# Patient Record
Sex: MALE | Race: WHITE | Employment: FULL TIME | RURAL
[De-identification: names, ages, dates, MRNs, and addresses within clinical notes are randomized per-mention and may not be internally consistent; named-entity substitution may affect disease eponyms.]

---

## 2018-08-07 ENCOUNTER — APPOINTMENT (OUTPATIENT)
Dept: CT IMAGING | Facility: HOSPITAL | Age: 53
End: 2018-08-07
Payer: COMMERCIAL

## 2018-08-07 ENCOUNTER — HOSPITAL ENCOUNTER (EMERGENCY)
Facility: HOSPITAL | Age: 53
Discharge: HOME | End: 2018-08-08
Attending: EMERGENCY MEDICINE
Payer: COMMERCIAL

## 2018-08-07 ENCOUNTER — APPOINTMENT (OUTPATIENT)
Dept: GENERAL RADIOLOGY | Facility: HOSPITAL | Age: 53
End: 2018-08-07
Payer: COMMERCIAL

## 2018-08-07 DIAGNOSIS — I10 ESSENTIAL HYPERTENSION: Primary | ICD-10-CM

## 2018-08-07 LAB
A/G RATIO: 1.6 (ref 0.8–2)
ALBUMIN SERPL-MCNC: 4.5 G/DL (ref 3.4–4.8)
ALP BLD-CCNC: 92 U/L (ref 25–100)
ALT SERPL-CCNC: 17 U/L (ref 4–36)
AMYLASE: 28 U/L (ref 20–104)
ANION GAP SERPL CALCULATED.3IONS-SCNC: 13 MMOL/L (ref 3–16)
AST SERPL-CCNC: 21 U/L (ref 8–33)
BASOPHILS ABSOLUTE: 0.1 K/UL (ref 0–0.1)
BASOPHILS RELATIVE PERCENT: 0.8 %
BILIRUB SERPL-MCNC: 0.7 MG/DL (ref 0.3–1.2)
BUN BLDV-MCNC: 20 MG/DL (ref 6–20)
CALCIUM SERPL-MCNC: 10 MG/DL (ref 8.5–10.5)
CHLORIDE BLD-SCNC: 98 MMOL/L (ref 98–107)
CO2: 26 MMOL/L (ref 20–30)
CREAT SERPL-MCNC: 1.3 MG/DL (ref 0.4–1.2)
EOSINOPHILS ABSOLUTE: 0.4 K/UL (ref 0–0.4)
EOSINOPHILS RELATIVE PERCENT: 4.9 %
GFR AFRICAN AMERICAN: >59
GFR NON-AFRICAN AMERICAN: 57
GLOBULIN: 2.8 G/DL
GLUCOSE BLD-MCNC: 246 MG/DL (ref 74–106)
HCT VFR BLD CALC: 44.3 % (ref 40–54)
HEMOGLOBIN: 15.5 G/DL (ref 13–18)
IMMATURE GRANULOCYTES #: 0 K/UL
IMMATURE GRANULOCYTES %: 0.3 % (ref 0–5)
LACTIC ACID: 1.8 MMOL/L (ref 0.4–2)
LIPASE: 23 U/L (ref 5.6–51.3)
LYMPHOCYTES ABSOLUTE: 1.7 K/UL (ref 1.5–4)
LYMPHOCYTES RELATIVE PERCENT: 22.3 %
MCH RBC QN AUTO: 30.3 PG (ref 27–32)
MCHC RBC AUTO-ENTMCNC: 35 G/DL (ref 31–35)
MCV RBC AUTO: 86.5 FL (ref 80–100)
MONOCYTES ABSOLUTE: 0.6 K/UL (ref 0.2–0.8)
MONOCYTES RELATIVE PERCENT: 8 %
NEUTROPHILS ABSOLUTE: 4.8 K/UL (ref 2–7.5)
NEUTROPHILS RELATIVE PERCENT: 63.7 %
PDW BLD-RTO: 12.5 % (ref 11–16)
PLATELET # BLD: 184 K/UL (ref 150–400)
PMV BLD AUTO: 10.7 FL (ref 6–10)
POTASSIUM SERPL-SCNC: 4.1 MMOL/L (ref 3.4–5.1)
RBC # BLD: 5.12 M/UL (ref 4.5–6)
SODIUM BLD-SCNC: 137 MMOL/L (ref 136–145)
TOTAL PROTEIN: 7.3 G/DL (ref 6.4–8.3)
TROPONIN: <0.3 NG/ML
WBC # BLD: 7.5 K/UL (ref 4–11)

## 2018-08-07 PROCEDURE — 36415 COLL VENOUS BLD VENIPUNCTURE: CPT

## 2018-08-07 PROCEDURE — 70450 CT HEAD/BRAIN W/O DYE: CPT

## 2018-08-07 PROCEDURE — 6370000000 HC RX 637 (ALT 250 FOR IP): Performed by: EMERGENCY MEDICINE

## 2018-08-07 PROCEDURE — 82150 ASSAY OF AMYLASE: CPT

## 2018-08-07 PROCEDURE — 83605 ASSAY OF LACTIC ACID: CPT

## 2018-08-07 PROCEDURE — 71045 X-RAY EXAM CHEST 1 VIEW: CPT

## 2018-08-07 PROCEDURE — 93005 ELECTROCARDIOGRAM TRACING: CPT

## 2018-08-07 PROCEDURE — 85025 COMPLETE CBC W/AUTO DIFF WBC: CPT

## 2018-08-07 PROCEDURE — 83690 ASSAY OF LIPASE: CPT

## 2018-08-07 PROCEDURE — 80053 COMPREHEN METABOLIC PANEL: CPT

## 2018-08-07 PROCEDURE — 84484 ASSAY OF TROPONIN QUANT: CPT

## 2018-08-07 PROCEDURE — 99284 EMERGENCY DEPT VISIT MOD MDM: CPT

## 2018-08-07 RX ORDER — AMLODIPINE BESYLATE 5 MG/1
10 TABLET ORAL ONCE
Status: COMPLETED | OUTPATIENT
Start: 2018-08-07 | End: 2018-08-07

## 2018-08-07 RX ORDER — ONDANSETRON 2 MG/ML
4 INJECTION INTRAMUSCULAR; INTRAVENOUS ONCE
Status: DISCONTINUED | OUTPATIENT
Start: 2018-08-07 | End: 2018-08-08 | Stop reason: HOSPADM

## 2018-08-07 RX ORDER — 0.9 % SODIUM CHLORIDE 0.9 %
1000 INTRAVENOUS SOLUTION INTRAVENOUS ONCE
Status: DISCONTINUED | OUTPATIENT
Start: 2018-08-07 | End: 2018-08-07

## 2018-08-07 RX ORDER — CLONIDINE HYDROCHLORIDE 0.1 MG/1
0.2 TABLET ORAL ONCE
Status: COMPLETED | OUTPATIENT
Start: 2018-08-07 | End: 2018-08-07

## 2018-08-07 RX ORDER — ONDANSETRON 2 MG/ML
4 INJECTION INTRAMUSCULAR; INTRAVENOUS ONCE
Status: DISCONTINUED | OUTPATIENT
Start: 2018-08-07 | End: 2018-08-07

## 2018-08-07 RX ORDER — ONDANSETRON 2 MG/ML
4 INJECTION INTRAMUSCULAR; INTRAVENOUS EVERY 30 MIN PRN
Status: DISCONTINUED | OUTPATIENT
Start: 2018-08-07 | End: 2018-08-07

## 2018-08-07 RX ORDER — AMLODIPINE BESYLATE 10 MG/1
10 TABLET ORAL DAILY
Qty: 30 TABLET | Refills: 0 | Status: SHIPPED | OUTPATIENT
Start: 2018-08-07

## 2018-08-07 RX ORDER — AMLODIPINE BESYLATE 10 MG/1
10 TABLET ORAL DAILY
COMMUNITY
End: 2018-08-07 | Stop reason: SDUPTHER

## 2018-08-07 RX ADMIN — AMLODIPINE BESYLATE 10 MG: 5 TABLET ORAL at 23:44

## 2018-08-07 RX ADMIN — CLONIDINE HYDROCHLORIDE 0.2 MG: 0.1 TABLET ORAL at 22:43

## 2018-08-08 ENCOUNTER — HOSPITAL ENCOUNTER (EMERGENCY)
Facility: HOSPITAL | Age: 53
Discharge: ANOTHER HOSPITAL | End: 2018-08-08
Attending: EMERGENCY MEDICINE
Payer: COMMERCIAL

## 2018-08-08 ENCOUNTER — HOSPITAL ENCOUNTER (INPATIENT)
Facility: HOSPITAL | Age: 53
LOS: 7 days | Discharge: NURSING, REHAB OR LONG-TERM CARE | End: 2018-08-15
Attending: HOSPITALIST | Admitting: INTERNAL MEDICINE

## 2018-08-08 VITALS
DIASTOLIC BLOOD PRESSURE: 102 MMHG | HEART RATE: 60 BPM | HEIGHT: 72 IN | TEMPERATURE: 98.4 F | WEIGHT: 200 LBS | SYSTOLIC BLOOD PRESSURE: 186 MMHG | RESPIRATION RATE: 16 BRPM | OXYGEN SATURATION: 97 % | BODY MASS INDEX: 27.09 KG/M2

## 2018-08-08 VITALS
RESPIRATION RATE: 8 BRPM | DIASTOLIC BLOOD PRESSURE: 107 MMHG | HEIGHT: 72 IN | HEART RATE: 75 BPM | TEMPERATURE: 98.8 F | WEIGHT: 210 LBS | BODY MASS INDEX: 28.44 KG/M2 | SYSTOLIC BLOOD PRESSURE: 184 MMHG | OXYGEN SATURATION: 99 %

## 2018-08-08 DIAGNOSIS — R47.1 DYSARTHRIA: ICD-10-CM

## 2018-08-08 DIAGNOSIS — Z74.09 IMPAIRED MOBILITY AND ADLS: ICD-10-CM

## 2018-08-08 DIAGNOSIS — I63.9 CEREBROVASCULAR ACCIDENT (CVA), UNSPECIFIED MECHANISM (HCC): Primary | ICD-10-CM

## 2018-08-08 DIAGNOSIS — R47.1 DYSARTHRIA: Primary | ICD-10-CM

## 2018-08-08 DIAGNOSIS — Z74.09 IMPAIRED FUNCTIONAL MOBILITY, BALANCE, GAIT, AND ENDURANCE: ICD-10-CM

## 2018-08-08 DIAGNOSIS — R53.1 RIGHT SIDED WEAKNESS: ICD-10-CM

## 2018-08-08 DIAGNOSIS — Z78.9 IMPAIRED MOBILITY AND ADLS: ICD-10-CM

## 2018-08-08 LAB
A/G RATIO: 1.5 (ref 0.8–2)
ALBUMIN SERPL-MCNC: 4.3 G/DL (ref 3.4–4.8)
ALP BLD-CCNC: 88 U/L (ref 25–100)
ALT SERPL-CCNC: 14 U/L (ref 4–36)
ANION GAP SERPL CALCULATED.3IONS-SCNC: 14 MMOL/L (ref 3–16)
AST SERPL-CCNC: 18 U/L (ref 8–33)
BASOPHILS ABSOLUTE: 0.1 K/UL (ref 0–0.1)
BASOPHILS RELATIVE PERCENT: 0.6 %
BILIRUB SERPL-MCNC: 1 MG/DL (ref 0.3–1.2)
BUN BLDV-MCNC: 19 MG/DL (ref 6–20)
CALCIUM SERPL-MCNC: 9.7 MG/DL (ref 8.5–10.5)
CHLORIDE BLD-SCNC: 97 MMOL/L (ref 98–107)
CO2: 24 MMOL/L (ref 20–30)
CREAT SERPL-MCNC: 1.2 MG/DL (ref 0.4–1.2)
EOSINOPHILS ABSOLUTE: 0.2 K/UL (ref 0–0.4)
EOSINOPHILS RELATIVE PERCENT: 2.5 %
GFR AFRICAN AMERICAN: >59
GFR NON-AFRICAN AMERICAN: >59
GLOBULIN: 2.9 G/DL
GLUCOSE BLD-MCNC: 292 MG/DL (ref 74–106)
GLUCOSE BLD-MCNC: 324 MG/DL (ref 74–106)
GLUCOSE BLDC GLUCOMTR-MCNC: 237 MG/DL (ref 70–130)
HCT VFR BLD CALC: 46.8 % (ref 40–54)
HEMOGLOBIN: 16.3 G/DL (ref 13–18)
IMMATURE GRANULOCYTES #: 0 K/UL
IMMATURE GRANULOCYTES %: 0.2 % (ref 0–5)
LYMPHOCYTES ABSOLUTE: 1.1 K/UL (ref 1.5–4)
LYMPHOCYTES RELATIVE PERCENT: 13.8 %
MCH RBC QN AUTO: 29.9 PG (ref 27–32)
MCHC RBC AUTO-ENTMCNC: 34.8 G/DL (ref 31–35)
MCV RBC AUTO: 85.7 FL (ref 80–100)
MONOCYTES ABSOLUTE: 0.6 K/UL (ref 0.2–0.8)
MONOCYTES RELATIVE PERCENT: 7.5 %
NEUTROPHILS ABSOLUTE: 6.2 K/UL (ref 2–7.5)
NEUTROPHILS RELATIVE PERCENT: 75.4 %
PDW BLD-RTO: 12.4 % (ref 11–16)
PERFORMED ON: ABNORMAL
PLATELET # BLD: 187 K/UL (ref 150–400)
PMV BLD AUTO: 10.7 FL (ref 6–10)
POTASSIUM REFLEX MAGNESIUM: 4.1 MMOL/L (ref 3.4–5.1)
RBC # BLD: 5.46 M/UL (ref 4.5–6)
SODIUM BLD-SCNC: 135 MMOL/L (ref 136–145)
TOTAL PROTEIN: 7.2 G/DL (ref 6.4–8.3)
WBC # BLD: 8.3 K/UL (ref 4–11)

## 2018-08-08 PROCEDURE — 80053 COMPREHEN METABOLIC PANEL: CPT

## 2018-08-08 PROCEDURE — G0378 HOSPITAL OBSERVATION PER HR: HCPCS

## 2018-08-08 PROCEDURE — 85025 COMPLETE CBC W/AUTO DIFF WBC: CPT

## 2018-08-08 PROCEDURE — 99285 EMERGENCY DEPT VISIT HI MDM: CPT

## 2018-08-08 PROCEDURE — 36415 COLL VENOUS BLD VENIPUNCTURE: CPT

## 2018-08-08 PROCEDURE — 82962 GLUCOSE BLOOD TEST: CPT

## 2018-08-08 PROCEDURE — 6370000000 HC RX 637 (ALT 250 FOR IP): Performed by: EMERGENCY MEDICINE

## 2018-08-08 RX ORDER — ASPIRIN 81 MG/1
324 TABLET, CHEWABLE ORAL ONCE
Status: COMPLETED | OUTPATIENT
Start: 2018-08-08 | End: 2018-08-08

## 2018-08-08 RX ADMIN — ASPIRIN 81 MG 324 MG: 81 TABLET ORAL at 18:24

## 2018-08-08 ASSESSMENT — ENCOUNTER SYMPTOMS
SHORTNESS OF BREATH: 0
WHEEZING: 0
ABDOMINAL PAIN: 0
SINUS PRESSURE: 0
TROUBLE SWALLOWING: 0
EYE PAIN: 0
BACK PAIN: 0
COUGH: 0
EYE REDNESS: 0
RHINORRHEA: 0
SORE THROAT: 0
NAUSEA: 0
CONSTIPATION: 0
CHEST TIGHTNESS: 0
DIARRHEA: 0
EYE DISCHARGE: 0
VOMITING: 0

## 2018-08-08 NOTE — ED PROVIDER NOTES
anxiety  Genitourinary:  No dysuria, no hematuria    Except as noted above the remainder of the review of systems was reviewed and negative. PAST MEDICAL HISTORY     Past Medical History:   Diagnosis Date    Diabetes mellitus ([address]     Hypertension          SURGICAL HISTORY     History reviewed. No pertinent surgical history. CURRENT MEDICATIONS       Previous Medications    AMLODIPINE (NORVASC) 10 MG TABLET    Take 1 tablet by mouth daily    METFORMIN (GLUCOPHAGE) 500 MG TABLET    Take 500 mg by mouth daily       ALLERGIES     Patient has no known allergies. FAMILY HISTORY     History reviewed. No pertinent family history. SOCIAL HISTORY       Social History     Social History    Marital status:      Spouse name: N/A    Number of children: N/A    Years of education: N/A     Social History Main Topics    Smoking status: Former Smoker     Types: Cigarettes     Quit date: 1980    Smokeless tobacco: Never Used    Alcohol use No    Drug use: No    Sexual activity: Not Asked     Other Topics Concern    None     Social History Narrative    None         PHYSICAL EXAM    (up to 7 for level 4, 8 or more for level 5)     ED Triage Vitals   BP Temp Temp src Pulse Resp SpO2 Height Weight   -- -- -- -- -- -- -- --       Physical Exam  General :Patient is awake, alert, oriented, in no acute distress, nontoxic appearing  HEENT: Pupils are equally round and reactive to light, EOMI, conjunctivae clear, sclerae white, there is no injection no icterus. Oral mucosa is moist, no exudate. Uvula is midline  Neck: Neck is supple, full range of motion, trachea midline  Cardiac: Heart regular rate, rhythm, no murmurs, rubs, or gallops  Lungs: Lungs are clear to auscultation, there is no wheezing, rhonchi, or rales. There is no use of accessory muscles. Chest wall: There is no tenderness to palpation over the chest wall or over ribs  Abdomen: Abdomen is soft, nontender, nondistended.  There is no firm including blood work which were unremarkable, that time did have some elevated blood pressures and was started on blood pressure medication. Given his continued symptoms I feel he needs to be evaluated for underlying CVA progressive [place] stroke. I did discuss the case with neurologist, Dr. Selma Kocher, who agrees the patient would benefit from further workup and evaluation at their facility regarding transfer. Patient was given full dose aspirin, we'll plan on transfer for neurological workup, MRI/MRA. Family updated on current plan of care and they are in agreement. CONSULTS:  None    PROCEDURES:  Procedures    CRITICAL CARE TIME    Total Critical Care time was 0 minutes, excluding separately reportable procedures. There was a high probability of clinically significant/life threatening deterioration in the patient's condition which required my urgent intervention. FINAL IMPRESSION      1. Cerebrovascular accident (CVA), unspecified mechanism ([address]    2. Right sided weakness    3. Dysarthria          DISPOSITION/PLAN   DISPOSITION Decision To Transfer 08/08/2018 06:02:14 PM      PATIENT REFERRED TO:  No follow-up provider specified. DISCHARGE MEDICATIONS:  New Prescriptions    No medications on file       Comment: Please note this report has been produced using speech recognition software and may contain errors related to that system including errors in grammar, punctuation, and spelling, as well as words and phrases that may be inappropriate. If there are any questions or concerns please feel free to contact the dictating provider for clarification.     Sherry Carmen DO  Attending Emergency Physician              Sherry Carmen DO  08/08/18 5793

## 2018-08-08 NOTE — ED NOTES
08/08/18 1740   Neurological   Level of Consciousness 0   Orientation Level Oriented X4   NIH/MNIHSS Stroke Scale Assessed Yes   [place]ann Coma Scale   Eye Opening 4   Best Verbal Response 5   Best Motor Response 6   [place] Coma Scale Score 15   Neurological   Neuro (WDL) X   Seizure activity No   NIH/MNHISS Stroke Scale   Interval 24 hrs post onset of symptoms +/- 20 mins   Level of Consciousness (1a. ) 0   LOC Questions (1b. ) 0   LOC Commands (1c. ) 0   Best Gaze (2. ) 0   Visual (3. ) 0   Facial Palsy (4. ) (!) 1   Motor Arm, Left (5a. ) 0   Motor Arm, Right (5b. ) 1   Motor Leg, Left (6a. ) 0   Motor Leg, Right (6b. ) 1   Limb Ataxia (7. ) (!) 1   Sensory (8. ) 0   Best Language (9. ) 0   Dysarthria (10. ) 1   Extinction and Inattention (11) (!) 1   Modified Total 6   Total 6     pt speech is mildly slurred. Family reports a change from baseline, onset of speech change was last night, reports that speech and gotten worse today. Pt reports that RUE and RLE weakness started yesterday, however reports that he was able to walk yesterday, and is having to drag extremity  today. Pt reports he started noticing balance \"issues' 2 days ago.       Shelia Washington RN  08/08/18 1800       Shelia Washington RN  08/08/18 [address], RN  08/08/18 1911

## 2018-08-08 NOTE — ED TRIAGE NOTES
Pt laying in bed, reports that he was seen yesterday for the same thing, reports that he started having Right sided weakness yesterday, and was worked up for a stroke. Pt reports that he was [facility] after midnight. And that every since then the symptoms have gotten worse. Pt reports that \"that leg just drags\", \"i cant do anything with this arm\". Pt has a noted, droop on right side of face. Pt speech noted to be mildly off, per the family. Pt has weakness in RUE , and RLE pulls/pushs. Pt is mildly tearful, reports fear of what is going on.

## 2018-08-08 NOTE — NURSING NOTE
"  ACC REVIEW REPORT: [facility]        PATIENT NAME: Mele Rossi    PATIENT ID: [id number]    BED: S 377    BED TYPE: telemetry    BED GIVEN TO: Madisyn Tompkins    TIME BED GIVEN: 1805    YOB: 1965    AGE: 53    GENDER: M    PREVIOUS ADMIT TO [facility]: no    PREVIOUS ADMISSION DATE:     PATIENT CLASS:     TODAY'S DATE: 8/8/2018    TRANSFER DATE: 8-8-18    ETA: after 1945    TRANSFERRING FACILITY: [facility]    TRANSFERRING FACILITY PHONE # : [phone]    TRANSFERRING MD: Mariajose    DATE/TIME REQUEST RECEIVED: 8-8-18@1804    [facility] RN: Karin Tabares    REPORT FROM: Madisyn Tompkins    TIME REPORT TAKEN: 1804    DIAGNOSIS: CVA    REASON FOR TRANSFER TO [facility]: higher level of care    TRANSPORTATION: EMS    CLINICAL REASON FOR TRANSFER TO [facility]: pt developed sx of being off balance on 8/6 - 24 hours on 8/7 @ 8a.m.  he was seen in the ED and CT scan was done that was negative; today he returned to the ED because his sx were worsening - balance & rt sided weakness UE & LE - CT scan was not repeated      CLINICAL INFORMATION    HEIGHT: 72\"    WEIGHT: 95.45 kg    ALLERGIES: NKA    EDMONDS: no    INFECTIOUS DISEASE:     ISOLATION:     LAST VITAL SIGNS:  TIME: 1800  TEMP:   PULSE: 88  B/P: 198/114  (SBP 8/7 ED visit was 200 - norvasc was ordered to start tonight - pt has not started this yet)  RESP:     LAB INFORMATION: CBC wnl, CMP results pending; fsbs @ 1750 was 292    CULTURE INFORMATION:     MEDS/IV FLUIDS: #18 rt ac - SL; only med given is ASA 324mg      CARDIAC SYSTEM:    CHEST PAIN: none reported    RHYTHM: NSR    Is patient taking or has patient been given any drugs that could increase bleeding? no  (Plavix, Brilinta, Effient, Eliquis, Xarelto, Warfarin, Integrilin, Angiomax)      CARDIAC NOTES: pt has a hx of HTN but was not taking any anti-hypertensive meds      RESPIRATORY SYSTEM:    OXYGEN: no    O2 SAT: 99% on RA    RESPIRATORY STATUS: no soa reported      CNS/MUSCULOSKELETAL    ALERT AND " ORIENTED:  yes    STROKE SCALE: 6 - see separate detailed NIHSS form    CAT SCAN RESULTS:negative on 8/7    CNS/MUSCULOSKELETAL NOTES: pt drags his RLE; has mild rt facial droop, mild slurred speech, RUE weakness and limb ataxia; up with assistance      GI//GY    GI//GY NOTES: using urinal    PAST MEDICAL HISTORY: htn, DM    OTHER SYMPTOM NOTES: swallow test not performed - has been NPO      Analia Tabares RN  8/8/2018  6:26 PM

## 2018-08-09 ENCOUNTER — APPOINTMENT (OUTPATIENT)
Dept: MRI IMAGING | Facility: HOSPITAL | Age: 53
End: 2018-08-09

## 2018-08-09 ENCOUNTER — APPOINTMENT (OUTPATIENT)
Dept: CARDIOLOGY | Facility: HOSPITAL | Age: 53
End: 2018-08-09
Attending: INTERNAL MEDICINE

## 2018-08-09 PROBLEM — R47.1 DYSARTHRIA: Status: ACTIVE | Noted: 2018-08-09

## 2018-08-09 PROBLEM — E11.9 DIABETES MELLITUS: Status: ACTIVE | Noted: 2018-08-09

## 2018-08-09 PROBLEM — I10 HTN (HYPERTENSION): Status: ACTIVE | Noted: 2018-08-09

## 2018-08-09 LAB
ALBUMIN SERPL-MCNC: 3.96 G/DL (ref 3.2–4.8)
ALBUMIN SERPL-MCNC: 3.98 G/DL (ref 3.2–4.8)
ALBUMIN/GLOB SERPL: 1.3 G/DL (ref 1.5–2.5)
ALBUMIN/GLOB SERPL: 1.5 G/DL (ref 1.5–2.5)
ALP SERPL-CCNC: 81 U/L (ref 25–100)
ALP SERPL-CCNC: 82 U/L (ref 25–100)
ALT SERPL W P-5'-P-CCNC: 16 U/L (ref 7–40)
ALT SERPL W P-5'-P-CCNC: 16 U/L (ref 7–40)
ANION GAP SERPL CALCULATED.3IONS-SCNC: 10 MMOL/L (ref 3–11)
ANION GAP SERPL CALCULATED.3IONS-SCNC: 16 MMOL/L (ref 3–11)
ARTICHOKE IGE QN: 69 MG/DL (ref 0–130)
AST SERPL-CCNC: 21 U/L (ref 0–33)
AST SERPL-CCNC: 22 U/L (ref 0–33)
BILIRUB SERPL-MCNC: 0.9 MG/DL (ref 0.3–1.2)
BILIRUB SERPL-MCNC: 1.1 MG/DL (ref 0.3–1.2)
BUN BLD-MCNC: 14 MG/DL (ref 9–23)
BUN BLD-MCNC: 16 MG/DL (ref 9–23)
BUN/CREAT SERPL: 11.7 (ref 7–25)
BUN/CREAT SERPL: 13.2 (ref 7–25)
CALCIUM SPEC-SCNC: 9.1 MG/DL (ref 8.7–10.4)
CALCIUM SPEC-SCNC: 9.2 MG/DL (ref 8.7–10.4)
CHLORIDE SERPL-SCNC: 102 MMOL/L (ref 99–109)
CHLORIDE SERPL-SCNC: 103 MMOL/L (ref 99–109)
CHOLEST SERPL-MCNC: 111 MG/DL (ref 0–200)
CO2 SERPL-SCNC: 28 MMOL/L (ref 20–31)
CO2 SERPL-SCNC: 29 MMOL/L (ref 20–31)
CREAT BLD-MCNC: 1.2 MG/DL (ref 0.6–1.3)
CREAT BLD-MCNC: 1.21 MG/DL (ref 0.6–1.3)
DEPRECATED RDW RBC AUTO: 39.6 FL (ref 37–54)
DEPRECATED RDW RBC AUTO: 40.4 FL (ref 37–54)
ERYTHROCYTE [DISTWIDTH] IN BLOOD BY AUTOMATED COUNT: 12.6 % (ref 11.3–14.5)
ERYTHROCYTE [DISTWIDTH] IN BLOOD BY AUTOMATED COUNT: 12.7 % (ref 11.3–14.5)
GFR SERPL CREATININE-BSD FRML MDRD: 62 ML/MIN/1.73
GFR SERPL CREATININE-BSD FRML MDRD: 63 ML/MIN/1.73
GLOBULIN UR ELPH-MCNC: 2.6 GM/DL
GLOBULIN UR ELPH-MCNC: 2.9 GM/DL
GLUCOSE BLD-MCNC: 170 MG/DL (ref 70–100)
GLUCOSE BLD-MCNC: 185 MG/DL (ref 70–100)
GLUCOSE BLDC GLUCOMTR-MCNC: 200 MG/DL (ref 70–130)
GLUCOSE BLDC GLUCOMTR-MCNC: 211 MG/DL (ref 70–130)
GLUCOSE BLDC GLUCOMTR-MCNC: 244 MG/DL (ref 70–130)
GLUCOSE BLDC GLUCOMTR-MCNC: 338 MG/DL (ref 70–130)
HBA1C MFR BLD: 10.6 % (ref 4.8–5.6)
HBA1C MFR BLD: 10.7 % (ref 4.8–5.6)
HCT VFR BLD AUTO: 44.5 % (ref 38.9–50.9)
HCT VFR BLD AUTO: 46.3 % (ref 38.9–50.9)
HDLC SERPL-MCNC: 32 MG/DL (ref 40–60)
HGB BLD-MCNC: 15.7 G/DL (ref 13.1–17.5)
HGB BLD-MCNC: 16 G/DL (ref 13.1–17.5)
INR PPP: 1.02 (ref 0.91–1.09)
MCH RBC QN AUTO: 30.2 PG (ref 27–31)
MCH RBC QN AUTO: 30.4 PG (ref 27–31)
MCHC RBC AUTO-ENTMCNC: 34.6 G/DL (ref 32–36)
MCHC RBC AUTO-ENTMCNC: 35.3 G/DL (ref 32–36)
MCV RBC AUTO: 86.1 FL (ref 80–99)
MCV RBC AUTO: 87.4 FL (ref 80–99)
PLATELET # BLD AUTO: 164 10*3/MM3 (ref 150–450)
PLATELET # BLD AUTO: 172 10*3/MM3 (ref 150–450)
PMV BLD AUTO: 10.9 FL (ref 6–12)
PMV BLD AUTO: 11.5 FL (ref 6–12)
POTASSIUM BLD-SCNC: 3.9 MMOL/L (ref 3.5–5.5)
POTASSIUM BLD-SCNC: 4.4 MMOL/L (ref 3.5–5.5)
PROT SERPL-MCNC: 6.6 G/DL (ref 5.7–8.2)
PROT SERPL-MCNC: 6.9 G/DL (ref 5.7–8.2)
PROTHROMBIN TIME: 10.7 SECONDS (ref 9.6–11.5)
RBC # BLD AUTO: 5.17 10*6/MM3 (ref 4.2–5.76)
RBC # BLD AUTO: 5.3 10*6/MM3 (ref 4.2–5.76)
SODIUM BLD-SCNC: 141 MMOL/L (ref 132–146)
SODIUM BLD-SCNC: 147 MMOL/L (ref 132–146)
TRIGL SERPL-MCNC: 117 MG/DL (ref 0–150)
TROPONIN I SERPL-MCNC: 0.01 NG/ML
TSH SERPL DL<=0.05 MIU/L-ACNC: 0.62 MIU/ML (ref 0.35–5.35)
WBC NRBC COR # BLD: 8.66 10*3/MM3 (ref 3.5–10.8)
WBC NRBC COR # BLD: 8.72 10*3/MM3 (ref 3.5–10.8)

## 2018-08-09 PROCEDURE — 63710000001 INSULIN LISPRO (HUMAN) PER 5 UNITS: Performed by: INTERNAL MEDICINE

## 2018-08-09 PROCEDURE — 99223 1ST HOSP IP/OBS HIGH 75: CPT | Performed by: INTERNAL MEDICINE

## 2018-08-09 PROCEDURE — 80061 LIPID PANEL: CPT | Performed by: INTERNAL MEDICINE

## 2018-08-09 PROCEDURE — 92523 SPEECH SOUND LANG COMPREHEN: CPT

## 2018-08-09 PROCEDURE — 99223 1ST HOSP IP/OBS HIGH 75: CPT | Performed by: PSYCHIATRY & NEUROLOGY

## 2018-08-09 PROCEDURE — 97165 OT EVAL LOW COMPLEX 30 MIN: CPT

## 2018-08-09 PROCEDURE — 85027 COMPLETE CBC AUTOMATED: CPT | Performed by: INTERNAL MEDICINE

## 2018-08-09 PROCEDURE — 83036 HEMOGLOBIN GLYCOSYLATED A1C: CPT | Performed by: INTERNAL MEDICINE

## 2018-08-09 PROCEDURE — G8979 MOBILITY GOAL STATUS: HCPCS

## 2018-08-09 PROCEDURE — 85610 PROTHROMBIN TIME: CPT | Performed by: INTERNAL MEDICINE

## 2018-08-09 PROCEDURE — 70551 MRI BRAIN STEM W/O DYE: CPT

## 2018-08-09 PROCEDURE — 80053 COMPREHEN METABOLIC PANEL: CPT | Performed by: INTERNAL MEDICINE

## 2018-08-09 PROCEDURE — 97162 PT EVAL MOD COMPLEX 30 MIN: CPT

## 2018-08-09 PROCEDURE — 84443 ASSAY THYROID STIM HORMONE: CPT | Performed by: INTERNAL MEDICINE

## 2018-08-09 PROCEDURE — 92610 EVALUATE SWALLOWING FUNCTION: CPT

## 2018-08-09 PROCEDURE — 84484 ASSAY OF TROPONIN QUANT: CPT | Performed by: INTERNAL MEDICINE

## 2018-08-09 PROCEDURE — 93306 TTE W/DOPPLER COMPLETE: CPT

## 2018-08-09 PROCEDURE — 82962 GLUCOSE BLOOD TEST: CPT

## 2018-08-09 PROCEDURE — G8978 MOBILITY CURRENT STATUS: HCPCS

## 2018-08-09 PROCEDURE — G0108 DIAB MANAGE TRN  PER INDIV: HCPCS | Performed by: REGISTERED NURSE

## 2018-08-09 PROCEDURE — 93880 EXTRACRANIAL BILAT STUDY: CPT | Performed by: INTERNAL MEDICINE

## 2018-08-09 PROCEDURE — 93306 TTE W/DOPPLER COMPLETE: CPT | Performed by: INTERNAL MEDICINE

## 2018-08-09 PROCEDURE — 93880 EXTRACRANIAL BILAT STUDY: CPT

## 2018-08-09 RX ORDER — ACETAMINOPHEN 650 MG
TABLET, EXTENDED RELEASE ORAL DAILY
Status: DISCONTINUED | OUTPATIENT
Start: 2018-08-09 | End: 2018-08-15 | Stop reason: HOSPADM

## 2018-08-09 RX ORDER — ASPIRIN 325 MG
325 TABLET ORAL DAILY
Status: DISCONTINUED | OUTPATIENT
Start: 2018-08-09 | End: 2018-08-15 | Stop reason: HOSPADM

## 2018-08-09 RX ORDER — DEXTROSE MONOHYDRATE 25 G/50ML
25 INJECTION, SOLUTION INTRAVENOUS
Status: DISCONTINUED | OUTPATIENT
Start: 2018-08-09 | End: 2018-08-15 | Stop reason: HOSPADM

## 2018-08-09 RX ORDER — AMLODIPINE BESYLATE 5 MG/1
5 TABLET ORAL
Status: DISCONTINUED | OUTPATIENT
Start: 2018-08-09 | End: 2018-08-10

## 2018-08-09 RX ORDER — AMLODIPINE BESYLATE 10 MG/1
10 TABLET ORAL DAILY
COMMUNITY
End: 2022-01-12 | Stop reason: SDUPTHER

## 2018-08-09 RX ORDER — ENALAPRILAT 2.5 MG/2ML
0.62 INJECTION INTRAVENOUS ONCE
Status: COMPLETED | OUTPATIENT
Start: 2018-08-09 | End: 2018-08-09

## 2018-08-09 RX ORDER — ASPIRIN 300 MG/1
300 SUPPOSITORY RECTAL DAILY
Status: DISCONTINUED | OUTPATIENT
Start: 2018-08-09 | End: 2018-08-15 | Stop reason: HOSPADM

## 2018-08-09 RX ORDER — SODIUM CHLORIDE 0.9 % (FLUSH) 0.9 %
1-10 SYRINGE (ML) INJECTION AS NEEDED
Status: DISCONTINUED | OUTPATIENT
Start: 2018-08-09 | End: 2018-08-15 | Stop reason: HOSPADM

## 2018-08-09 RX ORDER — ATORVASTATIN CALCIUM 40 MG/1
80 TABLET, FILM COATED ORAL NIGHTLY
Status: DISCONTINUED | OUTPATIENT
Start: 2018-08-09 | End: 2018-08-15 | Stop reason: HOSPADM

## 2018-08-09 RX ORDER — NICOTINE POLACRILEX 4 MG
15 LOZENGE BUCCAL
Status: DISCONTINUED | OUTPATIENT
Start: 2018-08-09 | End: 2018-08-15 | Stop reason: HOSPADM

## 2018-08-09 RX ADMIN — ASPIRIN 325 MG ORAL TABLET 325 MG: 325 PILL ORAL at 09:47

## 2018-08-09 RX ADMIN — INSULIN LISPRO 4 UNITS: 100 INJECTION, SOLUTION INTRAVENOUS; SUBCUTANEOUS at 20:32

## 2018-08-09 RX ADMIN — ENALAPRILAT 0.62 MG: 1.25 INJECTION INTRAVENOUS at 20:31

## 2018-08-09 RX ADMIN — INSULIN LISPRO 4 UNITS: 100 INJECTION, SOLUTION INTRAVENOUS; SUBCUTANEOUS at 17:10

## 2018-08-09 RX ADMIN — INSULIN LISPRO 7 UNITS: 100 INJECTION, SOLUTION INTRAVENOUS; SUBCUTANEOUS at 11:48

## 2018-08-09 RX ADMIN — DESMOPRESSIN ACETATE 80 MG: 0.2 TABLET ORAL at 20:32

## 2018-08-09 RX ADMIN — AMLODIPINE BESYLATE 5 MG: 5 TABLET ORAL at 12:32

## 2018-08-09 NOTE — PLAN OF CARE
Problem: Patient Care Overview  Goal: Plan of Care Review  Outcome: Ongoing (interventions implemented as appropriate)   08/09/18 0542   Coping/Psychosocial   Plan of Care Reviewed With patient   Plan of Care Review   Progress improving

## 2018-08-09 NOTE — THERAPY EVALUATION
Acute Care - Occupational Therapy Initial Evaluation  [facility]     Patient Name: Mele Rossi  : 1965  MRN: [id number]  Today's Date: 2018  Onset of Illness/Injury or Date of Surgery: 18  Date of Referral to OT: 18  Referring Physician: MD Jenn    Admit Date: 2018       ICD-10-CM ICD-9-CM   1. Dysarthria R47.1 784.51   2. Impaired mobility and ADLs Z74.09 799.89     Patient Active Problem List   Diagnosis   • Stroke (CMS/HCC)   • Diabetes mellitus (CMS/HCC)   • HTN (hypertension)   • Dysarthria     History reviewed. No pertinent past medical history.  No past surgical history on file.       OT ASSESSMENT FLOWSHEET (last 72 hours)      Occupational Therapy Evaluation     Row Name 18 1351                   OT Evaluation Time/Intention    Subjective Information no complaints  -AN        Document Type evaluation  -AN        Mode of Treatment occupational therapy  -AN        Patient Effort good  -AN        Symptoms Noted During/After Treatment none  -AN        Comment BP is high  -AN           General Information    Patient Profile Reviewed? yes  -AN        Onset of Illness/Injury or Date of Surgery 18  -AN        Referring Physician MD Jenn  -AN        Patient Observations alert;cooperative;agree to therapy  -AN        Patient/Family Observations Spouse and Sister present  -AN        General Observations of Patient Pt supine in bed watchiing TV, SCDS on  -AN        Prior Level of Function independent:;all household mobility;community mobility;gait;transfer;ADL's;bed mobility;home management;driving;work   works full time labor work  -AN        Equipment Currently Used at Home shower chair  -AN        Pertinent History of Current Functional Problem Pt admitted following onset of R side weakness, decreased balance, facial droop  -AN        Existing Precautions/Restrictions fall   R side weakness  -AN        Risks Reviewed patient:;family:;LOB;dizziness;increased  discomfort;change in vital signs  -AN        Benefits Reviewed patient:;family:;improve function;increase independence;increase strength;increase balance  -AN        Barriers to Rehab none identified  -AN           Relationship/Environment    Primary Source of Support/Comfort spouse  -AN        Lives With spouse  -AN        Primary Roles/Responsibilities wage earner, full time  -AN           Resource/Environmental Concerns    Current Living Arrangements home/apartment/condo  -AN           Home Main Entrance    Number of Stairs, Main Entrance four  -AN           Cognitive Assessment/Interventions    Additional Documentation Cognitive Assessment/Intervention (Group)  -AN           Cognitive Assessment/Intervention- PT/OT    Affect/Mental Status (Cognitive) flat/blunted affect  -AN        Orientation Status (Cognition) oriented x 4  -AN        Follows Commands (Cognition) WFL  -AN        Cognitive Function (Cognitive) attention deficit  -AN        Attention Deficit (Cognitive) mild deficit  -AN        Safety Deficit (Cognitive) insight into deficits/self awareness;mild deficit  -AN        Cognitive Interventions (Cognitive) occupation/activity based interventions  -AN        Personal Safety Interventions fall prevention program maintained  -AN           Bed Mobility Assessment/Treatment    Bed Mobility Assessment/Treatment supine-sit;sit-supine  -AN        Supine-Sit [place] (Bed Mobility) set up;supervision  -AN        Bed Mobility, Safety Issues decreased use of arms for pushing/pulling;decreased use of legs for bridging/pushing  -AN        Assistive Device (Bed Mobility) bed rails;head of bed elevated  -AN           Functional Mobility    Functional Mobility- Comment defer to PT  -AN           Transfer Assessment/Treatment    Transfer Assessment/Treatment sit-stand transfer;stand-sit transfer  -AN           Sit-Stand Transfer    Sit-Stand [place] (Transfers) contact guard  -AN           Stand-Sit Transfer     Stand-Sit [place] (Transfers) contact guard  -AN           ADL Assessment/Intervention    BADL Assessment/Intervention upper body dressing;lower body dressing;grooming;bathing  -AN           Bathing Assessment/Intervention    Bathing [place] Level lower body;moderate assist (50% patient effort)  -AN        Comment (Bathing) simulated with limited balance  -AN           Upper Body Dressing Assessment/Training    Upper Body Dressing [place] Level don;pajama/robe;minimum assist (75% patient effort)  -AN        Upper Body Dressing Position edge of bed sitting  -AN           Lower Body Dressing Assessment/Training    Lower Body Dressing [place] Level don;socks;moderate assist (50% patient effort)  -AN        Lower Body Dressing Position edge of bed sitting  -AN           Grooming Assessment/Training    Comment (Grooming) simulated set up, decreased R UE coordination  -AN           BADL Safety/Performance    Impairments, BADL Safety/Performance balance;coordination;strength  -AN        Skilled BADL Treatment/Intervention BADL process/adaptation training  -AN           General ROM    GENERAL ROM COMMENTS Tray UE WFL, slight R UE drift  -AN           General Assessment (Manual Muscle Testing)    Comment, General Manual Muscle Testing (MMT) Assessment R UE 4/5, L UE 4+/5  -AN           Motor Assessment/Interventions    Additional Documentation Balance (Group);Gross Motor Coordination (Group);Therapeutic Exercise (Group)  -AN           Gross Motor Coordination    Gross Motor Impairments balance;coordination;strength  -AN        Gross Motor Skill, Impairments Detail deficit R finger to nose, grasp  -AN           Balance    Balance static sitting balance;dynamic sitting balance;static standing balance;dynamic standing balance  -AN           Static Sitting Balance    Level of [place] (Unsupported Sitting, Static Balance) supervision  -AN        Sitting Position (Unsupported Sitting, Static Balance)  sitting on edge of bed  -AN        Time Able to Maintain Position (Unsupported Sitting, Static Balance) 3 to 4 minutes  -AN        Comment (Unsupported Sitting, Static Balance) Tray feet on floor  -AN           Dynamic Sitting Balance    Level of [place], Reaches Outside Midline (Sitting, Dynamic Balance) supervision  -AN        Sitting Position, Reaches Outside Midline (Sitting, Dynamic Balance) sitting on edge of bed  -AN        Comment, Reaches Outside Midline (Sitting, Dynamic Balance) L/R shift with Tray feet on floor  -AN           Static Standing Balance    Level of [place] (Supported Standing, Static Balance) contact guard assist  -AN        Time Able to Maintain Position (Supported Standing, Static Balance) 45 to 60 seconds  -AN           Dynamic Standing Balance    Level of [place], Reaches Outside Midline (Standing, Dynamic Balance) minimal assist, 75% patient effort  -AN        Time Able to Maintain Position, Reaches Outside Midline (Standing, Dynamic Balance) 45 to 60 seconds  -AN        Comment, Reaches Outside Midline (Standing, Dynamic Balance) L/R weight shift  -AN           Sensory Assessment/Intervention    Sensory General Assessment no sensation deficits identified  -AN        Additional Documentation Vision Assessment/Intervention (Group)  -AN           Vision Assessment/Intervention    Visual Impairment/Limitations WFL;corrective lenses full time  -AN           Positioning and Restraints    Pre-Treatment Position in bed  -AN        Post Treatment Position bed  -AN        In Bed supine;call light within reach;encouraged to call for assist;with family/caregiver;SCD pump applied  -AN           Pain Assessment    Additional Documentation Pain Scale: Numbers Pre/Post-Treatment (Group)  -AN           Pain Scale: Numbers Pre/Post-Treatment    Pain Scale: Numbers, Pretreatment 0/10 - no pain  -AN        Pain Scale: Numbers, Post-Treatment 0/10 - no pain  -AN           Wound 08/08/18 2010  Right lateral foot     Wound - Properties Group Date first assessed: 08/08/18  -TL Time first assessed: 2010  -TL Present On Admission : yes  -TL Side: Right  -TL Orientation: lateral  -TL Location: foot  -TL       Coping    Observed Emotional State calm  -AN           Plan of Care Review    Plan of Care Reviewed With patient;spouse  -AN           Clinical Impression (OT)    Date of Referral to OT 08/09/18  -AN        OT Diagnosis Impaired ADL and mobility performance  -AN        Patient/Family Goals Statement (OT Eval) Agreeable to OT  -AN        Criteria for Skilled Therapeutic Interventions Met (OT Eval) yes;treatment indicated  -AN        Rehab Potential (OT Eval) good, to achieve stated therapy goals  -AN        Therapy Frequency (OT Eval) daily  -AN        Care Plan Review (OT) evaluation/treatment results reviewed;care plan/treatment goals reviewed;risks/benefits reviewed  -AN        Anticipated Equipment Needs at Discharge (OT) front wheeled walker;raised toilet seat   grab bar  -AN        Anticipated Discharge Disposition (OT) inpatient rehabilitation facility  -AN        Patient/Family Concerns, Anticipated Discharge Disposition (OT) looking at  as option also  -AN           Vital Signs    Pre Systolic BP Rehab 178  -AN        Pre Treatment Diastolic   -AN        Post Systolic BP Rehab 197  -AN        Post Treatment Diastolic   -AN        Pretreatment Heart Rate (beats/min) 74  -AN        Posttreatment Heart Rate (beats/min) 69  -AN           Planned OT Interventions    Planned Therapy Interventions (OT Eval) BADL retraining;functional balance retraining;occupation/activity based interventions;strengthening exercise;transfer/mobility retraining  -AN        BADL Retraining    -AN           OT Goals    Transfer Goal Selection (OT) transfer, OT goal 1  -AN        Bathing Goal Selection (OT) bathing, OT goal 1  -AN        Dressing Goal Selection (OT) dressing, OT goal 1  -AN        Strength Goal  Selection (OT) strength, OT goal 1  -AN        Additional Documentation Strength Goal Selection (OT) (Row)  -AN           Transfer Goal 1 (OT)    Activity/Assistive Device (Transfer Goal 1, OT) transfers, all;walker, rolling  -AN        [place] Level/Cues Needed (Transfer Goal 1, OT) contact guard assist  -AN        Time Frame (Transfer Goal 1, OT) 10 days  -AN        Progress/Outcome (Transfer Goal 1, OT) goal ongoing  -AN           Bathing Goal 1 (OT)    Activity/Assistive Device (Bathing Goal 1, OT) bathing skills, all;long-handled sponge;shower chair  -AN        [place] Level/Cues Needed (Bathing Goal 1, OT) minimum assist (75% or more patient effort)  -AN        Time Frame (Bathing Goal 1, OT) 10 days  -AN        Progress/Outcomes (Bathing Goal 1, OT) goal ongoing  -AN           Dressing Goal 1 (OT)    Activity/Assistive Device (Dressing Goal 1, OT) dressing skills, all  -AN        [place]/Cues Needed (Dressing Goal 1, OT) minimum assist (75% or more patient effort)  -AN        Time Frame (Dressing Goal 1, OT) 10 days  -AN        Progress/Outcome (Dressing Goal 1, OT) goal ongoing  -AN           Strength Goal 1 (OT)    Strength Goal 1 (OT) Pt will be I withUE HEP to improve R UE strength  -AN        Time Frame (Strength Goal 1, OT) 10 days  -AN        Progress/Outcome (Strength Goal 1, OT) goal ongoing  -AN           Living Environment    Home Accessibility stairs to enter home  -AN          User Key  (r) = Recorded By, (t) = Taken By, (c) = Cosigned By    Initials Name Effective Dates    AN Elida Jeffrey, OT 06/22/15 -     Sherita Gomez LPN 03/14/16 -            Occupational Therapy Education     Title: PT OT SLP Therapies (Active)     Topic: Occupational Therapy (Active)     Point: ADL training (Done)     Description: Instruct learner(s) on proper safety adaptation and remediation techniques during self care or transfers.   Instruct in proper use of assistive devices.   Learning Progress  Summary     Learner Status Readiness Method Response Comment Documented by    Patient Done Acceptance E JACEK,NR Educated on OT role, pt current deficits and need for assist. Discussed POC and discharge. AN 08/09/18 1428    Family Done Acceptance E JACEK,NR Educated on OT role, pt current deficits and need for assist. Discussed POC and discharge. AN 08/09/18 1428                      User Key     Initials Effective Dates Name Provider Type Discipline    AN 06/22/15 -  Elida Jeffrey, OT Occupational Therapist OT                  OT Recommendation and Plan  Outcome Summary/Treatment Plan (OT)  Anticipated Equipment Needs at Discharge (OT): front wheeled walker, raised toilet seat (grab bar)  Anticipated Discharge Disposition (OT): inpatient rehabilitation facility  Patient/Family Concerns, Anticipated Discharge Disposition (OT): looking at HH as option also  Planned Therapy Interventions (OT Eval): BADL retraining, functional balance retraining, occupation/activity based interventions, strengthening exercise, transfer/mobility retraining  Therapy Frequency (OT Eval): daily  Plan of Care Review  Plan of Care Reviewed With: patient, spouse  Plan of Care Reviewed With: patient, spouse  Outcome Summary: Pt with current evolving symptoms that are hindering I and safety with ADL's and mobility. Pt is currently CGa for txfr, min assist dynamic balance and mod assist LB ADL's. Pt would benefit from IRF at discharge.           Outcome Measures     Row Name 08/09/18 2621             How much help from another is currently needed...    Putting on and taking off regular lower body clothing? 2  -AN      Bathing (including washing, rinsing, and drying) 2  -AN      Toileting (which includes using toilet bed pan or urinal) 2  -AN      Putting on and taking off regular upper body clothing 3  -AN      Taking care of personal grooming (such as brushing teeth) 3  -AN      Eating meals 3  -AN      Score 15  -AN         Modified Marie Scale     Modified [place] Scale 4 - Moderately severe disability.  Unable to walk without assistance, and unable to attend to own bodily needs without assistance.  -AN         Functional Assessment    Outcome Measure Options AM-PAC 6 Clicks Daily Activity (OT);Modified Marie  -AN        User Key  (r) = Recorded By, (t) = Taken By, (c) = Cosigned By    Initials Name Provider Type    Elida Trinidad OT Occupational Therapist          Time Calculation:   OT Start Time: 1351  Therapy Suggested Charges     Code   Minutes Charges    None           Therapy Charges for Today     Code Description Service Date Service Provider Modifiers Qty    [id number]  OT EVAL LOW COMPLEXITY 4 8/9/2018 Elida Jeffrey OT GO 1               Elida Jeffrey OT  8/9/2018

## 2018-08-09 NOTE — PLAN OF CARE
Problem: Patient Care Overview  Goal: Plan of Care Review  Outcome: Ongoing (interventions implemented as appropriate)   08/09/18 1430   Coping/Psychosocial   Plan of Care Reviewed With patient   Plan of Care Review   Progress no change   OTHER   Outcome Summary WOC nurse consulted to assess right great toe wound. Pt has chronic diabetic/neuropathic wound right great toe; dry and callused; cleaned with NS, Betadine applied; BRYCE. See LDA and order. Pt counseled to work with podiatrist for nail and callus care. WOC nurse will s/o. Please contact WOC nurse as needed for concerns.

## 2018-08-09 NOTE — H&P
"    [facility] Medicine Services  HISTORY AND PHYSICAL    Patient Name: Mele Rossi  : 1965  MRN: [id number]  Primary Care Physician: Kymberly Ibrahim APRN    Subjective   Subjective     Chief Complaint:  Right sided weakness     HPI:  Mele Rossi is a 53 y.o. male who has h/o HTN and DM2 but has been non compliant with his medications. Patient tells me he cannot remember the last time he took his medications. However, patient initially presented to [facility] ER with c/o right sided weakness and \"getting off balance\". Patient reports that his symptoms started at about 430PM yesterday. He does not have any pain or SOB. No report of fever, chills, nausea or vomiting. Upon presentation to the ER, patient was reportedly severely hypertenstive with SBP of 240. He had a negative work up for stroke and BP was treated. Patient was discharged home. Today, he reported again with worsening symptoms. Head CT scan was not repeated but he was noted to have a very elevated BP again. Patient was transferred to [facility] for further evaluation. I repeated the BP upon reaching patient's room and it was 170/109. Again, patient does not complain of any other symptoms except right sided weakness and occasional difficulty with swallowing.     Review of Systems   Constitutional: Negative for chills, diaphoresis, fatigue and fever.   HENT: Positive for trouble swallowing. Negative for drooling, sore throat and voice change.    Eyes: Negative for photophobia and visual disturbance.   Respiratory: Negative for cough, chest tightness, shortness of breath and wheezing.    Cardiovascular: Negative for chest pain, palpitations and leg swelling.   Gastrointestinal: Negative for abdominal distention, abdominal pain, diarrhea, nausea and vomiting.   Endocrine: Negative for polyuria.   Genitourinary: Negative for dysuria, flank pain, frequency and urgency.   Musculoskeletal: Negative for arthralgias, neck pain and " neck stiffness.   Skin: Negative for rash.   Neurological: Positive for weakness (right UE and LE). Negative for dizziness, speech difficulty, light-headedness, numbness and headaches.   Psychiatric/Behavioral: Negative for agitation, confusion and hallucinations.   All other systems reviewed and are negative.           Personal History     HTN, Diabetes, Arthritis    No past surgical history on file.    Family History: family history is not on file.     Social History:    Social History     Social History Narrative   • No narrative on file       Medications:  No prescriptions prior to admission.       No Known Allergies    Objective   Objective     Vital Signs:   Temp:  [97.3 °F (36.3 °C)-98.3 °F (36.8 °C)] 97.3 °F (36.3 °C)  Heart Rate:  [72-80] 72  Resp:  [16] 16  BP: (176-232)/(107-151) 176/107   Total (NIH Stroke Scale): 4    Physical Exam   Constitutional: No acute distress, awake, alert and oriented x 4.  Eyes: PERRLA, sclerae anicteric, no conjunctival injection  HENT: NCAT, mucous membranes moist  Neck: Supple, no thyromegaly, no lymphadenopathy, trachea midline  Respiratory: Clear to auscultation bilaterally, nonlabored respirations   Cardiovascular: RRR, no murmurs, rubs, or gallops, palpable pedal pulses bilaterally  Gastrointestinal: Positive bowel sounds, soft, nontender, nondistended  Musculoskeletal: No bilateral ankle edema, no clubbing or cyanosis to extremities  Psychiatric: Appropriate affect, cooperative  Neurologic: Oriented x 4, Power is about 3/5 in both RUE and RLE, Cranial Nerves grossly intact to confrontation, speech clear  Skin: No rashes    Results Reviewed:  I have personally reviewed current lab, radiology, and data and agree.      Results from last 7 days  Lab Units 08/09/18  0011   WBC 10*3/mm3 8.72   HEMOGLOBIN g/dL 15.7   HEMATOCRIT % 44.5   PLATELETS 10*3/mm3 172           Invalid input(s):  ALKPHOS, TROPONININT  CrCl cannot be calculated (No order found.).  Brief Urine Lab  Results     None        No results found for: BNP  Imaging Results (last 24 hours)     ** No results found for the last 24 hours. **             Assessment/Plan   Assessment / Plan     Hospital Problem List     Stroke (CMS/[facility])    Diabetes mellitus (CMS/[facility])    HTN (hypertension)            Assessment & Plan:  1. Right sided weakness  2. Uncontrolled HTN  3. Uncontrolled DM2  3. Medical non compliance.      Admit patient to observation and telemetry.  Keep npo until swallow evaluation in the morning.  Also needs PT and OT evaluation in the morning.  Will allow permissive HTN but treat SBP >180mmHg.  Will obtain brain MRI, 2D echo and bilateral carotid doppler.  Will check fasting lipids.  Will consult neurology in the morning.  Check A1c and start fingerstick glucose monitoring with insulin protocol.  Patient needs further counseling on the importance of compliance with his care.    DVT prophylaxis: bilateral SCDs.    CODE STATUS:    Code Status and Medical Interventions:   Ordered at: 08/09/18 0010     Level Of Support Discussed With:    Patient     Code Status:    CPR     Medical Interventions (Level of Support Prior to Arrest):    Full         Electronically signed by Fabio Roger MD, 08/09/18, 12:34 AM.

## 2018-08-09 NOTE — CONSULTS
" Patient was seen for management of type 2 diabetes. Discussed and taught patient about type 2 diabetes self-management, risk factors, and importance of blood glucose control to reduce complications. Target blood glucose readings and A1c goals per ADA were reviewed. Reviewed with patient current A1c of 10.7% and discussed its significance. Signs, symptoms and treatment of hyperglycemia and hypoglycemia were discussed. He shares that he is currently on metformin for his management, and \"only take it, when my glucose is elevated\" Discussed the action of metformin and that it must be taken as written by provider. He is unable to tell me how he is supposed to take it. Encouraged him to allow his wife to read the label or to call his pharmacy and have them read the label as written. He uses a [employer] meter and testing supplies and denies any difficulty testing. A copy of blood sugar goals per ADA guidelines were given to Mr Rossi.  Lifestyle changes such as physical activity with MD approval and healthy eating were encouraged. Patient was encouraged to keep record of blood glucose readings to take to follow up appointment with PCP.  OP education was also encouraged for additional education once discharged. Thank you for this referral.   "

## 2018-08-09 NOTE — PROGRESS NOTES
H and P reviewed overnight. Patient admitted with CVA. Feels a little better this am. Neurology has seen. Continue asa and statin. Echo and carotid imaging pending. Will restart amlodipine. Also needs better control of his dm2, but is very hesitant to start insulin. PT/OT eval pending.

## 2018-08-09 NOTE — CONSULTS
Attempted to see patient for diabetes education, however patient was out of room for a procedure. Noted on chart that patient was in noninvasive lab at attempted time of visit  Will attempt to see patient at a later time.   Thank you for this referral.

## 2018-08-09 NOTE — THERAPY EVALUATION
Acute Care - Speech Language Pathology Initial Evaluation  [facility]   Cognitive-Communication Evaluation  Clinical Swallow Evaluation     Patient Name: Mele Rossi  : 1965  MRN: [id number]  Today's Date: 2018               Admit Date: 2018     Visit Dx:    ICD-10-CM ICD-9-CM   1. Dysarthria R47.1 784.51     Patient Active Problem List   Diagnosis   • Stroke (CMS/HCC)   • Diabetes mellitus (CMS/HCC)   • HTN (hypertension)     History reviewed. No pertinent past medical history.  No past surgical history on file.       SLP EVALUATION (last 72 hours)      SLP SLC Evaluation     Row Name 18 0830                   Comprehension Assessment/Intervention    Comprehension Assessment/Intervention Auditory Comprehension;Reading Comprehension  -SM           Auditory Comprehension Assessment/Intervention    Auditory Comprehension (Communication) WFL  -SM           Reading Comprehension Assessment/Intervention    Reading Comprehension (Communication) WFL  -SM        Reading Comprehension, Comment slow to read at times, baseline per pt  -SM           Expression Assessment/Intervention    Expression Assessment/Intervention verbal expression   DNT writing 2' R hand weakness  -SM           Verbal Expression Assessment/Intervention    Verbal Expression WFL  -SM           Motor Speech Assessment/Intervention    Motor Speech Function mild impairment  -        Motor Speech, Comment Flaccid dysarthria c/b imprecise articulation 2' R OM weakness  -SM           Cognitive Assessment Intervention- SLP    Cognitive Function (Cognition) WFL  -SM        Orientation Status (Cognition) person;place;time;situation;WFL  -SM        Memory (Cognitive) short-term;long-term;mental manipulation;WFL  -SM        Attention (Cognitive) WFL  -SM        Thought Organization (Cognitive) WFL;mild impairment   baseline  -SM        Reasoning (Cognitive) WFL  -SM        Problem Solving (Cognitive) WFL  -        Functional Math  (Cognitive) L  -        Executive Function (Cognition) L  -        Pragmatics (Communication) [facility]  -           SLP Clinical Impressions    SLP Diagnosis Mild flaccid dysarthria  -        Rehab Potential/Prognosis good  -        Criteria for Skilled Therapy Interventions Met yes  -        Functional Impact difficulty in expressing complex messages  -           Recommendations    Therapy Frequency (SLP SLC) 5 days per week  -        Predicted Duration Therapy Intervention (Days) until discharge  -        Anticipated Dischage Disposition anticipate therapy at next level of care  -           Communication Treatment Objective and Progress Goals (SLP)    Motor Speech/Dysarthria Treatment Objectives Motor Speech/Dysarthria Treatment Objectives (Group)  -        Additional Goals Additional Goals (Group)  -           Motor Speech/Dysarthria Treatment Objectives    Articulation Selection articulation, SLP goal 1  -           Articulation Goal 1 (SLP)    Improve Articulation Goal 1 (SLP) by over-articulating at word level;by over-articulating at phrase level;by over-articulating in connected speech;100%;independently (over 90% accuracy)  -        Time Frame (Articulation Goal 1, SLP) short term goal (STG);by discharge  -           Additional Goals    Additional Goal Selection additional goal, SLP goal 1  -           Additional Goal 1 (SLP)    Additional Goal 1, SLP LTG: Improve speech in order to communicate self independently with 100% accuracy and no cues  -        Time Frame (Additional Goal 1, SLP) by discharge  -          User Key  (r) = Recorded By, (t) = Taken By, (c) = Cosigned By    Initials Name Effective Dates    Elizabeth Zelaya, MS CCC-SLP 06/22/15 -                EDUCATION  The patient has been educated in the following areas:     Cognitive Impairment Communication Impairment.    SLP Recommendation and Plan    SLP Diagnosis: Mild flaccid dysarthria    Rehab  Potential/Prognosis: good    Criteria for Skilled Therapeutic Interventions Met: no problems identified which require skilled intervention (will address oral weakness via dysarthria goals)  Criteria for Skilled Therapy Interventions Met: yes    Anticipated Dischage Disposition: anticipate therapy at next level of care              Predicted Duration Therapy Intervention (Days): until discharge          Plan of Care Review  Plan of Care Reviewed With: patient, family  Plan of Care Reviewed With: patient, family              SLP GOALS     Row Name 08/09/18 0830             Articulation Goal 1 (SLP)    Improve Articulation Goal 1 (SLP) by over-articulating at word level;by over-articulating at phrase level;by over-articulating in connected speech;100%;independently (over 90% accuracy)  -      Time Frame (Articulation Goal 1, SLP) short term goal (STG);by discharge  -         Additional Goal 1 (SLP)    Additional Goal 1, SLP LTG: Improve speech in order to communicate self independently with 100% accuracy and no cues  -      Time Frame (Additional Goal 1, SLP) by discharge  -        User Key  (r) = Recorded By, (t) = Taken By, (c) = Cosigned By    Initials Name Provider Type    Elizabeth Zelaya MS CCC-SLP Speech and Language Pathologist                      Time Calculation:           Time Calculation- SLP     Row Name 08/09/18 0943             Time Calculation- SLP    SLP Start Time 0830  -      SLP Received On 08/09/18  -        User Key  (r) = Recorded By, (t) = Taken By, (c) = Cosigned By    Initials Name Provider Type    Elizabeth Zelaya MS CCC-SLP Speech and Language Pathologist          Therapy Charges for Today     Code Description Service Date Service Provider Modifiers Qty    95404699649 HC ST EVAL ORAL PHARYNG SWALLOW 2 8/9/2018 Elizabeth Pereira MS CCC-SLP GN 1    70180412179 HC ST EVAL SPEECH AND PROD W LANG  2 8/9/2018 Elizabeth Pereira MS CCC-SLP GN 1                      Elizabeth Pereira MS CCC-SLP  2018 and Acute Care - Speech Language Pathology   Swallow Initial Evaluation  [place]     Patient Name: Mele Rossi  : 1965  MRN: [id number]  Today's Date: 2018               Admit Date: 2018    Visit Dx:     ICD-10-CM ICD-9-CM   1. Dysarthria R47.1 784.51     Patient Active Problem List   Diagnosis   • Stroke (CMS/HCC)   • Diabetes mellitus (CMS/HCC)   • HTN (hypertension)     History reviewed. No pertinent past medical history.  No past surgical history on file.       SWALLOW EVALUATION (last 72 hours)      SLP Adult Swallow Evaluation     Row Name 18 0830                   Rehab Evaluation    Document Type evaluation  -SM        Subjective Information no complaints  -        Patient Observations alert;cooperative  -           General Information    Patient Profile Reviewed yes  -SM        Pertinent History Of Current Problem L soo infarct  -        Current Method of Nutrition NPO  -SM        Precautions/Limitations, Vision corrective lenses needed for reading  -        Precautions/Limitations, Hearing WFL  -        Prior Level of Function-Communication WFL  -SM        Prior Level of Function-Swallowing safe, efficient swallowing in all situations  -        Plans/Goals Discussed with patient and family;agreed upon  -        Barriers to Rehab none identified  -        Patient's Goals for Discharge return to regular diet  -        Family Goals for Discharge family did not state  -           Pain Assessment    Additional Documentation Pain Scale: Numbers Pre/Post-Treatment (Group)  -SM           Pain Scale: Numbers Pre/Post-Treatment    Pain Scale: Numbers, Pretreatment 0/10 - no pain  -SM        Pain Scale: Numbers, Post-Treatment 0/10 - no pain  -SM           Oral Musculature and Cranial Nerve Assessment    Oral Labial or Buccal Impairment, Detail, Cranial Nerve VII (Facial): right labial droop  -SM        Lingual Impairment,  Detail. Cranial Nerves IX, XII (Glossopharyngeal and Hypoglossal) reduced strength right;other (see comments)   tongue deviation to R  -SM           Clinical Swallow Eval    Oral Prep Phase WFL  -SM        Oral Transit WFL  -SM        Oral Residue WFL  -SM        Pharyngeal Phase no overt signs/symptoms of pharyngeal impairment  -           Clinical Impression    SLP Swallowing Diagnosis other (see comments)   Swallow WFL  -SM        Criteria for Skilled Therapeutic Interventions Met no problems identified which require skilled intervention   will address oral weakness via dysarthria goals  -           Recommendations    SLP Diet Recommendation regular textures;thin liquids  -        SLP Rec. for Method of Medication Administration meds whole;with thin liquids  -          User Key  (r) = Recorded By, (t) = Taken By, (c) = Cosigned By    Initials Name Effective Dates    Elizabeth Zelaya MS CCC-SLP 06/22/15 -         EDUCATION  The patient has been educated in the following areas:   Dysphagia (Swallowing Impairment) Modified Diet Instruction.    SLP Recommendation and Plan  SLP Swallowing Diagnosis: other (see comments) (Swallow WFL)  SLP Diet Recommendation: regular textures, thin liquids              Criteria for Skilled Therapeutic Interventions Met: no problems identified which require skilled intervention (will address oral weakness via dysarthria goals)  Anticipated Dischage Disposition: anticipate therapy at next level of care        Predicted Duration Therapy Intervention (Days): until discharge       Plan of Care Reviewed With: patient, family  Plan of Care Review  Plan of Care Reviewed With: patient, family          SLP GOALS     Row Name 08/09/18 0830             Articulation Goal 1 (SLP)    Improve Articulation Goal 1 (SLP) by over-articulating at word level;by over-articulating at phrase level;by over-articulating in connected speech;100%;independently (over 90% accuracy)  -      Time  Frame (Articulation Goal 1, SLP) short term goal (STG);by discharge  -         Additional Goal 1 (SLP)    Additional Goal 1, SLP LTG: Improve speech in order to communicate self independently with 100% accuracy and no cues  -      Time Frame (Additional Goal 1, SLP) by discharge  -        User Key  (r) = Recorded By, (t) = Taken By, (c) = Cosigned By    Initials Name Provider Type    Elizabeth Zelaya MS CCC-SLP Speech and Language Pathologist               Time Calculation:         Time Calculation- SLP     Row Name 08/09/18 0943             Time Calculation- SLP    SLP Start Time 0830  -      SLP Received On 08/09/18  -        User Key  (r) = Recorded By, (t) = Taken By, (c) = Cosigned By    Initials Name Provider Type    Elizabeth Zelaya MS CCC-SLP Speech and Language Pathologist          Therapy Charges for Today     Code Description Service Date Service Provider Modifiers Qty    64627782153 HC ST EVAL ORAL PHARYNG SWALLOW 2 8/9/2018 Elizabeth Pereira MS CCC-SLP GN 1    82951487529 HC ST EVAL SPEECH AND PROD W LANG  2 8/9/2018 Elizabeth Pereira MS CCC-SLP GN 1               Elizabeth Pereira MS CCC-JOELLEN  8/9/2018

## 2018-08-09 NOTE — PLAN OF CARE
Problem: Patient Care Overview  Goal: Plan of Care Review  Outcome: Ongoing (interventions implemented as appropriate)   08/09/18 2046   Coping/Psychosocial   Plan of Care Reviewed With patient   Plan of Care Review   Progress improving   OTHER   Outcome Summary PT eval completed. Pt. presents w/ evolving symptoms including dec strength, balance deficits, dec coordination and motor control, and gait instability, impacting patient's safety and ind. w/ functional mobility. Pt. required CGA for transfers and min A for gait, ambulating 60 ft w/ RW (dec motor control R LE). Pt. will benefit from skilled IPPT to address impairments and promote return to PLOF. Recommend IP rehab at D/C.

## 2018-08-09 NOTE — PLAN OF CARE
Problem: Patient Care Overview  Goal: Plan of Care Review  Outcome: Ongoing (interventions implemented as appropriate)   08/08/18 2302   Coping/Psychosocial   Plan of Care Reviewed With patient   Plan of Care Review   Progress improving   Coping/Psychosocial   Patient Agreement with Plan of Care agrees       Problem: Stroke (Ischemic) (Adult)  Goal: Signs and Symptoms of Listed Potential Problems Will be Absent, Minimized or Managed (Stroke)  Outcome: Ongoing (interventions implemented as appropriate)   08/08/18 2302   Goal/Outcome Evaluation   Problems Assessed (Stroke (Ischemic)) all   Problems Assessed (Stroke (Ischemic)) eating/swallowing impairment

## 2018-08-09 NOTE — PLAN OF CARE
Problem: Patient Care Overview  Goal: Plan of Care Review  Outcome: Ongoing (interventions implemented as appropriate)   08/09/18 0941   Coping/Psychosocial   Plan of Care Reviewed With patient;family   SLP evaluation completed. Dysphagia Eval: Swallow WFL. Safe for regular diet and thin liquids - R oral motor weakness not impacting. Cog-Comm Eval: Mild flaccid dysarthria with imprecise articulation. All other skills WFL. Will follow for dysarthria tx. Please see note for further details and recommendations.

## 2018-08-09 NOTE — DISCHARGE PLACEMENT REQUEST
"Sherry Zuniga RN  Case Management  P: [phone]    Referral for swing bed unit      Mele Gómez  (53 y.o. Male)     Date of Birth Social Security Number Address Home Phone MRN    1965  [place] [address] [phone] [id number]    [employer] Mar[employer] Status          None        Admission Date Admission Type Admitting Provider Attending Provider Department, Room/Bed    18 Urgent Sherry Ybarra II, DO Sherry Ybarra II,  [facility] 3H, S377/1    Discharge Date Discharge Disposition Discharge Destination                       Attending Provider:  Sherry Ybarra II, DO    Allergies:  No Known Allergies    Isolation:  None   Infection:  None   Code Status:  CPR    Ht:  182.9 cm (72\")   Wt:  94.3 kg (208 lb)    Admission Cmt:  None   Principal Problem:  None                Active Insurance as of 2018     Primary Coverage     Payor Plan Insurance Group Employer/Plan Group    [employer]HE[employer] BLUE CROSS On license of [facility] [id number]     Payor Plan Address Payor Plan Phone Number Effective From Effective To    [address] [phone] 2017     [facility] [id number]       Subscriber Name Subscriber Birth Date Member ID       MELE GÓMEZ 1965 [id number]                 Emergency Contacts      (Rel.) Home Phone Work Phone Mobile Phone    Lexy Gómez (Spouse) [phone] -- --               History & Physical      Fabio Roger MD at 2018 12:34 AM              [facility] Medicine Services  HISTORY AND PHYSICAL    Patient Name: Mele Gómez  : 1965  MRN: [id number]  Primary Care Physician: Kymberly Ibrahim APRN    Subjective   Subjective     Chief Complaint:  Right sided weakness     HPI:  Mele Gómez is a 53 y.o. male who has h/o HTN and DM2 but has been non compliant with his medications. Patient tells me he cannot remember the last time he took his medications. However, patient initially " "presented to [facility] ER with c/o right sided weakness and \"getting off balance\". Patient reports that his symptoms started at about 430PM yesterday. He does not have any pain or SOB. No report of fever, chills, nausea or vomiting. Upon presentation to the ER, patient was reportedly severely hypertenstive with SBP of 240. He had a negative work up for stroke and BP was treated. Patient was discharged home. Today, he reported again with worsening symptoms. Head CT scan was not repeated but he was noted to have a very elevated BP again. Patient was transferred to [facility] for further evaluation. I repeated the BP upon reaching patient's room and it was 170/109. Again, patient does not complain of any other symptoms except right sided weakness and occasional difficulty with swallowing.     Review of Systems   Constitutional: Negative for chills, diaphoresis, fatigue and fever.   HENT: Positive for trouble swallowing. Negative for drooling, sore throat and voice change.    Eyes: Negative for photophobia and visual disturbance.   Respiratory: Negative for cough, chest tightness, shortness of breath and wheezing.    Cardiovascular: Negative for chest pain, palpitations and leg swelling.   Gastrointestinal: Negative for abdominal distention, abdominal pain, diarrhea, nausea and vomiting.   Endocrine: Negative for polyuria.   Genitourinary: Negative for dysuria, flank pain, frequency and urgency.   Musculoskeletal: Negative for arthralgias, neck pain and neck stiffness.   Skin: Negative for rash.   Neurological: Positive for weakness (right UE and LE). Negative for dizziness, speech difficulty, light-headedness, numbness and headaches.   Psychiatric/Behavioral: Negative for agitation, confusion and hallucinations.   All other systems reviewed and are negative.           Personal History     HTN, Diabetes, Arthritis    No past surgical history on file.    Family History: family history is not on file.     Social " History:    Social History     Social History Narrative   • No narrative on file       Medications:  No prescriptions prior to admission.       No Known Allergies    Objective   Objective     Vital Signs:   Temp:  [97.3 °F (36.3 °C)-98.3 °F (36.8 °C)] 97.3 °F (36.3 °C)  Heart Rate:  [72-80] 72  Resp:  [16] 16  BP: (176-232)/(107-151) 176/107   Total (NIH Stroke Scale): 4    Physical Exam   Constitutional: No acute distress, awake, alert and oriented x 4.  Eyes: PERRLA, sclerae anicteric, no conjunctival injection  HENT: NCAT, mucous membranes moist  Neck: Supple, no thyromegaly, no lymphadenopathy, trachea midline  Respiratory: Clear to auscultation bilaterally, nonlabored respirations   Cardiovascular: RRR, no murmurs, rubs, or gallops, palpable pedal pulses bilaterally  Gastrointestinal: Positive bowel sounds, soft, nontender, nondistended  Musculoskeletal: No bilateral ankle edema, no clubbing or cyanosis to extremities  Psychiatric: Appropriate affect, cooperative  Neurologic: Oriented x 4, Power is about 3/5 in both RUE and RLE, Cranial Nerves grossly intact to confrontation, speech clear  Skin: No rashes    Results Reviewed:  I have personally reviewed current lab, radiology, and data and agree.      Results from last 7 days  Lab Units 08/09/18  0011   WBC 10*3/mm3 8.72   HEMOGLOBIN g/dL 15.7   HEMATOCRIT % 44.5   PLATELETS 10*3/mm3 172           Invalid input(s):  ALKPHOS, TROPONININT  CrCl cannot be calculated (No order found.).  Brief Urine Lab Results     None        No results found for: BNP  Imaging Results (last 24 hours)     ** No results found for the last 24 hours. **             Assessment/Plan   Assessment / Plan     Hospital Problem List     Stroke (CMS/HCC)    Diabetes mellitus (CMS/[facility])    HTN (hypertension)            Assessment & Plan:  1. Right sided weakness  2. Uncontrolled HTN  3. Uncontrolled DM2  3. Medical non compliance.      Admit patient to observation and telemetry.  Keep npo until  "swallow evaluation in the morning.  Also needs PT and OT evaluation in the morning.  Will allow permissive HTN but treat SBP >180mmHg.  Will obtain brain MRI, 2D echo and bilateral carotid doppler.  Will check fasting lipids.  Will consult neurology in the morning.  Check A1c and start fingerstick glucose monitoring with insulin protocol.  Patient needs further counseling on the importance of compliance with his care.    DVT prophylaxis: bilateral SCDs.    CODE STATUS:    Code Status and Medical Interventions:   Ordered at: 08/09/18 0010     Level Of Support Discussed With:    Patient     Code Status:    CPR     Medical Interventions (Level of Support Prior to Arrest):    Full         Electronically signed by Fabio Roger MD, 08/09/18, 12:34 AM.        Electronically signed by Fabio Roger MD at 8/9/2018  1:00 AM       Hospital Medications (active)       Dose Frequency Start End    amLODIPine (NORVASC) tablet 5 mg 5 mg Every 24 Hours Scheduled 8/9/2018     Sig - Route: Take 1 tablet by mouth Daily. - Oral    aspirin suppository 300 mg 300 mg Daily 8/9/2018     Sig - Route: Insert 1 suppository into the rectum Daily. - Rectal    Linked Group 1:  \"Or\" Linked Group Details        aspirin tablet 325 mg 325 mg Daily 8/9/2018     Sig - Route: Take 1 tablet by mouth Daily. - Oral    Linked Group 1:  \"Or\" Linked Group Details        atorvastatin (LIPITOR) tablet 80 mg 80 mg Nightly 8/9/2018     Sig - Route: Take 2 tablets by mouth Every Night. - Oral    dextrose (D50W) solution 25 g 25 g Every 15 Minutes PRN 8/9/2018     Sig - Route: Infuse 50 mL into a venous catheter Every 15 (Fifteen) Minutes As Needed for Low Blood Sugar (Blood Sugar Less Than 70). - Intravenous    dextrose (D50W) solution 25 g 25 g Every 15 Minutes PRN 8/9/2018     Sig - Route: Infuse 50 mL into a venous catheter Every 15 (Fifteen) Minutes As Needed for Low Blood Sugar (Blood Sugar Less Than 70). - Intravenous    dextrose (GLUTOSE) oral " gel 15 g 15 g Every 15 Minutes PRN 8/9/2018     Sig - Route: Take 15 g by mouth Every 15 (Fifteen) Minutes As Needed for Low Blood Sugar (Blood sugar less than 70). - Oral    dextrose (GLUTOSE) oral gel 15 g 15 g Every 15 Minutes PRN 8/9/2018     Sig - Route: Take 15 g by mouth Every 15 (Fifteen) Minutes As Needed for Low Blood Sugar (Blood sugar less than 70). - Oral    glucagon (human recombinant) (GLUCAGEN DIAGNOSTIC) injection 1 mg 1 mg As Needed 8/9/2018     Sig - Route: Inject 1 mg under the skin into the appropriate area as directed As Needed (Blood Glucose Less Than 70). - Subcutaneous    glucagon (human recombinant) (GLUCAGEN DIAGNOSTIC) injection 1 mg 1 mg As Needed 8/9/2018     Sig - Route: Inject 1 mg under the skin into the appropriate area as directed As Needed (Blood Glucose Less Than 70). - Subcutaneous    insulin lispro (humaLOG) injection 0-9 Units 0-9 Units 4 Times Daily With Meals & Nightly 8/9/2018     Sig - Route: Inject 0-9 Units under the skin into the appropriate area as directed 4 (Four) Times a Day With Meals & at Bedtime. - Subcutaneous    sodium chloride 0.9 % flush 1-10 mL 1-10 mL As Needed 8/9/2018     Sig - Route: Infuse 1-10 mL into a venous catheter As Needed for Line Care. - Intravenous             Physician Progress Notes (most recent note)      Sherry Ybarra II, DO at 8/9/2018 11:51 AM        H and P reviewed overnight. Patient admitted with CVA. Feels a little better this am. Neurology has seen. Continue asa and statin. Echo and carotid imaging pending. Will restart amlodipine. Also needs better control of his dm2, but is very hesitant to start insulin. PT/OT eval pending.    Electronically signed by Sherry Ybarra II, DO at 8/9/2018 11:53 AM          Consult Notes (most recent note)      Varun Gayle MD at 8/9/2018  7:42 AM      Consult Orders:    1. Inpatient Neurology Consult [[id number]] ordered by Fabio Roger MD at 08/09/18 0009              Subjective  "    CC: stroke    History of Present Illness   Mele Rossi is a 53 y.o. male is seen today in consultation at the request of Dr. Roger for stroke. Briefly, Mr. Rossi initially noted feeling off-balanced 3 days ago, and worsening right hemiparesis over the next couple days. This was initially associated with malignant hypertension. However, because his symptoms continued to worsen he presented to his local ED and was subsequently transferred here overnight. He also noted associated dysarthria, but believes that his symptoms have begun to improve overnight. No modifying factors have been noted.    It is noted that he has had essentially untreated HTN, and takes no medications regularly at home.    I have reviewed and confirmed the past family, social and medical history as accurate on 8/9/18.    PMH: HTN, DM  FH: mother with stroke     Review of Systems   Constitutional: Negative.    Respiratory: Negative.    Cardiovascular: Negative.    Gastrointestinal: Negative.    Genitourinary: Negative.    Musculoskeletal: Negative.    All other systems reviewed and are negative.      Objective   General appearance today is normal.   Peripheral pulses were present and symmetric.   The ophthalmoscopic exam today is unremarkable. This discs and posterior elements are unremarkable.    BP (!) 177/106 (BP Location: Right arm, Patient Position: Lying)   Pulse 61   Temp 97.9 °F (36.6 °C) (Temporal Artery )   Resp 16   Ht 182.9 cm (72\")   Wt 94.3 kg (208 lb)   SpO2 96%   BMI 28.21 kg/m²      Physical Exam   Constitutional: He is oriented to person, place, and time.   Neurological: He is oriented to person, place, and time. He has an abnormal Finger-Nose-Finger Test (dysmetric on the right). Gait normal.   Reflex Scores:       Tricep reflexes are 1+ on the right side and 1+ on the left side.       Bicep reflexes are 1+ on the right side and 1+ on the left side.       Brachioradialis reflexes are 1+ on the right side and 1+ on the " left side.       Patellar reflexes are 1+ on the right side and 1+ on the left side.       Achilles reflexes are 1+ on the right side and 1+ on the left side.  Psychiatric: His speech is normal.        Neurologic Exam     Mental Status   Oriented to person, place, and time.   Registration: recalls 3 of 3 objects. Recall at 5 minutes: recalls 3 of 3 objects. Follows 3 step commands.   Attention: normal.   Speech: speech is normal   Level of consciousness: alert  Knowledge: good.   Normal comprehension.     Cranial Nerves   Cranial nerves II through XII intact.   Except for very mild right facial paresis     Motor Exam   Muscle bulk: normal  Overall muscle tone: normalRight hemiparesis at 4+/5     Sensory Exam   Light touch normal.     Gait, Coordination, and Reflexes     Gait  Gait: normal    Coordination   Finger to nose coordination: abnormal (dysmetric on the right)    Reflexes   Right brachioradialis: 1+  Left brachioradialis: 1+  Right biceps: 1+  Left biceps: 1+  Right triceps: 1+  Left triceps: 1+  Right patellar: 1+  Left patellar: 1+  Right achilles: 1+  Left achilles: 1+  Right plantar: equivocal  Left plantar: equivocal      Laboratory and radiological testing are notable for an MRI confirming a left ventral pontine infarct (I reviewed images personally). CMP/CBC are unremarkable. HgbA1c=10.70. LDL=69.      Assessment/Plan     Mele Rossi is admitted with stroke. This is likely secondary to intracranial atherosclerotic disease in a patient with multiple poorly controlled risk factors (notably HTN, DM), which I discussed. Therefore I agree with current treatment including ASA and Lipitor along with risk factor modification. I'll await his ECHO and Carotid Dopplers (though admittedly any carotid stenosis would be asymptomatic in this case). If these studies are normal, then I will not have further recommendations and management will likely hinge on the need for inpatient vs outpatient rehab.    As part of  this visit I reviewed prior lab results, reviewed radiology results, reviewed radiology images, obtained additional history from the family which is incorporated in the HPI, reviewed records from the current hospitalization which is incorporated in the HPI and ED records from [place]. Please see above for details.    Electronically signed by Varun Gayle MD at 8/9/2018  7:50 AM

## 2018-08-09 NOTE — PROGRESS NOTES
Discharge Planning Assessment  [facility]     Patient Name: Mele Rossi  MRN: [id number]  Today's Date: 8/9/2018    Admit Date: 8/8/2018          Discharge Needs Assessment     Row Name 08/09/18 8900       Living Environment    Lives With spouse    Current Living Arrangements home/apartment/condo    Primary Care Provided by self    Provides Primary Care For no one    Family Caregiver if Needed spouse    Quality of Family Relationships helpful;involved;supportive    Able to Return to Prior Arrangements yes       Resource/Environmental Concerns    Resource/Environmental Concerns none    Transportation Concerns car, none       Transition Planning    Patient/Family Anticipates Transition to home with family    Patient/Family Anticipated Services at Transition home health care;rehabilitation services    Transportation Anticipated family or friend will provide       Discharge Needs Assessment    Readmission Within the Last 30 Days no previous admission in last 30 days    Concerns to be Addressed decision making;discharge planning    Equipment Currently Used at Home none    Anticipated Changes Related to Illness inability to care for self    Equipment Needed After Discharge other (see comments)   unknown    Discharge Facility/Level of Care Needs home with home health;rehabilitation facility    Offered/Gave Vendor List yes            Discharge Plan     Row Name 08/09/18 2730       Plan    Plan Goal is home with home health    Patient/Family in Agreement with Plan yes    Plan Comments Met with patient and spouse at bedside to initiate discharge planning. They live in one-story home, with a few steps to the entrance, in [facility]. He was independent with all ADLs prior to admission and did not use any DME or home health services. His goal is to return home at discharge with [facility] if needed. If inpatient rehab is needed, they would prefer [facility] swing bed unit in [place] [facility]. PT/OT anselmo are still  pending. CM will follow up after evals are complete and make necessary referrals. Sherry Zuniga RN [phone]        Destination     No service coordination in this encounter.      Durable Medical Equipment     No service coordination in this encounter.      Dialysis/Infusion     No service coordination in this encounter.      Home Medical Care     No service coordination in this encounter.      Social Care     No service coordination in this encounter.                Demographic Summary     Row Name 08/09/18 1339       General Information    Arrived From emergency department    Referral Source admission list;physician    Reason for Consult discharge planning    Preferred Language English            Functional Status     Row Name 08/09/18 1339       Functional Status    Usual Activity Tolerance moderate    Current Activity Tolerance fair       Functional Status, IADL    Medications independent    Meal Preparation independent    Housekeeping independent    Laundry independent    Shopping independent       Employment/    Employment/ Comments [place] [employer] PPO with Rx medication coverage            Psychosocial    No documentation.           Abuse/Neglect    No documentation.           Legal    No documentation.           Substance Abuse    No documentation.           Patient Forms    No documentation.         Sherry Zuniga RN

## 2018-08-09 NOTE — CONSULTS
"Subjective     CC: stroke    History of Present Illness   Mele Rossi is a 53 y.o. male is seen today in consultation at the request of Dr. Roger for stroke. Briefly, Mr. Rossi initially noted feeling off-balanced 3 days ago, and worsening right hemiparesis over the next couple days. This was initially associated with malignant hypertension. However, because his symptoms continued to worsen he presented to his local ED and was subsequently transferred here overnight. He also noted associated dysarthria, but believes that his symptoms have begun to improve overnight. No modifying factors have been noted.    It is noted that he has had essentially untreated HTN, and takes no medications regularly at home.    I have reviewed and confirmed the past family, social and medical history as accurate on 8/9/18.    PMH: HTN, DM  FH: mother with stroke     Review of Systems   Constitutional: Negative.    Respiratory: Negative.    Cardiovascular: Negative.    Gastrointestinal: Negative.    Genitourinary: Negative.    Musculoskeletal: Negative.    All other systems reviewed and are negative.      Objective   General appearance today is normal.   Peripheral pulses were present and symmetric.   The ophthalmoscopic exam today is unremarkable. This discs and posterior elements are unremarkable.    BP (!) 177/106 (BP Location: Right arm, Patient Position: Lying)   Pulse 61   Temp 97.9 °F (36.6 °C) (Temporal Artery )   Resp 16   Ht 182.9 cm (72\")   Wt 94.3 kg (208 lb)   SpO2 96%   BMI 28.21 kg/m²     Physical Exam   Constitutional: He is oriented to person, place, and time.   Neurological: He is oriented to person, place, and time. He has an abnormal Finger-Nose-Finger Test (dysmetric on the right). Gait normal.   Reflex Scores:       Tricep reflexes are 1+ on the right side and 1+ on the left side.       Bicep reflexes are 1+ on the right side and 1+ on the left side.       Brachioradialis reflexes are 1+ on the right side and 1+ " on the left side.       Patellar reflexes are 1+ on the right side and 1+ on the left side.       Achilles reflexes are 1+ on the right side and 1+ on the left side.  Psychiatric: His speech is normal.        Neurologic Exam     Mental Status   Oriented to person, place, and time.   Registration: recalls 3 of 3 objects. Recall at 5 minutes: recalls 3 of 3 objects. Follows 3 step commands.   Attention: normal.   Speech: speech is normal   Level of consciousness: alert  Knowledge: good.   Normal comprehension.     Cranial Nerves   Cranial nerves II through XII intact.   Except for very mild right facial paresis     Motor Exam   Muscle bulk: normal  Overall muscle tone: normalRight hemiparesis at 4+/5     Sensory Exam   Light touch normal.     Gait, Coordination, and Reflexes     Gait  Gait: normal    Coordination   Finger to nose coordination: abnormal (dysmetric on the right)    Reflexes   Right brachioradialis: 1+  Left brachioradialis: 1+  Right biceps: 1+  Left biceps: 1+  Right triceps: 1+  Left triceps: 1+  Right patellar: 1+  Left patellar: 1+  Right achilles: 1+  Left achilles: 1+  Right plantar: equivocal  Left plantar: equivocal      Laboratory and radiological testing are notable for an MRI confirming a left ventral pontine infarct (I reviewed images personally). CMP/CBC are unremarkable. HgbA1c=10.70. LDL=69.      Assessment/Plan     Mele Rossi is admitted with stroke. This is likely secondary to intracranial atherosclerotic disease in a patient with multiple poorly controlled risk factors (notably HTN, DM), which I discussed. Therefore I agree with current treatment including ASA and Lipitor along with risk factor modification. I'll await his ECHO and Carotid Dopplers (though admittedly any carotid stenosis would be asymptomatic in this case). If these studies are normal, then I will not have further recommendations and management will likely hinge on the need for inpatient vs outpatient rehab.    As part  of this visit I reviewed prior lab results, reviewed radiology results, reviewed radiology images, obtained additional history from the family which is incorporated in the HPI, reviewed records from the current hospitalization which is incorporated in the HPI and ED records from [place]. Please see above for details.

## 2018-08-09 NOTE — PLAN OF CARE
Problem: Patient Care Overview  Goal: Plan of Care Review  Outcome: Ongoing (interventions implemented as appropriate)   08/09/18 1632   Coping/Psychosocial   Plan of Care Reviewed With patient   Plan of Care Review   Progress improving   Coping/Psychosocial   Patient Agreement with Plan of Care agrees   OTHER   Outcome Summary BP remains elevated. MD aware, norvasc ordered. NIHSS a 5 today. Passed swallow eval per speech. Pt. AxO, up with assist of 1, voiding , eating well. Some slurred speech and right sided weakness. Pt. seen by WOC for right great toe callus. Skin care per orders. Still awaiting echo and carotid duplex. Continue to monitor.

## 2018-08-09 NOTE — THERAPY EVALUATION
Acute Care - Physical Therapy Initial Evaluation  [facility]     Patient Name: Mele Rossi  : 1965  MRN: [id number]  Today's Date: 2018   Onset of Illness/Injury or Date of Surgery: 18  Date of Referral to PT: 18  Referring Physician: DANIEL Barajas MD      Admit Date: 2018    Visit Dx:     ICD-10-CM ICD-9-CM   1. Dysarthria R47.1 784.51   2. Impaired mobility and ADLs Z74.09 799.89   3. Impaired functional mobility, balance, gait, and endurance Z74.09 V49.89     Patient Active Problem List   Diagnosis   • Stroke (CMS/HCC)   • Diabetes mellitus (CMS/HCC)   • HTN (hypertension)   • Dysarthria     History reviewed. No pertinent past medical history.  History reviewed. No pertinent surgical history.     PT ASSESSMENT (last 12 hours)      Physical Therapy Evaluation     Row Name 18 1429          PT Evaluation Time/Intention    Subjective Information no complaints  -MB     Document Type evaluation  -MB     Mode of Treatment physical therapy  -MB     Patient Effort good  -MB     Symptoms Noted During/After Treatment none  -MB     Comment elevated BP  -MB     Row Name 18 1429          General Information    Patient Profile Reviewed? yes  -MB     Onset of Illness/Injury or Date of Surgery 18  -MB     Referring Physician DANIEL Barajas MD  -MB     Patient Observations alert;cooperative;agree to therapy  -MB     Patient/Family Observations No family present at bedside.  -MB     General Observations of Patient Pt.supine, on RA, telemetry, IV heplocked, SCDS doffed.  RN consent for PT.  -MB     Prior Level of Function independent:;all household mobility;community mobility;gait;transfer;bed mobility;ADL's;home management;work;using stairs  -MB     Equipment Currently Used at Home shower chair  -MB     Pertinent History of Current Functional Problem Pt. presented to OSH w/ R side weakness, decreased balance, facial droop.  Pt. found hypertensive in ED ().  Pt. transferred to  [facility] for higher level of care.  MRI revealed acute infarct ventral L soo, no intracranial hemorrhage.  -MB     Existing Precautions/Restrictions fall   R side weakness  -MB     Risks Reviewed patient:;LOB;nausea/vomiting;dizziness;increased discomfort;change in vital signs  -MB     Benefits Reviewed patient:;improve function;increase independence;increase strength;increase balance;decrease pain;decrease risk of DVT;improve skin integrity;increase knowledge  -MB     Barriers to Rehab none identified  -MB     Row Name 08/09/18 1429          Relationship/Environment    Primary Source of Support/Comfort spouse  -MB     Lives With spouse  -MB     Primary Roles/Responsibilities wage earner, full time  -MB     Row Name 08/09/18 1429          Resource/Environmental Concerns    Current Living Arrangements home/apartment/condo  -MB     Row Name 08/09/18 1429          Home Main Entrance    Number of Stairs, Main Entrance four  -MB     Row Name 08/09/18 1429          Cognitive Assessment/Intervention- PT/OT    Affect/Mental Status (Cognitive) flat/blunted affect  -MB     Orientation Status (Cognition) oriented x 4  -MB     Follows Commands (Cognition) WFL  -MB     Safety Deficit (Cognitive) mild deficit;insight into deficits/self awareness  -MB     Personal Safety Interventions fall prevention program maintained;gait belt;muscle strengthening facilitated;nonskid shoes/slippers when out of bed  -MB     Row Name 08/09/18 1429          Safety Issues, Functional Mobility    Impairments Affecting Function (Mobility) balance;coordination;strength  -MB     Row Name 08/09/18 1429          Bed Mobility Assessment/Treatment    Bed Mobility Assessment/Treatment supine-sit  -MB     Supine-Sit [place] (Bed Mobility) supervision;verbal cues  -MB     Bed Mobility, Safety Issues decreased use of arms for pushing/pulling;decreased use of legs for bridging/pushing  -MB     Assistive Device (Bed Mobility) bed rails;head of bed elevated  -MB      Comment (Bed Mobility) Pt. demo good safety w/ bed mobility.    -MB     Row Name 08/09/18 1429          Transfer Assessment/Treatment    Transfer Assessment/Treatment sit-stand transfer;stand-sit transfer  -MB     Comment (Transfers) Pt. required VCs for safe hand placement.  -MB     Sit-Stand [place] (Transfers) contact guard;verbal cues  -MB     Stand-Sit [place] (Transfers) contact guard;verbal cues  -MB     Row Name 08/09/18 1429          Sit-Stand Transfer    Assistive Device (Sit-Stand Transfers) walker, front-wheeled  -MB     Row Name 08/09/18 1429          Stand-Sit Transfer    Assistive Device (Stand-Sit Transfers) walker, front-wheeled  -MB     Row Name 08/09/18 1429          Gait/Stairs Assessment/Training    [place] Level (Gait) minimum assist (75% patient effort);verbal cues  -MB     Assistive Device (Gait) walker, front-wheeled  -MB     Distance in Feet (Gait) 60  -MB     Pattern (Gait) step-through  -MB     Deviations/Abnormal Patterns (Gait) right sided deviations;ataxic;taran decreased;gait speed decreased;stride length decreased  -MB     Bilateral Gait Deviations knee buckling, right side;weight shift ability decreased  -MB     Right Sided Gait Deviations knee buckling, right side;weight shift ability decreased  -MB     Comment (Gait/Stairs) Pt. demo step-through gait pattern at slow pace w/ several episodes of R knee buckling.  Pt. able to self-correct.  VCs for increased R heel strike and foot clearance.  Gait distance limited by fatigue.  -MB     Row Name 08/09/18 1429          General ROM    GENERAL ROM COMMENTS BLEs WFL  -MB     Row Name 08/09/18 1429          General Assessment (Manual Muscle Testing)    Comment, General Manual Muscle Testing (MMT) Assessment L LE grossly 5/5, R LE grossly 4+/5, R ankle DF/PF 4/5  -MB     Row Name 08/09/18 1429          Gross Motor Coordination    Gross Motor Impairments balance;coordination;motor control;strength  -MB     Gross Motor  Skill, Impairments Detail R heel to shin impairment  -[facility] Name 08/09/18 1429          Balance    Balance static standing balance;dynamic standing balance  -MB     Row Name 08/09/18 1429          Static Standing Balance    Level of [place] (Supported Standing, Static Balance) --  -MB     Time Able to Maintain Position (Supported Standing, Static Balance) --  -MB     Assistive Device Utilized (Supported Standing, Static Balance) --  -MB     Comment (Supported Standing, Static Balance) --  -MB     Row Name 08/09/18 1429          Dynamic Standing Balance    Level of [place], Reaches Outside Midline (Standing, Dynamic Balance) minimal assist, 75% patient effort  -MB     Time Able to Maintain Position, Reaches Outside Midline (Standing, Dynamic Balance) 1 to 2 minutes  -MB     Comment, Reaches Outside Midline (Standing, Dynamic Balance) +LOB narrow KENY, eyes open  -MB     Row Name 08/09/18 1429          Sensory Assessment/Intervention    Sensory General Assessment no sensation deficits identified  -MB     Row Name 08/09/18 1429          Vision Assessment/Intervention    Visual Impairment/Limitations corrective lenses full time  -MB     Row Name 08/09/18 1429          Pain Scale: Numbers Pre/Post-Treatment    Pain Scale: Numbers, Pretreatment 0/10 - no pain  -MB     Pain Scale: Numbers, Post-Treatment 0/10 - no pain  -[facility] Name             Wound 08/08/18 2010 Right lateral foot     Wound - Properties Group Date first assessed: 08/08/18  -TL Time first assessed: 2010  -TL Present On Admission : yes  -TL Side: Right  -TL Orientation: lateral  -TL Location: foot  -[facility] Name 08/09/18 1429          Physical Therapy Clinical Impression    Date of Referral to PT 08/09/18  -MB     PT Diagnosis (PT Clinical Impression) R side weakness, functional decline  -MB     Functional Level at Time of Evaluation (PT Clinical Impression) Pt. required min A for safe ambulation.  -MB     Patient/Family Goals Statement  (PT Clinical Impression) Return to PLOF and home.  -MB     Criteria for Skilled Interventions Met (PT Clinical Impression) yes;treatment indicated  -MB     Rehab Potential (PT Clinical Summary) good, to achieve stated therapy goals  -MB     Care Plan Review (PT) evaluation/treatment results reviewed;care plan/treatment goals reviewed;risks/benefits reviewed;current/potential barriers reviewed;patient/other agree to care plan  -MB     Row Name 08/09/18 1429          Vital Signs    Pre Systolic BP Rehab 197  -MB     Pre Treatment Diastolic   -MB     Intra Systolic BP Rehab 188  -MB     Intra Treatment Diastolic BP 99  -MB     Posttreatment Heart Rate (beats/min) 70  -MB     Posttreatment Resp Rate (breaths/min) 79  -MB     Pre Patient Position Supine  -MB     Intra Patient Position Standing  -MB     Post Patient Position Sitting  -MB     Row Name 08/09/18 1429          Physical Therapy Goals    Bed Mobility Goal Selection (PT) bed mobility, PT goal 1  -MB     Transfer Goal Selection (PT) transfer, PT goal 1  -MB     Gait Training Goal Selection (PT) gait training, PT goal 1  -MB     Stairs Goal Selection (PT) stairs, PT goal 1  -MB     Additional Documentation Stairs Goal Selection (PT) (Row)  -MB     Row Name 08/09/18 1429          Bed Mobility Goal 1 (PT)    Activity/Assistive Device (Bed Mobility Goal 1, PT) bed mobility activities, all  -MB     [place] Level/Cues Needed (Bed Mobility Goal 1, PT) independent  -MB     Time Frame (Bed Mobility Goal 1, PT) 1 week  -MB     Progress/Outcomes (Bed Mobility Goal 1, PT) goal ongoing  -MB     Row Name 08/09/18 1429          Transfer Goal 1 (PT)    Activity/Assistive Device (Transfer Goal 1, PT) sit-to-stand/stand-to-sit;bed-to-chair/chair-to-bed;walker, rolling  -MB     [place] Level/Cues Needed (Transfer Goal 1, PT) conditional independence  -MB     Time Frame (Transfer Goal 1, PT) 1 week  -MB     Progress/Outcome (Transfer Goal 1, PT) goal ongoing  -MB      Row Name 08/09/18 1429          Gait Training Goal 1 (PT)    Activity/Assistive Device (Gait Training Goal 1, PT) gait (walking locomotion);walker, rolling  -MB     [place] Level (Gait Training Goal 1, PT) supervision required  -MB     Distance (Gait Goal 1, PT) 150  -MB     Time Frame (Gait Training Goal 1, PT) 1 week  -MB     Progress/Outcome (Gait Training Goal 1, PT) goal ongoing  -MB     Row Name 08/09/18 1429          Stairs Goal 1 (PT)    Activity/Assistive Device (Stairs Goal 1, PT) stairs, all skills  -MB     [place] Level/Cues Needed (Stairs Goal 1, PT) minimum assist (75% or more patient effort)  -MB     Number of Stairs (Stairs Goal 1, PT) 4  -MB     Time Frame (Stairs Goal 1, PT) 2 weeks  -MB     Progress/Outcome (Stairs Goal 1, PT) goal ongoing  -MB     Row Name 08/09/18 1429          Positioning and Restraints    Pre-Treatment Position in bed  -MB     Post Treatment Position chair  -MB     In Chair notified nsg;reclined;call light within reach;encouraged to call for assist;exit alarm on;legs elevated  -MB     Row Name 08/09/18 1429          Living Environment    Home Accessibility stairs to enter home  -MB       User Key  (r) = Recorded By, (t) = Taken By, (c) = Cosigned By    Initials Name Provider Type    Radha Garcia, PT Physical Therapist    TL Sherita Walsh, LPN Licensed Nurse              PT Recommendation and Plan  Anticipated Discharge Disposition (PT): inpatient rehabilitation facility  Planned Therapy Interventions (PT Eval): balance training, bed mobility training, gait training, home exercise program, patient/family education, stair training, strengthening, transfer training, neuromuscular re-education, motor coordination training  Therapy Frequency (PT Clinical Impression): daily  Outcome Summary/Treatment Plan (PT)  Anticipated Equipment Needs at Discharge (PT): front wheeled walker  Anticipated Discharge Disposition (PT): inpatient rehabilitation facility  Plan of  Care Reviewed With: patient  Progress: improving  Outcome Summary: PT eval completed.  Pt. presents w/ evolving symptoms including dec strength, balance deficits, dec coordination and motor control, and gait instability, impacting patient's safety and ind. w/ functional mobility.  Pt. required CGA for transfers and min A for gait, ambulating 60 ft w/ RW (dec motor control R LE).  Pt. will benefit from skilled IPPT to address impairments and promote return to PLOF.  Recommend IP rehab at D/C.          Outcome Measures     Row Name 08/09/18 1429 08/09/18 1351          How much help from another person do you currently need...    Turning from your back to your side while in flat bed without using bedrails? 4  -MB  --     Moving from lying on back to sitting on the side of a flat bed without bedrails? 3  -MB  --     Moving to and from a bed to a chair (including a wheelchair)? 3  -MB  --     Standing up from a chair using your arms (e.g., wheelchair, bedside chair)? 3  -MB  --     Climbing 3-5 steps with a railing? 2  -MB  --     To walk in hospital room? 3  -MB  --     AM-PAC 6 Clicks Score 18  -MB  --        How much help from another is currently needed...    Putting on and taking off regular lower body clothing?  -- 2  -AN     Bathing (including washing, rinsing, and drying)  -- 2  -AN     Toileting (which includes using toilet bed pan or urinal)  -- 2  -AN     Putting on and taking off regular upper body clothing  -- 3  -AN     Taking care of personal grooming (such as brushing teeth)  -- 3  -AN     Eating meals  -- 3  -AN     Score  -- 15  -AN        Modified Emerson Scale    Modified [place] Scale 4 - Moderately severe disability.  Unable to walk without assistance, and unable to attend to own bodily needs without assistance.  -MB 4 - Moderately severe disability.  Unable to walk without assistance, and unable to attend to own bodily needs without assistance.  -AN        Functional Assessment    Outcome Measure  Options AM-PAC 6 Clicks Basic Mobility (PT)  -MB AM-PAC 6 Clicks Daily Activity (OT);Modified [place]  -AN       User Key  (r) = Recorded By, (t) = Taken By, (c) = Cosigned By    Initials Name Provider Type    Elida Trinidad, OT Occupational Therapist    Radha Garcia, PT Physical Therapist           Time Calculation:         PT Charges     Row Name 08/09/18 1531             Time Calculation    Start Time 1429  -MB      PT Received On 08/09/18  -MB      PT Goal Re-Cert Due Date 08/19/18  -MB        User Key  (r) = Recorded By, (t) = Taken By, (c) = Cosigned By    Initials Name Provider Type    Radha Garcia, PT Physical Therapist        Therapy Suggested Charges     Code   Minutes Charges    None           Therapy Charges for Today     Code Description Service Date Service Provider Modifiers Qty    23634854076 HC PT MOBILITY CURRENT 8/9/2018 Radha Marrero, PT GP, CK 1    35189998900 HC PT MOBILITY PROJECTED 8/9/2018 Radha Marrero, PT GP, CJ 1    [id number] HC PT EVAL MOD COMPLEXITY 4 8/9/2018 Radha Marrero, PT GP 1          PT G-Codes  PT Professional Judgement Used?: Yes  Outcome Measure Options: AM-PAC 6 Clicks Basic Mobility (PT)  Score: 18  Functional Limitation: Mobility: Walking and moving around  Mobility: Walking and Moving Around Current Status (): At least 40 percent but less than 60 percent impaired, limited or restricted  Mobility: Walking and Moving Around Goal Status (): At least 20 percent but less than 40 percent impaired, limited or restricted      Radha Marrero, PT  8/9/2018

## 2018-08-09 NOTE — PLAN OF CARE
Problem: Patient Care Overview  Goal: Plan of Care Review  Outcome: Ongoing (interventions implemented as appropriate)      Problem: Stroke (Ischemic) (Adult)  Goal: Signs and Symptoms of Listed Potential Problems Will be Absent, Minimized or Managed (Stroke)  Outcome: Ongoing (interventions implemented as appropriate)   08/09/18 1421   Goal/Outcome Evaluation   Problems Assessed (Stroke (Ischemic)) cognitive impairment;communication impairment;motor/sensory impairment;acute neurologic deterioration;muscle tone abnormal   Problems Assessed (Stroke (Ischemic)) communication impairment;motor/sensory impairment;muscle tone abnormal       Problem: Patient Care Overview  Goal: Plan of Care Review  Outcome: Ongoing (interventions implemented as appropriate)   08/09/18 1428   Coping/Psychosocial   Plan of Care Reviewed With patient;spouse   OTHER   Outcome Summary Pt with current evolving symptoms that are hindering I and safety with ADL's and mobility. Pt is currently CGa for txfr, min assist dynamic balance and mod assist LB ADL's. Pt would benefit from IRF at discharge.

## 2018-08-09 NOTE — PLAN OF CARE
Problem: Stroke (Ischemic) (Adult)  Goal: Signs and Symptoms of Listed Potential Problems Will be Absent, Minimized or Managed (Stroke)  Outcome: Ongoing (interventions implemented as appropriate)  Improved movement of RUE and RLE. Speech mildly slurred.

## 2018-08-10 ENCOUNTER — APPOINTMENT (OUTPATIENT)
Dept: CT IMAGING | Facility: HOSPITAL | Age: 53
End: 2018-08-10

## 2018-08-10 PROBLEM — E11.65 TYPE 2 DIABETES MELLITUS WITH HYPERGLYCEMIA (HCC): Status: ACTIVE | Noted: 2018-08-09

## 2018-08-10 LAB
BH CV ECHO MEAS - AO MAX PG (FULL): 1.2 MMHG
BH CV ECHO MEAS - AO MAX PG: 6 MMHG
BH CV ECHO MEAS - AO ROOT AREA (BSA CORRECTED): 1.4
BH CV ECHO MEAS - AO ROOT AREA: 7.2 CM^2
BH CV ECHO MEAS - AO ROOT DIAM: 3 CM
BH CV ECHO MEAS - AO V2 MAX: 126 CM/SEC
BH CV ECHO MEAS - AVA(V,A): 2.6 CM^2
BH CV ECHO MEAS - AVA(V,D): 2.6 CM^2
BH CV ECHO MEAS - BSA(HAYCOCK): 2.2 M^2
BH CV ECHO MEAS - BSA(HAYCOCK): 2.2 M^2
BH CV ECHO MEAS - BSA: 2.2 M^2
BH CV ECHO MEAS - BSA: 2.2 M^2
BH CV ECHO MEAS - BZI_BMI: 28.2 KILOGRAMS/M^2
BH CV ECHO MEAS - BZI_BMI: 28.2 KILOGRAMS/M^2
BH CV ECHO MEAS - BZI_METRIC_HEIGHT: 182.9 CM
BH CV ECHO MEAS - BZI_METRIC_HEIGHT: 182.9 CM
BH CV ECHO MEAS - BZI_METRIC_WEIGHT: 94.3 KG
BH CV ECHO MEAS - BZI_METRIC_WEIGHT: 94.3 KG
BH CV ECHO MEAS - CONTRAST EF (2CH): 72 ML/M^2
BH CV ECHO MEAS - CONTRAST EF 4CH: 66.7 ML/M^2
BH CV ECHO MEAS - EDV(CUBED): 56.3 ML
BH CV ECHO MEAS - EDV(MOD-SP2): 82 ML
BH CV ECHO MEAS - EDV(MOD-SP4): 81 ML
BH CV ECHO MEAS - EDV(TEICH): 63.3 ML
BH CV ECHO MEAS - EF(CUBED): 78.8 %
BH CV ECHO MEAS - EF(MOD-SP2): 72 %
BH CV ECHO MEAS - EF(MOD-SP4): 66.7 %
BH CV ECHO MEAS - EF(TEICH): 71.8 %
BH CV ECHO MEAS - ESV(CUBED): 11.9 ML
BH CV ECHO MEAS - ESV(MOD-SP2): 23 ML
BH CV ECHO MEAS - ESV(MOD-SP4): 27 ML
BH CV ECHO MEAS - ESV(TEICH): 17.8 ML
BH CV ECHO MEAS - FS: 40.4 %
BH CV ECHO MEAS - IVS/LVPW: 1.1
BH CV ECHO MEAS - IVSD: 1.3 CM
BH CV ECHO MEAS - LA DIMENSION: 3.2 CM
BH CV ECHO MEAS - LA/AO: 1
BH CV ECHO MEAS - LAT PEAK E' VEL: 4.5 CM/SEC
BH CV ECHO MEAS - LV DIASTOLIC VOL/BSA (35-75): 37.4 ML/M^2
BH CV ECHO MEAS - LV MASS(C)D: 159.2 GRAMS
BH CV ECHO MEAS - LV MASS(C)DI: 73.5 GRAMS/M^2
BH CV ECHO MEAS - LV MAX PG: 4.8 MMHG
BH CV ECHO MEAS - LV SYSTOLIC VOL/BSA (12-30): 12.5 ML/M^2
BH CV ECHO MEAS - LV V1 MAX: 109.6 CM/SEC
BH CV ECHO MEAS - LVIDD: 3.8 CM
BH CV ECHO MEAS - LVIDS: 2.3 CM
BH CV ECHO MEAS - LVLD AP2: 7.7 CM
BH CV ECHO MEAS - LVLD AP4: 7.6 CM
BH CV ECHO MEAS - LVLS AP2: 6.7 CM
BH CV ECHO MEAS - LVLS AP4: 6 CM
BH CV ECHO MEAS - LVOT AREA (M): 3.1 CM^2
BH CV ECHO MEAS - LVOT AREA: 3 CM^2
BH CV ECHO MEAS - LVOT DIAM: 2 CM
BH CV ECHO MEAS - LVPWD: 1.2 CM
BH CV ECHO MEAS - MED PEAK E' VEL: 4.07 CM/SEC
BH CV ECHO MEAS - MV A MAX VEL: 92.8 CM/SEC
BH CV ECHO MEAS - MV DEC TIME: 0.37 SEC
BH CV ECHO MEAS - MV E MAX VEL: 64.2 CM/SEC
BH CV ECHO MEAS - MV E/A: 0.69
BH CV ECHO MEAS - PA ACC SLOPE: 824.3 CM/SEC^2
BH CV ECHO MEAS - PA ACC TIME: 0.1 SEC
BH CV ECHO MEAS - PA PR(ACCEL): 32.7 MMHG
BH CV ECHO MEAS - PULM DIAS VEL: 38.5 CM/SEC
BH CV ECHO MEAS - PULM S/D: 2.1
BH CV ECHO MEAS - PULM SYS VEL: 82.3 CM/SEC
BH CV ECHO MEAS - RVDD: 3.2 CM
BH CV ECHO MEAS - SI(CUBED): 20.5 ML/M^2
BH CV ECHO MEAS - SI(MOD-SP2): 27.2 ML/M^2
BH CV ECHO MEAS - SI(MOD-SP4): 24.9 ML/M^2
BH CV ECHO MEAS - SI(TEICH): 21 ML/M^2
BH CV ECHO MEAS - SV(CUBED): 44.4 ML
BH CV ECHO MEAS - SV(MOD-SP2): 59 ML
BH CV ECHO MEAS - SV(MOD-SP4): 54 ML
BH CV ECHO MEAS - SV(TEICH): 45.4 ML
BH CV ECHO MEAS - TAPSE (>1.6): 1.7 CM2
BH CV ECHO MEAS - TR MAX VEL: 211 CM/SEC
BH CV ECHO MEASUREMENTS AVERAGE E/E' RATIO: 14.98
BH CV VAS BP LEFT ARM: NORMAL MMHG
BH CV XLRA - RV BASE: 2.5 CM
BH CV XLRA - RV LENGTH: 7.6 CM
BH CV XLRA - RV MID: 2.4 CM
BH CV XLRA - TDI S': 15 CM/SEC
BH CV XLRA MEAS LEFT CCA RATIO VEL: 78.1 CM/SEC
BH CV XLRA MEAS LEFT DIST CCA EDV: 17.5 CM/SEC
BH CV XLRA MEAS LEFT DIST CCA PSV: 72.9 CM/SEC
BH CV XLRA MEAS LEFT DIST ICA EDV: 16.8 CM/SEC
BH CV XLRA MEAS LEFT DIST ICA PSV: 38.1 CM/SEC
BH CV XLRA MEAS LEFT ICA RATIO VEL: 78.2 CM/SEC
BH CV XLRA MEAS LEFT ICA/CCA RATIO: 1
BH CV XLRA MEAS LEFT MID CCA EDV: 17 CM/SEC
BH CV XLRA MEAS LEFT MID CCA PSV: 78.6 CM/SEC
BH CV XLRA MEAS LEFT MID ICA EDV: 19.6 CM/SEC
BH CV XLRA MEAS LEFT MID ICA PSV: 58.1 CM/SEC
BH CV XLRA MEAS LEFT PROX CCA EDV: 18.9 CM/SEC
BH CV XLRA MEAS LEFT PROX CCA PSV: 89.4 CM/SEC
BH CV XLRA MEAS LEFT PROX ECA PSV: 107.5 CM/SEC
BH CV XLRA MEAS LEFT PROX ICA EDV: 27.9 CM/SEC
BH CV XLRA MEAS LEFT PROX ICA PSV: 78.9 CM/SEC
BH CV XLRA MEAS LEFT PROX SCLA PSV: 130.1 CM/SEC
BH CV XLRA MEAS LEFT VERTEBRAL A EDV: 12.2 CM/SEC
BH CV XLRA MEAS LEFT VERTEBRAL A PSV: 36.3 CM/SEC
BH CV XLRA MEAS RIGHT CCA RATIO VEL: 79.4 CM/SEC
BH CV XLRA MEAS RIGHT DIST CCA EDV: 11.6 CM/SEC
BH CV XLRA MEAS RIGHT DIST CCA PSV: 65.1 CM/SEC
BH CV XLRA MEAS RIGHT DIST ICA EDV: 21.1 CM/SEC
BH CV XLRA MEAS RIGHT DIST ICA PSV: 73.7 CM/SEC
BH CV XLRA MEAS RIGHT ICA RATIO VEL: 93.6 CM/SEC
BH CV XLRA MEAS RIGHT ICA/CCA RATIO: 1.2
BH CV XLRA MEAS RIGHT MID CCA EDV: 13.8 CM/SEC
BH CV XLRA MEAS RIGHT MID CCA PSV: 80 CM/SEC
BH CV XLRA MEAS RIGHT MID ICA EDV: 22.6 CM/SEC
BH CV XLRA MEAS RIGHT MID ICA PSV: 78.1 CM/SEC
BH CV XLRA MEAS RIGHT PROX CCA EDV: 16 CM/SEC
BH CV XLRA MEAS RIGHT PROX CCA PSV: 86 CM/SEC
BH CV XLRA MEAS RIGHT PROX ECA PSV: 130.6 CM/SEC
BH CV XLRA MEAS RIGHT PROX ICA EDV: 27.2 CM/SEC
BH CV XLRA MEAS RIGHT PROX ICA PSV: 94.3 CM/SEC
BH CV XLRA MEAS RIGHT PROX SCLA PSV: 180.7 CM/SEC
BH CV XLRA MEAS RIGHT VERTEBRAL A EDV: 11.3 CM/SEC
BH CV XLRA MEAS RIGHT VERTEBRAL A PSV: 45.7 CM/SEC
GLUCOSE BLDC GLUCOMTR-MCNC: 222 MG/DL (ref 70–130)
GLUCOSE BLDC GLUCOMTR-MCNC: 240 MG/DL (ref 70–130)
GLUCOSE BLDC GLUCOMTR-MCNC: 241 MG/DL (ref 70–130)
GLUCOSE BLDC GLUCOMTR-MCNC: 256 MG/DL (ref 70–130)
GLUCOSE BLDC GLUCOMTR-MCNC: 271 MG/DL (ref 70–130)
LEFT ATRIUM VOLUME INDEX: 17.5 ML/M2
LV EF 2D ECHO EST: 75 %
RIGHT ARM BP: NORMAL MMHG

## 2018-08-10 PROCEDURE — 0042T HC CT CEREBRAL PERFUSION W/WO CONTRAST: CPT

## 2018-08-10 PROCEDURE — 99232 SBSQ HOSP IP/OBS MODERATE 35: CPT | Performed by: PSYCHIATRY & NEUROLOGY

## 2018-08-10 PROCEDURE — 63710000001 INSULIN DETEMIR PER 5 UNITS: Performed by: INTERNAL MEDICINE

## 2018-08-10 PROCEDURE — 99233 SBSQ HOSP IP/OBS HIGH 50: CPT | Performed by: INTERNAL MEDICINE

## 2018-08-10 PROCEDURE — 97110 THERAPEUTIC EXERCISES: CPT

## 2018-08-10 PROCEDURE — 70496 CT ANGIOGRAPHY HEAD: CPT

## 2018-08-10 PROCEDURE — 25010000002 ENOXAPARIN PER 10 MG: Performed by: INTERNAL MEDICINE

## 2018-08-10 PROCEDURE — 93005 ELECTROCARDIOGRAM TRACING: CPT | Performed by: INTERNAL MEDICINE

## 2018-08-10 PROCEDURE — 70498 CT ANGIOGRAPHY NECK: CPT

## 2018-08-10 PROCEDURE — 0 IOPAMIDOL PER 1 ML: Performed by: INTERNAL MEDICINE

## 2018-08-10 PROCEDURE — 82962 GLUCOSE BLOOD TEST: CPT

## 2018-08-10 PROCEDURE — 97116 GAIT TRAINING THERAPY: CPT

## 2018-08-10 PROCEDURE — 70450 CT HEAD/BRAIN W/O DYE: CPT

## 2018-08-10 PROCEDURE — 93010 ELECTROCARDIOGRAM REPORT: CPT | Performed by: INTERNAL MEDICINE

## 2018-08-10 RX ORDER — CLONIDINE HYDROCHLORIDE 0.1 MG/1
0.1 TABLET ORAL ONCE
Status: COMPLETED | OUTPATIENT
Start: 2018-08-10 | End: 2018-08-10

## 2018-08-10 RX ORDER — HYDRALAZINE HYDROCHLORIDE 10 MG/1
10 TABLET, FILM COATED ORAL ONCE
Status: COMPLETED | OUTPATIENT
Start: 2018-08-10 | End: 2018-08-10

## 2018-08-10 RX ORDER — LISINOPRIL 20 MG/1
20 TABLET ORAL
Status: DISCONTINUED | OUTPATIENT
Start: 2018-08-10 | End: 2018-08-11

## 2018-08-10 RX ORDER — AMLODIPINE BESYLATE 10 MG/1
10 TABLET ORAL
Status: DISCONTINUED | OUTPATIENT
Start: 2018-08-10 | End: 2018-08-15 | Stop reason: HOSPADM

## 2018-08-10 RX ADMIN — ASPIRIN 325 MG ORAL TABLET 325 MG: 325 PILL ORAL at 08:23

## 2018-08-10 RX ADMIN — DESMOPRESSIN ACETATE 80 MG: 0.2 TABLET ORAL at 22:03

## 2018-08-10 RX ADMIN — AMLODIPINE BESYLATE 10 MG: 10 TABLET ORAL at 08:23

## 2018-08-10 RX ADMIN — INSULIN LISPRO 6 UNITS: 100 INJECTION, SOLUTION INTRAVENOUS; SUBCUTANEOUS at 11:37

## 2018-08-10 RX ADMIN — IOPAMIDOL 115 ML: 755 INJECTION, SOLUTION INTRAVENOUS at 20:29

## 2018-08-10 RX ADMIN — ENOXAPARIN SODIUM 40 MG: 100 INJECTION SUBCUTANEOUS at 16:54

## 2018-08-10 RX ADMIN — INSULIN LISPRO 4 UNITS: 100 INJECTION, SOLUTION INTRAVENOUS; SUBCUTANEOUS at 08:24

## 2018-08-10 RX ADMIN — HYDRALAZINE HYDROCHLORIDE 10 MG: 10 TABLET ORAL at 00:23

## 2018-08-10 RX ADMIN — POVIDONE-IODINE: 10 SOLUTION TOPICAL at 09:00

## 2018-08-10 RX ADMIN — LISINOPRIL 20 MG: 20 TABLET ORAL at 09:35

## 2018-08-10 RX ADMIN — CLONIDINE HYDROCHLORIDE 0.1 MG: 0.1 TABLET ORAL at 04:20

## 2018-08-10 RX ADMIN — INSULIN DETEMIR 10 UNITS: 100 INJECTION, SOLUTION SUBCUTANEOUS at 09:35

## 2018-08-10 RX ADMIN — INSULIN LISPRO 4 UNITS: 100 INJECTION, SOLUTION INTRAVENOUS; SUBCUTANEOUS at 16:55

## 2018-08-10 RX ADMIN — INSULIN LISPRO 4 UNITS: 100 INJECTION, SOLUTION INTRAVENOUS; SUBCUTANEOUS at 22:03

## 2018-08-10 NOTE — PROGRESS NOTES
"Mele Rossi    Subjective     CC: stroke    History of Present Illness     Mele Rossi is followed for stroke. His right hemiparesis is improved overnight. He denies new symptoms.      There have been no other changes in the patient's interval history since my consult note of 8/9/18.    I have reviewed and confirmed the past family, social and medical history as accurate on 8/10/18.     Review of Systems   Constitutional: Negative.    Respiratory: Negative.    Cardiovascular: Negative.    Gastrointestinal: Negative.    Genitourinary: Negative.    Musculoskeletal: Negative.        Objective     /98   Pulse 62   Temp 97.8 °F (36.6 °C) (Oral)   Resp 16   Ht 182.9 cm (72\")   Wt 94.3 kg (208 lb)   SpO2 99%   BMI 28.21 kg/m²     Physical Exam   Constitutional: He is oriented to person, place, and time.   Neurological: He is oriented to person, place, and time. He has an abnormal Finger-Nose-Finger Test (dysmetric on the right).   Psychiatric: His speech is normal.        Neurologic Exam     Mental Status   Oriented to person, place, and time.   Attention: normal.   Speech: speech is normal   Level of consciousness: alert  Knowledge: good.   Normal comprehension.     Cranial Nerves   Cranial nerves II through XII intact.     Motor Exam   Muscle bulk: normal  Overall muscle tone: normalRight hemiparesis (5-/5)     Sensory Exam   Light touch normal.     Gait, Coordination, and Reflexes     Coordination   Finger to nose coordination: abnormal (dysmetric on the right)      Laboratory and radiological testing is notable for ECHO and Carotid Dopplers that are preliminarily unremarkable. Glucose=200.    Assessment/Plan       Mele Rossi is followed with stroke. I'll await the final results of his ECHO and Carotid Dopplers. I agree with his current management, and don't have new recommendations at this time.       As part of this visit I reviewed prior lab results, reviewed radiology results and reviewed records from the " current hospitalization which is incorporated in the HPI.

## 2018-08-10 NOTE — PLAN OF CARE
Problem: Patient Care Overview  Goal: Plan of Care Review   08/10/18 1536   Coping/Psychosocial   Plan of Care Reviewed With patient   Plan of Care Review   Progress improving   OTHER   Outcome Summary Ambulated 100 ft with RW and CGA. BP elevated after ambulation (206/125) notified nsg. Will continue to progress as able per POC.

## 2018-08-10 NOTE — DISCHARGE PLACEMENT REQUEST
"Sherry Zuniga RN  Case Management  P: [phone]    Looking for a male short-term rehab bed      Mele Gómez  (53 y.o. Male)     Date of Birth Social Security Number Address Home Phone MRN    1965  [place] [address] [phone] [id number]    Orthodoxy Marital Status          None        Admission Date Admission Type Admitting Provider Attending Provider Department, Room/Bed    18 Urgent  Sherry Ybarra II, DO [facility] 3H, S377/1    Discharge Date Discharge Disposition Discharge Destination                       Attending Provider:  Sherry Ybarra II, DO    Allergies:  No Known Allergies    Isolation:  None   Infection:  None   Code Status:  CPR    Ht:  182.9 cm (72\")   Wt:  94.3 kg (208 lb)    Admission Cmt:  None   Principal Problem:  None                Active Insurance as of 2018     Primary Coverage     Payor Plan Insurance Group Employer/Plan Group    [employer] PPO [id number]     Payor Plan Address Payor Plan Phone Number Effective From Effective To    [address] [phone] 2017     [facility] [id number]       Subscriber Name Subscriber Birth Date Member ID       MELE GÓMEZ 1965 [id number]                 Emergency Contacts      (Rel.) Home Phone Work Phone Mobile Phone    Lexy Gómez (Spouse) [phone] -- --               History & Physical      Fabio Roger MD at 2018 12:34 AM              [facility] Medicine Services  HISTORY AND PHYSICAL    Patient Name: Mele Gómez  : 1965  MRN: [id number]  Primary Care Physician: Kymberly Ibrahim APRN    Subjective   Subjective     Chief Complaint:  Right sided weakness     HPI:  Mele Gómez is a 53 y.o. male who has h/o HTN and DM2 but has been non compliant with his medications. Patient tells me he cannot remember the last time he took his medications. However, patient initially presented to " "[facility] with c/o right sided weakness and \"getting off balance\". Patient reports that his symptoms started at about 430PM yesterday. He does not have any pain or SOB. No report of fever, chills, nausea or vomiting. Upon presentation to the ER, patient was reportedly severely hypertenstive with SBP of 240. He had a negative work up for stroke and BP was treated. Patient was discharged home. Today, he reported again with worsening symptoms. Head CT scan was not repeated but he was noted to have a very elevated BP again. Patient was transferred to [facility] for further evaluation. I repeated the BP upon reaching patient's room and it was 170/109. Again, patient does not complain of any other symptoms except right sided weakness and occasional difficulty with swallowing.     Review of Systems   Constitutional: Negative for chills, diaphoresis, fatigue and fever.   HENT: Positive for trouble swallowing. Negative for drooling, sore throat and voice change.    Eyes: Negative for photophobia and visual disturbance.   Respiratory: Negative for cough, chest tightness, shortness of breath and wheezing.    Cardiovascular: Negative for chest pain, palpitations and leg swelling.   Gastrointestinal: Negative for abdominal distention, abdominal pain, diarrhea, nausea and vomiting.   Endocrine: Negative for polyuria.   Genitourinary: Negative for dysuria, flank pain, frequency and urgency.   Musculoskeletal: Negative for arthralgias, neck pain and neck stiffness.   Skin: Negative for rash.   Neurological: Positive for weakness (right UE and LE). Negative for dizziness, speech difficulty, light-headedness, numbness and headaches.   Psychiatric/Behavioral: Negative for agitation, confusion and hallucinations.   All other systems reviewed and are negative.           Personal History     HTN, Diabetes, Arthritis    No past surgical history on file.    Family History: family history is not on file.     Social History:    Social " History     Social History Narrative   • No narrative on file       Medications:  No prescriptions prior to admission.       No Known Allergies    Objective   Objective     Vital Signs:   Temp:  [97.3 °F (36.3 °C)-98.3 °F (36.8 °C)] 97.3 °F (36.3 °C)  Heart Rate:  [72-80] 72  Resp:  [16] 16  BP: (176-232)/(107-151) 176/107   Total (NIH Stroke Scale): 4    Physical Exam   Constitutional: No acute distress, awake, alert and oriented x 4.  Eyes: PERRLA, sclerae anicteric, no conjunctival injection  HENT: NCAT, mucous membranes moist  Neck: Supple, no thyromegaly, no lymphadenopathy, trachea midline  Respiratory: Clear to auscultation bilaterally, nonlabored respirations   Cardiovascular: RRR, no murmurs, rubs, or gallops, palpable pedal pulses bilaterally  Gastrointestinal: Positive bowel sounds, soft, nontender, nondistended  Musculoskeletal: No bilateral ankle edema, no clubbing or cyanosis to extremities  Psychiatric: Appropriate affect, cooperative  Neurologic: Oriented x 4, Power is about 3/5 in both RUE and RLE, Cranial Nerves grossly intact to confrontation, speech clear  Skin: No rashes    Results Reviewed:  I have personally reviewed current lab, radiology, and data and agree.      Results from last 7 days  Lab Units 08/09/18  0011   WBC 10*3/mm3 8.72   HEMOGLOBIN g/dL 15.7   HEMATOCRIT % 44.5   PLATELETS 10*3/mm3 172           Invalid input(s):  ALKPHOS, TROPONININT  CrCl cannot be calculated (No order found.).  Brief Urine Lab Results     None        No results found for: BNP  Imaging Results (last 24 hours)     ** No results found for the last 24 hours. **             Assessment/Plan   Assessment / Plan     Hospital Problem List     Stroke (CMS/HCC)    Diabetes mellitus (CMS/[facility])    HTN (hypertension)            Assessment & Plan:  1. Right sided weakness  2. Uncontrolled HTN  3. Uncontrolled DM2  3. Medical non compliance.      Admit patient to observation and telemetry.  Keep npo until swallow evaluation  "in the morning.  Also needs PT and OT evaluation in the morning.  Will allow permissive HTN but treat SBP >180mmHg.  Will obtain brain MRI, 2D echo and bilateral carotid doppler.  Will check fasting lipids.  Will consult neurology in the morning.  Check A1c and start fingerstick glucose monitoring with insulin protocol.  Patient needs further counseling on the importance of compliance with his care.    DVT prophylaxis: bilateral SCDs.    CODE STATUS:    Code Status and Medical Interventions:   Ordered at: 08/09/18 0010     Level Of Support Discussed With:    Patient     Code Status:    CPR     Medical Interventions (Level of Support Prior to Arrest):    Full         Electronically signed by Fabio Roger MD, 08/09/18, 12:34 AM.        Electronically signed by Fabio Roger MD at 8/9/2018  1:00 AM       Hospital Medications (active)       Dose Frequency Start End    amLODIPine (NORVASC) tablet 10 mg 10 mg Every 24 Hours Scheduled 8/10/2018     Sig - Route: Take 1 tablet by mouth Daily. - Oral    aspirin suppository 300 mg 300 mg Daily 8/9/2018     Sig - Route: Insert 1 suppository into the rectum Daily. - Rectal    Linked Group 1:  \"Or\" Linked Group Details        aspirin tablet 325 mg 325 mg Daily 8/9/2018     Sig - Route: Take 1 tablet by mouth Daily. - Oral    Linked Group 1:  \"Or\" Linked Group Details        atorvastatin (LIPITOR) tablet 80 mg 80 mg Nightly 8/9/2018     Sig - Route: Take 2 tablets by mouth Every Night. - Oral    CloNIDine (CATAPRES) tablet 0.1 mg 0.1 mg Once 8/10/2018 8/10/2018    Sig - Route: Take 1 tablet by mouth 1 (One) Time. - Oral    dextrose (D50W) solution 25 g 25 g Every 15 Minutes PRN 8/9/2018     Sig - Route: Infuse 50 mL into a venous catheter Every 15 (Fifteen) Minutes As Needed for Low Blood Sugar (Blood Sugar Less Than 70). - Intravenous    dextrose (D50W) solution 25 g 25 g Every 15 Minutes PRN 8/9/2018     Sig - Route: Infuse 50 mL into a venous catheter Every 15 " (Fifteen) Minutes As Needed for Low Blood Sugar (Blood Sugar Less Than 70). - Intravenous    dextrose (GLUTOSE) oral gel 15 g 15 g Every 15 Minutes PRN 8/9/2018     Sig - Route: Take 15 g by mouth Every 15 (Fifteen) Minutes As Needed for Low Blood Sugar (Blood sugar less than 70). - Oral    dextrose (GLUTOSE) oral gel 15 g 15 g Every 15 Minutes PRN 8/9/2018     Sig - Route: Take 15 g by mouth Every 15 (Fifteen) Minutes As Needed for Low Blood Sugar (Blood sugar less than 70). - Oral    enalaprilat (VASOTEC) injection 0.625 mg 0.625 mg Once 8/9/2018 8/9/2018    Sig - Route: Infuse 0.5 mL into a venous catheter 1 (One) Time. - Intravenous    glucagon (human recombinant) (GLUCAGEN DIAGNOSTIC) injection 1 mg 1 mg As Needed 8/9/2018     Sig - Route: Inject 1 mg under the skin into the appropriate area as directed As Needed (Blood Glucose Less Than 70). - Subcutaneous    glucagon (human recombinant) (GLUCAGEN DIAGNOSTIC) injection 1 mg 1 mg As Needed 8/9/2018     Sig - Route: Inject 1 mg under the skin into the appropriate area as directed As Needed (Blood Glucose Less Than 70). - Subcutaneous    hydrALAZINE (APRESOLINE) tablet 10 mg 10 mg Once 8/10/2018 8/10/2018    Sig - Route: Take 1 tablet by mouth 1 (One) Time. - Oral    insulin detemir (LEVEMIR) injection 10 Units 10 Units Every Morning 8/10/2018     Sig - Route: Inject 10 Units under the skin into the appropriate area as directed Every Morning. - Subcutaneous    insulin lispro (humaLOG) injection 0-9 Units 0-9 Units 4 Times Daily With Meals & Nightly 8/9/2018     Sig - Route: Inject 0-9 Units under the skin into the appropriate area as directed 4 (Four) Times a Day With Meals & at Bedtime. - Subcutaneous    lisinopril (PRINIVIL,ZESTRIL) tablet 20 mg 20 mg Every 24 Hours Scheduled 8/10/2018     Sig - Route: Take 1 tablet by mouth Daily. - Oral    povidone-iodine (BETADINE) external solution  Daily 8/9/2018     Sig - Route: Apply  topically to the appropriate area as  "directed Daily. - Topical    Cosign for Ordering: Accepted by Sherry Ybarra II, DO on 8/10/2018  8:09 AM    sodium chloride 0.9 % flush 1-10 mL 1-10 mL As Needed 8/9/2018     Sig - Route: Infuse 1-10 mL into a venous catheter As Needed for Line Care. - Intravenous    amLODIPine (NORVASC) tablet 5 mg (Discontinued) 5 mg Every 24 Hours Scheduled 8/9/2018 8/10/2018    Sig - Route: Take 1 tablet by mouth Daily. - Oral             Physician Progress Notes (last 24 hours) (Notes from 8/9/2018 10:44 AM through 8/10/2018 10:44 AM)      Varun Gayle MD at 8/10/2018  7:29 AM        Mele Rossi    Subjective     CC: stroke    History of Present Illness     Mele Rossi is followed for stroke. His right hemiparesis is improved overnight. He denies new symptoms.      There have been no other changes in the patient's interval history since my consult note of 8/9/18.    I have reviewed and confirmed the past family, social and medical history as accurate on 8/10/18.     Review of Systems   Constitutional: Negative.    Respiratory: Negative.    Cardiovascular: Negative.    Gastrointestinal: Negative.    Genitourinary: Negative.    Musculoskeletal: Negative.        Objective     /98   Pulse 62   Temp 97.8 °F (36.6 °C) (Oral)   Resp 16   Ht 182.9 cm (72\")   Wt 94.3 kg (208 lb)   SpO2 99%   BMI 28.21 kg/m²      Physical Exam   Constitutional: He is oriented to person, place, and time.   Neurological: He is oriented to person, place, and time. He has an abnormal Finger-Nose-Finger Test (dysmetric on the right).   Psychiatric: His speech is normal.        Neurologic Exam     Mental Status   Oriented to person, place, and time.   Attention: normal.   Speech: speech is normal   Level of consciousness: alert  Knowledge: good.   Normal comprehension.     Cranial Nerves   Cranial nerves II through XII intact.     Motor Exam   Muscle bulk: normal  Overall muscle tone: normalRight hemiparesis (5-/5)     Sensory Exam   Light " touch normal.     Gait, Coordination, and Reflexes     Coordination   Finger to nose coordination: abnormal (dysmetric on the right)      Laboratory and radiological testing is notable for ECHO and Carotid Dopplers that are preliminarily unremarkable. Glucose=200.    Assessment/Plan       Mele Rossi is followed with stroke. I'll await the final results of his ECHO and Carotid Dopplers. I agree with his current management, and don't have new recommendations at this time.       As part of this visit I reviewed prior lab results, reviewed radiology results and reviewed records from the current hospitalization which is incorporated in the HPI.    Electronically signed by Varun Gayle MD at 8/10/2018  7:31 AM     Sherry Ybarra II, DO at 2018 11:51 AM        H and P reviewed overnight. Patient admitted with CVA. Feels a little better this am. Neurology has seen. Continue asa and statin. Echo and carotid imaging pending. Will restart amlodipine. Also needs better control of his dm2, but is very hesitant to start insulin. PT/OT eval pending.    Electronically signed by Sherry Ybarra II, DO at 2018 11:53 AM          Physical Therapy Notes (most recent note)      Radha Marrero PT at 2018  3:31 PM  Version 1 of 1         Acute Care - Physical Therapy Initial Evaluation  [facility]     Patient Name: Mele Rossi  : 1965  MRN: [id number]  Today's Date: 2018   Onset of Illness/Injury or Date of Surgery: 18  Date of Referral to PT: 18  Referring Physician: DANIEL Barajas MD      Admit Date: 2018    Visit Dx:     ICD-10-CM ICD-9-CM   1. Dysarthria R47.1 784.51   2. Impaired mobility and ADLs Z74.09 799.89   3. Impaired functional mobility, balance, gait, and endurance Z74.09 V49.89     Patient Active Problem List   Diagnosis   • Stroke (CMS/HCC)   • Diabetes mellitus (CMS/HCC)   • HTN (hypertension)   • Dysarthria     History reviewed. No pertinent past medical  history.  History reviewed. No pertinent surgical history.     PT ASSESSMENT (last 12 hours)      Physical Therapy Evaluation     Row Name 08/09/18 1429          PT Evaluation Time/Intention    Subjective Information no complaints  -MB     Document Type evaluation  -MB     Mode of Treatment physical therapy  -MB     Patient Effort good  -MB     Symptoms Noted During/After Treatment none  -MB     Comment elevated BP  -MB     Row Name 08/09/18 1429          General Information    Patient Profile Reviewed? yes  -MB     Onset of Illness/Injury or Date of Surgery 08/08/18  -MB     Referring Physician DANIEL Barajas MD  -MB     Patient Observations alert;cooperative;agree to therapy  -MB     Patient/Family Observations No family present at bedside.  -MB     General Observations of Patient Pt.supine, on RA, telemetry, IV heplocked, SCDS doffed.  RN consent for PT.  -MB     Prior Level of Function independent:;all household mobility;community mobility;gait;transfer;bed mobility;ADL's;home management;work;using stairs  -MB     Equipment Currently Used at Home shower chair  -MB     Pertinent History of Current Functional Problem Pt. presented to OSH w/ R side weakness, decreased balance, facial droop.  Pt. found hypertensive in ED ().  Pt. transferred to [facility] for higher level of care.  MRI revealed acute infarct ventral L soo, no intracranial hemorrhage.  -MB     Existing Precautions/Restrictions fall   R side weakness  -MB     Risks Reviewed patient:;LOB;nausea/vomiting;dizziness;increased discomfort;change in vital signs  -MB     Benefits Reviewed patient:;improve function;increase independence;increase strength;increase balance;decrease pain;decrease risk of DVT;improve skin integrity;increase knowledge  -MB     Barriers to Rehab none identified  -MB     Row Name 08/09/18 1429          Relationship/Environment    Primary Source of Support/Comfort spouse  -MB     Lives With spouse  -MB     Primary  Roles/Responsibilities wage earner, full time  -MB     Row Name 08/09/18 1429          Resource/Environmental Concerns    Current Living Arrangements home/apartment/condo  -MB     Row Name 08/09/18 1429          Home Main Entrance    Number of Stairs, Main Entrance four  -MB     Row Name 08/09/18 1429          Cognitive Assessment/Intervention- PT/OT    Affect/Mental Status (Cognitive) flat/blunted affect  -MB     Orientation Status (Cognition) oriented x 4  -MB     Follows Commands (Cognition) WFL  -MB     Safety Deficit (Cognitive) mild deficit;insight into deficits/self awareness  -MB     Personal Safety Interventions fall prevention program maintained;gait belt;muscle strengthening facilitated;nonskid shoes/slippers when out of bed  -MB     Row Name 08/09/18 1429          Safety Issues, Functional Mobility    Impairments Affecting Function (Mobility) balance;coordination;strength  -MB     Row Name 08/09/18 1429          Bed Mobility Assessment/Treatment    Bed Mobility Assessment/Treatment supine-sit  -MB     Supine-Sit [place] (Bed Mobility) supervision;verbal cues  -MB     Bed Mobility, Safety Issues decreased use of arms for pushing/pulling;decreased use of legs for bridging/pushing  -MB     Assistive Device (Bed Mobility) bed rails;head of bed elevated  -MB     Comment (Bed Mobility) Pt. demo good safety w/ bed mobility.    -MB     Row Name 08/09/18 1429          Transfer Assessment/Treatment    Transfer Assessment/Treatment sit-stand transfer;stand-sit transfer  -MB     Comment (Transfers) Pt. required VCs for safe hand placement.  -MB     Sit-Stand [place] (Transfers) contact guard;verbal cues  -MB     Stand-Sit [place] (Transfers) contact guard;verbal cues  -MB     Row Name 08/09/18 1429          Sit-Stand Transfer    Assistive Device (Sit-Stand Transfers) walker, front-wheeled  -MB     Row Name 08/09/18 1429          Stand-Sit Transfer    Assistive Device (Stand-Sit Transfers) walker,  front-wheeled  -[facility] Name 08/09/18 1429          Gait/Stairs Assessment/Training    [place] Level (Gait) minimum assist (75% patient effort);verbal cues  -MB     Assistive Device (Gait) walker, front-wheeled  -MB     Distance in Feet (Gait) 60  -MB     Pattern (Gait) step-through  -MB     Deviations/Abnormal Patterns (Gait) right sided deviations;ataxic;taran decreased;gait speed decreased;stride length decreased  -MB     Bilateral Gait Deviations knee buckling, right side;weight shift ability decreased  -MB     Right Sided Gait Deviations knee buckling, right side;weight shift ability decreased  -MB     Comment (Gait/Stairs) Pt. demo step-through gait pattern at slow pace w/ several episodes of R knee buckling.  Pt. able to self-correct.  VCs for increased R heel strike and foot clearance.  Gait distance limited by fatigue.  -[facility] Name 08/09/18 1429          General ROM    GENERAL ROM COMMENTS BLEs WFL  -[facility] Name 08/09/18 1429          General Assessment (Manual Muscle Testing)    Comment, General Manual Muscle Testing (MMT) Assessment L LE grossly 5/5, R LE grossly 4+/5, R ankle DF/PF 4/5  -[facility] Name 08/09/18 1429          Gross Motor Coordination    Gross Motor Impairments balance;coordination;motor control;strength  -MB     Gross Motor Skill, Impairments Detail R heel to shin impairment  -[facility] Name 08/09/18 1429          Balance    Balance static standing balance;dynamic standing balance  -[facility] Name 08/09/18 1429          Static Standing Balance    Level of [place] (Supported Standing, Static Balance) --  -MB     Time Able to Maintain Position (Supported Standing, Static Balance) --  -MB     Assistive Device Utilized (Supported Standing, Static Balance) --  -MB     Comment (Supported Standing, Static Balance) --  -MB     Row Name 08/09/18 1429          Dynamic Standing Balance    Level of [place], Reaches Outside Midline (Standing, Dynamic Balance) minimal  assist, 75% patient effort  -MB     Time Able to Maintain Position, Reaches Outside Midline (Standing, Dynamic Balance) 1 to 2 minutes  -MB     Comment, Reaches Outside Midline (Standing, Dynamic Balance) +LOB narrow KENY, eyes open  -MB     Row Name 08/09/18 1429          Sensory Assessment/Intervention    Sensory General Assessment no sensation deficits identified  -MB     Row Name 08/09/18 1429          Vision Assessment/Intervention    Visual Impairment/Limitations corrective lenses full time  -MB     Row Name 08/09/18 1429          Pain Scale: Numbers Pre/Post-Treatment    Pain Scale: Numbers, Pretreatment 0/10 - no pain  -MB     Pain Scale: Numbers, Post-Treatment 0/10 - no pain  -MB     Row Name             Wound 08/08/18 2010 Right lateral foot     Wound - Properties Group Date first assessed: 08/08/18  -TL Time first assessed: 2010  -TL Present On Admission : yes  -TL Side: Right  -TL Orientation: lateral  -TL Location: foot  -TL    Row Name 08/09/18 1429          Physical Therapy Clinical Impression    Date of Referral to PT 08/09/18  -MB     PT Diagnosis (PT Clinical Impression) R side weakness, functional decline  -MB     Functional Level at Time of Evaluation (PT Clinical Impression) Pt. required min A for safe ambulation.  -MB     Patient/Family Goals Statement (PT Clinical Impression) Return to PLOF and home.  -MB     Criteria for Skilled Interventions Met (PT Clinical Impression) yes;treatment indicated  -MB     Rehab Potential (PT Clinical Summary) good, to achieve stated therapy goals  -MB     Care Plan Review (PT) evaluation/treatment results reviewed;care plan/treatment goals reviewed;risks/benefits reviewed;current/potential barriers reviewed;patient/other agree to care plan  -MB     Row Name 08/09/18 1429          Vital Signs    Pre Systolic BP Rehab 197  -MB     Pre Treatment Diastolic   -MB     Intra Systolic BP Rehab 188  -MB     Intra Treatment Diastolic BP 99  -MB     Posttreatment  Heart Rate (beats/min) 70  -MB     Posttreatment Resp Rate (breaths/min) 79  -MB     Pre Patient Position Supine  -MB     Intra Patient Position Standing  -MB     Post Patient Position Sitting  -MB     Row Name 08/09/18 1429          Physical Therapy Goals    Bed Mobility Goal Selection (PT) bed mobility, PT goal 1  -MB     Transfer Goal Selection (PT) transfer, PT goal 1  -MB     Gait Training Goal Selection (PT) gait training, PT goal 1  -MB     Stairs Goal Selection (PT) stairs, PT goal 1  -MB     Additional Documentation Stairs Goal Selection (PT) (Row)  -MB     Row Name 08/09/18 1429          Bed Mobility Goal 1 (PT)    Activity/Assistive Device (Bed Mobility Goal 1, PT) bed mobility activities, all  -MB     [place] Level/Cues Needed (Bed Mobility Goal 1, PT) independent  -MB     Time Frame (Bed Mobility Goal 1, PT) 1 week  -MB     Progress/Outcomes (Bed Mobility Goal 1, PT) goal ongoing  -MB     Row Name 08/09/18 1429          Transfer Goal 1 (PT)    Activity/Assistive Device (Transfer Goal 1, PT) sit-to-stand/stand-to-sit;bed-to-chair/chair-to-bed;walker, rolling  -MB     [place] Level/Cues Needed (Transfer Goal 1, PT) conditional independence  -MB     Time Frame (Transfer Goal 1, PT) 1 week  -MB     Progress/Outcome (Transfer Goal 1, PT) goal ongoing  -MB     Row Name 08/09/18 1429          Gait Training Goal 1 (PT)    Activity/Assistive Device (Gait Training Goal 1, PT) gait (walking locomotion);walker, rolling  -MB     [place] Level (Gait Training Goal 1, PT) supervision required  -MB     Distance (Gait Goal 1, PT) 150  -MB     Time Frame (Gait Training Goal 1, PT) 1 week  -MB     Progress/Outcome (Gait Training Goal 1, PT) goal ongoing  -MB     Row Name 08/09/18 1429          Stairs Goal 1 (PT)    Activity/Assistive Device (Stairs Goal 1, PT) stairs, all skills  -MB     [place] Level/Cues Needed (Stairs Goal 1, PT) minimum assist (75% or more patient effort)  -MB     Number of Stairs  (Stairs Goal 1, PT) 4  -MB     Time Frame (Stairs Goal 1, PT) 2 weeks  -MB     Progress/Outcome (Stairs Goal 1, PT) goal ongoing  -MB     Row Name 08/09/18 1429          Positioning and Restraints    Pre-Treatment Position in bed  -MB     Post Treatment Position chair  -MB     In Chair notified nsg;reclined;call light within reach;encouraged to call for assist;exit alarm on;legs elevated  -MB     Row Name 08/09/18 1429          Living Environment    Home Accessibility stairs to enter home  -MB       User Key  (r) = Recorded By, (t) = Taken By, (c) = Cosigned By    Initials Name Provider Type    Radha Garcia, PT Physical Therapist    Sherita Gomez, LPN Licensed Nurse              PT Recommendation and Plan  Anticipated Discharge Disposition (PT): inpatient rehabilitation facility  Planned Therapy Interventions (PT Eval): balance training, bed mobility training, gait training, home exercise program, patient/family education, stair training, strengthening, transfer training, neuromuscular re-education, motor coordination training  Therapy Frequency (PT Clinical Impression): daily  Outcome Summary/Treatment Plan (PT)  Anticipated Equipment Needs at Discharge (PT): front wheeled walker  Anticipated Discharge Disposition (PT): inpatient rehabilitation facility  Plan of Care Reviewed With: patient  Progress: improving  Outcome Summary: PT eval completed.  Pt. presents w/ evolving symptoms including dec strength, balance deficits, dec coordination and motor control, and gait instability, impacting patient's safety and ind. w/ functional mobility.  Pt. required CGA for transfers and min A for gait, ambulating 60 ft w/ RW (dec motor control R LE).  Pt. will benefit from skilled IPPT to address impairments and promote return to PLOF.  Recommend IP rehab at D/C.          Outcome Measures     Row Name 08/09/18 1429 08/09/18 1351          How much help from another person do you currently need...    Turning from  your back to your side while in flat bed without using bedrails? 4  -MB  --     Moving from lying on back to sitting on the side of a flat bed without bedrails? 3  -MB  --     Moving to and from a bed to a chair (including a wheelchair)? 3  -MB  --     Standing up from a chair using your arms (e.g., wheelchair, bedside chair)? 3  -MB  --     Climbing 3-5 steps with a railing? 2  -MB  --     To walk in hospital room? 3  -MB  --     AM-PAC 6 Clicks Score 18  -MB  --        How much help from another is currently needed...    Putting on and taking off regular lower body clothing?  -- 2  -AN     Bathing (including washing, rinsing, and drying)  -- 2  -AN     Toileting (which includes using toilet bed pan or urinal)  -- 2  -AN     Putting on and taking off regular upper body clothing  -- 3  -AN     Taking care of personal grooming (such as brushing teeth)  -- 3  -AN     Eating meals  -- 3  -AN     Score  -- 15  -AN        Modified Marie Scale    Modified Marie Scale 4 - Moderately severe disability.  Unable to walk without assistance, and unable to attend to own bodily needs without assistance.  -MB 4 - Moderately severe disability.  Unable to walk without assistance, and unable to attend to own bodily needs without assistance.  -AN        Functional Assessment    Outcome Measure Options AM-PAC 6 Clicks Basic Mobility (PT)  -MB AM-[facility] 6 Clicks Daily Activity (OT);Modified Marie  -AN       User Key  (r) = Recorded By, (t) = Taken By, (c) = Cosigned By    Initials Name Provider Type    Elida Trinidad, OT Occupational Therapist    Radha Garcia, PT Physical Therapist           Time Calculation:         PT Charges     Row Name 08/09/18 1531             Time Calculation    Start Time 1429  -MB      PT Received On 08/09/18  -MB      PT Goal Re-Cert Due Date 08/19/18  -MB        User Key  (r) = Recorded By, (t) = Taken By, (c) = Cosigned By    Initials Name Provider Type    Radha Garcia, PT Physical  Therapist        Therapy Suggested Charges     Code   Minutes Charges    None           Therapy Charges for Today     Code Description Service Date Service Provider Modifiers Qty    00348555065 HC PT MOBILITY CURRENT 2018 Radha Marrero, PT GP, CK 1    44385235485 HC PT MOBILITY PROJECTED 2018 Radha Marrero, PT GP, CJ 1    99764373279 HC PT EVAL MOD COMPLEXITY 4 2018 Radha Marrero, PT GP 1          PT G-Codes  PT Professional Judgement Used?: Yes  Outcome Measure Options: AM-PAC 6 Clicks Basic Mobility (PT)  Score: 18  Functional Limitation: Mobility: Walking and moving around  Mobility: Walking and Moving Around Current Status (): At least 40 percent but less than 60 percent impaired, limited or restricted  Mobility: Walking and Moving Around Goal Status (): At least 20 percent but less than 40 percent impaired, limited or restricted      Radha Marrero PT  2018         Electronically signed by Radha Marrero, PT at 2018  3:32 PM          Occupational Therapy Notes (most recent note)      Elida Jeffrey, OT at 2018  2:31 PM          Acute Care - Occupational Therapy Initial Evaluation  [facility]     Patient Name: Mele Rossi  : 1965  MRN: [id number]  Today's Date: 2018  Onset of Illness/Injury or Date of Surgery: 18  Date of Referral to OT: 18  Referring Physician: MD Jenn    Admit Date: 2018       ICD-10-CM ICD-9-CM   1. Dysarthria R47.1 784.51   2. Impaired mobility and ADLs Z74.09 799.89     Patient Active Problem List   Diagnosis   • Stroke (CMS/HCC)   • Diabetes mellitus (CMS/HCC)   • HTN (hypertension)   • Dysarthria     History reviewed. No pertinent past medical history.  No past surgical history on file.       OT ASSESSMENT FLOWSHEET (last 72 hours)      Occupational Therapy Evaluation     Row Name 18 1351                   OT Evaluation Time/Intention    Subjective Information no complaints  -AN         Document Type evaluation  -AN        Mode of Treatment occupational therapy  -AN        Patient Effort good  -AN        Symptoms Noted During/After Treatment none  -AN        Comment BP is high  -AN           General Information    Patient Profile Reviewed? yes  -AN        Onset of Illness/Injury or Date of Surgery 08/08/18  -AN        Referring Physician MD Jenn  -AN        Patient Observations alert;cooperative;agree to therapy  -AN        Patient/Family Observations Spouse and Sister present  -AN        General Observations of Patient Pt supine in bed watchiing TV, SCDS on  -AN        Prior Level of Function independent:;all household mobility;community mobility;gait;transfer;ADL's;bed mobility;home management;driving;work   works full time labor work  -AN        Equipment Currently Used at Home shower chair  -AN        Pertinent History of Current Functional Problem Pt admitted following onset of R side weakness, decreased balance, facial droop  -AN        Existing Precautions/Restrictions fall   R side weakness  -AN        Risks Reviewed patient:;family:;LOB;dizziness;increased discomfort;change in vital signs  -AN        Benefits Reviewed patient:;family:;improve function;increase independence;increase strength;increase balance  -AN        Barriers to Rehab none identified  -AN           Relationship/Environment    Primary Source of Support/Comfort spouse  -AN        Lives With spouse  -AN        Primary Roles/Responsibilities wage earner, full time  -AN           Resource/Environmental Concerns    Current Living Arrangements home/apartment/condo  -AN           Home Main Entrance    Number of Stairs, Main Entrance four  -AN           Cognitive Assessment/Interventions    Additional Documentation Cognitive Assessment/Intervention (Group)  -AN           Cognitive Assessment/Intervention- PT/OT    Affect/Mental Status (Cognitive) flat/blunted affect  -AN        Orientation Status (Cognition) oriented x 4   -AN        Follows Commands (Cognition) WFL  -AN        Cognitive Function (Cognitive) attention deficit  -AN        Attention Deficit (Cognitive) mild deficit  -AN        Safety Deficit (Cognitive) insight into deficits/self awareness;mild deficit  -AN        Cognitive Interventions (Cognitive) occupation/activity based interventions  -AN        Personal Safety Interventions fall prevention program maintained  -AN           Bed Mobility Assessment/Treatment    Bed Mobility Assessment/Treatment supine-sit;sit-supine  -AN        Supine-Sit [place] (Bed Mobility) set up;supervision  -AN        Bed Mobility, Safety Issues decreased use of arms for pushing/pulling;decreased use of legs for bridging/pushing  -AN        Assistive Device (Bed Mobility) bed rails;head of bed elevated  -AN           Functional Mobility    Functional Mobility- Comment defer to PT  -AN           Transfer Assessment/Treatment    Transfer Assessment/Treatment sit-stand transfer;stand-sit transfer  -AN           Sit-Stand Transfer    Sit-Stand [place] (Transfers) contact guard  -AN           Stand-Sit Transfer    Stand-Sit [place] (Transfers) contact guard  -AN           ADL Assessment/Intervention    BADL Assessment/Intervention upper body dressing;lower body dressing;grooming;bathing  -AN           Bathing Assessment/Intervention    Bathing [place] Level lower body;moderate assist (50% patient effort)  -AN        Comment (Bathing) simulated with limited balance  -AN           Upper Body Dressing Assessment/Training    Upper Body Dressing [place] Level don;pajama/robe;minimum assist (75% patient effort)  -AN        Upper Body Dressing Position edge of bed sitting  -AN           Lower Body Dressing Assessment/Training    Lower Body Dressing [place] Level don;socks;moderate assist (50% patient effort)  -AN        Lower Body Dressing Position edge of bed sitting  -AN           Grooming Assessment/Training    Comment  (Grooming) simulated set up, decreased R UE coordination  -AN           BADL Safety/Performance    Impairments, BADL Safety/Performance balance;coordination;strength  -AN        Skilled BADL Treatment/Intervention BADL process/adaptation training  -AN           General ROM    GENERAL ROM COMMENTS Tray UE WFL, slight R UE drift  -AN           General Assessment (Manual Muscle Testing)    Comment, General Manual Muscle Testing (MMT) Assessment R UE 4/5, L UE 4+/5  -AN           Motor Assessment/Interventions    Additional Documentation Balance (Group);Gross Motor Coordination (Group);Therapeutic Exercise (Group)  -AN           Gross Motor Coordination    Gross Motor Impairments balance;coordination;strength  -AN        Gross Motor Skill, Impairments Detail deficit R finger to nose, grasp  -AN           Balance    Balance static sitting balance;dynamic sitting balance;static standing balance;dynamic standing balance  -AN           Static Sitting Balance    Level of [place] (Unsupported Sitting, Static Balance) supervision  -AN        Sitting Position (Unsupported Sitting, Static Balance) sitting on edge of bed  -AN        Time Able to Maintain Position (Unsupported Sitting, Static Balance) 3 to 4 minutes  -AN        Comment (Unsupported Sitting, Static Balance) Tray feet on floor  -AN           Dynamic Sitting Balance    Level of [place], Reaches Outside Midline (Sitting, Dynamic Balance) supervision  -AN        Sitting Position, Reaches Outside Midline (Sitting, Dynamic Balance) sitting on edge of bed  -AN        Comment, Reaches Outside Midline (Sitting, Dynamic Balance) L/R shift with Tray feet on floor  -AN           Static Standing Balance    Level of [place] (Supported Standing, Static Balance) contact guard assist  -AN        Time Able to Maintain Position (Supported Standing, Static Balance) 45 to 60 seconds  -AN           Dynamic Standing Balance    Level of [place], Reaches Outside Midline  (Standing, Dynamic Balance) minimal assist, 75% patient effort  -AN        Time Able to Maintain Position, Reaches Outside Midline (Standing, Dynamic Balance) 45 to 60 seconds  -AN        Comment, Reaches Outside Midline (Standing, Dynamic Balance) L/R weight shift  -AN           Sensory Assessment/Intervention    Sensory General Assessment no sensation deficits identified  -AN        Additional Documentation Vision Assessment/Intervention (Group)  -AN           Vision Assessment/Intervention    Visual Impairment/Limitations WFL;corrective lenses full time  -AN           Positioning and Restraints    Pre-Treatment Position in bed  -AN        Post Treatment Position bed  -AN        In Bed supine;call light within reach;encouraged to call for assist;with family/caregiver;SCD pump applied  -AN           Pain Assessment    Additional Documentation Pain Scale: Numbers Pre/Post-Treatment (Group)  -AN           Pain Scale: Numbers Pre/Post-Treatment    Pain Scale: Numbers, Pretreatment 0/10 - no pain  -AN        Pain Scale: Numbers, Post-Treatment 0/10 - no pain  -AN           Wound 08/08/18 2010 Right lateral foot     Wound - Properties Group Date first assessed: 08/08/18  -TL Time first assessed: 2010  -TL Present On Admission : yes  -TL Side: Right  -TL Orientation: lateral  -TL Location: foot  -TL       Coping    Observed Emotional State calm  -AN           Plan of Care Review    Plan of Care Reviewed With patient;spouse  -AN           Clinical Impression (OT)    Date of Referral to OT 08/09/18  -AN        OT Diagnosis Impaired ADL and mobility performance  -AN        Patient/Family Goals Statement (OT Eval) Agreeable to OT  -AN        Criteria for Skilled Therapeutic Interventions Met (OT Eval) yes;treatment indicated  -AN        Rehab Potential (OT Eval) good, to achieve stated therapy goals  -AN        Therapy Frequency (OT Eval) daily  -AN        Care Plan Review (OT) evaluation/treatment results reviewed;care  plan/treatment goals reviewed;risks/benefits reviewed  -AN        Anticipated Equipment Needs at Discharge (OT) front wheeled walker;raised toilet seat   grab bar  -AN        Anticipated Discharge Disposition (OT) inpatient rehabilitation facility  -AN        Patient/Family Concerns, Anticipated Discharge Disposition (OT) looking at  as option also  -AN           Vital Signs    Pre Systolic BP Rehab 178  -AN        Pre Treatment Diastolic   -AN        Post Systolic BP Rehab 197  -AN        Post Treatment Diastolic   -AN        Pretreatment Heart Rate (beats/min) 74  -AN        Posttreatment Heart Rate (beats/min) 69  -AN           Planned OT Interventions    Planned Therapy Interventions (OT Eval) BADL retraining;functional balance retraining;occupation/activity based interventions;strengthening exercise;transfer/mobility retraining  -AN        BADL Retraining    -AN           OT Goals    Transfer Goal Selection (OT) transfer, OT goal 1  -AN        Bathing Goal Selection (OT) bathing, OT goal 1  -AN        Dressing Goal Selection (OT) dressing, OT goal 1  -AN        Strength Goal Selection (OT) strength, OT goal 1  -AN        Additional Documentation Strength Goal Selection (OT) (Row)  -AN           Transfer Goal 1 (OT)    Activity/Assistive Device (Transfer Goal 1, OT) transfers, all;walker, rolling  -AN        [place] Level/Cues Needed (Transfer Goal 1, OT) contact guard assist  -AN        Time Frame (Transfer Goal 1, OT) 10 days  -AN        Progress/Outcome (Transfer Goal 1, OT) goal ongoing  -AN           Bathing Goal 1 (OT)    Activity/Assistive Device (Bathing Goal 1, OT) bathing skills, all;long-handled sponge;shower chair  -AN        [place] Level/Cues Needed (Bathing Goal 1, OT) minimum assist (75% or more patient effort)  -AN        Time Frame (Bathing Goal 1, OT) 10 days  -AN        Progress/Outcomes (Bathing Goal 1, OT) goal ongoing  -AN           Dressing Goal 1 (OT)     Activity/Assistive Device (Dressing Goal 1, OT) dressing skills, all  -AN        [place]/Cues Needed (Dressing Goal 1, OT) minimum assist (75% or more patient effort)  -AN        Time Frame (Dressing Goal 1, OT) 10 days  -AN        Progress/Outcome (Dressing Goal 1, OT) goal ongoing  -AN           Strength Goal 1 (OT)    Strength Goal 1 (OT) Pt will be I withUE HEP to improve R UE strength  -AN        Time Frame (Strength Goal 1, OT) 10 days  -AN        Progress/Outcome (Strength Goal 1, OT) goal ongoing  -AN           Living Environment    Home Accessibility stairs to enter home  -AN          User Key  (r) = Recorded By, (t) = Taken By, (c) = Cosigned By    Initials Name Effective Dates    AN Elida Jeffrey OT 06/22/15 -     Sherita Gomez LPN 03/14/16 -            Occupational Therapy Education     Title: PT OT SLP Therapies (Active)     Topic: Occupational Therapy (Active)     Point: ADL training (Done)     Description: Instruct learner(s) on proper safety adaptation and remediation techniques during self care or transfers.   Instruct in proper use of assistive devices.   Learning Progress Summary     Learner Status Readiness Method Response Comment Documented by    Patient Done Acceptance E JACEK,NR Educated on OT role, pt current deficits and need for assist. Discussed POC and discharge. AN 08/09/18 1428    Family Done Acceptance E TEOFILO READ Educated on OT role, pt current deficits and need for assist. Discussed POC and discharge. AN 08/09/18 1428                      User Key     Initials Effective Dates Name Provider Type Discipline    AN 06/22/15 -  Elida Jeffrey OT Occupational Therapist OT                  OT Recommendation and Plan  Outcome Summary/Treatment Plan (OT)  Anticipated Equipment Needs at Discharge (OT): front wheeled walker, raised toilet seat (grab bar)  Anticipated Discharge Disposition (OT): inpatient rehabilitation facility  Patient/Family Concerns, Anticipated Discharge Disposition  (OT): looking at HH as option also  Planned Therapy Interventions (OT Eval): BADL retraining, functional balance retraining, occupation/activity based interventions, strengthening exercise, transfer/mobility retraining  Therapy Frequency (OT Eval): daily  Plan of Care Review  Plan of Care Reviewed With: patient, spouse  Plan of Care Reviewed With: patient, spouse  Outcome Summary: Pt with current evolving symptoms that are hindering I and safety with ADL's and mobility. Pt is currently CGa for txfr, min assist dynamic balance and mod assist LB ADL's. Pt would benefit from IRF at discharge.           Outcome Measures     Row Name 08/09/18 1351             How much help from another is currently needed...    Putting on and taking off regular lower body clothing? 2  -AN      Bathing (including washing, rinsing, and drying) 2  -AN      Toileting (which includes using toilet bed pan or urinal) 2  -AN      Putting on and taking off regular upper body clothing 3  -AN      Taking care of personal grooming (such as brushing teeth) 3  -AN      Eating meals 3  -AN      Score 15  -AN         Modified Marie Scale    Modified [place] Scale 4 - Moderately severe disability.  Unable to walk without assistance, and unable to attend to own bodily needs without assistance.  -AN         Functional Assessment    Outcome Measure Options AM-PAC 6 Clicks Daily Activity (OT);Modified Marie  -AN        User Key  (r) = Recorded By, (t) = Taken By, (c) = Cosigned By    Initials Name Provider Type    AN Elida Jeffrey OT Occupational Therapist          Time Calculation:   OT Start Time: 1351  Therapy Suggested Charges     Code   Minutes Charges    None           Therapy Charges for Today     Code Description Service Date Service Provider Modifiers Qty    86860066100  OT EVAL LOW COMPLEXITY 4 8/9/2018 Elida Jeffrey OT GO 1               Elida Jeffrey OT  8/9/2018    Electronically signed by Elida Jeffrey OT at 8/9/2018  2:31 PM

## 2018-08-10 NOTE — SIGNIFICANT NOTE
RN reports has had elevated BP since here. Here for acute CVA.    Will allow higher pressure with acute event, a few are borderline to that 220 cutoff. Had a dose of enalapril and oral hydralazine earlier (has had HR in 50s so no BB, and we don't have IV hydralazine in stock). Can give a clonidine 0.1 mg PO now, and if pressure still sustains >190 can repeat x1 in 1-2 hours.    Will ask rounding provider to re-eval BP regiment as moving further out from stroke onset.

## 2018-08-10 NOTE — PLAN OF CARE
Problem: Patient Care Overview  Goal: Plan of Care Review  Outcome: Ongoing (interventions implemented as appropriate)   08/10/18 9001   Coping/Psychosocial   Plan of Care Reviewed With patient   Plan of Care Review   Progress improving   Coping/Psychosocial   Patient Agreement with Plan of Care agrees   OTHER   Outcome Summary VSS, no complaints of pain. NIHSS a 4 today. Plan is to go to rehab soon.

## 2018-08-10 NOTE — PROGRESS NOTES
Continued Stay Note  [facility]     Patient Name: Mele Rossi  MRN: [id number]  Today's Date: 8/10/2018    Admit Date: 8/8/2018          Discharge Plan     Row Name 08/10/18 1245       Plan    Plan Goal is [facility] for inpatient rehab - pending [place] pre-cert    Patient/Family in Agreement with Plan yes    Plan Comments PT/OT have recommended inpatient rehab for Mr. Rossi. Per [facility], patient would have a 20% copay if he comes to their facility. Discussed this with patient and also checked with [facility] to see if copay would be similar there. Per Kimberly at [facility], patient has a $3,425 out of pocket maximum per year with his insurance and will likely meet that with this hospitalization or soon after with rehab. Until that amount is met, patient would pay a 20% copay for any healthcare he receives, at any facility. After that is met, he will not have a copay.     Met with patient at bedside and spoke with spouse via phone to discuss and they would still like for patient to go to [facility] at discharge. Spoke with Maddy at [facility] (P: [phone]; F: [phone]) and they will initiate pre-cert with patient's [facility] today. CM notifed patient and wife that it may take up to 3 business days for Jake to approve/deny our request for transfer to inpatient rehab. They verbalized understanding. Sherry Zuniga RN [phone]    Final Discharge Disposition Code 61 - hospital-based swing bed              Discharge Codes    No documentation.       Expected Discharge Date and Time     Expected Discharge Date Expected Discharge Time    Aug 13, 2018             Sherry Zuniga RN

## 2018-08-10 NOTE — THERAPY TREATMENT NOTE
Acute Care - Physical Therapy Treatment Note  [facility]     Patient Name: Mele Rossi  : 1965  MRN: [id number]  Today's Date: 8/10/2018  Onset of Illness/Injury or Date of Surgery: 18  Date of Referral to PT: 18  Referring Physician: DANIEL Barajas MD    Admit Date: 2018    Visit Dx:    ICD-10-CM ICD-9-CM   1. Dysarthria R47.1 784.51   2. Impaired mobility and ADLs Z74.09 799.89   3. Impaired functional mobility, balance, gait, and endurance Z74.09 V49.89     Patient Active Problem List   Diagnosis   • Stroke (CMS/HCC)   • Type 2 diabetes mellitus with hyperglycemia (CMS/HCC)   • HTN (hypertension)   • Dysarthria       Therapy Treatment          Rehabilitation Treatment Summary     Row Name 08/10/18 1455 08/10/18 1405          Treatment Time/Intention    Discipline physical therapist  -VG --  -VG     Document Type therapy note (daily note)  -VG --  -VG     Subjective Information no complaints  -VG --  -VG     Mode of Treatment individual therapy;physical therapy  -VG --  -VG     Patient/Family Observations Pt in bed, tele, RA, bed check; friend present  -VG --  -VG     Care Plan Review patient/other agree to care plan  -VG --  -VG     Care Plan Review, Other Participant(s) friend  -VG --  -VG     Therapy Frequency (PT Clinical Impression) daily  -VG --  -VG     Patient Effort good  -VG --  -VG     Existing Precautions/Restrictions fall;other (see comments)   R sided weakness  -VG --  -VG     Recorded by [VG] Korina Edwards, PT 08/10/18 1602 [VG] Korina Edwards, PT 08/10/18 1602     Row Name 08/10/18 1455 08/10/18 1405          Vital Signs    Pre Systolic BP Rehab 162  -VG --  -VG     Pre Treatment Diastolic   -VG --  -VG     Post Systolic BP Rehab 206  -VG --  -VG     Post Treatment Diastolic   -VG --  -VG     Pretreatment Heart Rate (beats/min) 70  -VG --  -VG     Posttreatment Heart Rate (beats/min) 89  -VG --  -VG     Recorded by [VG] Korina Edwards, PT 08/10/18 1602  [VG] Korina Edwards, PT 08/10/18 1602     Row Name 08/10/18 1455 08/10/18 1405          Cognitive Assessment/Intervention- PT/OT    Affect/Mental Status (Cognitive) WFL  -VG --  -VG     Orientation Status (Cognition) oriented x 4  -VG --  -VG     Follows Commands (Cognition) WFL  -VG --  -VG     Cognitive Function (Cognitive) safety deficit  -VG --  -VG     Safety Deficit (Cognitive) mild deficit;ability to follow commands;insight into deficits/self awareness;judgment;safety precautions follow-through/compliance  -VG --  -VG     Personal Safety Interventions fall prevention program maintained;gait belt;nonskid shoes/slippers when out of bed;supervised activity  -VG --  -VG     Recorded by [VG] Korina Edwards, PT 08/10/18 1602 [VG] Korina Edwards, PT 08/10/18 1602     Row Name 08/10/18 1455 08/10/18 1405          Safety Issues, Functional Mobility    Safety Issues Affecting Function (Mobility) awareness of need for assistance;ability to follow commands;positioning of assistive device;safety precautions follow-through/compliance  -VG --  -VG     Impairments Affecting Function (Mobility) balance;coordination;strength  -VG --  -VG     Recorded by [VG] Korina Edwards, PT 08/10/18 1602 [VG] Korina Edwards, PT 08/10/18 1602     Row Name 08/10/18 1455 08/10/18 1405          Bed Mobility Assessment/Treatment    Bed Mobility Assessment/Treatment supine-sit;sit-supine  -VG --  -VG     Supine-Sit [place] (Bed Mobility) supervision;verbal cues  -VG --  -VG     Sit-Supine [place] (Bed Mobility) supervision;verbal cues  -VG --  -VG     Bed Mobility, Safety Issues decreased use of arms for pushing/pulling;decreased use of legs for bridging/pushing  -VG --  -VG     Assistive Device (Bed Mobility) bed rails;head of bed elevated  -VG --  -VG     Comment (Bed Mobility) Cues for technique. No dizziness noted upon sitting EOB  -VG --  -VG     Recorded by [VG] Korina Edwards, PT 08/10/18 1602 [VG] Korina Edwards,  PT 08/10/18 1602     Row Name 08/10/18 1455 08/10/18 1405          Transfer Assessment/Treatment    Transfer Assessment/Treatment sit-stand transfer;stand-sit transfer  -VG --  -VG     Comment (Transfers) Vc's for hand placement and sequencing. No dizziness noted upon standing.  -VG --  -VG     Recorded by [VG] Korina Edwards, PT 08/10/18 1602 [VG] Korina Edwards, PT 08/10/18 1602     Row Name 08/10/18 1455 08/10/18 1405          Sit-Stand Transfer    Sit-Stand [place] (Transfers) contact guard;verbal cues  -VG --  -VG     Assistive Device (Sit-Stand Transfers) walker, front-wheeled  -VG --  -VG     Recorded by [VG] Korina Edwards, PT 08/10/18 1602 [VG] Korina Edwards, PT 08/10/18 1602     Row Name 08/10/18 1455 08/10/18 1405          Stand-Sit Transfer    Stand-Sit [place] (Transfers) contact guard;verbal cues  -VG --  -VG     Assistive Device (Stand-Sit Transfers) walker, front-wheeled  -VG --  -VG     Recorded by [VG] Korina Edwards, PT 08/10/18 1602 [VG] Korina Edwards, PT 08/10/18 1602     Row Name 08/10/18 1455 08/10/18 1405          Gait/Stairs Assessment/Training    45928 - Gait Training Minutes  15  -VG --  -VG     Gait/Stairs Assessment/Training gait/ambulation independence;gait/ambulation assistive device;distance ambulated;gait pattern  -VG --  -VG     [place] Level (Gait) contact guard;verbal cues  -VG --  -VG     Assistive Device (Gait) walker, front-wheeled  -VG --  -VG     Distance in Feet (Gait)  -- --  -VG     Pattern (Gait) step-through;step-to  -VG --  -VG     Deviations/Abnormal Patterns (Gait) right sided deviations;ataxic;taran decreased;gait speed decreased;stride length decreased  -VG --  -VG     Bilateral Gait Deviations knee buckling, right side;weight shift ability decreased  -VG --  -VG     Comment (Gait/Stairs) Distance limited by RLE weakness/fatigue. Demonstrates improved mechanics, sequencing, and stability. Vc's for AD placement and bracing thru UEs on  RW when stepping with RLE to avoid buckling. Pt's BP elevated after ambulation, nsg made aware, approved of ther ex.  -VG --  -VG     Recorded by [VG] Korina Edwards, PT 08/10/18 1602 [VG] Korina Edwards, PT 08/10/18 1602     Row Name 08/10/18 1405             Motor Skills Assessment/Interventions    Additional Documentation --  -VG      Recorded by [VG] Korina Edwards, PT 08/10/18 1602      Row Name 08/10/18 1455 08/10/18 1405          Therapeutic Exercise    68912 - PT Therapeutic Exercise Minutes 10  -VG --  -VG     Recorded by [VG] Korina Edwards, PT 08/10/18 1602 [VG] Korina Edwards, PT 08/10/18 1602     Row Name 08/10/18 1455 08/10/18 1405          Therapeutic Exercise    Lower Extremity (Therapeutic Exercise) heel slides, bilateral;SLR (straight leg raise), bilateral  -VG --  -VG     Lower Extremity Range of Motion (Therapeutic Exercise) hip abduction/adduction, bilateral;ankle dorsiflexion/plantar flexion, bilateral  -VG --  -VG     Exercise Type (Therapeutic Exercise) AROM (active range of motion)  -VG --  -VG     Position (Therapeutic Exercise) supine  -VG --  -VG     Sets/Reps (Therapeutic Exercise) 15  -VG --  -VG     Comment (Therapeutic Exercise) Cues for technique. [place] required for SLR and hip abd/add on RLE.  -VG --  -VG     Recorded by [VG] Korina Edwards, PT 08/10/18 1602 [VG] Korina Edwards, PT 08/10/18 1602     Row Name 08/10/18 1455 08/10/18 1405          Positioning and Restraints    Pre-Treatment Position in bed  -VG --  -VG     Post Treatment Position bed  -VG --  -VG     In Bed fowlers;call light within reach;encouraged to call for assist;exit alarm on;side rails up x2;with family/caregiver  -VG --  -VG     Recorded by [VG] Korina Edwards, PT 08/10/18 1602 [VG] Korina Edwards, PT 08/10/18 1602     Row Name 08/10/18 1455 08/10/18 1405          Pain Scale: Numbers Pre/Post-Treatment    Pain Scale: Numbers, Pretreatment 0/10 - no pain  -VG --  -VG     Pain Scale: Numbers,  Post-Treatment 0/10 - no pain  -VG --  -VG     Recorded by [VG] Korina Edwards, PT 08/10/18 1602 [VG] Korina Edwards, PT 08/10/18 1602     Row Name                Wound 08/08/18 2010 Right lateral foot     Wound - Properties Group Date first assessed: 08/08/18 [TL] Time first assessed: 2010 [TL] Present On Admission : yes [TL] Side: Right [TL] Orientation: lateral [TL] Location: foot [TL] Additional Comments: right great toe; Diabetic ulcer [CP] Recorded by:  [CP] Marycarmen Mcfarland, APRN 08/09/18 1558 [TL] Sherita Walsh LPN 08/09/18 0743    Row Name 08/10/18 1455 08/10/18 1405          Coping    Observed Emotional State accepting  -VG --  -VG     Verbalized Emotional State acceptance  -VG --  -VG     Recorded by [VG] Korina Edwards, PT 08/10/18 1602 [VG] Korina Edwards, PT 08/10/18 1602     Row Name 08/10/18 1455 08/10/18 1405          Plan of Care Review    Plan of Care Reviewed With patient;friend  -VG --  -VG     Recorded by [VG] Korina Edwards, PT 08/10/18 1602 [VG] Korina Edwards, PT 08/10/18 1602     Row Name 08/10/18 1455 08/10/18 1405          Outcome Summary/Treatment Plan (PT)    Daily Summary of Progress (PT) progress toward functional goals is good  -VG --  -VG     Anticipated Equipment Needs at Discharge (PT) front wheeled walker  -VG --  -VG     Anticipated Discharge Disposition (PT) inpatient rehabilitation facility  -VG --  -VG     Recorded by [VG] Korina Edwards, PT 08/10/18 1602 [VG] Korina Edwards, PT 08/10/18 1602       User Key  (r) = Recorded By, (t) = Taken By, (c) = Cosigned By    Initials Name Effective Dates Discipline    CP Marycarmen Mcfarland, APRN 06/08/18 -  Nurse    TL Sherita Walsh, LPN 03/14/16 -  Nurse    VG Korina Edwards, PT 05/29/18 -  PT          Wound 08/08/18 2010 Right lateral foot  (Active)   Dressing Appearance open to air 8/10/2018  8:00 AM   Base dry;clean 8/10/2018  8:00 AM   Periwound pink;dry;intact 8/10/2018  8:00 AM   Drainage Amount none  8/10/2018  8:00 AM   Dressing Care, Wound open to air 8/9/2018  8:12 PM             Physical Therapy Education     Title: PT OT SLP Therapies (Active)     Topic: Physical Therapy (Done)     Point: Mobility training (Done)    Learning Progress Summary     Learner Status Readiness Method Response Comment Documented by    Patient Done Acceptance E VU Educated on HEP and correct mechanics for safe functional mobility. VG 08/10/18 1534          Point: Home exercise program (Done)    Learning Progress Summary     Learner Status Readiness Method Response Comment Documented by    Patient Done Acceptance E VU Educated on HEP and correct mechanics for safe functional mobility. VG 08/10/18 1534          Point: Body mechanics (Done)    Learning Progress Summary     Learner Status Readiness Method Response Comment Documented by    Patient Done Acceptance E VU Educated on HEP and correct mechanics for safe functional mobility. VG 08/10/18 1534          Point: Precautions (Done)    Learning Progress Summary     Learner Status Readiness Method Response Comment Documented by    Patient Done Acceptance E VU Educated on HEP and correct mechanics for safe functional mobility.  08/10/18 1534                      User Key     Initials Effective Dates Name Provider Type Discipline     05/29/18 -  Korina Edwards, PT Physical Therapist PT                    PT Recommendation and Plan  Anticipated Discharge Disposition (PT): inpatient rehabilitation facility  Therapy Frequency (PT Clinical Impression): daily  Outcome Summary/Treatment Plan (PT)  Daily Summary of Progress (PT): progress toward functional goals is good  Anticipated Equipment Needs at Discharge (PT): front wheeled walker  Anticipated Discharge Disposition (PT): inpatient rehabilitation facility  Plan of Care Reviewed With: patient  Progress: improving  Outcome Summary: Ambulated 100 ft with RW and CGA. BP elevated after ambulation (206/125) notified nsg. Will continue to  progress as able per POC.          Outcome Measures     Row Name 08/10/18 1455 08/09/18 1429 08/09/18 1351       How much help from another person do you currently need...    Turning from your back to your side while in flat bed without using bedrails? 4  -VG 4  -MB  --    Moving from lying on back to sitting on the side of a flat bed without bedrails? 4  -VG 3  -MB  --    Moving to and from a bed to a chair (including a wheelchair)? 3  -VG 3  -MB  --    Standing up from a chair using your arms (e.g., wheelchair, bedside chair)? 3  -VG 3  -MB  --    Climbing 3-5 steps with a railing? 2  -VG 2  -MB  --    To walk in hospital room? 3  -VG 3  -MB  --    AM-PAC 6 Clicks Score 19  -VG 18  -MB  --       How much help from another is currently needed...    Putting on and taking off regular lower body clothing?  --  -- 2  -AN    Bathing (including washing, rinsing, and drying)  --  -- 2  -AN    Toileting (which includes using toilet bed pan or urinal)  --  -- 2  -AN    Putting on and taking off regular upper body clothing  --  -- 3  -AN    Taking care of personal grooming (such as brushing teeth)  --  -- 3  -AN    Eating meals  --  -- 3  -AN    Score  --  -- 15  -AN       Modified Marie Scale    Modified Marie Scale 4 - Moderately severe disability.  Unable to walk without assistance, and unable to attend to own bodily needs without assistance.  -VG 4 - Moderately severe disability.  Unable to walk without assistance, and unable to attend to own bodily needs without assistance.  -MB 4 - Moderately severe disability.  Unable to walk without assistance, and unable to attend to own bodily needs without assistance.  -AN       Functional Assessment    Outcome Measure Options AM-PAC 6 Clicks Basic Mobility (PT);Modified [place]  -VG AM-PAC 6 Clicks Basic Mobility (PT)  -MB AM-PAC 6 Clicks Daily Activity (OT);Modified [place]  -AN      User Key  (r) = Recorded By, (t) = Taken By, (c) = Cosigned By    Initials Name Provider Type     Elida Trinidad, OT Occupational Therapist    Radha Garcia, PT Physical Therapist    VG Korina Edwards, PT Physical Therapist           Time Calculation:         PT Charges     Row Name 08/10/18 1455 08/10/18 1405          Time Calculation    Start Time 1455  -VG  --     PT Received On 08/10/18  -VG  --     PT Goal Re-Cert Due Date 08/15/18  -VG  --        Time Calculation- PT    Total Timed Code Minutes- PT 25 minute(s)  -VG  --        Timed Charges    42225 - PT Therapeutic Exercise Minutes 10  -VG --  -VG     41444 - Gait Training Minutes  15  -VG --  -VG       User Key  (r) = Recorded By, (t) = Taken By, (c) = Cosigned By    Initials Name Provider Type    Korina Toussaint, PT Physical Therapist        Therapy Suggested Charges     Code   Minutes Charges    95771 (CPT®) Hc Pt Neuromusc Re Education Ea 15 Min      84581 (CPT®) Hc Pt Ther Proc Ea 15 Min 10 1    54882 (CPT®) Hc Gait Training Ea 15 Min 15 1    65614 (CPT®) Hc Pt Therapeutic Act Ea 15 Min      83192 (CPT®) Hc Pt Manual Therapy Ea 15 Min      40679 (CPT®) Hc Pt Iontophoresis Ea 15 Min      44438 (CPT®) Hc Pt Elec Stim Ea-Per 15 Min      65210 (CPT®) Hc Pt Ultrasound Ea 15 Min      21437 (CPT®) Hc Pt Self Care/Mgmt/Train Ea 15 Min      Total  25 2        Therapy Charges for Today     Code Description Service Date Service Provider Modifiers Qty    29522683947 HC PT THER PROC EA 15 MIN 8/10/2018 Korina Edwards, PT GP 1    92175054639 HC GAIT TRAINING EA 15 MIN 8/10/2018 Korina Edwards, PT GP 1          PT G-Codes  PT Professional Judgement Used?: Yes  Outcome Measure Options: AM-PAC 6 Clicks Basic Mobility (PT), Modified [place]  Score: 18  Functional Limitation: Mobility: Walking and moving around  Mobility: Walking and Moving Around Current Status (): At least 40 percent but less than 60 percent impaired, limited or restricted  Mobility: Walking and Moving Around Goal Status (): At least 20 percent but less than 40 percent  impaired, limited or restricted    Maia Edwards, PT  8/10/2018

## 2018-08-10 NOTE — PROGRESS NOTES
[facility]  PROGRESS NOTE    Patient Name: Mele Rossi  : 1965  MRN: [id number]    Date of Admission: 2018  Length of Stay: 2  Primary Care Physician: Kymberly Ibrahim APRN    Subjective   Subjective     CC: f/u CVA    HPI: Up in bed. Friend at bedside. No complaints. Feels like his is getting a little stronger.    Review of Systems  Gen- No fevers, chills  CV- No chest pain, palpitations  Resp- No cough, dyspnea  GI- No N/V/D, abd pain    Otherwise ROS is negative except as mentioned in the HPI.    Objective   Objective     Vital Signs:   Temp:  [97.8 °F (36.6 °C)-98.3 °F (36.8 °C)] 98 °F (36.7 °C)  Heart Rate:  [55-99] 72  Resp:  [16-18] 18  BP: (145-230)/() 149/96  Total (NIH Stroke Scale): 4     Physical Exam:  Constitutional: No acute distress, awake, alert, up in bed  HENT: NCAT, mucous membranes moist  Respiratory: Clear to auscultation bilaterally, respiratory effort normal   Cardiovascular: RRR, no murmurs, rubs, or gallops, palpable pedal pulses bilaterally  Gastrointestinal: Positive bowel sounds, soft, nontender, nondistended  Musculoskeletal: No bilateral ankle edema  Psychiatric: Appropriate affect, cooperative  Neurologic: Oriented x 3, right hemiplegia  Skin: No rashes    Results Reviewed:  I have personally reviewed current lab, radiology, and data and agree.      Results from last 7 days  Lab Units 18  0505 18  0011   WBC 10*3/mm3 8.66 8.72   HEMOGLOBIN g/dL 16.0 15.7   HEMATOCRIT % 46.3 44.5   PLATELETS 10*3/mm3 164 172   INR   --  1.02       Results from last 7 days  Lab Units 18  0505 18  0011   SODIUM mmol/L 141 147*   POTASSIUM mmol/L 4.4 3.9   CHLORIDE mmol/L 102 103   CO2 mmol/L 29.0 28.0   BUN mg/dL 16 14   CREATININE mg/dL 1.21 1.20   GLUCOSE mg/dL 170* 185*   CALCIUM mg/dL 9.2 9.1   ALT (SGPT) U/L 16 16   AST (SGOT) U/L 22 21   TROPONIN I ng/mL  --  0.009     Estimated Creatinine Clearance: 82.2 mL/min  (by C-G formula based on SCr of 1.21 mg/dL).  No results found for: BNP    Microbiology Results Abnormal     None        MRI brain personally reviewed with small right sided infarct. Agree with interpretation.       Results for orders placed during the hospital encounter of 08/08/18   Adult Transthoracic Echo Complete W/ Cont if Necessary Per Protocol (With Agitated Saline)    Narrative · Left ventricular systolic function is hyperdynamic (EF > 70).  · Left ventricular wall thickness is consistent with concentric   hypertrophy.  · The left ventricular cavity is small.  · Left ventricular diastolic dysfunction.          I have reviewed the medications.    Assessment/Plan   Assessment / Plan     Hospital Problem List     Stroke (CMS/[facility])    Diabetes mellitus (CMS/[facility])    HTN (hypertension)    Dysarthria           Brief Hospital Course to date:  Mele Rossi is a 53 y.o. male with uncontrolled HTN and DM2 presenting with acute left pontine CVA.    Assessment & Plan:  --Neurology follows. Continue asa and statin. Echo unremarkable. Carotid report pending.  --Increase bp meds today.  --Add levemir.  --PT/OT recs rehab. Patient agreeable. CM following.    DVT Prophylaxis:  Lovenox    CODE STATUS:   Code Status and Medical Interventions:   Ordered at: 08/09/18 0010     Level Of Support Discussed With:    Patient     Code Status:    CPR     Medical Interventions (Level of Support Prior to Arrest):    Full       Disposition: I expect the patient to be discharged to rehab once bed available.      Electronically signed by Sherry Ybarra II, DO, 08/10/18, 1:52 PM.

## 2018-08-11 LAB
GLUCOSE BLDC GLUCOMTR-MCNC: 238 MG/DL (ref 70–130)
GLUCOSE BLDC GLUCOMTR-MCNC: 246 MG/DL (ref 70–130)
GLUCOSE BLDC GLUCOMTR-MCNC: 378 MG/DL (ref 70–130)
GLUCOSE BLDC GLUCOMTR-MCNC: 387 MG/DL (ref 70–130)

## 2018-08-11 PROCEDURE — 99233 SBSQ HOSP IP/OBS HIGH 50: CPT | Performed by: INTERNAL MEDICINE

## 2018-08-11 PROCEDURE — 25010000002 ENOXAPARIN PER 10 MG: Performed by: INTERNAL MEDICINE

## 2018-08-11 PROCEDURE — 63710000001 INSULIN LISPRO (HUMAN) PER 5 UNITS: Performed by: INTERNAL MEDICINE

## 2018-08-11 PROCEDURE — 82088 ASSAY OF ALDOSTERONE: CPT | Performed by: INTERNAL MEDICINE

## 2018-08-11 PROCEDURE — 99232 SBSQ HOSP IP/OBS MODERATE 35: CPT | Performed by: PSYCHIATRY & NEUROLOGY

## 2018-08-11 PROCEDURE — 84244 ASSAY OF RENIN: CPT | Performed by: INTERNAL MEDICINE

## 2018-08-11 PROCEDURE — 82962 GLUCOSE BLOOD TEST: CPT

## 2018-08-11 PROCEDURE — 63710000001 INSULIN DETEMIR PER 5 UNITS: Performed by: INTERNAL MEDICINE

## 2018-08-11 RX ORDER — CARVEDILOL 12.5 MG/1
12.5 TABLET ORAL EVERY 12 HOURS SCHEDULED
Status: DISCONTINUED | OUTPATIENT
Start: 2018-08-11 | End: 2018-08-12

## 2018-08-11 RX ORDER — SIMETHICONE 80 MG
80 TABLET,CHEWABLE ORAL ONCE
Status: COMPLETED | OUTPATIENT
Start: 2018-08-12 | End: 2018-08-12

## 2018-08-11 RX ORDER — CARVEDILOL 6.25 MG/1
6.25 TABLET ORAL EVERY 12 HOURS SCHEDULED
Status: DISCONTINUED | OUTPATIENT
Start: 2018-08-11 | End: 2018-08-11

## 2018-08-11 RX ORDER — CLOPIDOGREL BISULFATE 75 MG/1
75 TABLET ORAL DAILY
Status: DISCONTINUED | OUTPATIENT
Start: 2018-08-11 | End: 2018-08-15 | Stop reason: HOSPADM

## 2018-08-11 RX ORDER — LISINOPRIL 40 MG/1
40 TABLET ORAL
Status: DISCONTINUED | OUTPATIENT
Start: 2018-08-11 | End: 2018-08-14

## 2018-08-11 RX ORDER — SIMETHICONE 80 MG
80 TABLET,CHEWABLE ORAL ONCE
Status: COMPLETED | OUTPATIENT
Start: 2018-08-11 | End: 2018-08-11

## 2018-08-11 RX ORDER — HYDROCHLOROTHIAZIDE 25 MG/1
25 TABLET ORAL DAILY
Status: DISCONTINUED | OUTPATIENT
Start: 2018-08-11 | End: 2018-08-15 | Stop reason: HOSPADM

## 2018-08-11 RX ORDER — LABETALOL HYDROCHLORIDE 5 MG/ML
10 INJECTION, SOLUTION INTRAVENOUS ONCE
Status: COMPLETED | OUTPATIENT
Start: 2018-08-11 | End: 2018-08-11

## 2018-08-11 RX ADMIN — AMLODIPINE BESYLATE 10 MG: 10 TABLET ORAL at 08:09

## 2018-08-11 RX ADMIN — INSULIN DETEMIR 10 UNITS: 100 INJECTION, SOLUTION SUBCUTANEOUS at 08:10

## 2018-08-11 RX ADMIN — INSULIN LISPRO 5 UNITS: 100 INJECTION, SOLUTION INTRAVENOUS; SUBCUTANEOUS at 17:09

## 2018-08-11 RX ADMIN — INSULIN LISPRO 4 UNITS: 100 INJECTION, SOLUTION INTRAVENOUS; SUBCUTANEOUS at 17:08

## 2018-08-11 RX ADMIN — HYDROCHLOROTHIAZIDE 25 MG: 25 TABLET ORAL at 13:41

## 2018-08-11 RX ADMIN — LISINOPRIL 40 MG: 40 TABLET ORAL at 08:09

## 2018-08-11 RX ADMIN — CARVEDILOL 6.25 MG: 6.25 TABLET, FILM COATED ORAL at 08:08

## 2018-08-11 RX ADMIN — INSULIN LISPRO 8 UNITS: 100 INJECTION, SOLUTION INTRAVENOUS; SUBCUTANEOUS at 11:32

## 2018-08-11 RX ADMIN — INSULIN LISPRO 8 UNITS: 100 INJECTION, SOLUTION INTRAVENOUS; SUBCUTANEOUS at 22:01

## 2018-08-11 RX ADMIN — INSULIN LISPRO 5 UNITS: 100 INJECTION, SOLUTION INTRAVENOUS; SUBCUTANEOUS at 13:42

## 2018-08-11 RX ADMIN — CARVEDILOL 12.5 MG: 12.5 TABLET, FILM COATED ORAL at 21:59

## 2018-08-11 RX ADMIN — ASPIRIN 325 MG ORAL TABLET 325 MG: 325 PILL ORAL at 08:08

## 2018-08-11 RX ADMIN — INSULIN LISPRO 4 UNITS: 100 INJECTION, SOLUTION INTRAVENOUS; SUBCUTANEOUS at 08:11

## 2018-08-11 RX ADMIN — POVIDONE-IODINE: 10 SOLUTION TOPICAL at 11:34

## 2018-08-11 RX ADMIN — ENOXAPARIN SODIUM 40 MG: 100 INJECTION SUBCUTANEOUS at 08:10

## 2018-08-11 RX ADMIN — LABETALOL HYDROCHLORIDE 10 MG: 5 INJECTION INTRAVENOUS at 04:14

## 2018-08-11 RX ADMIN — DESMOPRESSIN ACETATE 80 MG: 0.2 TABLET ORAL at 21:59

## 2018-08-11 RX ADMIN — CLOPIDOGREL BISULFATE 75 MG: 75 TABLET ORAL at 08:09

## 2018-08-11 RX ADMIN — SIMETHICONE CHEW TAB 80 MG 80 MG: 80 TABLET ORAL at 17:09

## 2018-08-11 NOTE — PLAN OF CARE
Problem: Patient Care Overview  Goal: Plan of Care Review  Outcome: Ongoing (interventions implemented as appropriate)   08/11/18 1652   Coping/Psychosocial   Plan of Care Reviewed With patient   Plan of Care Review   Progress improving   OTHER   Outcome Summary Patients BP has improved and decreased to 161/110. Has been sleeping in between care and does not report pain. NIH score was a 3. Improvment in RUE and RLE range of motion. Ambulated to bathroom gait remains  unsteady Anticipating duplex renal artery tomorrow Pt will be NPO after midnight.        Problem: Stroke (Ischemic) (Adult)  Goal: Signs and Symptoms of Listed Potential Problems Will be Absent, Minimized or Managed (Stroke)  Outcome: Ongoing (interventions implemented as appropriate)   08/11/18 1652   Goal/Outcome Evaluation   Problems Assessed (Stroke (Ischemic)) all   Problems Assessed (Stroke (Ischemic)) communication impairment;muscle tone abnormal;motor/sensory impairment       Problem: Fall Risk (Adult)  Goal: Identify Related Risk Factors and Signs and Symptoms  Outcome: Ongoing (interventions implemented as appropriate)    Goal: Absence of Fall  Outcome: Ongoing (interventions implemented as appropriate)   08/11/18 1652   Fall Risk (Adult)   Absence of Fall making progress toward outcome

## 2018-08-11 NOTE — PROGRESS NOTES
"Mele Rossi    Subjective     CC: stroke    History of Present Illness     Mele Rossi is followed for stroke. His right hemiparesis reported overnight, but has now improved again.      There have been no other changes in the patient's interval history since my last progress note of 8/10/18.    I have reviewed and confirmed the past family, social and medical history as accurate on 8/10/18.     Review of Systems   Constitutional: Negative.    Respiratory: Negative.    Cardiovascular: Negative.    Gastrointestinal: Negative.    Genitourinary: Negative.    Musculoskeletal: Negative.        Objective     BP (!) 193/108 (BP Location: Right arm, Patient Position: Lying)   Pulse 72   Temp 98.5 °F (36.9 °C) (Oral)   Resp 16   Ht 182.9 cm (72\")   Wt 94.3 kg (208 lb)   SpO2 97%   BMI 28.21 kg/m²     Physical Exam   Constitutional: He is oriented to person, place, and time.   Neurological: He is oriented to person, place, and time. He has an abnormal Finger-Nose-Finger Test (dysmetric on the right).   Psychiatric: His speech is normal.        Neurologic Exam     Mental Status   Oriented to person, place, and time.   Attention: normal.   Speech: speech is normal   Level of consciousness: alert  Knowledge: good.   Normal comprehension.     Cranial Nerves   Cranial nerves II through XII intact.     Motor Exam   Muscle bulk: normal  Overall muscle tone: normalRight hemiparesis (5-/5)     Sensory Exam   Light touch normal.     Gait, Coordination, and Reflexes     Coordination   Finger to nose coordination: abnormal (dysmetric on the right)      Laboratory and radiological testing includes a normal CTP/CTA's overnight. ECHO and Dopplers are normal.    Assessment/Plan       Mele Rossi is followed with stroke. I'll add Plavix at this time, with recommendations to revert from dual anti-platelet therapy to monotherapy after 90 days. This was discussed.      As part of this visit I reviewed prior lab results, reviewed radiology " results and reviewed records from the current hospitalization which is incorporated in the HPI.

## 2018-08-11 NOTE — ED NOTES
Patient here for stroke treatment. RN reported change in NIHSS called code stroke. NIHSS repeated scored a 5. Dr abbott notified, new orders received. Patient will go to CT scan, [facility] will continue to monitor.

## 2018-08-11 NOTE — NURSING NOTE
Per echo lab, they only have certain techs that know how to do renal artery duplex -- contacted and will be here tomorrow to perform. NPO at midnight tonight for scan as well as anti-gas medications this evening and tomorrow AM.

## 2018-08-11 NOTE — PROGRESS NOTES
[facility] Medicine Services  PROGRESS NOTE    Patient Name: Mele Rossi  : 1965  MRN: [id number]    Date of Admission: 2018  Length of Stay: 3  Primary Care Physician: Kymbrely Ibrahim APRN    Subjective   Subjective     CC: f/u CVA    HPI: Code 19 called overnight due to worsening RUE numbness. Doing much better this am. Feels stronger than yesterday.    Review of Systems  Gen- No fevers, chills  CV- No chest pain, palpitations  Resp- No cough, dyspnea  GI- No N/V/D, abd pain    Otherwise ROS is negative except as mentioned in the HPI.    Objective   Objective     Vital Signs:   Temp:  [98.2 °F (36.8 °C)-98.5 °F (36.9 °C)] 98.5 °F (36.9 °C)  Heart Rate:  [63-78] 73  Resp:  [16] 16  BP: (149-207)/() 183/143  Total (NIH Stroke Scale): 3     Physical Exam:  Constitutional: No acute distress, awake, alert  HENT: NCAT, mucous membranes moist  Respiratory: Clear to auscultation bilaterally, respiratory effort normal   Cardiovascular: RRR, no murmurs, rubs, or gallops, palpable pedal pulses bilaterally  Gastrointestinal: Positive bowel sounds, soft, nontender, nondistended  Musculoskeletal: No bilateral ankle edema  Psychiatric: Appropriate affect, cooperative  Neurologic: Oriented x 3, rue hemiplegia improving  Skin: No rashes    Results Reviewed:  I have personally reviewed current lab, radiology, and data and agree.      Results from last 7 days  Lab Units 18  0505 18  0011   WBC 10*3/mm3 8.66 8.72   HEMOGLOBIN g/dL 16.0 15.7   HEMATOCRIT % 46.3 44.5   PLATELETS 10*3/mm3 164 172   INR   --  1.02       Results from last 7 days  Lab Units 18  0505 18  0011   SODIUM mmol/L 141 147*   POTASSIUM mmol/L 4.4 3.9   CHLORIDE mmol/L 102 103   CO2 mmol/L 29.0 28.0   BUN mg/dL 16 14   CREATININE mg/dL 1.21 1.20   GLUCOSE mg/dL 170* 185*   CALCIUM mg/dL 9.2 9.1   ALT (SGPT) U/L 16 16   AST (SGOT) U/L 22 21   TROPONIN I ng/mL  --  0.009     Estimated Creatinine  Clearance: 82.2 mL/min (by C-G formula based on SCr of 1.21 mg/dL).  No results found for: BNP    Microbiology Results Abnormal     None          Imaging Results (last 24 hours)     Procedure Component Value Units Date/Time    CT Angiogram Neck With & Without Contrast [[id number]] Collected:  08/10/18 2021     Updated:  08/10/18 2218    Narrative:       EXAM:    CT Angiography Neck With Intravenous Contrast    CLINICAL HISTORY:    53 years old, male; Dysarthria and anarthria; Other reduced mobility; Other   reduced mobility; Signs and symptoms and condition or disease; Other:   Hypertension; Weakness; Additional info: Stroke    TECHNIQUE:    Axial computed tomographic angiography images of the neck with intravenous   contrast using CT angiography protocol.  All CT scans at this facility use at   least one of these dose optimization techniques: automated exposure control; mA   and/or kV adjustment per patient size (includes targeted exams where dose is   matched to clinical indication); or iterative reconstruction.    MIP reconstructed images were created and reviewed.    Coronal and sagittal reformatted images were created and reviewed.    CONTRAST:    115 mL of isovue 370 administered intravenously.      COMPARISON:    US - DUPLEX CAROTID BILATERAL CAR 2018-08-09 08:54    FINDINGS:     VASCULATURE:      Right common carotid artery:  Unremarkable.  No significant stenosis.  No   dissection or occlusion.      Right internal carotid artery:  Unremarkable.  Extracranial segment is patent   with no significant stenosis.  No dissection or occlusion.      Right external carotid artery:  Unremarkable.  No occlusion.      Right vertebral artery:  Unremarkable.  No significant stenosis.  No   dissection or occlusion.      Left common carotid artery:  Unremarkable.  No significant stenosis.  No   dissection or occlusion.      Left internal carotid artery:  Less than 50% stenosis at the origin of the   left internal carotid  artery.  No dissection or occlusion.      Left external carotid artery:  Unremarkable.  No occlusion.      Left vertebral artery:  Unremarkable.  No significant stenosis.  No   dissection or occlusion.     NECK:      Bones/joints:  No acute fracture.  No dislocation.      Soft tissues:  Unremarkable as visualized.  No mass.      Sinuses:  Bubbly opacification in the right sphenoid sinus.     CAROTID STENOSIS REFERENCE USING NASCET CRITERIA:    % ICA stenosis = (1 - narrowest ICA diameter/diameter of distal cervical ICA)   x 100.    Mild - <50% stenosis.    Moderate - 50-69% stenosis.    Severe - 70-94% stenosis.    Near occlusion - 95-99% stenosis.    Occluded - 100% stenosis.      Impression:           No hemodynamically significant stenosis by NASCET criteria.    THIS DOCUMENT HAS BEEN ELECTRONICALLY SIGNED BY STACEY BECK MD    CT Angiogram Head With & Without Contrast [[id number]] Collected:  08/10/18 2005     Updated:  08/10/18 2105    Narrative:       EXAM:    CT Angiography Head With Intravenous Contrast    CLINICAL HISTORY:    53 years old, male; Dysarthria and anarthria; Other reduced mobility; Other   reduced mobility; Signs and symptoms and condition or disease; Other:   Hypertension; Weakness; Additional info: Stroke    TECHNIQUE:    Axial computed tomographic angiography images of the head with intravenous   contrast using CT angiography protocol.  All CT scans at this facility use at   least one of these dose optimization techniques: automated exposure control; mA   and/or kV adjustment per patient size (includes targeted exams where dose is   matched to clinical indication); or iterative reconstruction.    MIP reconstructed images were created and reviewed.    Coronal and sagittal reformatted images were created and reviewed.    CONTRAST:    115 mL of isovue 370 administered intravenously.      COMPARISON:    MRI BRAIN WO CONTRAST 2018-08-09 01:00    FINDINGS:      Right internal carotid artery:  No  acute findings.  Intracranial segment is   patent with no significant stenosis.  No aneurysm.      Right anterior cerebral artery:  Unremarkable.  No occlusion or significant   stenosis.  No aneurysm.      Right middle cerebral artery:  Unremarkable.  No occlusion or significant   stenosis.  No aneurysm.      Right posterior cerebral artery:  Unremarkable.  No occlusion or significant   stenosis.  No aneurysm.      Right vertebral artery:  Unremarkable as visualized.      Left internal carotid artery:  No acute findings.  Intracranial segment is   patent with no significant stenosis.  No aneurysm.      Left anterior cerebral artery:  Unremarkable.  No occlusion or significant   stenosis.  No aneurysm.      Left middle cerebral artery:  Unremarkable.  No occlusion or significant   stenosis.  No aneurysm.      Left posterior cerebral artery:  Unremarkable.  No occlusion or significant   stenosis.  No aneurysm.      Left vertebral artery:  Unremarkable as visualized.      Basilar artery:  Unremarkable.  No occlusion or significant stenosis.  No   aneurysm.      Impression:           Normal head CTA.    THIS DOCUMENT HAS BEEN ELECTRONICALLY SIGNED BY STACEY BECK MD    CT Cerebral Perfusion With & Without Contrast [[id number]] Collected:  08/10/18 2004     Updated:  08/10/18 2104    Narrative:       EXAM:    CT Brain Perfusion With Intravenous Contrast    CLINICAL HISTORY:    53 years old, male; Dysarthria and anarthria; Other reduced mobility; Other   reduced mobility; Signs and symptoms; Other: Right sided weakness; Additional   info: Right sided weakness, hypertension, HX of stroke    TECHNIQUE:    Axial computed tomography images of the brain with intravenous contrast using   cerebral perfusion protocol.  Post-processing parametric maps were created and   reviewed.  These include cerebral blood flow, cerebral blood volume and mean   transit time.  All CT scans at this facility use at least one of these dose    optimization techniques: automated exposure control; mA and/or kV adjustment   per patient size (includes targeted exams where dose is matched to clinical   indication); or iterative reconstruction.    MIP reconstructed images were created and reviewed.    CONTRAST:    40 mL of Isovue 370 administered intravenously.      COMPARISON:    MRI BRAIN WO CONTRAST 2018-08-09 01:00    FINDINGS:      Cerebral blood flow:  Unremarkable.  Symmetric with no defects.      Cerebral blood volume:  Unremarkable.  Symmetric.      Mean transit time:  Unremarkable.  No delayed perfusion.        Impression:           Normal brain perfusion CT.    THIS DOCUMENT HAS BEEN ELECTRONICALLY SIGNED BY STACEY BECK MD    CT Head Without Contrast [[id number]] Collected:  08/10/18 2004     Updated:  08/10/18 2043    Narrative:       EXAM:    CT Head Without Intravenous Contrast    CLINICAL HISTORY:    53 years old, male; Dysarthria and anarthria; Other reduced mobility; Other   reduced mobility; Signs and symptoms; Other: Right sided weakness; Additional   info: Right sided weakness, hypertension, HX of stroke, please document scan   time in report: 2025    TECHNIQUE:    Axial computed tomography images of the head/brain without intravenous   contrast.  All CT scans at this facility use at least one of these dose   optimization techniques: automated exposure control; mA and/or kV adjustment   per patient size (includes targeted exams where dose is matched to clinical   indication); or iterative reconstruction.    COMPARISON:    MRI BRAIN WO CONTRAST 2018-08-09 01:00    FINDINGS:      Brain:  Unremarkable.  No hemorrhage.  No mass effect.  No edema.      Ventricles:  Unremarkable.      Bones/joints:  Unremarkable.  No acute fracture.      Soft tissues:  Unremarkable.      Sinuses:  Partial bubbly opacification in the right sphenoid sinus.      Mastoid air cells:  Unremarkable.      Impression:           No acute findings.      THIS DOCUMENT HAS  BEEN ELECTRONICALLY SIGNED BY STACEY BECK MD        Results for orders placed during the hospital encounter of 08/08/18   Adult Transthoracic Echo Complete W/ Cont if Necessary Per Protocol (With Agitated Saline)    Narrative · Left ventricular systolic function is hyperdynamic (EF > 70).  · Left ventricular wall thickness is consistent with concentric   hypertrophy.  · The left ventricular cavity is small.  · Left ventricular diastolic dysfunction.          I have reviewed the medications.    Assessment/Plan   Assessment / Plan     Hospital Problem List     Stroke (CMS/[facility])    Type 2 diabetes mellitus with hyperglycemia (CMS/[facility])    HTN (hypertension)    Dysarthria             Brief Hospital Course to date:  Mele Rossi is a 53 y.o. male with uncontrolled HTN and DM2 presenting with acute left pontine CVA.     Assessment & Plan:  --Neurology follows. Repeat CT imaging overnight negative. Plavix added for at least 90 days. Continue asa, statin.   --BP has been hard to control. Check renal artery duplex, renin/anjana. Add coreg and hctz.   --Increase insulin.  --PT/OT recs rehab. Patient agreeable. CM following.     DVT Prophylaxis:  Lovenox    CODE STATUS:   Code Status and Medical Interventions:   Ordered at: 08/09/18 0010     Level Of Support Discussed With:    Patient     Code Status:    CPR     Medical Interventions (Level of Support Prior to Arrest):    Full       Disposition: I expect the patient to be discharged to rehab in 2 days.    Electronically signed by Sherry Ybarra II, DO, 08/11/18, 12:39 PM.

## 2018-08-12 ENCOUNTER — APPOINTMENT (OUTPATIENT)
Dept: CARDIOLOGY | Facility: HOSPITAL | Age: 53
End: 2018-08-12
Attending: INTERNAL MEDICINE

## 2018-08-12 LAB
ANION GAP SERPL CALCULATED.3IONS-SCNC: 5 MMOL/L (ref 3–11)
BUN BLD-MCNC: 19 MG/DL (ref 9–23)
BUN/CREAT SERPL: 15.6 (ref 7–25)
CALCIUM SPEC-SCNC: 9 MG/DL (ref 8.7–10.4)
CHLORIDE SERPL-SCNC: 104 MMOL/L (ref 99–109)
CO2 SERPL-SCNC: 28 MMOL/L (ref 20–31)
CREAT BLD-MCNC: 1.22 MG/DL (ref 0.6–1.3)
GFR SERPL CREATININE-BSD FRML MDRD: 62 ML/MIN/1.73
GLUCOSE BLD-MCNC: 218 MG/DL (ref 70–100)
GLUCOSE BLDC GLUCOMTR-MCNC: 196 MG/DL (ref 70–130)
GLUCOSE BLDC GLUCOMTR-MCNC: 281 MG/DL (ref 70–130)
GLUCOSE BLDC GLUCOMTR-MCNC: 310 MG/DL (ref 70–130)
GLUCOSE BLDC GLUCOMTR-MCNC: 368 MG/DL (ref 70–130)
GLUCOSE BLDC GLUCOMTR-MCNC: 425 MG/DL (ref 70–130)
POTASSIUM BLD-SCNC: 4.1 MMOL/L (ref 3.5–5.5)
SODIUM BLD-SCNC: 137 MMOL/L (ref 132–146)

## 2018-08-12 PROCEDURE — 97530 THERAPEUTIC ACTIVITIES: CPT

## 2018-08-12 PROCEDURE — 63710000001 INSULIN DETEMIR PER 5 UNITS: Performed by: INTERNAL MEDICINE

## 2018-08-12 PROCEDURE — 93975 VASCULAR STUDY: CPT

## 2018-08-12 PROCEDURE — 99232 SBSQ HOSP IP/OBS MODERATE 35: CPT | Performed by: INTERNAL MEDICINE

## 2018-08-12 PROCEDURE — 97112 NEUROMUSCULAR REEDUCATION: CPT

## 2018-08-12 PROCEDURE — 82962 GLUCOSE BLOOD TEST: CPT

## 2018-08-12 PROCEDURE — 80048 BASIC METABOLIC PNL TOTAL CA: CPT | Performed by: INTERNAL MEDICINE

## 2018-08-12 PROCEDURE — 25010000002 ENOXAPARIN PER 10 MG: Performed by: INTERNAL MEDICINE

## 2018-08-12 PROCEDURE — 63710000001 INSULIN LISPRO (HUMAN) PER 5 UNITS: Performed by: INTERNAL MEDICINE

## 2018-08-12 PROCEDURE — 99231 SBSQ HOSP IP/OBS SF/LOW 25: CPT | Performed by: PSYCHIATRY & NEUROLOGY

## 2018-08-12 RX ORDER — CARVEDILOL 12.5 MG/1
25 TABLET ORAL EVERY 12 HOURS SCHEDULED
Status: DISCONTINUED | OUTPATIENT
Start: 2018-08-12 | End: 2018-08-14

## 2018-08-12 RX ADMIN — INSULIN LISPRO 9 UNITS: 100 INJECTION, SOLUTION INTRAVENOUS; SUBCUTANEOUS at 11:28

## 2018-08-12 RX ADMIN — INSULIN LISPRO 6 UNITS: 100 INJECTION, SOLUTION INTRAVENOUS; SUBCUTANEOUS at 11:29

## 2018-08-12 RX ADMIN — DESMOPRESSIN ACETATE 80 MG: 0.2 TABLET ORAL at 19:55

## 2018-08-12 RX ADMIN — LISINOPRIL 40 MG: 40 TABLET ORAL at 08:09

## 2018-08-12 RX ADMIN — CLOPIDOGREL BISULFATE 75 MG: 75 TABLET ORAL at 08:09

## 2018-08-12 RX ADMIN — SIMETHICONE CHEW TAB 80 MG 80 MG: 80 TABLET ORAL at 08:09

## 2018-08-12 RX ADMIN — HYDROCHLOROTHIAZIDE 25 MG: 25 TABLET ORAL at 08:09

## 2018-08-12 RX ADMIN — ASPIRIN 325 MG ORAL TABLET 325 MG: 325 PILL ORAL at 08:09

## 2018-08-12 RX ADMIN — INSULIN DETEMIR 20 UNITS: 100 INJECTION, SOLUTION SUBCUTANEOUS at 20:16

## 2018-08-12 RX ADMIN — INSULIN LISPRO 2 UNITS: 100 INJECTION, SOLUTION INTRAVENOUS; SUBCUTANEOUS at 20:18

## 2018-08-12 RX ADMIN — INSULIN LISPRO 7 UNITS: 100 INJECTION, SOLUTION INTRAVENOUS; SUBCUTANEOUS at 16:47

## 2018-08-12 RX ADMIN — INSULIN LISPRO 6 UNITS: 100 INJECTION, SOLUTION INTRAVENOUS; SUBCUTANEOUS at 16:47

## 2018-08-12 RX ADMIN — POVIDONE-IODINE: 10 SOLUTION TOPICAL at 08:15

## 2018-08-12 RX ADMIN — ENOXAPARIN SODIUM 40 MG: 100 INJECTION SUBCUTANEOUS at 08:09

## 2018-08-12 RX ADMIN — AMLODIPINE BESYLATE 10 MG: 10 TABLET ORAL at 08:09

## 2018-08-12 RX ADMIN — CARVEDILOL 25 MG: 12.5 TABLET, FILM COATED ORAL at 08:09

## 2018-08-12 RX ADMIN — INSULIN DETEMIR 15 UNITS: 100 INJECTION, SOLUTION SUBCUTANEOUS at 08:09

## 2018-08-12 NOTE — NURSING NOTE
Patient states his right side feels weaker and his speech is more slurred. NIHSS did increase but only from 5 this AM to 7 now r/t increased drift. Dr. Toribio and Dr. Ybarra both made aware. BP was 150/86. Per notes, seems like this has occurred to patient before (two nights ago). No new orders. Have blood pressure on q1h to monitor for any decreases.

## 2018-08-12 NOTE — PLAN OF CARE
Problem: Patient Care Overview  Goal: Plan of Care Review  Outcome: Ongoing (interventions implemented as appropriate)   08/12/18 1703   Coping/Psychosocial   Plan of Care Reviewed With patient   Plan of Care Review   Progress no change   Coping/Psychosocial   Patient Agreement with Plan of Care agrees   OTHER   Outcome Summary Patient had exacerbation of symptoms this afternoon around 2 pm but denies any new symptoms. Remains extremely weak on the right side this afternoon but speech has slightly improved over the day. His pressures were lower this morning/early PM and have steadily increased back up this afternoon. His family is at beside. Wife has been concerned about waxing and waning symptoms. I explained that this can be common especially with an increasing/decreasing blood pressure. All in agreeance. Dr. Ybarra and Dr. Toribio aware of this episode this afternoon. Plan to d/c to rehab this week.

## 2018-08-12 NOTE — PLAN OF CARE
Problem: Patient Care Overview  Goal: Plan of Care Review  Outcome: Ongoing (interventions implemented as appropriate)   08/12/18 0848   Coping/Psychosocial   Plan of Care Reviewed With patient   Plan of Care Review   Progress improving   OTHER   Outcome Summary Pt motivated to regain R side function. Education initiated for RUE: SROM HEP to support function/good teach back. SBA bed mobility; CGA all other mobility/transfers. OT to follow. Pt awaiting rehab bed. Recommend rehab (vs HH) for best outcome/recovery.

## 2018-08-12 NOTE — PROGRESS NOTES
[facility] Medicine Services  PROGRESS NOTE    Patient Name: Mele Rossi  : 1965  MRN: [id number]    Date of Admission: 2018  Length of Stay: 4  Primary Care Physician: Kymberly Ibrahim APRN    Subjective   Subjective     CC: f/u CVA    HPI: Sitting up in bed. Still weak but slowly improving.    Review of Systems  Gen- No fevers, chills  CV- No chest pain, palpitations  Resp- No cough, dyspnea  GI- No N/V/D, abd pain    Otherwise ROS is negative except as mentioned in the HPI.    Objective   Objective     Vital Signs:   Temp:  [98 °F (36.7 °C)-99 °F (37.2 °C)] 98 °F (36.7 °C)  Heart Rate:  [] 101  Resp:  [16] 16  BP: (144-176)/() 144/96  Total (NIH Stroke Scale): 5     Physical Exam:  Constitutional: No acute distress, awake, alert  HENT: NCAT, mucous membranes moist  Respiratory: Clear to auscultation bilaterally, respiratory effort normal   Cardiovascular: RRR, no murmurs, rubs, or gallops, palpable pedal pulses bilaterally  Gastrointestinal: Positive bowel sounds, soft, nontender, nondistended  Musculoskeletal: No bilateral ankle edema  Psychiatric: Appropriate affect, cooperative  Neurologic: Oriented x 3, right hemiplegia - improving  Skin: No rashes    Results Reviewed:  I have personally reviewed current lab, radiology, and data and agree.      Results from last 7 days  Lab Units 18  0505 18  0011   WBC 10*3/mm3 8.66 8.72   HEMOGLOBIN g/dL 16.0 15.7   HEMATOCRIT % 46.3 44.5   PLATELETS 10*3/mm3 164 172   INR   --  1.02       Results from last 7 days  Lab Units 18  0505 18  0505 18  0011   SODIUM mmol/L 137 141 147*   POTASSIUM mmol/L 4.1 4.4 3.9   CHLORIDE mmol/L 104 102 103   CO2 mmol/L 28.0 29.0 28.0   BUN mg/dL 19 16 14   CREATININE mg/dL 1.22 1.21 1.20   GLUCOSE mg/dL 218* 170* 185*   CALCIUM mg/dL 9.0 9.2 9.1   ALT (SGPT) U/L  --  16 16   AST (SGOT) U/L  --  22 21   TROPONIN I ng/mL  --   --  0.009     Estimated Creatinine  Clearance: 81.6 mL/min (by C-G formula based on SCr of 1.22 mg/dL).  No results found for: BNP    Microbiology Results Abnormal     None          Imaging Results (last 24 hours)     ** No results found for the last 24 hours. **        Results for orders placed during the hospital encounter of 08/08/18   Adult Transthoracic Echo Complete W/ Cont if Necessary Per Protocol (With Agitated Saline)    Narrative · Left ventricular systolic function is hyperdynamic (EF > 70).  · Left ventricular wall thickness is consistent with concentric   hypertrophy.  · The left ventricular cavity is small.  · Left ventricular diastolic dysfunction.          I have reviewed the medications.    Assessment/Plan   Assessment / Plan     Hospital Problem List     Stroke (CMS/[facility])    Type 2 diabetes mellitus with hyperglycemia (CMS/[facility])    HTN (hypertension)    Dysarthria          Brief Hospital Course to date:  Mele Rossi is a 53 y.o. male with uncontrolled HTN and DM2 presenting with acute left pontine CVA.     Assessment & Plan:  --Neurology follows. Repeat CT imaging overnight negative. Plavix added for at least 90 days. Continue asa, statin.   --BP control is better. Continue current medical therapy. Renal artery US completed, read pending. Renin/anjana pending.  --Increase insulin again.  --PT/OT recs rehab. Patient agreeable. CM following.     DVT Prophylaxis:  Lovenox    CODE STATUS:   Code Status and Medical Interventions:   Ordered at: 08/09/18 0010     Level Of Support Discussed With:    Patient     Code Status:    CPR     Medical Interventions (Level of Support Prior to Arrest):    Full       Disposition: I expect the patient to be discharged to rehab in 1-2 days.      Electronically signed by Sherry Ybarra II, DO, 08/12/18, 10:26 AM.

## 2018-08-12 NOTE — PROGRESS NOTES
"Mele Rossi    Subjective     CC: stroke    History of Present Illness     Mele Rossi is followed for stroke. His right hemiparesis is unchanged overnight.      There have been no other changes in the patient's interval history since my last progress note of 8/11/18.    I have reviewed and confirmed the past family, social and medical history as accurate on 8/10/18.     Review of Systems   Constitutional: Negative.        Objective     /96 (BP Location: Right arm, Patient Position: Sitting)   Pulse 101   Temp 98 °F (36.7 °C) (Oral)   Resp 16   Ht 182.9 cm (72\")   Wt 94.3 kg (208 lb)   SpO2 98%   BMI 28.21 kg/m²     Physical Exam   Constitutional: He is oriented to person, place, and time.   Neurological: He is oriented to person, place, and time.   Psychiatric: His speech is normal.        Neurologic Exam     Mental Status   Oriented to person, place, and time.   Attention: normal.   Speech: speech is normal   Level of consciousness: alert  Knowledge: good.   Normal comprehension.     Cranial Nerves   Cranial nerves II through XII intact.     Motor Exam   Muscle bulk: normal  Overall muscle tone: normalRight hemiparesis (4+/5)     Sensory Exam   Light touch normal.       Laboratory and radiological testing includes a normal CTP/CTA's overnight. ECHO and Dopplers are normal.    Assessment/Plan       Mele Rossi is followed with stroke. I don't have further recommendations today. He is ready for rehab from my standpoint.      As part of this visit I reviewed records from the current hospitalization which is incorporated in the HPI.  "

## 2018-08-12 NOTE — THERAPY TREATMENT NOTE
Acute Care - Occupational Therapy Treatment Note  [facility]     Patient Name: Mele Rossi  : 1965  MRN: [id number]  Today's Date: 2018  Onset of Illness/Injury or Date of Surgery: 18  Date of Referral to OT: 18  Referring Physician: DANIEL Barajas MD    Admit Date: 2018       ICD-10-CM ICD-9-CM   1. Dysarthria R47.1 784.51   2. Impaired mobility and ADLs Z74.09 799.89   3. Impaired functional mobility, balance, gait, and endurance Z74.09 V49.89     Patient Active Problem List   Diagnosis   • Stroke (CMS/HCC)   • Type 2 diabetes mellitus with hyperglycemia (CMS/HCC)   • HTN (hypertension)   • Dysarthria     History reviewed. No pertinent past medical history.  History reviewed. No pertinent surgical history.    Therapy Treatment          Rehabilitation Treatment Summary     Row Name 18 0848             Treatment Time/Intention    Discipline occupational therapist  -TB      Document Type therapy note (daily note)  -TB      Subjective Information complains of;weakness  -TB      Mode of Treatment individual therapy  -TB      Patient/Family Observations No family present; pt rec'vd supine in bed, room air, IV heplocked; exit alarm  -TB      Care Plan Review care plan/treatment goals reviewed;patient/other agree to care plan  -TB      Patient Effort good  -TB      Existing Precautions/Restrictions fall   exit alarms  -TB      Treatment Considerations/Comments Diastolic BP elevated; persisent R HP  -TB      Patient Response to Treatment Pt tolerates OOB activity well  -TB      Recorded by [TB] Jazz Hernandez OT 18 1030      Row Name 18 0848             Vital Signs    Intra Systolic BP Rehab 126  -TB      Intra Treatment Diastolic BP 95  -TB      Post SpO2 (%) 98  -TB      O2 Delivery Post Treatment room air  -TB      Pre Patient Position Supine  -TB      Intra Patient Position Standing  -TB      Post Patient Position Sitting  -TB      Recorded by [TB] Mary  Jazz Martinez, OT 08/12/18 1030      Row Name 08/12/18 0848             Cognitive Assessment/Intervention- PT/OT    Affect/Mental Status (Cognitive) WFL  -TB      Orientation Status (Cognition) oriented x 4  -TB      Follows Commands (Cognition) WFL  -TB      Attention Deficit (Cognitive) mild deficit  -TB      Safety Deficit (Cognitive) mild deficit  -TB      Personal Safety Interventions fall prevention program maintained;gait belt;nonskid shoes/slippers when out of bed   exit alarms  -TB      Recorded by [TB] Jazz Hernandez, OT 08/12/18 1030      Row Name 08/12/18 0848             Safety Issues, Functional Mobility    Safety Issues Affecting Function (Mobility) awareness of need for assistance  -TB      Impairments Affecting Function (Mobility) balance;coordination;range of motion (ROM);strength  -TB      Recorded by [TB] Jazz Hernandez, OT 08/12/18 1030      Row Name 08/12/18 0848             Bed Mobility Assessment/Treatment    Bed Mobility Assessment/Treatment supine-sit  -TB      Supine-Sit [place] (Bed Mobility) supervision  -TB      Bed Mobility, Safety Issues decreased use of arms for pushing/pulling;decreased use of legs for bridging/pushing  -TB      Assistive Device (Bed Mobility) head of bed elevated  -TB      Recorded by [TB] Jazz Hernandez OT 08/12/18 1030      Row Name 08/12/18 0848             Functional Mobility    Functional Mobility- Ind. Level contact guard assist  -TB      Functional Mobility- Device rolling walker  -TB      Functional Mobility-Distance (Feet) 75  -TB      Functional Mobility- Safety Issues step length decreased;weight-shifting ability decreased;balance decreased during turns  -TB      Functional Mobility- Comment fair safety, no LOB  -TB      Recorded by [TB] Jazz Hernandez, OT 08/12/18 1030      Row Name 08/12/18 0848             Transfer Assessment/Treatment    Transfer Assessment/Treatment sit-stand transfer;stand-sit  transfer;bed-chair transfer  -TB      Comment (Transfers) fair safety, no LOB; cues for hand placement  -TB      Recorded by [TB] Jazz Hernandez, OT 08/12/18 1030      Row Name 08/12/18 0848             Bed-Chair Transfer    Bed-Chair [place] (Transfers) contact guard;verbal cues  -TB      Assistive Device (Bed-Chair Transfers) walker, front-wheeled  -TB      Recorded by [TB] Jazz Hernandez OT 08/12/18 1030      Row Name 08/12/18 0848             Sit-Stand Transfer    Sit-Stand [place] (Transfers) contact guard;verbal cues  -TB      Assistive Device (Sit-Stand Transfers) walker, front-wheeled  -TB      Recorded by [TB] Jazz Hernandez, OT 08/12/18 1030      Row Name 08/12/18 0848             Stand-Sit Transfer    Stand-Sit [place] (Transfers) contact guard;verbal cues  -TB      Assistive Device (Stand-Sit Transfers) walker, front-wheeled  -TB      Recorded by [TB] Jazz Hernandez, OT 08/12/18 1030      Row Name 08/12/18 0848             Motor Skills Assessment/Interventions    Additional Documentation Balance (Group);Therapeutic Exercise (Group)  -TB      Recorded by [TB] Jazz Hernandez OT 08/12/18 1030      Row Name 08/12/18 0848             Therapeutic Exercise    Therapeutic Exercise seated, upper extremities  -TB      Additional Documentation Therapeutic Exercise (Row)  -TB      18366 -  OT Neuromuscular Reeducation Minutes 14  -TB      81075 - OT Therapeutic Activity Minutes 10  -TB      Recorded by [TB] Jazz Hernandez OT 08/12/18 1030      Row Name 08/12/18 0848             Therapeutic Exercise    Upper Extremity Range of Motion (Therapeutic Exercise) shoulder flexion/extension, bilateral;shoulder horizontal abduction/adduction, bilateral;elbow flexion/extension, bilateral;forearm supination/pronation, bilateral;wrist flexion/extension, bilateral  -TB      Hand (Therapeutic Exercise) finger flexion/extension, right;hand , right  -TB       Exercise Type (Therapeutic Exercise) PROM (passive range of motion);AAROM (active assistive range of motion)  -TB      Position (Therapeutic Exercise) seated  -TB      Sets/Reps (Therapeutic Exercise) 3x10  -TB      Expected Outcome (Therapeutic Exercise) facilitate normal movement patterns;increase active range of motion  -TB      Comment (Therapeutic Exercise) Education initiated with good teach back for R UE SROM HEP including jt protection and movement pattern teaching  -TB      Recorded by [TB] Jazz Hernandez, OT 08/12/18 1030      Row Name 08/12/18 0848             Balance    Balance dynamic sitting balance;dynamic standing balance  -TB      Recorded by [TB] Jazz Hernandez, OT 08/12/18 1030      Row Name 08/12/18 0848             Dynamic Sitting Balance    Level of [place], Reaches Outside Midline (Sitting, Dynamic Balance) supervision  -TB      Recorded by [TB] Jazz Hernandez, OT 08/12/18 1030      Row Name 08/12/18 0848             Static Standing Balance    Level of [place] (Supported Standing, Static Balance) standby assist  -TB      Time Able to Maintain Position (Supported Standing, Static Balance) more than 5 minutes  -TB      Recorded by [TB] Jazz Hernandez OT 08/12/18 1030      Row Name 08/12/18 0848             Dynamic Standing Balance    Level of [place], Reaches Outside Midline (Standing, Dynamic Balance) contact guard assist  -TB      Time Able to Maintain Position, Reaches Outside Midline (Standing, Dynamic Balance) more than 5 minutes  -TB      Recorded by [TB] Jazz Hernandez OT 08/12/18 1030      Row Name 08/12/18 0848             Positioning and Restraints    Pre-Treatment Position in bed  -TB      Post Treatment Position chair  -TB      In Chair notified nsg;sitting;call light within reach;encouraged to call for assist;exit alarm on  -TB      Recorded by [TB] Jzaz Hernandez OT 08/12/18 1030      Row Name 08/12/18 0848              Pain Scale: Numbers Pre/Post-Treatment    Pain Scale: Numbers, Pretreatment 0/10 - no pain  -TB      Pain Scale: Numbers, Post-Treatment 0/10 - no pain  -TB      Recorded by [TB] Jazz Hernandez, OT 08/12/18 1030      Row Name 08/12/18 0848             Vision Assessment/Intervention    Visual Impairment/Limitations corrective lenses full time  -TB      Recorded by [TB] Jazz Hernandez, OT 08/12/18 1030      Row Name                Wound 08/08/18 2010 Right lateral foot     Wound - Properties Group Date first assessed: 08/08/18 [TL] Time first assessed: 2010 [TL] Present On Admission : yes [TL] Side: Right [TL] Orientation: lateral [TL] Location: foot [TL] Additional Comments: right great toe; Diabetic ulcer [CP] Recorded by:  [CP] Marycarmen Mcfarland APRN 08/09/18 1558 [TL] Sherita Walsh LPN 08/09/18 0743    Row Name 08/12/18 0848             Coping    Observed Emotional State cooperative;hopeful  -TB      Verbalized Emotional State hopefulness  -TB      Recorded by [TB] Jazz Hernandez, OT 08/12/18 1030      Row Name 08/12/18 0848             Plan of Care Review    Plan of Care Reviewed With patient  -TB      Recorded by [TB] Jazz Hernandez, OT 08/12/18 1030      Row Name 08/12/18 0848             Outcome Summary/Treatment Plan (OT)    Daily Summary of Progress (OT) progress toward functional goals as expected  -TB      Plan for Continued Treatment (OT) per POC  -TB      Anticipated Discharge Disposition (OT) inpatient rehabilitation facility  -TB      Recorded by [TB] Jazz Hernandez, OT 08/12/18 1030        User Key  (r) = Recorded By, (t) = Taken By, (c) = Cosigned By    Initials Name Effective Dates Discipline    TB Jazz Hernandez, EVERETTE 06/08/18 -  OT    CP Marycarmen Mcfarland APRN 06/08/18 -  Nurse    TL Sherita Walsh LPN 03/14/16 -  Nurse        Wound 08/08/18 2010 Right lateral foot  (Active)   Dressing Appearance open to air 8/12/2018  8:09 AM   Closure  Open to air 8/12/2018  8:09 AM   Base dry;clean 8/11/2018  7:54 PM   Periwound pink;dry;intact 8/11/2018  7:54 PM   Periwound Temperature warm 8/11/2018  7:54 PM   Periwound Skin Turgor firm 8/11/2018  7:54 PM   Edges irregular;callused 8/11/2018  7:54 PM   Drainage Amount none 8/12/2018  8:09 AM   Care, Wound cleansed with;wound cleanser 8/12/2018  8:09 AM   Dressing Care, Wound open to air 8/12/2018  8:09 AM         Occupational Therapy Education     Title: PT OT SLP Therapies (Active)     Topic: Occupational Therapy (Active)     Point: ADL training (Done)     Description: Instruct learner(s) on proper safety adaptation and remediation techniques during self care or transfers.   Instruct in proper use of assistive devices.   Learning Progress Summary     Learner Status Readiness Method Response Comment Documented by    Patient Done Acceptance E JACEK,NR Educated on OT role, pt current deficits and need for assist. Discussed POC and discharge. AN 08/09/18 1428    Family Done Acceptance E JACEK,NR Educated on OT role, pt current deficits and need for assist. Discussed POC and discharge. AN 08/09/18 1428          Point: Home exercise program (Done)     Description: Instruct learner(s) on appropriate technique for monitoring, assisting and/or progressing therapeutic exercises/activities.   Learning Progress Summary     Learner Status Readiness Method Response Comment Documented by    Patient Done Acceptance E,D,TB JACEK,LAW,NR Education intitiated for SROM HEP R UE TB 08/12/18 1031                      User Key     Initials Effective Dates Name Provider Type Discipline     06/08/18 -  Jazz Hernandez, OT Occupational Therapist OT    AN 06/22/15 -  Elida Jeffrey OT Occupational Therapist OT                OT Recommendation and Plan  Outcome Summary/Treatment Plan (OT)  Daily Summary of Progress (OT): progress toward functional goals as expected  Plan for Continued Treatment (OT): per POC  Anticipated Discharge  Disposition (OT): inpatient rehabilitation facility  Daily Summary of Progress (OT): progress toward functional goals as expected  Plan of Care Review  Plan of Care Reviewed With: patient  Plan of Care Reviewed With: patient  Outcome Summary: Pt motivated to regain R side function. Education initiated for RUE: SROM HEP to support function/good teach back. SBA bed mobility; CGA all other mobility/transfers. OT to follow. Pt awaiting rehab bed. Recommend rehab (vs HH) for best outcome/recovery.        Outcome Measures     Row Name 08/12/18 0848 08/10/18 1455 08/09/18 1429       How much help from another person do you currently need...    Turning from your back to your side while in flat bed without using bedrails?  -- 4  -VG 4  -MB    Moving from lying on back to sitting on the side of a flat bed without bedrails?  -- 4  -VG 3  -MB    Moving to and from a bed to a chair (including a wheelchair)?  -- 3  -VG 3  -MB    Standing up from a chair using your arms (e.g., wheelchair, bedside chair)?  -- 3  -VG 3  -MB    Climbing 3-5 steps with a railing?  -- 2  -VG 2  -MB    To walk in hospital room?  -- 3  -VG 3  -MB    AM-PAC 6 Clicks Score  -- 19  -VG 18  -MB       How much help from another is currently needed...    Putting on and taking off regular lower body clothing? 2  -TB  --  --    Bathing (including washing, rinsing, and drying) 2  -TB  --  --    Toileting (which includes using toilet bed pan or urinal) 2  -TB  --  --    Putting on and taking off regular upper body clothing 3  -TB  --  --    Taking care of personal grooming (such as brushing teeth) 3  -TB  --  --    Eating meals 3  -TB  --  --    Score 15  -TB  --  --       Modified [place] Scale    Modified [place]on Scale  -- 4 - Moderately severe disability.  Unable to walk without assistance, and unable to attend to own bodily needs without assistance.  -VG 4 - Moderately severe disability.  Unable to walk without assistance, and unable to attend to own bodily  needs without assistance.  -MB       Functional Assessment    Outcome Measure Options AM-PAC 6 Clicks Daily Activity (OT)  -TB AM-PAC 6 Clicks Basic Mobility (PT);Modified [place]  -VG -PAC 6 Clicks Basic Mobility (PT)  -MB    Row Name 08/09/18 1351             How much help from another is currently needed...    Putting on and taking off regular lower body clothing? 2  -AN      Bathing (including washing, rinsing, and drying) 2  -AN      Toileting (which includes using toilet bed pan or urinal) 2  -AN      Putting on and taking off regular upper body clothing 3  -AN      Taking care of personal grooming (such as brushing teeth) 3  -AN      Eating meals 3  -AN      Score 15  -AN         Modified [place] Scale    Modified [place] Scale 4 - Moderately severe disability.  Unable to walk without assistance, and unable to attend to own bodily needs without assistance.  -AN         Functional Assessment    Outcome Measure Options AM-PAC 6 Clicks Daily Activity (OT);Modified [place]  -AN        User Key  (r) = Recorded By, (t) = Taken By, (c) = Cosigned By    Initials Name Provider Type    Jazz Ruelas, OT Occupational Therapist    Elida Trinidad, OT Occupational Therapist    Radha Garcia, PT Physical Therapist    VG Korina Edwards, PT Physical Therapist           Time Calculation:         Time Calculation- OT     Row Name 08/12/18 1034 08/12/18 0848          Time Calculation- OT    OT Start Time 0848  -TB  --     OT Received On 08/12/18  -  --     OT Goal Re-Cert Due Date 08/19/18  -TB  --        Timed Charges    50515 -  OT Neuromuscular Reeducation Minutes  -- 14  -TB     71628 - OT Therapeutic Activity Minutes  -- 10  -TB       User Key  (r) = Recorded By, (t) = Taken By, (c) = Cosigned By    Initials Name Provider Type    Jazz Ruelas, OT Occupational Therapist           Therapy Suggested Charges     Code   Minutes Charges    46588 (CPT®)  Ot Neuromusc Re Education Ea 15 Min  14 1    29619 (CPT®) Hc Ot Ther Proc Ea 15 Min      24400 (CPT®) Hc Ot Therapeutic Act Ea 15 Min 10 1    07137 (CPT®) Hc Ot Manual Therapy Ea 15 Min      58928 (CPT®) Hc Ot Iontophoresis Ea 15 Min      16363 (CPT®) Hc Ot Elec Stim Ea-Per 15 Min      90862 (CPT®) Hc Ot Ultrasound Ea 15 Min      06828 (CPT®) Hc Ot Self Care/Mgmt/Train Ea 15 Min      Total  24 2        Therapy Charges for Today     Code Description Service Date Service Provider Modifiers Qty    13486103413 HC OT NEUROMUSC RE EDUCATION EA 15 MIN 8/12/2018 Jazz Hernandez OT GO 1    18257929067 HC OT THERAPEUTIC ACT EA 15 MIN 8/12/2018 Jazz Hernandez OT GO 1               Jazz Hernandez OT  8/12/2018

## 2018-08-13 ENCOUNTER — APPOINTMENT (OUTPATIENT)
Dept: CT IMAGING | Facility: HOSPITAL | Age: 53
End: 2018-08-13

## 2018-08-13 LAB
BH CV ECHO MEAS - DIST REN A EDV LEFT: 30 CM/SEC
BH CV ECHO MEAS - DIST REN A PSV LEFT: 82.3 CM/SEC
BH CV ECHO MEAS - DIST REN A RI LEFT: 0.64
BH CV ECHO MEAS - MID REN A EDV LEFT: 32.6 CM/SEC
BH CV ECHO MEAS - MID REN A PSV LEFT: 87.5 CM/SEC
BH CV ECHO MEAS - MID REN A RI LEFT: 0.63
BH CV ECHO MEAS - PROX REN A EDV LEFT: 28.3 CM/SEC
BH CV ECHO MEAS - PROX REN A PSV LEFT: 83.2 CM/SEC
BH CV ECHO MEAS - PROX REN A RI LEFT: 0.66
BH CV VAS RENAL AORTIC MID PSV: 88 CM/S
BH CV VAS RENAL HILUM LEFT PSV: 68 CM/S
BH CV VAS RENAL HILUM RIGHT PSV: 77 CM/S
BH CV XCLRA SUP ARC RI LEFT: 0.67
BH CV XLRA MEAS - KID L LEFT: 11.6 CM
BH CV XLRA MEAS - RENAL A ORG RI LEFT: 0.64
BH CV XLRA MEAS - SUP ARC PSV LEFT: 25.7 CM/SEC
BH CV XLRA MEAS - SUP REN AO PSV: 88.3 CM/SEC
BH CV XLRA MEAS - SUP SEG EDV LEFT: 11.2 CM/SEC
BH CV XLRA MEAS - SUP SEG PSV LEFT: 31.3 CM/SEC
BH CV XLRA MEAS - SUP SEG RI LEFT: 0.64
BH CV XLRA MEAS DIST REN A PSV RIGHT: 134.7 CM/SEC
BH CV XLRA MEAS INF ARC EDV LEFT: 7.9 CM/SEC
BH CV XLRA MEAS INF ARC EDV RIGHT: 13.7 CM/SEC
BH CV XLRA MEAS INF ARC PSV LEFT: 21.7 CM/SEC
BH CV XLRA MEAS INF ARC PSV RIGHT: 31.7 CM/SEC
BH CV XLRA MEAS INF ARC RI LEFT: 0.63
BH CV XLRA MEAS INF ARC RI RIGHT: 0.57
BH CV XLRA MEAS INF SEG EDV LEFT: 12.9 CM/SEC
BH CV XLRA MEAS INF SEG EDV RIGHT: 18.9 CM/SEC
BH CV XLRA MEAS INF SEG PSV LEFT: 38.6 CM/SEC
BH CV XLRA MEAS INF SEG PSV RIGHT: 48 CM/SEC
BH CV XLRA MEAS INF SEG RI LEFT: 0.67
BH CV XLRA MEAS INF SEG RI RIGHT: 0.61
BH CV XLRA MEAS KID L RIGHT: 10.8 CM
BH CV XLRA MEAS MID REN A PSV RIGHT: 125.2 CM/SEC
BH CV XLRA MEAS PROX REN A PSV RIGHT: 137.2 CM/SEC
BH CV XLRA MEAS RAR RIGHT: 1.6
BH CV XLRA MEAS RENAL A ORG EDV LEFT: 34.3 CM/SEC
BH CV XLRA MEAS RENAL A ORG PSV LEFT: 94.4 CM/SEC
BH CV XLRA MEAS RENAL A ORG PSV RIGHT: 141.5 CM/SEC
BH CV XLRA MEAS SUP ARC EDV RIGHT: 9.4 CM/SEC
BH CV XLRA MEAS SUP ARC PSV RIGHT: 28.7 CM/SEC
BH CV XLRA MEAS SUP ARC RI RIGHT: 0.67
BH CV XLRA MEAS SUP SEG EDV RIGHT: 17.6 CM/SEC
BH CV XLRA MEAS SUP SEG PSV RIGHT: 43.7 CM/SEC
BH CV XLRA MEAS SUP SEG RI RIGHT: 0.6
BH CV XLRA SUP ARC EDV LEFT: 8.6 CM/SEC
GLUCOSE BLDC GLUCOMTR-MCNC: 215 MG/DL (ref 70–130)
GLUCOSE BLDC GLUCOMTR-MCNC: 219 MG/DL (ref 70–130)
GLUCOSE BLDC GLUCOMTR-MCNC: 263 MG/DL (ref 70–130)
GLUCOSE BLDC GLUCOMTR-MCNC: 359 MG/DL (ref 70–130)
LEFT RENAL UPPER PARENCHYMA MAX: 38 CM/S
RIGHT RENAL UPPER PARENCHYMA MAX: 43 CM/S
RIGHT RENAL UPPER PARENCHYMA MIN: 17 CM/S
RIGHT RENAL UPPER PARENCHYMA RI: 0.6

## 2018-08-13 PROCEDURE — 25010000002 ENOXAPARIN PER 10 MG: Performed by: INTERNAL MEDICINE

## 2018-08-13 PROCEDURE — 99232 SBSQ HOSP IP/OBS MODERATE 35: CPT | Performed by: PSYCHIATRY & NEUROLOGY

## 2018-08-13 PROCEDURE — 99233 SBSQ HOSP IP/OBS HIGH 50: CPT | Performed by: INTERNAL MEDICINE

## 2018-08-13 PROCEDURE — 70450 CT HEAD/BRAIN W/O DYE: CPT

## 2018-08-13 PROCEDURE — 63710000001 INSULIN LISPRO (HUMAN) PER 5 UNITS: Performed by: INTERNAL MEDICINE

## 2018-08-13 PROCEDURE — 63710000001 INSULIN DETEMIR PER 5 UNITS: Performed by: INTERNAL MEDICINE

## 2018-08-13 PROCEDURE — 97110 THERAPEUTIC EXERCISES: CPT

## 2018-08-13 PROCEDURE — 82962 GLUCOSE BLOOD TEST: CPT

## 2018-08-13 PROCEDURE — 97116 GAIT TRAINING THERAPY: CPT

## 2018-08-13 RX ADMIN — INSULIN LISPRO 8 UNITS: 100 INJECTION, SOLUTION INTRAVENOUS; SUBCUTANEOUS at 11:33

## 2018-08-13 RX ADMIN — CARVEDILOL 25 MG: 12.5 TABLET, FILM COATED ORAL at 20:57

## 2018-08-13 RX ADMIN — CARVEDILOL 25 MG: 12.5 TABLET, FILM COATED ORAL at 08:38

## 2018-08-13 RX ADMIN — ENOXAPARIN SODIUM 40 MG: 100 INJECTION SUBCUTANEOUS at 08:39

## 2018-08-13 RX ADMIN — HYDROCHLOROTHIAZIDE 25 MG: 25 TABLET ORAL at 08:38

## 2018-08-13 RX ADMIN — LISINOPRIL 40 MG: 40 TABLET ORAL at 08:39

## 2018-08-13 RX ADMIN — INSULIN LISPRO 8 UNITS: 100 INJECTION, SOLUTION INTRAVENOUS; SUBCUTANEOUS at 11:35

## 2018-08-13 RX ADMIN — INSULIN LISPRO 6 UNITS: 100 INJECTION, SOLUTION INTRAVENOUS; SUBCUTANEOUS at 08:42

## 2018-08-13 RX ADMIN — ASPIRIN 325 MG ORAL TABLET 325 MG: 325 PILL ORAL at 08:38

## 2018-08-13 RX ADMIN — AMLODIPINE BESYLATE 10 MG: 10 TABLET ORAL at 08:38

## 2018-08-13 RX ADMIN — INSULIN LISPRO 4 UNITS: 100 INJECTION, SOLUTION INTRAVENOUS; SUBCUTANEOUS at 17:02

## 2018-08-13 RX ADMIN — INSULIN LISPRO 6 UNITS: 100 INJECTION, SOLUTION INTRAVENOUS; SUBCUTANEOUS at 21:19

## 2018-08-13 RX ADMIN — CLOPIDOGREL BISULFATE 75 MG: 75 TABLET ORAL at 08:38

## 2018-08-13 RX ADMIN — INSULIN LISPRO 4 UNITS: 100 INJECTION, SOLUTION INTRAVENOUS; SUBCUTANEOUS at 08:40

## 2018-08-13 RX ADMIN — POVIDONE-IODINE: 10 SOLUTION TOPICAL at 13:13

## 2018-08-13 RX ADMIN — INSULIN LISPRO 8 UNITS: 100 INJECTION, SOLUTION INTRAVENOUS; SUBCUTANEOUS at 17:02

## 2018-08-13 RX ADMIN — DESMOPRESSIN ACETATE 80 MG: 0.2 TABLET ORAL at 20:56

## 2018-08-13 NOTE — PLAN OF CARE
"Problem: Stroke (Ischemic) (Adult)  Goal: Signs and Symptoms of Listed Potential Problems Will be Absent, Minimized or Managed (Stroke)  Outcome: Ongoing (interventions implemented as appropriate)  Pt stated he \"felt like he was stronger today.\" Pt steady on feet with walker and gait belt once up and walking. Compliant with diabetic education and teaching. No c/o pain/distress this shift.       "

## 2018-08-13 NOTE — PROGRESS NOTES
"Mele Rossi    Subjective     CC: stroke    History of Present Illness     Mele Rossi is followed for stroke. He reports fluctuating, and overall worsening right hemiparesis over the last day. He denies any other new or associated symptoms.      There have been no other changes in the patient's interval history since my last progress note of 8/12/18.    I have reviewed and confirmed the past family, social and medical history as accurate on 8/10/18.     Review of Systems   Constitutional: Negative.    Respiratory: Negative.    Cardiovascular: Negative.    Gastrointestinal: Negative.    Genitourinary: Negative.    Musculoskeletal: Negative.        Objective     BP (!) 154/102 (BP Location: Left arm, Patient Position: Lying)   Pulse 73   Temp 98.4 °F (36.9 °C) (Oral)   Resp 16   Ht 182.9 cm (72\")   Wt 94.3 kg (208 lb)   SpO2 95%   BMI 28.21 kg/m²     Physical Exam   Constitutional: He is oriented to person, place, and time.   Neurological: He is oriented to person, place, and time.   Psychiatric: His speech is normal.        Neurologic Exam     Mental Status   Oriented to person, place, and time.   Attention: normal.   Speech: speech is normal   Level of consciousness: alert  Knowledge: good.   Normal comprehension.     Cranial Nerves   Cranial nerves II through XII intact.     Motor Exam   Muscle bulk: normal  Overall muscle tone: normalRight hemiparesis (3+/5 - 4-/5)     Sensory Exam   Light touch normal.       Laboratory and radiological testing includes a normal normal Carotid Dopplers and CTA's of the head/neck. No clear source of cardioembolism has been seen on ECHO. Glucose=215.    Assessment/Plan       Mele Rossi is followed with stroke. Lacunar strokes can unfortunately fluctuate and even worsen over days. I don't find evidence for contributor factors such as large vessel stenosis, hypotension, hypoglycemia or other metabolic etiology. He is already treated with dual anti-platelet therapy. Therefore, " there may not be additional interventions or investigations that would be helpful at this time. This was discussed. He will continue on current therapy and with continued efforts toward rehab. However, I will check a head CT to rule out evidence for hemorrhage.      As part of this visit I reviewed prior lab results, reviewed radiology results and reviewed records from the current hospitalization which is incorporated in the HPI.

## 2018-08-13 NOTE — PLAN OF CARE
Problem: Patient Care Overview  Goal: Plan of Care Review  Outcome: Ongoing (interventions implemented as appropriate)   08/13/18 9341   Coping/Psychosocial   Plan of Care Reviewed With patient   Plan of Care Review   Progress declining   OTHER   Outcome Summary DECREASED DISTANCE AMBULATED DUE TO INCREASED R SIDED WEAKNESS. CT REVEALED EVOLVING SUBACUTE L PONTINE INFARCTION. PT GIVES GOOD EFFORT AND IS GOOD REHAB CANDIDATE. AMBULATED 8 FEET WITH MAX ASSIST OF 2. NEEDS MANUAL ASSIST FOR WT SHIFT AND TO BLOCK R KNEE FROM [place] IN STANCE.

## 2018-08-13 NOTE — PROGRESS NOTES
Continued Stay Note  [facility]     Patient Name: Mele Rossi  MRN: [id number]  Today's Date: 8/13/2018    Admit Date: 8/8/2018          Discharge Plan     Row Name 08/13/18 1613       Plan    Plan skilled bed at [facility]    Patient/Family in Agreement with Plan yes    Plan Comments Case mgt con't to follow. Plan is to transfer to a skilled level of rehab at [facility] in [place]. I spoke with Elida who confirmed they are still awaiting  insurance approval. Discussed with Mr Rossi. Case mgt will f/u in am.              Discharge Codes    No documentation.       Expected Discharge Date and Time     Expected Discharge Date Expected Discharge Time    Aug 13, 2018             Sonja C Kellerman, RN

## 2018-08-13 NOTE — PLAN OF CARE
Problem: Patient Care Overview  Goal: Plan of Care Review   08/13/18 0123   Coping/Psychosocial   Plan of Care Reviewed With patient   Plan of Care Review   Progress no change   OTHER   Outcome Summary Pt is alert and oriented x4, VSS, HS coreg held HR less than 60, NIH 7 r/t speech (slightly slurred, pace/rate variation at times), right arm almost flaccid, able to wiggle fingers later on in the shift and able to keep arm up using shoulder strength when standing, RLE weak dorsi/plantarflexion improved later on in the shift to moderate strength, able to lift RLE off ground without hitting the bed, drift noted at times. [place] and wife very concerned about patient's worsened condition since admission, reiterated what day shift nurse discussed with family regarding a waxing and waning of symptoms r/t his varying BP readings, Dr. Ybarra and Dr. Toribio made aware on day shift regarding patient's exacerbation of symptoms on day shift. On night shift pt showed some improvement as the night went on. Will continue to monitor for changes. CM awaiting insurance approve on 8-13, plan for rehab this week. Dr. Gayle and hospitalist are following patient

## 2018-08-13 NOTE — THERAPY TREATMENT NOTE
Acute Care - Physical Therapy Treatment Note  [facility]     Patient Name: Mele Rossi  : 1965  MRN: [id number]  Today's Date: 2018  Onset of Illness/Injury or Date of Surgery: 18  Date of Referral to PT: 18  Referring Physician: DANIEL Barajas MD    Admit Date: 2018    Visit Dx:    ICD-10-CM ICD-9-CM   1. Dysarthria R47.1 784.51   2. Impaired mobility and ADLs Z74.09 799.89   3. Impaired functional mobility, balance, gait, and endurance Z74.09 V49.89     Patient Active Problem List   Diagnosis   • Stroke (CMS/HCC)   • Type 2 diabetes mellitus with hyperglycemia (CMS/HCC)   • HTN (hypertension)   • Dysarthria       Therapy Treatment          Rehabilitation Treatment Summary     Row Name 18 1335             Treatment Time/Intention    Discipline physical therapist  -CD      Document Type therapy note (daily note)  -CD      Subjective Information complains of;weakness   PT REPORTS INCREASED R SIDED WEAKNESS.   -CD      Mode of Treatment physical therapy  -CD      Care Plan Review care plan/treatment goals reviewed  -CD      Therapy Frequency (PT Clinical Impression) daily  -CD      Patient Effort good  -CD      Existing Precautions/Restrictions fall   INCREASED R SIDED WEAKNESS.   -CD      Treatment Considerations/Comments REPEAT CT SCAN REVEALED EVOLVING SUBACUTE L PONTINE INFARCTION.   -CD      Recorded by [CD] Colette Mccoy, PT 18 1444      Row Name 18 1335             Vital Signs    Post Systolic BP Rehab 114  -CD      Post Treatment Diastolic BP 84  -CD      Posttreatment Heart Rate (beats/min) 78  -CD      Post SpO2 (%) 97  -CD      O2 Delivery Post Treatment room air  -CD      Post Patient Position Sitting   AFTER GAIT X 8 FEET.   -CD      Recorded by [CD] Colette Mccoy, PT 18 1444      Row Name 18 1334             Cognitive Assessment/Intervention- PT/OT    Affect/Mental Status (Cognitive) WFL  -CD      Orientation Status (Cognition) oriented x 4   -CD      Follows Commands (Cognition) follows one step commands;over 90% accuracy;verbal cues/prompting required  -CD      Attention Deficit (Cognitive) mild deficit  -CD      Safety Deficit (Cognitive) mild deficit  -CD      Personal Safety Interventions fall prevention program maintained;gait belt;muscle strengthening facilitated;nonskid shoes/slippers when out of bed;supervised activity  -CD      Recorded by [CD] Colette Mccoy, PT 08/13/18 1444      Row Name 08/13/18 1337             Bed Mobility Assessment/Treatment    Bed Mobility Assessment/Treatment supine-sit  -CD      Supine-Sit [place] (Bed Mobility) moderate assist (50% patient effort);2 person assist  -CD      Bed Mobility, Safety Issues decreased use of arms for pushing/pulling;decreased use of legs for bridging/pushing  -CD      Assistive Device (Bed Mobility) head of bed elevated  -CD      Comment (Bed Mobility) MANUAL ASSIST TO UPRIGHT TRUNK FROM L SIDELYING AND ASSIST FOR LE'S OFF EOB.   -CD      Recorded by [CD] Colette Mccoy, PT 08/13/18 1444      Row Name 08/13/18 1330             Transfer Assessment/Treatment    Transfer Assessment/Treatment sit-stand transfer;stand-sit transfer  -CD      Recorded by [CD] Colette Mccoy, PT 08/13/18 1444      Row Name 08/13/18 1336             Sit-Stand Transfer    Sit-Stand [place] (Transfers) moderate assist (50% patient effort);2 person assist;verbal cues  -CD      Assistive Device (Sit-Stand Transfers) --   VIA B UE SUPPORT AND GAIT BELT. PT NO LONGER ABLE TO  WX  -CD      Recorded by [CD] Colette Mccoy, PT 08/13/18 1444      Row Name 08/13/18 1334             Stand-Sit Transfer    Stand-Sit [place] (Transfers) moderate assist (50% patient effort);2 person assist;verbal cues  -CD      Assistive Device (Stand-Sit Transfers) --   VIA B UE SUPPORT AND GAIT BELT.   -CD      Recorded by [CD] Colette Mccoy, PT 08/13/18 1444      Row Name 08/13/18 1338             Gait/Stairs  Assessment/Training    37613 - Gait Training Minutes  15  -CD      Gait/Stairs Assessment/Training gait/ambulation independence;gait/ambulation assistive device;distance ambulated  -CD      [place] Level (Gait) maximum assist (25% patient effort);2 person assist;verbal cues  -CD      Assistive Device (Gait) --   VIA B UE SUPPORT AND GAIT BELT  -CD      Distance in Feet (Gait) 8  -CD      Deviations/Abnormal Patterns (Gait) right sided deviations;taran decreased;stride length decreased  -CD      Bilateral Gait Deviations knee buckling, right side;heel strike decreased;foot drop/toe drag;weight shift ability decreased  -CD      Comment (Gait/Stairs) UNABLE TO USE WALKER TODAY DUE TO INCREASED R SIDED WEAKNESS. CARE TO BLOCK R KNEE DURING STANCE AND FOR WT SHIFT L FOR ALLOW SWING ON R. CHAIR BROUGHT UP BEHIND PT TO SIT.   -CD      Recorded by [CD] Colette Mccoy, PT 08/13/18 1444      Row Name 08/13/18 1335             Motor Skills Assessment/Interventions    Additional Documentation Balance (Group)  -CD      Recorded by [CD] Colette Mccoy, PT 08/13/18 1448      Row Name 08/13/18 1335             Therapeutic Exercise    06103 - PT Therapeutic Exercise Minutes 15  -CD      Recorded by [CD] Colette Mccoy, PT 08/13/18 1448      Row Name 08/13/18 1335             Therapeutic Exercise    Lower Extremity (Therapeutic Exercise) heel slides, right;LAQ (long arc quad), right;quad sets, right;hamstring sets, right;gastroc stretch, right   HIP ABD/ADD.   -CD      Exercise Type (Therapeutic Exercise) PROM (passive range of motion);AAROM (active assistive range of motion)  -CD      Position (Therapeutic Exercise) seated;supine  -CD      Sets/Reps (Therapeutic Exercise) 1 SET OF 10.   -CD      Recorded by [CD] Colette Mccoy, PT 08/13/18 1448      Row Name 08/13/18 1335             Gross Motor Coordination    Gross Motor Impairments coordination;balance;motor control;strength  -CD      Recorded by [CD] Colette Mccoy, PT  08/13/18 1448      Row Name 08/13/18 1335             Static Sitting Balance    Level of [place] (Unsupported Sitting, Static Balance) supervision  -CD      Sitting Position (Unsupported Sitting, Static Balance) sitting on edge of bed  -CD      Time Able to Maintain Position (Unsupported Sitting, Static Balance) more than 5 minutes  -CD      Recorded by [CD] Colette Mccoy, PT 08/13/18 1448      Row Name 08/13/18 1335             Static Standing Balance    Level of [place] (Supported Standing, Static Balance) moderate assist, 50 to 74% patient effort   X2  -CD      Time Able to Maintain Position (Supported Standing, Static Balance) 4 to 5 minutes  -CD      Assistive Device Utilized (Supported Standing, Static Balance) --   VIA B UE SUPPORT AND GAIT BELT.   -CD      Comment (Supported Standing, Static Balance) CARE TO BLOCK R KNEE. WORKED ON UPRIGHT POSTURE AND WT SHIFT R/L.   -CD      Recorded by [CD] Colette Mccoy, PT 08/13/18 1448      Row Name 08/13/18 1339             Positioning and Restraints    Pre-Treatment Position in bed  -CD      Post Treatment Position chair  -CD      In Chair reclined;call light within reach;encouraged to call for assist;notified nsg;legs elevated;exit alarm on;RUE elevated   NSG NOTIFIED OF CURRENTLY MOBILITY STATUS AND REC'S FOR TF'S  -CD      Recorded by [CD] Colette Mccoy, PT 08/13/18 1448      Row Name 08/13/18 1335             Pain Scale: Numbers Pre/Post-Treatment    Pain Scale: Numbers, Pretreatment 0/10 - no pain  -CD      Pain Scale: Numbers, Post-Treatment 0/10 - no pain  -CD      Recorded by [CD] Colette Mccoy, PT 08/13/18 1448      Row Name                Wound 08/08/18 2010 Right lateral foot     Wound - Properties Group Date first assessed: 08/08/18 [TL] Time first assessed: 2010 [TL] Present On Admission : yes [TL] Side: Right [TL] Orientation: lateral [TL] Location: foot [TL] Additional Comments: right great toe; Diabetic ulcer [CP] Recorded by:  [CP]  Marycarmen Mcfarland, APRN 08/09/18 1558 [TL] Sherita Walsh, LPN 08/09/18 0743    Row Name 08/13/18 1335             Outcome Summary/Treatment Plan (PT)    Daily Summary of Progress (PT) progress towards functional goals is fair   INCREASED R SIDED WEAKNESS WITH DECLINE IN MOBILITY.   -CD      Recorded by [CD] Colette Mccoy, PT 08/13/18 1448        User Key  (r) = Recorded By, (t) = Taken By, (c) = Cosigned By    Initials Name Effective Dates Discipline    CD Colette Mccoy, PT 06/19/15 -  PT    CP Marycarmen Mcfarland, APRN 06/08/18 -  Nurse    TL Sherita Walsh, LPN 03/14/16 -  Nurse          Wound 08/08/18 2010 Right lateral foot  (Active)   Dressing Appearance open to air 8/13/2018  8:00 AM   Base dry;clean 8/12/2018  8:00 PM   Periwound pink;dry;intact 8/12/2018  8:00 PM   Periwound Temperature warm 8/12/2018  8:00 PM   Periwound Skin Turgor firm 8/12/2018  8:00 PM   Edges callused;irregular 8/13/2018  8:00 AM   Drainage Amount none 8/12/2018  8:00 PM   Dressing Care, Wound open to air 8/12/2018  8:00 PM             Physical Therapy Education     Title: PT OT SLP Therapies (Active)     Topic: Physical Therapy (Done)     Point: Mobility training (Done)    Learning Progress Summary     Learner Status Readiness Method Response Comment Documented by    Patient Done Acceptance E VU,NR BENEFITS OF OOB ACTIVITY, PROGRESSION OF POC, D/C PLANNING. POSTURAL AWARENESS, THER EX, TRANSFER/GAIT TRAINING. CD 08/13/18 1448     Done Acceptance E VU Educated on HEP and correct mechanics for safe functional mobility. VG 08/10/18 1534          Point: Home exercise program (Done)    Learning Progress Summary     Learner Status Readiness Method Response Comment Documented by    Patient Done Acceptance E VU,NR BENEFITS OF OOB ACTIVITY, PROGRESSION OF POC, D/C PLANNING. POSTURAL AWARENESS, THER EX, TRANSFER/GAIT TRAINING. CD 08/13/18 1448     Done Acceptance E VU Educated on HEP and correct mechanics for safe functional mobility. VG  08/10/18 1534          Point: Body mechanics (Done)    Learning Progress Summary     Learner Status Readiness Method Response Comment Documented by    Patient Done Acceptance E VU,NR BENEFITS OF OOB ACTIVITY, PROGRESSION OF POC, D/C PLANNING. POSTURAL AWARENESS, THER EX, TRANSFER/GAIT TRAINING. CD 08/13/18 1448     Done Acceptance E VU Educated on HEP and correct mechanics for safe functional mobility. VG 08/10/18 1534          Point: Precautions (Done)    Learning Progress Summary     Learner Status Readiness Method Response Comment Documented by    Patient Done Acceptance E VU,NR BENEFITS OF OOB ACTIVITY, PROGRESSION OF POC, D/C PLANNING. POSTURAL AWARENESS, THER EX, TRANSFER/GAIT TRAINING. CD 08/13/18 1448     Done Acceptance E VU Educated on HEP and correct mechanics for safe functional mobility. VG 08/10/18 1534                      User Key     Initials Effective Dates Name Provider Type Discipline    CD 06/19/15 -  Colette Mccoy, PT Physical Therapist PT    VG 05/29/18 -  Korina Edwards, PT Physical Therapist PT                    PT Recommendation and Plan  Therapy Frequency (PT Clinical Impression): daily  Outcome Summary/Treatment Plan (PT)  Daily Summary of Progress (PT): progress towards functional goals is fair (INCREASED R SIDED WEAKNESS WITH DECLINE IN MOBILITY. )  Plan of Care Reviewed With: patient  Progress: declining  Outcome Summary: DECREASED DISTANCE AMBULATED DUE TO INCREASED R SIDED WEAKNESS. CT REVEALED EVOLVING SUBACUTE L PONTINE INFARCTION. PT GIVES GOOD EFFORT AND IS GOOD REHAB CANDIDATE. AMBULATED 8 FEET WITH MAX ASSIST OF 2. NEEDS MANUAL ASSIST FOR WT SHIFT AND TO BLOCK R KNEE FROM [place] IN STANCE.           Outcome Measures     Row Name 08/13/18 1335 08/12/18 0848 08/10/18 1455       How much help from another person do you currently need...    Turning from your back to your side while in flat bed without using bedrails? 2  -CD  -- 4  -VG    Moving from lying on back to sitting  on the side of a flat bed without bedrails? 2  -CD  -- 4  -VG    Moving to and from a bed to a chair (including a wheelchair)? 2  -CD  -- 3  -VG    Standing up from a chair using your arms (e.g., wheelchair, bedside chair)? 2  -CD  -- 3  -VG    Climbing 3-5 steps with a railing? 2  -CD  -- 2  -VG    To walk in hospital room? 2  -CD  -- 3  -VG    AM-PAC 6 Clicks Score 12  -CD  -- 19  -VG       How much help from another is currently needed...    Putting on and taking off regular lower body clothing?  -- 2  -TB  --    Bathing (including washing, rinsing, and drying)  -- 2  -TB  --    Toileting (which includes using toilet bed pan or urinal)  -- 2  -TB  --    Putting on and taking off regular upper body clothing  -- 3  -TB  --    Taking care of personal grooming (such as brushing teeth)  -- 3  -TB  --    Eating meals  -- 3  -TB  --    Score  -- 15  -TB  --       Modified Marie Scale    Modified [place] Scale 4 - Moderately severe disability.  Unable to walk without assistance, and unable to attend to own bodily needs without assistance.  -CD  -- 4 - Moderately severe disability.  Unable to walk without assistance, and unable to attend to own bodily needs without assistance.  -VG       Functional Assessment    Outcome Measure Options AM-PAC 6 Clicks Basic Mobility (PT);Modified [place]  -CD AM-PAC 6 Clicks Daily Activity (OT)  -TB AM-PAC 6 Clicks Basic Mobility (PT);Modified Marie  -VG      User Key  (r) = Recorded By, (t) = Taken By, (c) = Cosigned By    Initials Name Provider Type    TB Jazz Hernandez, OT Occupational Therapist    CD Colette Mccoy, PT Physical Therapist    VG Korina Edwards, PT Physical Therapist           Time Calculation:         PT Charges     Row Name 08/13/18 1335             Time Calculation    Start Time 1335  -CD      PT Received On 08/13/18  -CD      PT Goal Re-Cert Due Date 08/15/18  -CD         Time Calculation- PT    Total Timed Code Minutes- PT 30 minute(s)  -CD         Timed  Charges    47600 - PT Therapeutic Exercise Minutes 15  -CD      27053 - Gait Training Minutes  15  -CD        User Key  (r) = Recorded By, (t) = Taken By, (c) = Cosigned By    Initials Name Provider Type    CD Colette Mccoy, PT Physical Therapist        Therapy Suggested Charges     Code   Minutes Charges    57168 (CPT®) Hc Pt Neuromusc Re Education Ea 15 Min      95732 (CPT®) Hc Pt Ther Proc Ea 15 Min 15 1    09366 (CPT®) Hc Gait Training Ea 15 Min 15 1    10988 (CPT®) Hc Pt Therapeutic Act Ea 15 Min      81307 (CPT®) Hc Pt Manual Therapy Ea 15 Min      75525 (CPT®) Hc Pt Iontophoresis Ea 15 Min      43553 (CPT®) Hc Pt Elec Stim Ea-Per 15 Min      52825 (CPT®) Hc Pt Ultrasound Ea 15 Min      16642 (CPT®) Hc Pt Self Care/Mgmt/Train Ea 15 Min      Total  30 2        Therapy Charges for Today     Code Description Service Date Service Provider Modifiers Qty    54079847982 HC PT THER PROC EA 15 MIN 8/13/2018 Colette Mccoy, PT GP 1    16852571285 HC GAIT TRAINING EA 15 MIN 8/13/2018 Colette Mccoy, PT GP 1    59193037139 HC PT THER SUPP EA 15 MIN 8/13/2018 Colette Mccoy, PT GP 2          PT G-Codes  PT Professional Judgement Used?: Yes  Outcome Measure Options: AM-PAC 6 Clicks Basic Mobility (PT), Modified Marie  Score: 18  Functional Limitation: Mobility: Walking and moving around  Mobility: Walking and Moving Around Current Status (): At least 40 percent but less than 60 percent impaired, limited or restricted  Mobility: Walking and Moving Around Goal Status (): At least 20 percent but less than 40 percent impaired, limited or restricted    Colette Mccoy, PT  8/13/2018

## 2018-08-13 NOTE — PROGRESS NOTES
[facility] Medicine Services  PROGRESS NOTE    Patient Name: Mele Rossi  : 1965  MRN: [id number]    Date of Admission: 2018  Length of Stay: 5  Primary Care Physician: Kymberly Ibrahim APRN    Subjective   Subjective     CC: f/u CVA    HPI: Had another episode of fluctuating symptoms yesterday pm but now doing some better this am. No complaints but is nervous about his fluctuating symptoms.     Review of Systems  Gen- No fevers, chills  CV- No chest pain, palpitations  Resp- No cough, dyspnea  GI- No N/V/D, abd pain    Otherwise ROS is negative except as mentioned in the HPI.    Objective   Objective     Vital Signs:   Temp:  [98 °F (36.7 °C)-98.5 °F (36.9 °C)] 98.4 °F (36.9 °C)  Heart Rate:  [58-80] 73  Resp:  [16-18] 16  BP: (132-164)/() 154/102  Total (NIH Stroke Scale): 7     Physical Exam:  Constitutional: No acute distress, awake, alert, up in bed  HENT: NCAT, mucous membranes moist  Respiratory: Clear to auscultation bilaterally, respiratory effort normal   Cardiovascular: RRR, no murmurs, rubs, or gallops, palpable pedal pulses bilaterally  Gastrointestinal: Positive bowel sounds, soft, nontender, nondistended  Musculoskeletal: No bilateral ankle edema  Psychiatric: Appropriate affect, cooperative  Neurologic: Oriented x 3, left sided weakness, mild dysarthria  Skin: No rashes    Results Reviewed:  I have personally reviewed current lab, radiology, and data and agree.      Results from last 7 days  Lab Units 18  0505 18  0011   WBC 10*3/mm3 8.66 8.72   HEMOGLOBIN g/dL 16.0 15.7   HEMATOCRIT % 46.3 44.5   PLATELETS 10*3/mm3 164 172   INR   --  1.02       Results from last 7 days  Lab Units 18  0505 18  0505 18  0011   SODIUM mmol/L 137 141 147*   POTASSIUM mmol/L 4.1 4.4 3.9   CHLORIDE mmol/L 104 102 103   CO2 mmol/L 28.0 29.0 28.0   BUN mg/dL 19 16 14   CREATININE mg/dL 1.22 1.21 1.20   GLUCOSE mg/dL 218* 170* 185*   CALCIUM mg/dL  9.0 9.2 9.1   ALT (SGPT) U/L  --  16 16   AST (SGOT) U/L  --  22 21   TROPONIN I ng/mL  --   --  0.009     Estimated Creatinine Clearance: 81.6 mL/min (by C-G formula based on SCr of 1.22 mg/dL).  No results found for: BNP    Microbiology Results Abnormal     None        CT head pending.    Results for orders placed during the hospital encounter of 08/08/18   Adult Transthoracic Echo Complete W/ Cont if Necessary Per Protocol (With Agitated Saline)    Narrative · Left ventricular systolic function is hyperdynamic (EF > 70).  · Left ventricular wall thickness is consistent with concentric   hypertrophy.  · The left ventricular cavity is small.  · Left ventricular diastolic dysfunction.          I have reviewed the medications.    Assessment/Plan   Assessment / Plan     Hospital Problem List     Stroke (CMS/HCC)    Type 2 diabetes mellitus with hyperglycemia (CMS/[facility])    HTN (hypertension)    Dysarthria          Brief Hospital Course to date:  Mele Rossi is a 53 y.o. male with uncontrolled HTN and DM2 presenting with acute left pontine CVA. Unfortunately he has had waxing and waning neurologic symptoms due to location of his infarct and BP fluctuations. He is awaiting rehab.    Assessment & Plan:  --Neurology follows. All neuroimaging has been negative thus far. Repeat CT head from this am pending. Continue DAPT (for at least 90 days) and statin.   --BP control is better. Continue current medical therapy. Renal artery US prelim neg, read pending. Renin/anjana pending.   --Appears to have mild CKD III which is probably contributing to his difficult to control HTN.  --Increase insulin again today.  --PT/OT recs rehab. Patient agreeable. CM following, medically ready for rehab any time.     DVT Prophylaxis:  Lovenox    CODE STATUS:   Code Status and Medical Interventions:   Ordered at: 08/09/18 0010     Level Of Support Discussed With:    Patient     Code Status:    CPR     Medical Interventions (Level of Support Prior to  Arrest):    Full       Disposition: I expect the patient to be discharged to inpatient rehab once bed available.      Electronically signed by Sherry Ybarra II, DO, 08/13/18, 10:31 AM.

## 2018-08-14 LAB
GLUCOSE BLDC GLUCOMTR-MCNC: 177 MG/DL (ref 70–130)
GLUCOSE BLDC GLUCOMTR-MCNC: 200 MG/DL (ref 70–130)
GLUCOSE BLDC GLUCOMTR-MCNC: 252 MG/DL (ref 70–130)
GLUCOSE BLDC GLUCOMTR-MCNC: 253 MG/DL (ref 70–130)
GLUCOSE BLDC GLUCOMTR-MCNC: 361 MG/DL (ref 70–130)

## 2018-08-14 PROCEDURE — 25010000002 ENOXAPARIN PER 10 MG: Performed by: INTERNAL MEDICINE

## 2018-08-14 PROCEDURE — 99232 SBSQ HOSP IP/OBS MODERATE 35: CPT | Performed by: NURSE PRACTITIONER

## 2018-08-14 PROCEDURE — 99232 SBSQ HOSP IP/OBS MODERATE 35: CPT | Performed by: PSYCHIATRY & NEUROLOGY

## 2018-08-14 PROCEDURE — 63710000001 INSULIN LISPRO (HUMAN) PER 5 UNITS: Performed by: NURSE PRACTITIONER

## 2018-08-14 PROCEDURE — 82962 GLUCOSE BLOOD TEST: CPT

## 2018-08-14 PROCEDURE — 63710000001 INSULIN DETEMIR PER 5 UNITS: Performed by: INTERNAL MEDICINE

## 2018-08-14 RX ORDER — CARVEDILOL 12.5 MG/1
12.5 TABLET ORAL EVERY 12 HOURS SCHEDULED
Status: DISCONTINUED | OUTPATIENT
Start: 2018-08-14 | End: 2018-08-15 | Stop reason: HOSPADM

## 2018-08-14 RX ADMIN — INSULIN LISPRO 12 UNITS: 100 INJECTION, SOLUTION INTRAVENOUS; SUBCUTANEOUS at 17:49

## 2018-08-14 RX ADMIN — DESMOPRESSIN ACETATE 80 MG: 0.2 TABLET ORAL at 21:14

## 2018-08-14 RX ADMIN — INSULIN LISPRO 8 UNITS: 100 INJECTION, SOLUTION INTRAVENOUS; SUBCUTANEOUS at 12:06

## 2018-08-14 RX ADMIN — INSULIN LISPRO 2 UNITS: 100 INJECTION, SOLUTION INTRAVENOUS; SUBCUTANEOUS at 21:14

## 2018-08-14 RX ADMIN — INSULIN LISPRO 6 UNITS: 100 INJECTION, SOLUTION INTRAVENOUS; SUBCUTANEOUS at 08:14

## 2018-08-14 RX ADMIN — CARVEDILOL 25 MG: 12.5 TABLET, FILM COATED ORAL at 10:56

## 2018-08-14 RX ADMIN — CLOPIDOGREL BISULFATE 75 MG: 75 TABLET ORAL at 10:56

## 2018-08-14 RX ADMIN — INSULIN LISPRO 6 UNITS: 100 INJECTION, SOLUTION INTRAVENOUS; SUBCUTANEOUS at 17:50

## 2018-08-14 RX ADMIN — ASPIRIN 325 MG ORAL TABLET 325 MG: 325 PILL ORAL at 10:57

## 2018-08-14 RX ADMIN — HYDROCHLOROTHIAZIDE 25 MG: 25 TABLET ORAL at 10:56

## 2018-08-14 RX ADMIN — CARVEDILOL 12.5 MG: 12.5 TABLET, FILM COATED ORAL at 21:14

## 2018-08-14 RX ADMIN — ENOXAPARIN SODIUM 40 MG: 100 INJECTION SUBCUTANEOUS at 10:57

## 2018-08-14 RX ADMIN — POVIDONE-IODINE: 10 SOLUTION TOPICAL at 09:00

## 2018-08-14 RX ADMIN — INSULIN LISPRO 8 UNITS: 100 INJECTION, SOLUTION INTRAVENOUS; SUBCUTANEOUS at 08:16

## 2018-08-14 RX ADMIN — AMLODIPINE BESYLATE 10 MG: 10 TABLET ORAL at 10:56

## 2018-08-14 RX ADMIN — INSULIN DETEMIR 25 UNITS: 100 INJECTION, SOLUTION SUBCUTANEOUS at 08:14

## 2018-08-14 RX ADMIN — SODIUM CHLORIDE 500 ML: 9 INJECTION, SOLUTION INTRAVENOUS at 04:23

## 2018-08-14 RX ADMIN — INSULIN LISPRO 8 UNITS: 100 INJECTION, SOLUTION INTRAVENOUS; SUBCUTANEOUS at 12:07

## 2018-08-14 NOTE — PROGRESS NOTES
Continued Stay Note   [place]     Patient Name: Mele Rossi  MRN: [id number]  Today's Date: 8/14/2018    Admit Date: 8/8/2018          Discharge Plan     Row Name 08/14/18 1459       Plan    Plan skilled bed at [facility]    Patient/Family in Agreement with Plan yes    Plan Comments Case mgt con't to follow. I spoke with Elida at [facility], they are still awaiting insurance approval for transfer to skilled level of care. Hopefully will have this by 8/15. [facility] ambulance tentatively scheduled for 1pm transport on 8/15              Discharge Codes    No documentation.       Expected Discharge Date and Time     Expected Discharge Date Expected Discharge Time    Aug 13, 2018             Sonja C Kellerman, RN

## 2018-08-14 NOTE — PLAN OF CARE
Problem: Patient Care Overview  Goal: Plan of Care Review   08/14/18 0054   Coping/Psychosocial   Plan of Care Reviewed With patient   Plan of Care Review   Progress no change   OTHER   Outcome Summary Pt remains alert and oriented x4, speech clear, NIH-5 r/t RUE/RLE weakness, mild right facial asymmetry. Pt reports lifting RUE at random, but unable to lift on command, able to wiggle fingers intermittently on command. C/o BLE tingling, pt states he has had restless legs on/off for approx 2-3 months which is keeping him up at night, pt re-educated on adequate glucose control A1C 10.6 on admission and  educated on diabetic neuropathy. Pt c/o right knee buckling while walking, up with 1 to stand next to bed to use urinal, up with 2 and walker for ambulation, poor balance noted when standing, required manual weight shifting. Pt ambulated approx 12 ft to bathroom this AM at approx 0330, post straining to have a large BM, pt became diaphoretic, pale, and weak, required wheelchair assistance to return to bed. Pt educated on a vagal response on the importance of not straining to have a BM, pt denies any history of syncope or prior vagal response. Glucose was 200 at the time, pt bradycardiac and BP low for post CVA at 131/87, HS coreg given earlier in the evening. BP rechecked at approx 0400 SBPv 100, Jordi ATKINSON contacted 500ml NS bolus ordered. Dr. Gyale and hospitalist following patient. Plan is to d/c to inpatient rehab at [facility] in [facility] awaiting insurance approval.

## 2018-08-14 NOTE — PROGRESS NOTES
"    [facility] Medicine Services  PROGRESS NOTE    Patient Name: Mele Rossi  : 1965  MRN: [id number]    Date of Admission: 2018  Length of Stay: 6  Primary Care Physician: Kymberly Ibrahim, CRYS    Subjective   Subjective     CC: f/u CVA    HPI: Vagaled last night while getting up to use the bathroom, BP 80's. He felt dizzy and sweaty. His wife called to report that he has had adverse reactions (inslucing almost passing out) while taking BP med ending in \"pril\"    Review of Systems  Gen- No fevers, chills  CV- No chest pain, palpitations  Resp- No cough, dyspnea  GI- No N/V/D, abd pain    Otherwise ROS is negative except as mentioned in the HPI.    Objective   Objective     Vital Signs:   Temp:  [97.7 °F (36.5 °C)-98.8 °F (37.1 °C)] 97.8 °F (36.6 °C)  Heart Rate:  [54-87] 79  Resp:  [14-18] 18  BP: ()/() 153/95  Total (NIH Stroke Scale): 5     Physical Exam:  Constitutional: No acute distress, awake, alert, up in bed  Respiratory: Clear to auscultation bilaterally, respiratory effort normal   Cardiovascular: RRR, no murmurs, rubs, or gallops, palpable pedal pulses bilaterally  Gastrointestinal: Positive bowel sounds, soft, nontender, nondistended  Musculoskeletal: No bilateral ankle edema  Psychiatric: Appropriate affect, cooperative  Neurologic: Oriented x 3, right sided weakness, mild dysarthria  Skin: No rashes    Results Reviewed:  I have personally reviewed current lab, radiology, and data and agree.      Results from last 7 days  Lab Units 18  0505 18  0011   WBC 10*3/mm3 8.66 8.72   HEMOGLOBIN g/dL 16.0 15.7   HEMATOCRIT % 46.3 44.5   PLATELETS 10*3/mm3 164 172   INR   --  1.02       Results from last 7 days  Lab Units 18  0505 18  0505 18  0011   SODIUM mmol/L 137 141 147*   POTASSIUM mmol/L 4.1 4.4 3.9   CHLORIDE mmol/L 104 102 103   CO2 mmol/L 28.0 29.0 28.0   BUN mg/dL 19 16 14   CREATININE mg/dL 1.22 1.21 1.20   GLUCOSE mg/dL " 218* 170* 185*   CALCIUM mg/dL 9.0 9.2 9.1   ALT (SGPT) U/L  --  16 16   AST (SGOT) U/L  --  22 21   TROPONIN I ng/mL  --   --  0.009     Estimated Creatinine Clearance: 81.6 mL/min (by C-G formula based on SCr of 1.22 mg/dL).  No results found for: BNP      Results for orders placed during the hospital encounter of 08/08/18   Adult Transthoracic Echo Complete W/ Cont if Necessary Per Protocol (With Agitated Saline)    Narrative · Left ventricular systolic function is hyperdynamic (EF > 70).  · Left ventricular wall thickness is consistent with concentric   hypertrophy.  · The left ventricular cavity is small.  · Left ventricular diastolic dysfunction.          I have reviewed the medications.    Assessment/Plan   Assessment / Plan     Hospital Problem List     Stroke (CMS/HCC)    Type 2 diabetes mellitus with hyperglycemia (CMS/[facility])    HTN (hypertension)    Dysarthria          Brief Hospital Course to date:  Mele Rossi is a 53 y.o. male with uncontrolled HTN and DM2 presenting with acute left pontine CVA. Unfortunately he has had waxing and waning neurologic symptoms due to location of his infarct and BP fluctuations. He is awaiting rehab.    Assessment & Plan:  --repeat CT head showing evolving subacute left pontine infarct  -- ECHO and carotid unremarkable, no a fib  -- Continue DAPT (for at least 90 days) and statin.   -- stop lisinopril, cut back on coreg. Renal artery US neg, . Renin/anjana pending.   -- Appears to have mild CKD III which is probably contributing to his difficult to control HTN.  --Increase insulin again today.  --PT/OT recs rehab. Patient agreeable. CM following,     DVT Prophylaxis:  Lovenox    CODE STATUS:   Code Status and Medical Interventions:   Ordered at: 08/09/18 0010     Level Of Support Discussed With:    Patient     Code Status:    CPR     Medical Interventions (Level of Support Prior to Arrest):    Full       Disposition: I expect the patient to be discharged to inpatient rehab  maybe tomorrow if BP ok      Electronically signed by CRYS [place], 08/14/18, 2:07 PM.

## 2018-08-14 NOTE — PROGRESS NOTES
"Mele Rossi    Subjective     CC: stroke    History of Present Illness     Mele Rossi is followed for stroke. He reports fluctuating, and overall worsening right hemiparesis over the last day. He again had worsening hemiparesis overnight associated with relative hypotension. He has improved again this morning.      There have been no other changes in the patient's interval history since my last progress note of 8/13/18.    I have reviewed and confirmed the past family, social and medical history as accurate on 8/10/18.     Review of Systems   Constitutional: Negative.        Objective     /88 (BP Location: Right arm, Patient Position: Lying)   Pulse 65   Temp 98.5 °F (36.9 °C) (Oral)   Resp 16   Ht 182.9 cm (72\")   Wt 94.3 kg (208 lb)   SpO2 98%   BMI 28.21 kg/m²     Physical Exam   Psychiatric: His speech is normal.        Neurologic Exam     Mental Status   Oriented to person.   Speech: speech is normal   Normal comprehension.     Cranial Nerves   Cranial nerves II through XII intact.     Motor Exam   Muscle bulk: normal  Overall muscle tone: normalRight hemiparesis (3+/5 - 4-/5)       Laboratory and radiological testing includes a normal normal Carotid Dopplers and CTA's of the head/neck. No clear source of cardioembolism has been seen on ECHO. Glucose=200.    Assessment/Plan       Mele Rossi is followed with stroke. Lacunar strokes can unfortunately fluctuate and even worsen over days. I don't find evidence for contributor factors such as large vessel stenosis, hypotension, hypoglycemia or other metabolic etiology. His episode last night likely represented focal hypoperfusion in the setting of hypotension. He is already treated with dual anti-platelet therapy. I unfortunately don't think additional interventions beyond careful blood pressure maintenance. This was discussed. He will continue on current therapy and with continued efforts toward rehab. However, I will check a head CT to rule out " evidence for hemorrhage.      As part of this visit I reviewed records from the current hospitalization which is incorporated in the HPI.

## 2018-08-15 ENCOUNTER — HOSPITAL ENCOUNTER (INPATIENT)
Facility: HOSPITAL | Age: 53
LOS: 29 days | Discharge: HOME | DRG: 057 | End: 2018-09-13
Attending: INTERNAL MEDICINE | Admitting: INTERNAL MEDICINE
Payer: COMMERCIAL

## 2018-08-15 ENCOUNTER — APPOINTMENT (OUTPATIENT)
Dept: GENERAL RADIOLOGY | Facility: HOSPITAL | Age: 53
DRG: 057 | End: 2018-08-15
Attending: INTERNAL MEDICINE
Payer: COMMERCIAL

## 2018-08-15 VITALS
DIASTOLIC BLOOD PRESSURE: 95 MMHG | HEIGHT: 72 IN | SYSTOLIC BLOOD PRESSURE: 159 MMHG | WEIGHT: 208 LBS | BODY MASS INDEX: 28.17 KG/M2 | HEART RATE: 77 BPM | TEMPERATURE: 98.6 F | RESPIRATION RATE: 18 BRPM | OXYGEN SATURATION: 98 %

## 2018-08-15 DIAGNOSIS — Z79.4 TYPE 2 DIABETES MELLITUS WITH COMPLICATION, WITH LONG-TERM CURRENT USE OF INSULIN (HCC): ICD-10-CM

## 2018-08-15 DIAGNOSIS — E11.8 TYPE 2 DIABETES MELLITUS WITH COMPLICATION, WITH LONG-TERM CURRENT USE OF INSULIN (HCC): ICD-10-CM

## 2018-08-15 DIAGNOSIS — G25.81 RESTLESS LEG SYNDROME: Primary | ICD-10-CM

## 2018-08-15 PROBLEM — R53.81 DECLINING FUNCTIONAL STATUS: Status: ACTIVE | Noted: 2018-08-15

## 2018-08-15 LAB
ALDOST SERPL-MCNC: <1 NG/DL (ref 0–30)
FERRITIN SERPL-MCNC: 249 NG/ML (ref 22–322)
GLUCOSE BLD-MCNC: 356 MG/DL (ref 74–106)
GLUCOSE BLDC GLUCOMTR-MCNC: 186 MG/DL (ref 70–130)
GLUCOSE BLDC GLUCOMTR-MCNC: 264 MG/DL (ref 70–130)
GLUCOSE BLDC GLUCOMTR-MCNC: 355 MG/DL (ref 70–130)
PERFORMED ON: ABNORMAL

## 2018-08-15 PROCEDURE — 99239 HOSP IP/OBS DSCHRG MGMT >30: CPT | Performed by: NURSE PRACTITIONER

## 2018-08-15 PROCEDURE — 82962 GLUCOSE BLOOD TEST: CPT

## 2018-08-15 PROCEDURE — 1200000002 HC SEMI PRIVATE SWING BED

## 2018-08-15 PROCEDURE — 6360000002 HC RX W HCPCS: Performed by: INTERNAL MEDICINE

## 2018-08-15 PROCEDURE — 63710000001 INSULIN DETEMIR PER 5 UNITS: Performed by: NURSE PRACTITIONER

## 2018-08-15 PROCEDURE — 99232 SBSQ HOSP IP/OBS MODERATE 35: CPT | Performed by: PSYCHIATRY & NEUROLOGY

## 2018-08-15 PROCEDURE — 6370000000 HC RX 637 (ALT 250 FOR IP): Performed by: INTERNAL MEDICINE

## 2018-08-15 PROCEDURE — 94760 N-INVAS EAR/PLS OXIMETRY 1: CPT

## 2018-08-15 PROCEDURE — 71045 X-RAY EXAM CHEST 1 VIEW: CPT

## 2018-08-15 PROCEDURE — 97116 GAIT TRAINING THERAPY: CPT

## 2018-08-15 PROCEDURE — 82728 ASSAY OF FERRITIN: CPT | Performed by: PSYCHIATRY & NEUROLOGY

## 2018-08-15 PROCEDURE — 25010000002 ENOXAPARIN PER 10 MG: Performed by: INTERNAL MEDICINE

## 2018-08-15 PROCEDURE — 97110 THERAPEUTIC EXERCISES: CPT

## 2018-08-15 PROCEDURE — 1200000000 HC SEMI PRIVATE

## 2018-08-15 RX ORDER — ACETAMINOPHEN 325 MG/1
650 TABLET ORAL EVERY 4 HOURS PRN
Status: DISCONTINUED | OUTPATIENT
Start: 2018-08-15 | End: 2018-09-13 | Stop reason: HOSPADM

## 2018-08-15 RX ORDER — ASPIRIN 325 MG
325 TABLET ORAL DAILY
COMMUNITY

## 2018-08-15 RX ORDER — ASPIRIN 325 MG
325 TABLET ORAL DAILY
Status: DISCONTINUED | OUTPATIENT
Start: 2018-08-16 | End: 2018-09-13 | Stop reason: HOSPADM

## 2018-08-15 RX ORDER — HYDROCHLOROTHIAZIDE 25 MG/1
25 TABLET ORAL DAILY
Status: DISCONTINUED | OUTPATIENT
Start: 2018-08-16 | End: 2018-09-13 | Stop reason: HOSPADM

## 2018-08-15 RX ORDER — CLOPIDOGREL BISULFATE 75 MG/1
75 TABLET ORAL DAILY
Qty: 30 TABLET
Start: 2018-08-16 | End: 2018-12-11

## 2018-08-15 RX ORDER — AMLODIPINE BESYLATE 5 MG/1
10 TABLET ORAL DAILY
Status: DISCONTINUED | OUTPATIENT
Start: 2018-08-15 | End: 2018-09-13 | Stop reason: HOSPADM

## 2018-08-15 RX ORDER — CLOPIDOGREL BISULFATE 75 MG/1
75 TABLET ORAL DAILY
COMMUNITY

## 2018-08-15 RX ORDER — ACETAMINOPHEN 650 MG
TABLET, EXTENDED RELEASE ORAL DAILY
Start: 2018-08-16 | End: 2019-12-27

## 2018-08-15 RX ORDER — ATORVASTATIN CALCIUM 80 MG/1
80 TABLET, FILM COATED ORAL NIGHTLY
Status: DISCONTINUED | OUTPATIENT
Start: 2018-08-15 | End: 2018-09-13 | Stop reason: HOSPADM

## 2018-08-15 RX ORDER — ASPIRIN 325 MG
325 TABLET ORAL DAILY
Start: 2018-08-16

## 2018-08-15 RX ORDER — PANTOPRAZOLE SODIUM 40 MG/1
40 TABLET, DELAYED RELEASE ORAL
Status: DISCONTINUED | OUTPATIENT
Start: 2018-08-16 | End: 2018-09-13 | Stop reason: HOSPADM

## 2018-08-15 RX ORDER — CARVEDILOL 12.5 MG/1
12.5 TABLET ORAL 2 TIMES DAILY
COMMUNITY

## 2018-08-15 RX ORDER — INSULIN GLARGINE 100 [IU]/ML
30 INJECTION, SOLUTION SUBCUTANEOUS NIGHTLY
Status: DISCONTINUED | OUTPATIENT
Start: 2018-08-15 | End: 2018-08-17

## 2018-08-15 RX ORDER — ATORVASTATIN CALCIUM 80 MG/1
80 TABLET, FILM COATED ORAL NIGHTLY
Start: 2018-08-15 | End: 2018-12-11

## 2018-08-15 RX ORDER — HYDROCHLOROTHIAZIDE 25 MG/1
25 TABLET ORAL DAILY
COMMUNITY

## 2018-08-15 RX ORDER — HYDROCHLOROTHIAZIDE 25 MG/1
25 TABLET ORAL DAILY
Start: 2018-08-16 | End: 2019-12-27

## 2018-08-15 RX ORDER — CLOPIDOGREL BISULFATE 75 MG/1
75 TABLET ORAL DAILY
Status: DISCONTINUED | OUTPATIENT
Start: 2018-08-16 | End: 2018-09-13 | Stop reason: HOSPADM

## 2018-08-15 RX ORDER — DEXTROSE MONOHYDRATE 50 MG/ML
100 INJECTION, SOLUTION INTRAVENOUS PRN
Status: DISCONTINUED | OUTPATIENT
Start: 2018-08-15 | End: 2018-09-13 | Stop reason: HOSPADM

## 2018-08-15 RX ORDER — NICOTINE POLACRILEX 4 MG
15 LOZENGE BUCCAL PRN
Status: DISCONTINUED | OUTPATIENT
Start: 2018-08-15 | End: 2018-09-13 | Stop reason: HOSPADM

## 2018-08-15 RX ORDER — ACETAMINOPHEN 650 MG
TABLET, EXTENDED RELEASE ORAL DAILY
Status: ON HOLD | COMMUNITY
End: 2018-09-13 | Stop reason: HOSPADM

## 2018-08-15 RX ORDER — ATORVASTATIN CALCIUM 80 MG/1
80 TABLET, FILM COATED ORAL NIGHTLY
COMMUNITY

## 2018-08-15 RX ORDER — CARVEDILOL 12.5 MG/1
12.5 TABLET ORAL 2 TIMES DAILY
Status: DISCONTINUED | OUTPATIENT
Start: 2018-08-15 | End: 2018-08-19

## 2018-08-15 RX ORDER — DEXTROSE MONOHYDRATE 25 G/50ML
12.5 INJECTION, SOLUTION INTRAVENOUS PRN
Status: DISCONTINUED | OUTPATIENT
Start: 2018-08-15 | End: 2018-09-13 | Stop reason: HOSPADM

## 2018-08-15 RX ORDER — CARVEDILOL 12.5 MG/1
12.5 TABLET ORAL EVERY 12 HOURS SCHEDULED
Start: 2018-08-15 | End: 2022-01-12 | Stop reason: SDUPTHER

## 2018-08-15 RX ADMIN — INSULIN DETEMIR 30 UNITS: 100 INJECTION, SOLUTION SUBCUTANEOUS at 08:36

## 2018-08-15 RX ADMIN — HYDROCHLOROTHIAZIDE 25 MG: 25 TABLET ORAL at 08:35

## 2018-08-15 RX ADMIN — CARVEDILOL 12.5 MG: 12.5 TABLET, FILM COATED ORAL at 08:35

## 2018-08-15 RX ADMIN — INSULIN GLARGINE 30 UNITS: 100 INJECTION, SOLUTION SUBCUTANEOUS at 22:22

## 2018-08-15 RX ADMIN — CLOPIDOGREL BISULFATE 75 MG: 75 TABLET ORAL at 08:35

## 2018-08-15 RX ADMIN — POVIDONE-IODINE: 10 SOLUTION TOPICAL at 11:48

## 2018-08-15 RX ADMIN — INSULIN LISPRO 2 UNITS: 100 INJECTION, SOLUTION INTRAVENOUS; SUBCUTANEOUS at 08:36

## 2018-08-15 RX ADMIN — INSULIN LISPRO 8 UNITS: 100 INJECTION, SOLUTION INTRAVENOUS; SUBCUTANEOUS at 11:49

## 2018-08-15 RX ADMIN — INSULIN LISPRO 5 UNITS: 100 INJECTION, SOLUTION INTRAVENOUS; SUBCUTANEOUS at 22:23

## 2018-08-15 RX ADMIN — ENOXAPARIN SODIUM 40 MG: 40 INJECTION, SOLUTION INTRAVENOUS; SUBCUTANEOUS at 22:18

## 2018-08-15 RX ADMIN — ATORVASTATIN CALCIUM 80 MG: 80 TABLET, FILM COATED ORAL at 22:18

## 2018-08-15 RX ADMIN — AMLODIPINE BESYLATE 10 MG: 10 TABLET ORAL at 08:35

## 2018-08-15 RX ADMIN — ASPIRIN 325 MG ORAL TABLET 325 MG: 325 PILL ORAL at 08:35

## 2018-08-15 RX ADMIN — AMLODIPINE BESYLATE 10 MG: 5 TABLET ORAL at 22:18

## 2018-08-15 RX ADMIN — INSULIN LISPRO 12 UNITS: 100 INJECTION, SOLUTION INTRAVENOUS; SUBCUTANEOUS at 11:49

## 2018-08-15 RX ADMIN — CARVEDILOL 12.5 MG: 12.5 TABLET, FILM COATED ORAL at 22:19

## 2018-08-15 RX ADMIN — ENOXAPARIN SODIUM 40 MG: 100 INJECTION SUBCUTANEOUS at 08:34

## 2018-08-15 NOTE — PLAN OF CARE
Problem: Patient Care Overview  Goal: Plan of Care Review   08/15/18 4106   Coping/Psychosocial   Plan of Care Reviewed With patient   Plan of Care Review   Progress improving   OTHER   Outcome Summary Pt ambulated 30 ft with MODA x 2 and RW, distance limited by RLE weakness and SOA. Continues to exhibit RLE buckling and foot drop, although improved muscle activation noted this session. Impaired sitting balance 2/2 trunk weakness. Progress Note completed this date, pt is making gradual progress towards mobility goals, updated goals to reflect current status. Recommend appropriate for IRF following d/c. Will continue to progres pt as able per POC.

## 2018-08-15 NOTE — PLAN OF CARE
Problem: Stroke (Ischemic) (Adult)  Goal: Signs and Symptoms of Listed Potential Problems Will be Absent, Minimized or Managed (Stroke)  Outcome: Ongoing (interventions implemented as appropriate)  Right leg and foot remain weak with the foot being propped with a pillow to prevent foot drop.  Right arm is flaccid.  Patient uses left arm to reposition right.      Problem: Patient Care Overview  Goal: Interprofessional Rounds/Family Conf  Outcome: Ongoing (interventions implemented as appropriate)  Patients blood pressures have remained within normal limits with no signs or symptoms of syncope.  Blood glucose has remained elevated.  Orders for basal dose change and administered during supper.      Problem: Fall Risk (Adult)  Goal: Absence of Fall  Outcome: Ongoing (interventions implemented as appropriate)  Patient has remained free from falls.  Has called out appropriately for assistance.  Assist of 1 to the bath room with gait belt and walker with no evidence of syncope episodes

## 2018-08-15 NOTE — PROGRESS NOTES
Report given to Ezequiel Melo RN at this time. Navigators complete, H2T needs to be complete, RN aware. Pt noted to have R sided weakness, RUE flaccid. Numbness noted in RLE. R sided facial droop. Pt alert and oriented. Callous to R great toe. No complaints at this time.

## 2018-08-15 NOTE — PROGRESS NOTES
"                  Clinical Nutrition     Nutrition Assessment  Reason for Visit:   [facility]    Patient Name: Mele Rossi  YOB: 1965  MRN: [id number]  Date of Encounter: 08/15/18 11:30 AM  Admission date: 8/8/2018    Nutrition Assessment     Hospital Problem List  Active Problems:    Stroke (CMS/HCC)    Type 2 diabetes mellitus with hyperglycemia (CMS/HCC)    HTN (hypertension)    Dysarthria      PMH: He  has no past medical history on file.   PSxH: He  has no past surgical history on file.     Other Nutrition Related Factors:  Medical noncompliance   SLP eval 8/9 WNL     Reported/Observed/Food/Nutrition Related History:     Reports good appetite.  DM Education material reviewed with patient.  All questions answered.  Seemed motivated to make changes after discharge and would like to get his BG under better control.  Encouraged patient to attend outpatient education after discharge as well.       Anthropometrics     Height: 182.9 cm (72\")  Last filed wt: Weight: 94.3 kg (208 lb) (08/09/18 1947)  Weight Method: Bed scale    BMI: BMI (Calculated): 28.2  Overweight: 25.0-29.9kg/m2     Ideal Body Weight (IBW) (kg): 82.07  Admission wt: 208  Method obtained: bed scale    Labs reviewed       Results from last 7 days  Lab Units 08/12/18  0505 08/09/18  0505   SODIUM mmol/L 137 141   POTASSIUM mmol/L 4.1 4.4   CHLORIDE mmol/L 104 102   CO2 mmol/L 28.0 29.0   BUN mg/dL 19 16   CREATININE mg/dL 1.22 1.21   CALCIUM mg/dL 9.0 9.2   BILIRUBIN mg/dL  --  1.1   ALK PHOS U/L  --  82   ALT (SGPT) U/L  --  16   AST (SGOT) U/L  --  22   GLUCOSE mg/dL 218* 170*       Results from last 7 days  Lab Units 08/15/18  1055 08/15/18  0645 08/14/18  2000 08/14/18  1643 08/14/18  1131 08/14/18  0737   GLUCOSE mg/dL 355* 186* 177* 253* 361* 252*       Lab Results  Lab Value Date/Time   HGBA1C 10.60 (H) 08/09/2018 0505   HGBA1C 10.70 (H) 08/09/2018 0011     Medications reviewed   Pertinent: insulin    Applicable medical tests/Procedures " since admission:  SLP bedside eval 8/9    Current Nutrition Prescription     PO: Diet Regular; Cardiac, Consistent Carbohydrate    Active Supplement Orders      DIET MESSAGE Please send patient late breakfast tray if possible. Thanks!    Intake:  80% x 9 meals        Nutrition Diagnosis     Nutrition Diagnoses Problem 1   Problem 1:  Knowledge Deficit   Etiology (related to): MNT for Treatment/Condition   Signs/Symptoms: Demonstrated Information Deficit   (evidence by)      Nutrition Intervention     Interventions Goal    General: Maintain nutrition, Provide information regarding MNT therapy    Nutrition Interventions   1.  Follow treatment progress, Care plan reviewed, Education provided     Patient provided with DM II nutrition therapy education materials. Reviewed with patient. All questions answered.     Monitor/ Evaluation    Per protocol, PO intake, Pertinent labs      Will Continue to follow per protocol      Helen Bolivar RD  Time Spent: 30min

## 2018-08-15 NOTE — DISCHARGE SUMMARY
[facility] Hospital Medicine Services  DISCHARGE SUMMARY    Patient Name: Mele Rossi  : 1965  MRN: [id number]    Date of Admission: 2018  Date of Discharge:  08/15/2018  Primary Care Physician: Kymberly Ibrahim APRN    Consults     Date and Time Order Name Status Description    2018 0010 Inpatient Neurology Consult Completed         Hospital Course     Presenting Problem:   Stroke (CMS/[facility]) [I63.9]  Dysarthria [R47.1]    Active Hospital Problems    Diagnosis Date Noted   • Type 2 diabetes mellitus with hyperglycemia (CMS/HCC) [E11.65] 2018   • HTN (hypertension) [I10] 2018   • Dysarthria [R47.1] 2018   • Stroke (CMS/HCC) [I63.9] 2018      Resolved Hospital Problems    Diagnosis Date Noted Date Resolved   No resolved problems to display.          Hospital Course:  Mele Rossi is a 53 y.o. male with uncontrolled hypertension, noncompliance, diabetes who presented to [facility] emergency department with right-sided weakness and feeling off balance.  He was found to have severe hypertension with systolic blood pressure of 240.  He was discharged home as he had a negative workup for stroke.  He then again reported worsening symptoms and was transferred to [facility] for further evaluation.  He was found to have an acute left pontine CVA.  Neurology was consulted.  He was started on aspirin, high-dose statin, Plavix.  Echo and carotid duplex were unremarkable.  No atrial fib has been noted on his telemetry.  Neurology recommends dual antiplatelet therapy for at least 90 days.  His neurological symptoms have waxed and waned.  This is felt to be due to the location of his infarct as well as blood pressure fluctuations.      His blood pressure has been difficult to control.  He did have an episode of hypotension and presyncopal event.  Blood pressure has been stable for the past 24 hours on current regimen.  He did have a renal artery duplex that is  negative.  His renin/aldosterone testing is pending.  PCP can follow up on those results.    He does appear to have mild chronic kidney disease stage III which could be contributing to his difficult to control hypertension.  Glucose has been elevated and up titration of insulin has been required.  Continue to adjust insulin for optimal blood sugar control.      Of note on day of discharge he discussed restless leg syndrome symptoms with me.  I'm reluctant to start Requip given its high occurrence of orthostatic hypotension.  We will defer to PCP for treating his restless leg syndrome once he is more stable.  He is being transferred to [facility] for skilled therapy.      Discharge Follow Up Recommendations for labs/diagnostics:  PCP in a few days after DC from rehab  Stroke clinic in 4 weeks    Day of Discharge     HPI:   Complains of restless leg syndrome type symptoms x several years    Review of Systems  Gen- No fevers, chills  CV- No chest pain, palpitations  Resp- No cough, dyspnea  GI- No N/V/D, abd pain    Otherwise ROS is negative except as mentioned in the HPI.    Vital Signs:   Temp:  [97.8 °F (36.6 °C)-99.1 °F (37.3 °C)] 98.7 °F (37.1 °C)  Heart Rate:  [62-75] 63  Resp:  [16-18] 18  BP: (122-144)/(76-91) 144/91     Physical Exam:  Gen-no acute distress, resting in bed  CV-RRR, S1 S2 normal, no m/r/g  Resp-CTAB, normal WOB  Abd-soft, NT, ND, +BS  Ext-no edema, PPP  Neuro-A&Ox3, right sided weakness, mild dysarthria   Psych-appropriate mood      Pertinent  and/or Most Recent Results       Results from last 7 days  Lab Units 08/12/18  0505 08/09/18  0505 08/09/18  0011   WBC 10*3/mm3  --  8.66 8.72   HEMOGLOBIN g/dL  --  16.0 15.7   HEMATOCRIT %  --  46.3 44.5   PLATELETS 10*3/mm3  --  164 172   SODIUM mmol/L 137 141 147*   POTASSIUM mmol/L 4.1 4.4 3.9   CHLORIDE mmol/L 104 102 103   CO2 mmol/L 28.0 29.0 28.0   BUN mg/dL 19 16 14   CREATININE mg/dL 1.22 1.21 1.20   GLUCOSE mg/dL 218* 170* 185*    CALCIUM mg/dL 9.0 9.2 9.1       Results from last 7 days  Lab Units 08/09/18  0505 08/09/18  0011   BILIRUBIN mg/dL 1.1 0.9   ALK PHOS U/L 82 81   ALT (SGPT) U/L 16 16   AST (SGOT) U/L 22 21   PROTIME Seconds  --  10.7   INR   --  1.02       Results from last 7 days  Lab Units 08/09/18  0505   CHOLESTEROL mg/dL 111   TRIGLYCERIDES mg/dL 117   HDL CHOL mg/dL 32*       Results from last 7 days  Lab Units 08/09/18  0505 08/09/18  0011   TSH mIU/mL  --  0.620   HEMOGLOBIN A1C % 10.60* 10.70*   TROPONIN I ng/mL  --  0.009     Imaging Results (all)     Procedure Component Value Units Date/Time    CT Head Without Contrast [[id number]] Collected:  08/13/18 1101     Updated:  08/13/18 1407    Narrative:       EXAMINATION: CT HEAD WO CONTRAST-      INDICATION: Right hemiparesis; R47.1-Dysarthria and anarthria;  Z74.09-Other reduced mobility.     TECHNIQUE: Axial CT of the head without intravenous contrast  administration.     The radiation dose reduction device was turned on for each scan per the  ALARA (As Low as Reasonably Achievable) protocol.     COMPARISON: CT cerebral perfusion and CT head performed 08/10/2018.     FINDINGS: Area of encephalomalacia and ill-defined low-attenuation  within the left soo consistent with subacute left pontine infarction.  Midline structures are symmetric without evidence of mass, mass effect  or midline shift. Fourth ventricle widely patent. No intraaxial  hemorrhage or extraaxial fluid collection. Mild to moderate attenuation  changes within the periventricular and deep white matter consistent  chronic small vessel ischemic change. Globes and orbits are  unremarkable. Visualized paranasal sinuses and mastoid air cells are  grossly clear and well pneumatized. Calvarium intact.       Impression:       Evidence of evolving subacute left pontine infarction  without evidence for mass effect or hydrocephalus development.     D:  08/13/2018  E:  08/13/2018     This report was finalized on  8/13/2018 2:05 PM by Dr. Jose Marroquin.       CT Angiogram Neck With & Without Contrast [[id number]] Collected:  08/10/18 2021     Updated:  08/10/18 2218    Narrative:       EXAM:    CT Angiography Neck With Intravenous Contrast    CLINICAL HISTORY:    53 years old, male; Dysarthria and anarthria; Other reduced mobility; Other   reduced mobility; Signs and symptoms and condition or disease; Other:   Hypertension; Weakness; Additional info: Stroke    TECHNIQUE:    Axial computed tomographic angiography images of the neck with intravenous   contrast using CT angiography protocol.  All CT scans at this facility use at   least one of these dose optimization techniques: automated exposure control; mA   and/or kV adjustment per patient size (includes targeted exams where dose is   matched to clinical indication); or iterative reconstruction.    MIP reconstructed images were created and reviewed.    Coronal and sagittal reformatted images were created and reviewed.    CONTRAST:    115 mL of isovue 370 administered intravenously.      COMPARISON:    US - DUPLEX CAROTID BILATERAL CAR 2018-08-09 08:54    FINDINGS:     VASCULATURE:      Right common carotid artery:  Unremarkable.  No significant stenosis.  No   dissection or occlusion.      Right internal carotid artery:  Unremarkable.  Extracranial segment is patent   with no significant stenosis.  No dissection or occlusion.      Right external carotid artery:  Unremarkable.  No occlusion.      Right vertebral artery:  Unremarkable.  No significant stenosis.  No   dissection or occlusion.      Left common carotid artery:  Unremarkable.  No significant stenosis.  No   dissection or occlusion.      Left internal carotid artery:  Less than 50% stenosis at the origin of the   left internal carotid artery.  No dissection or occlusion.      Left external carotid artery:  Unremarkable.  No occlusion.      Left vertebral artery:  Unremarkable.  No significant stenosis.  No    dissection or occlusion.     NECK:      Bones/joints:  No acute fracture.  No dislocation.      Soft tissues:  Unremarkable as visualized.  No mass.      Sinuses:  Bubbly opacification in the right sphenoid sinus.     CAROTID STENOSIS REFERENCE USING NASCET CRITERIA:    % ICA stenosis = (1 - narrowest ICA diameter/diameter of distal cervical ICA)   x 100.    Mild - <50% stenosis.    Moderate - 50-69% stenosis.    Severe - 70-94% stenosis.    Near occlusion - 95-99% stenosis.    Occluded - 100% stenosis.      Impression:           No hemodynamically significant stenosis by NASCET criteria.    THIS DOCUMENT HAS BEEN ELECTRONICALLY SIGNED BY STACEY BECK MD    CT Angiogram Head With & Without Contrast [[id number]] Collected:  08/10/18 2005     Updated:  08/10/18 2105    Narrative:       EXAM:    CT Angiography Head With Intravenous Contrast    CLINICAL HISTORY:    53 years old, male; Dysarthria and anarthria; Other reduced mobility; Other   reduced mobility; Signs and symptoms and condition or disease; Other:   Hypertension; Weakness; Additional info: Stroke    TECHNIQUE:    Axial computed tomographic angiography images of the head with intravenous   contrast using CT angiography protocol.  All CT scans at this facility use at   least one of these dose optimization techniques: automated exposure control; mA   and/or kV adjustment per patient size (includes targeted exams where dose is   matched to clinical indication); or iterative reconstruction.    MIP reconstructed images were created and reviewed.    Coronal and sagittal reformatted images were created and reviewed.    CONTRAST:    115 mL of isovue 370 administered intravenously.      COMPARISON:    MRI BRAIN WO CONTRAST 2018-08-09 01:00    FINDINGS:      Right internal carotid artery:  No acute findings.  Intracranial segment is   patent with no significant stenosis.  No aneurysm.      Right anterior cerebral artery:  Unremarkable.  No occlusion or significant    stenosis.  No aneurysm.      Right middle cerebral artery:  Unremarkable.  No occlusion or significant   stenosis.  No aneurysm.      Right posterior cerebral artery:  Unremarkable.  No occlusion or significant   stenosis.  No aneurysm.      Right vertebral artery:  Unremarkable as visualized.      Left internal carotid artery:  No acute findings.  Intracranial segment is   patent with no significant stenosis.  No aneurysm.      Left anterior cerebral artery:  Unremarkable.  No occlusion or significant   stenosis.  No aneurysm.      Left middle cerebral artery:  Unremarkable.  No occlusion or significant   stenosis.  No aneurysm.      Left posterior cerebral artery:  Unremarkable.  No occlusion or significant   stenosis.  No aneurysm.      Left vertebral artery:  Unremarkable as visualized.      Basilar artery:  Unremarkable.  No occlusion or significant stenosis.  No   aneurysm.      Impression:           Normal head CTA.    THIS DOCUMENT HAS BEEN ELECTRONICALLY SIGNED BY STACEY BECK MD    CT Cerebral Perfusion With & Without Contrast [[id number]] Collected:  08/10/18 2004     Updated:  08/10/18 2104    Narrative:       EXAM:    CT Brain Perfusion With Intravenous Contrast    CLINICAL HISTORY:    53 years old, male; Dysarthria and anarthria; Other reduced mobility; Other   reduced mobility; Signs and symptoms; Other: Right sided weakness; Additional   info: Right sided weakness, hypertension, HX of stroke    TECHNIQUE:    Axial computed tomography images of the brain with intravenous contrast using   cerebral perfusion protocol.  Post-processing parametric maps were created and   reviewed.  These include cerebral blood flow, cerebral blood volume and mean   transit time.  All CT scans at this facility use at least one of these dose   optimization techniques: automated exposure control; mA and/or kV adjustment   per patient size (includes targeted exams where dose is matched to clinical   indication); or  iterative reconstruction.    MIP reconstructed images were created and reviewed.    CONTRAST:    40 mL of Isovue 370 administered intravenously.      COMPARISON:    MRI BRAIN WO CONTRAST 2018-08-09 01:00    FINDINGS:      Cerebral blood flow:  Unremarkable.  Symmetric with no defects.      Cerebral blood volume:  Unremarkable.  Symmetric.      Mean transit time:  Unremarkable.  No delayed perfusion.        Impression:           Normal brain perfusion CT.    THIS DOCUMENT HAS BEEN ELECTRONICALLY SIGNED BY STACEY BECK MD    CT Head Without Contrast [[id number]] Collected:  08/10/18 2004     Updated:  08/10/18 2043    Narrative:       EXAM:    CT Head Without Intravenous Contrast    CLINICAL HISTORY:    53 years old, male; Dysarthria and anarthria; Other reduced mobility; Other   reduced mobility; Signs and symptoms; Other: Right sided weakness; Additional   info: Right sided weakness, hypertension, HX of stroke, please document scan   time in report: 2025    TECHNIQUE:    Axial computed tomography images of the head/brain without intravenous   contrast.  All CT scans at this facility use at least one of these dose   optimization techniques: automated exposure control; mA and/or kV adjustment   per patient size (includes targeted exams where dose is matched to clinical   indication); or iterative reconstruction.    COMPARISON:    MRI BRAIN WO CONTRAST 2018-08-09 01:00    FINDINGS:      Brain:  Unremarkable.  No hemorrhage.  No mass effect.  No edema.      Ventricles:  Unremarkable.      Bones/joints:  Unremarkable.  No acute fracture.      Soft tissues:  Unremarkable.      Sinuses:  Partial bubbly opacification in the right sphenoid sinus.      Mastoid air cells:  Unremarkable.      Impression:           No acute findings.      THIS DOCUMENT HAS BEEN ELECTRONICALLY SIGNED BY STACEY BECK MD    MRI Brain Without Contrast [[id number]] Collected:  08/09/18 0009     Updated:  08/09/18 0138    Narrative:       EXAM:    MR  "Head Without Intravenous Contrast    CLINICAL HISTORY:    53 years old, male; Signs and symptoms; Other: Right sided weakness; Patient   HX: Right sided weakness \"off balance\" evaluate for CVA.    TECHNIQUE:    Magnetic resonance images of the head/brain without intravenous contrast in   multiple planes.    COMPARISON:    No relevant prior studies available.    FINDINGS:    Brain:  Acute infarct involving the ventral left soo.  No mass effect or   midline shift.  No extra-axial collection.  No acute intracranial hemorrhage.    Basal cisterns are patent.  Chronic microvascular ischemic disease.    Ventricles:  Unremarkable.  No ventriculomegaly.    Bones/joints:  Unremarkable.    Sinuses:  Foamy secretions within the right sphenoid sinus.  No acute   sinusitis.    Mastoid air cells:  Unremarkable as visualized.  No mastoid effusion.    Orbits:  Unremarkable as visualized.      Impression:         Acute infarct involving the ventral left soo. No acute intracranial   hemorrhage.    THIS REPORT CONTAINS FINDINGS THAT MAY BE CRITICAL TO PATIENT CARE. The   findings were verbally communicated via telephone conference with ALIU, PETER   at 1:37 AM EDT on 8/9/2018. The findings were acknowledged and understood.    THIS DOCUMENT HAS BEEN ELECTRONICALLY SIGNED BY GOPAL FARRELL MD          Results for orders placed during the hospital encounter of 08/08/18   Duplex Renal Artery - Bilateral Complete CAR    Narrative EXAMINATION: DUPLEX IMAGING OF THE RENAL ARTERIES - 08/12/2018     HISTORY: Hypertension     FINDINGS: The maximum velocity in the abdominal aorta is 88 cm/s. The  maximum velocity in the right renal artery is 141.5 cm/s and in the left  renal artery 94.4 seconds. This results in ratios of renal artery to  aortic velocity of 1.6 on the right and 1.1 on the left. Both values are  within the limits of normal. Resistive indices are also normal  bilaterally measuring 0.6 on the right and 0.67 on the left.       " Impression There is no evidence of renal artery stenosis.     D:  08/13/2018  E:  08/13/2018     This report was finalized on 8/13/2018 1:54 PM by Dr. Bolivar Weems MD.          Results for orders placed during the hospital encounter of 08/08/18   Adult Transthoracic Echo Complete W/ Cont if Necessary Per Protocol (With Agitated Saline)    Narrative · Left ventricular systolic function is hyperdynamic (EF > 70).  · Left ventricular wall thickness is consistent with concentric   hypertrophy.  · The left ventricular cavity is small.  · Left ventricular diastolic dysfunction.           Order Current Status    Renin Direct Assay In process        Discharge Details        Discharge Medications      New Medications      Instructions Start Date   aspirin 325 MG tablet   325 mg, Oral, Daily      atorvastatin 80 MG tablet  Commonly known as:  LIPITOR   80 mg, Oral, Nightly      carvedilol 12.5 MG tablet  Commonly known as:  COREG   12.5 mg, Oral, Every 12 Hours Scheduled      clopidogrel 75 MG tablet  Commonly known as:  PLAVIX   75 mg, Oral, Daily      hydrochlorothiazide 25 MG tablet  Commonly known as:  HYDRODIURIL   25 mg, Oral, Daily      insulin detemir 100 UNIT/ML injection  Commonly known as:  LEVEMIR   30 Units, Subcutaneous, Every Morning      insulin lispro 100 UNIT/ML injection  Commonly known as:  humaLOG   0-9 Units, Subcutaneous, 4 Times Daily With Meals & Nightly      insulin lispro 100 UNIT/ML injection  Commonly known as:  humaLOG   12 Units, Subcutaneous, 3 Times Daily With Meals      povidone-iodine 10 % external solution  Commonly known as:  BETADINE   Topical, Daily         Continue These Medications      Instructions Start Date   amLODIPine 10 MG tablet  Commonly known as:  NORVASC   10 mg, Oral, Daily      metFORMIN 500 MG tablet  Commonly known as:  GLUCOPHAGE   500 mg, Oral, Daily With Breakfast           Discharge Disposition:  [facility], skilled rehab    Discharge Diet: cardiac  diabetic     Discharge Activity: as tolerated    Code Status/Level of Support:  Code Status and Medical Interventions:   Ordered at: 08/09/18 0010     Level Of Support Discussed With:    Patient     Code Status:    CPR     Medical Interventions (Level of Support Prior to Arrest):    Full     Additional Instructions for the Follow-ups that You Need to Schedule     Discharge Follow-up with PCP    As directed      Currently Documented PCP:  Kymberly Ibrahim APRN  PCP Phone Number:  [phone]    Follow Up Details:  PCP in a few days once discharged from rehab         Discharge Follow-up with Specialty: [facility] stroke clinic in 4 weeks    As directed      Specialty:  [facility] stroke clinic in 4 weeks               Time Spent on Discharge:  minutes    Electronically signed by CRYS Spring, 08/15/18, 1:23 PM.

## 2018-08-15 NOTE — PROGRESS NOTES
"Mele Rossi    Subjective     CC: stroke    History of Present Illness     Mele Rossi is followed for stroke. He denies any changes in his symptoms overnight.      There have been no other changes in the patient's interval history since my last progress note of 8/14/18.    I have reviewed and confirmed the past family, social and medical history as accurate on 8/10/18.     Review of Systems   Constitutional: Negative.        Objective     /91   Pulse 63   Temp 98.7 °F (37.1 °C) (Oral)   Resp 18   Ht 182.9 cm (72\")   Wt 94.3 kg (208 lb)   SpO2 98%   BMI 28.21 kg/m²     Physical Exam   Psychiatric: His speech is normal.        Neurologic Exam     Mental Status   Oriented to person.   Speech: speech is normal   Normal comprehension.     Cranial Nerves   Cranial nerves II through XII intact.     Motor Exam   Muscle bulk: normal  Overall muscle tone: normalRight hemiparesis (3/5)     Sensory Exam   Light touch normal.       Laboratory and radiological testing includes a normal normal Carotid Dopplers and CTA's of the head/neck. No clear source of cardioembolism has been seen on ECHO. Glucose=186.    Assessment/Plan       Mele Rossi is followed with stroke. I don't have new recommendations at this time. He is ready for rehab from my standpoint.      As part of this visit I reviewed records from the current hospitalization which is incorporated in the HPI.  "

## 2018-08-15 NOTE — PAYOR COMM NOTE
"Poornima Fofana RN Utilization Review [phone]  Fax # [phone]  Ref # [id number]          Mele Gómez  (53 y.o. Male)     Date of Birth Social Security Number Address Home Phone MRN    1965  [place] [address] [phone] [id number]    Jain Marital Status          None        Admission Date Admission Type Admitting Provider Attending Provider Department, Room/Bed    18 Urgent Carlitos Harris MD Dossett, Lee M, MD [facility] 3H, S377/1    Discharge Date Discharge Disposition Discharge Destination         Rehab Facility or Unit (DC - External)              Attending Provider:  Carlitos Harris MD    Allergies:  Lisinopril    Isolation:  None   Infection:  None   Code Status:  CPR    Ht:  182.9 cm (72\")   Wt:  94.3 kg (208 lb)    Admission Cmt:  None   Principal Problem:  None                Active Insurance as of 2018     Primary Coverage     Payor Plan Insurance Group Employer/Plan Group    [facility] [facility] [id number]     Payor Plan Address Payor Plan Phone Number Effective From Effective To    [address] [phone] 2017     [facility] [id number]       Subscriber Name Subscriber Birth Date Member ID       MELE GÓMEZ 1965 [id number]                 Emergency Contacts      (Rel.) Home Phone Work Phone Mobile Phone    Lexy Gómez (Spouse) [phone] -- --               Discharge Summary      Karla Mann APRN at 8/15/2018  1:22 PM              [facility] Medicine Services  DISCHARGE SUMMARY    Patient Name: Mele Gómez  : 1965  MRN: [id number]    Date of Admission: 2018  Date of Discharge:  08/15/2018  Primary Care Physician: Kymberly Ibrahim APRN    Consults     Date and Time Order Name Status Description    2018 0010 Inpatient Neurology Consult Completed         Hospital Course     Presenting Problem:   Stroke (CMS/[facility]) [I63.9]  Dysarthria " [R47.1]    Active Hospital Problems    Diagnosis Date Noted   • Type 2 diabetes mellitus with hyperglycemia (CMS/[facility]) [E11.65] 08/09/2018   • HTN (hypertension) [I10] 08/09/2018   • Dysarthria [R47.1] 08/09/2018   • Stroke (CMS/[facility]) [I63.9] 08/08/2018      Resolved Hospital Problems    Diagnosis Date Noted Date Resolved   No resolved problems to display.          Hospital Course:  Mele Rossi is a 53 y.o. male with uncontrolled hypertension, noncompliance, diabetes who presented to [facility] emergency department with right-sided weakness and feeling off balance.  He was found to have severe hypertension with systolic blood pressure of 240.  He was discharged home as he had a negative workup for stroke.  He then again reported worsening symptoms and was transferred to [facility] for further evaluation.  He was found to have an acute left pontine CVA.  Neurology was consulted.  He was started on aspirin, high-dose statin, Plavix.  Echo and carotid duplex were unremarkable.  No atrial fib has been noted on his telemetry.  Neurology recommends dual antiplatelet therapy for at least 90 days.  His neurological symptoms have waxed and waned.  This is felt to be due to the location of his infarct as well as blood pressure fluctuations.      His blood pressure has been difficult to control.  He did have an episode of hypotension and presyncopal event.  Blood pressure has been stable for the past 24 hours on current regimen.  He did have a renal artery duplex that is negative.  His renin/aldosterone testing is pending.  PCP can follow up on those results.    He does appear to have mild chronic kidney disease stage III which could be contributing to his difficult to control hypertension.  Glucose has been elevated and up titration of insulin has been required.  Continue to adjust insulin for optimal blood sugar control.      Of note on day of discharge he discussed restless leg syndrome symptoms with me.   I'm reluctant to start Requip given its high occurrence of orthostatic hypotension.  We will defer to PCP for treating his restless leg syndrome once he is more stable.  He is being transferred to [facility] for skilled therapy.      Discharge Follow Up Recommendations for labs/diagnostics:  PCP in a few days after DC from rehab  Stroke clinic in 4 weeks    Day of Discharge     HPI:   Complains of restless leg syndrome type symptoms x several years    Review of Systems  Gen- No fevers, chills  CV- No chest pain, palpitations  Resp- No cough, dyspnea  GI- No N/V/D, abd pain    Otherwise ROS is negative except as mentioned in the HPI.    Vital Signs:   Temp:  [97.8 °F (36.6 °C)-99.1 °F (37.3 °C)] 98.7 °F (37.1 °C)  Heart Rate:  [62-75] 63  Resp:  [16-18] 18  BP: (122-144)/(76-91) 144/91     Physical Exam:  Gen-no acute distress, resting in bed  CV-RRR, S1 S2 normal, no m/r/g  Resp-CTAB, normal WOB  Abd-soft, NT, ND, +BS  Ext-no edema, PPP  Neuro-A&Ox3, right sided weakness, mild dysarthria   Psych-appropriate mood      Pertinent  and/or Most Recent Results       Results from last 7 days  Lab Units 08/12/18  0505 08/09/18  0505 08/09/18  0011   WBC 10*3/mm3  --  8.66 8.72   HEMOGLOBIN g/dL  --  16.0 15.7   HEMATOCRIT %  --  46.3 44.5   PLATELETS 10*3/mm3  --  164 172   SODIUM mmol/L 137 141 147*   POTASSIUM mmol/L 4.1 4.4 3.9   CHLORIDE mmol/L 104 102 103   CO2 mmol/L 28.0 29.0 28.0   BUN mg/dL 19 16 14   CREATININE mg/dL 1.22 1.21 1.20   GLUCOSE mg/dL 218* 170* 185*   CALCIUM mg/dL 9.0 9.2 9.1       Results from last 7 days  Lab Units 08/09/18  0505 08/09/18  0011   BILIRUBIN mg/dL 1.1 0.9   ALK PHOS U/L 82 81   ALT (SGPT) U/L 16 16   AST (SGOT) U/L 22 21   PROTIME Seconds  --  10.7   INR   --  1.02       Results from last 7 days  Lab Units 08/09/18  0505   CHOLESTEROL mg/dL 111   TRIGLYCERIDES mg/dL 117   HDL CHOL mg/dL 32*       Results from last 7 days  Lab Units 08/09/18  0505 08/09/18  0011   TSH mIU/mL   --  0.620   HEMOGLOBIN A1C % 10.60* 10.70*   TROPONIN I ng/mL  --  0.009     Imaging Results (all)     Procedure Component Value Units Date/Time    CT Head Without Contrast [[id number]] Collected:  08/13/18 1101     Updated:  08/13/18 1407    Narrative:       EXAMINATION: CT HEAD WO CONTRAST-      INDICATION: Right hemiparesis; R47.1-Dysarthria and anarthria;  Z74.09-Other reduced mobility.     TECHNIQUE: Axial CT of the head without intravenous contrast  administration.     The radiation dose reduction device was turned on for each scan per the  ALARA (As Low as Reasonably Achievable) protocol.     COMPARISON: CT cerebral perfusion and CT head performed 08/10/2018.     FINDINGS: Area of encephalomalacia and ill-defined low-attenuation  within the left soo consistent with subacute left pontine infarction.  Midline structures are symmetric without evidence of mass, mass effect  or midline shift. Fourth ventricle widely patent. No intraaxial  hemorrhage or extraaxial fluid collection. Mild to moderate attenuation  changes within the periventricular and deep white matter consistent  chronic small vessel ischemic change. Globes and orbits are  unremarkable. Visualized paranasal sinuses and mastoid air cells are  grossly clear and well pneumatized. Calvarium intact.       Impression:       Evidence of evolving subacute left pontine infarction  without evidence for mass effect or hydrocephalus development.     D:  08/13/2018  E:  08/13/2018     This report was finalized on 8/13/2018 2:05 PM by Dr. Jose Marroquin.       CT Angiogram Neck With & Without Contrast [[id number]] Collected:  08/10/18 2021     Updated:  08/10/18 2218    Narrative:       EXAM:    CT Angiography Neck With Intravenous Contrast    CLINICAL HISTORY:    53 years old, male; Dysarthria and anarthria; Other reduced mobility; Other   reduced mobility; Signs and symptoms and condition or disease; Other:   Hypertension; Weakness; Additional info:  Stroke    TECHNIQUE:    Axial computed tomographic angiography images of the neck with intravenous   contrast using CT angiography protocol.  All CT scans at this facility use at   least one of these dose optimization techniques: automated exposure control; mA   and/or kV adjustment per patient size (includes targeted exams where dose is   matched to clinical indication); or iterative reconstruction.    MIP reconstructed images were created and reviewed.    Coronal and sagittal reformatted images were created and reviewed.    CONTRAST:    115 mL of isovue 370 administered intravenously.      COMPARISON:    US - DUPLEX CAROTID BILATERAL CAR 2018-08-09 08:54    FINDINGS:     VASCULATURE:      Right common carotid artery:  Unremarkable.  No significant stenosis.  No   dissection or occlusion.      Right internal carotid artery:  Unremarkable.  Extracranial segment is patent   with no significant stenosis.  No dissection or occlusion.      Right external carotid artery:  Unremarkable.  No occlusion.      Right vertebral artery:  Unremarkable.  No significant stenosis.  No   dissection or occlusion.      Left common carotid artery:  Unremarkable.  No significant stenosis.  No   dissection or occlusion.      Left internal carotid artery:  Less than 50% stenosis at the origin of the   left internal carotid artery.  No dissection or occlusion.      Left external carotid artery:  Unremarkable.  No occlusion.      Left vertebral artery:  Unremarkable.  No significant stenosis.  No   dissection or occlusion.     NECK:      Bones/joints:  No acute fracture.  No dislocation.      Soft tissues:  Unremarkable as visualized.  No mass.      Sinuses:  Bubbly opacification in the right sphenoid sinus.     CAROTID STENOSIS REFERENCE USING NASCET CRITERIA:    % ICA stenosis = (1 - narrowest ICA diameter/diameter of distal cervical ICA)   x 100.    Mild - <50% stenosis.    Moderate - 50-69% stenosis.    Severe - 70-94% stenosis.    Near  occlusion - 95-99% stenosis.    Occluded - 100% stenosis.      Impression:           No hemodynamically significant stenosis by NASCET criteria.    THIS DOCUMENT HAS BEEN ELECTRONICALLY SIGNED BY STACEY BECK MD    CT Angiogram Head With & Without Contrast [[id number]] Collected:  08/10/18 2005     Updated:  08/10/18 2105    Narrative:       EXAM:    CT Angiography Head With Intravenous Contrast    CLINICAL HISTORY:    53 years old, male; Dysarthria and anarthria; Other reduced mobility; Other   reduced mobility; Signs and symptoms and condition or disease; Other:   Hypertension; Weakness; Additional info: Stroke    TECHNIQUE:    Axial computed tomographic angiography images of the head with intravenous   contrast using CT angiography protocol.  All CT scans at this facility use at   least one of these dose optimization techniques: automated exposure control; mA   and/or kV adjustment per patient size (includes targeted exams where dose is   matched to clinical indication); or iterative reconstruction.    MIP reconstructed images were created and reviewed.    Coronal and sagittal reformatted images were created and reviewed.    CONTRAST:    115 mL of isovue 370 administered intravenously.      COMPARISON:    MRI BRAIN WO CONTRAST 2018-08-09 01:00    FINDINGS:      Right internal carotid artery:  No acute findings.  Intracranial segment is   patent with no significant stenosis.  No aneurysm.      Right anterior cerebral artery:  Unremarkable.  No occlusion or significant   stenosis.  No aneurysm.      Right middle cerebral artery:  Unremarkable.  No occlusion or significant   stenosis.  No aneurysm.      Right posterior cerebral artery:  Unremarkable.  No occlusion or significant   stenosis.  No aneurysm.      Right vertebral artery:  Unremarkable as visualized.      Left internal carotid artery:  No acute findings.  Intracranial segment is   patent with no significant stenosis.  No aneurysm.      Left anterior  cerebral artery:  Unremarkable.  No occlusion or significant   stenosis.  No aneurysm.      Left middle cerebral artery:  Unremarkable.  No occlusion or significant   stenosis.  No aneurysm.      Left posterior cerebral artery:  Unremarkable.  No occlusion or significant   stenosis.  No aneurysm.      Left vertebral artery:  Unremarkable as visualized.      Basilar artery:  Unremarkable.  No occlusion or significant stenosis.  No   aneurysm.      Impression:           Normal head CTA.    THIS DOCUMENT HAS BEEN ELECTRONICALLY SIGNED BY STACEY BECK MD    CT Cerebral Perfusion With & Without Contrast [[id number]] Collected:  08/10/18 2004     Updated:  08/10/18 2104    Narrative:       EXAM:    CT Brain Perfusion With Intravenous Contrast    CLINICAL HISTORY:    53 years old, male; Dysarthria and anarthria; Other reduced mobility; Other   reduced mobility; Signs and symptoms; Other: Right sided weakness; Additional   info: Right sided weakness, hypertension, HX of stroke    TECHNIQUE:    Axial computed tomography images of the brain with intravenous contrast using   cerebral perfusion protocol.  Post-processing parametric maps were created and   reviewed.  These include cerebral blood flow, cerebral blood volume and mean   transit time.  All CT scans at this facility use at least one of these dose   optimization techniques: automated exposure control; mA and/or kV adjustment   per patient size (includes targeted exams where dose is matched to clinical   indication); or iterative reconstruction.    MIP reconstructed images were created and reviewed.    CONTRAST:    40 mL of Isovue 370 administered intravenously.      COMPARISON:    MRI BRAIN WO CONTRAST 2018-08-09 01:00    FINDINGS:      Cerebral blood flow:  Unremarkable.  Symmetric with no defects.      Cerebral blood volume:  Unremarkable.  Symmetric.      Mean transit time:  Unremarkable.  No delayed perfusion.        Impression:           Normal brain perfusion  "CT.    THIS DOCUMENT HAS BEEN ELECTRONICALLY SIGNED BY STACEY BECK MD    CT Head Without Contrast [[id number]] Collected:  08/10/18 2004     Updated:  08/10/18 2043    Narrative:       EXAM:    CT Head Without Intravenous Contrast    CLINICAL HISTORY:    53 years old, male; Dysarthria and anarthria; Other reduced mobility; Other   reduced mobility; Signs and symptoms; Other: Right sided weakness; Additional   info: Right sided weakness, hypertension, HX of stroke, please document scan   time in report: 2025    TECHNIQUE:    Axial computed tomography images of the head/brain without intravenous   contrast.  All CT scans at this facility use at least one of these dose   optimization techniques: automated exposure control; mA and/or kV adjustment   per patient size (includes targeted exams where dose is matched to clinical   indication); or iterative reconstruction.    COMPARISON:    MRI BRAIN WO CONTRAST 2018-08-09 01:00    FINDINGS:      Brain:  Unremarkable.  No hemorrhage.  No mass effect.  No edema.      Ventricles:  Unremarkable.      Bones/joints:  Unremarkable.  No acute fracture.      Soft tissues:  Unremarkable.      Sinuses:  Partial bubbly opacification in the right sphenoid sinus.      Mastoid air cells:  Unremarkable.      Impression:           No acute findings.      THIS DOCUMENT HAS BEEN ELECTRONICALLY SIGNED BY STACEY BECK MD    MRI Brain Without Contrast [[id number]] Collected:  08/09/18 0009     Updated:  08/09/18 0138    Narrative:       EXAM:    MR Head Without Intravenous Contrast    CLINICAL HISTORY:    53 years old, male; Signs and symptoms; Other: Right sided weakness; Patient   HX: Right sided weakness \"off balance\" evaluate for CVA.    TECHNIQUE:    Magnetic resonance images of the head/brain without intravenous contrast in   multiple planes.    COMPARISON:    No relevant prior studies available.    FINDINGS:    Brain:  Acute infarct involving the ventral left soo.  No mass effect or "   midline shift.  No extra-axial collection.  No acute intracranial hemorrhage.    Basal cisterns are patent.  Chronic microvascular ischemic disease.    Ventricles:  Unremarkable.  No ventriculomegaly.    Bones/joints:  Unremarkable.    Sinuses:  Foamy secretions within the right sphenoid sinus.  No acute   sinusitis.    Mastoid air cells:  Unremarkable as visualized.  No mastoid effusion.    Orbits:  Unremarkable as visualized.      Impression:         Acute infarct involving the ventral left soo. No acute intracranial   hemorrhage.    THIS REPORT CONTAINS FINDINGS THAT MAY BE CRITICAL TO PATIENT CARE. The   findings were verbally communicated via telephone conference with ALIU, PETER   at 1:37 AM EDT on 8/9/2018. The findings were acknowledged and understood.    THIS DOCUMENT HAS BEEN ELECTRONICALLY SIGNED BY GOPAL FARRELL MD          Results for orders placed during the hospital encounter of 08/08/18   Duplex Renal Artery - Bilateral Complete CAR    Narrative EXAMINATION: DUPLEX IMAGING OF THE RENAL ARTERIES - 08/12/2018     HISTORY: Hypertension     FINDINGS: The maximum velocity in the abdominal aorta is 88 cm/s. The  maximum velocity in the right renal artery is 141.5 cm/s and in the left  renal artery 94.4 seconds. This results in ratios of renal artery to  aortic velocity of 1.6 on the right and 1.1 on the left. Both values are  within the limits of normal. Resistive indices are also normal  bilaterally measuring 0.6 on the right and 0.67 on the left.       Impression There is no evidence of renal artery stenosis.     D:  08/13/2018  E:  08/13/2018     This report was finalized on 8/13/2018 1:54 PM by Dr. Bolivar Weems MD.          Results for orders placed during the hospital encounter of 08/08/18   Adult Transthoracic Echo Complete W/ Cont if Necessary Per Protocol (With Agitated Saline)    Narrative · Left ventricular systolic function is hyperdynamic (EF > 70).  · Left ventricular wall thickness is  consistent with concentric   hypertrophy.  · The left ventricular cavity is small.  · Left ventricular diastolic dysfunction.           Order Current Status    Renin Direct Assay In process        Discharge Details        Discharge Medications      New Medications      Instructions Start Date   aspirin 325 MG tablet   325 mg, Oral, Daily      atorvastatin 80 MG tablet  Commonly known as:  LIPITOR   80 mg, Oral, Nightly      carvedilol 12.5 MG tablet  Commonly known as:  COREG   12.5 mg, Oral, Every 12 Hours Scheduled      clopidogrel 75 MG tablet  Commonly known as:  PLAVIX   75 mg, Oral, Daily      hydrochlorothiazide 25 MG tablet  Commonly known as:  HYDRODIURIL   25 mg, Oral, Daily      insulin detemir 100 UNIT/ML injection  Commonly known as:  LEVEMIR   30 Units, Subcutaneous, Every Morning      insulin lispro 100 UNIT/ML injection  Commonly known as:  humaLOG   0-9 Units, Subcutaneous, 4 Times Daily With Meals & Nightly      insulin lispro 100 UNIT/ML injection  Commonly known as:  humaLOG   12 Units, Subcutaneous, 3 Times Daily With Meals      povidone-iodine 10 % external solution  Commonly known as:  BETADINE   Topical, Daily         Continue These Medications      Instructions Start Date   amLODIPine 10 MG tablet  Commonly known as:  NORVASC   10 mg, Oral, Daily      metFORMIN 500 MG tablet  Commonly known as:  GLUCOPHAGE   500 mg, Oral, Daily With Breakfast           Discharge Disposition:  [facility], skilled rehab    Discharge Diet: cardiac diabetic     Discharge Activity: as tolerated    Code Status/Level of Support:  Code Status and Medical Interventions:   Ordered at: 08/09/18 0010     Level Of Support Discussed With:    Patient     Code Status:    CPR     Medical Interventions (Level of Support Prior to Arrest):    Full     Additional Instructions for the Follow-ups that You Need to Schedule     Discharge Follow-up with PCP    As directed      Currently Documented PCP:  Kymebrly Ibrahim  CRYS AGUIAR  PCP Phone Number:  [phone]    Follow Up Details:  PCP in a few days once discharged from rehab         Discharge Follow-up with Specialty: [facility] stroke clinic in 4 weeks    As directed      Specialty:  [facility] stroke clinic in 4 weeks               Time Spent on Discharge:  minutes    Electronically signed by CRYS Spring, 08/15/18, 1:23 PM.      Electronically signed by Karla Mann APRN at 8/15/2018  1:37 PM       Discharge Order     Start     Ordered    08/15/18 1319  Discharge patient  Once     Expected Discharge Date:  08/15/18    Discharge Disposition:  Rehab Facility or Unit (DC - External)    Physician of Record for Attribution - Please select from Treatment Team:  RENE WILLIAMSON [[id number]]    Review needed by CMO to determine Physician of Record:  No       Question Answer Comment   Physician of Record for Attribution - Please select from Treatment Team RENE WILLIAMSON    Review needed by CMO to determine Physician of Record No        08/15/18 5202

## 2018-08-15 NOTE — THERAPY PROGRESS REPORT/RE-CERT
Acute Care - Physical Therapy Progress Note  [facility]     Patient Name: Mele Rossi  : 1965  MRN: [id number]  Today's Date: 8/15/2018  Onset of Illness/Injury or Date of Surgery: 18  Date of Referral to PT: 18  Referring Physician: DANIEL Barajas MD    Admit Date: 2018    Visit Dx:    ICD-10-CM ICD-9-CM   1. Dysarthria R47.1 784.51   2. Impaired mobility and ADLs Z74.09 799.89   3. Impaired functional mobility, balance, gait, and endurance Z74.09 V49.89     Patient Active Problem List   Diagnosis   • Stroke (CMS/HCC)   • Type 2 diabetes mellitus with hyperglycemia (CMS/HCC)   • HTN (hypertension)   • Dysarthria       Therapy Treatment          Rehabilitation Treatment Summary     Row Name 08/15/18 1114             Treatment Time/Intention    Discipline physical therapist  -VG      Document Type therapy note (daily note)  -VG      Subjective Information no complaints  -VG      Mode of Treatment individual therapy;physical therapy  -VG      Patient/Family Observations In bed, tele, RA; no family present.  -VG      Care Plan Review patient/other agree to care plan  -VG      Therapy Frequency (PT Clinical Impression) daily  -VG      Patient Effort good  -VG      Existing Precautions/Restrictions fall;other (see comments)   R sided weakness  -VG      Recorded by [VG] Korina Edwards, PT 08/15/18 1146      Row Name 08/15/18 1114             Vital Signs    Pre Systolic BP Rehab 150  -VG      Pre Treatment Diastolic BP 94  -VG      Post Systolic BP Rehab 161  -VG      Post Treatment Diastolic   -VG      Pretreatment Heart Rate (beats/min) 66  -VG      Posttreatment Heart Rate (beats/min) 61  -VG      Recorded by [VG] Korina Edwards, PT 08/15/18 1146      Row Name 08/15/18 1114             Cognitive Assessment/Intervention- PT/OT    Affect/Mental Status (Cognitive) WFL  -VG      Orientation Status (Cognition) oriented x 4  -VG      Follows Commands (Cognition) WFL  -VG      Cognitive  Function (Cognitive) safety deficit  -VG      Safety Deficit (Cognitive) mild deficit;insight into deficits/self awareness;problem solving;safety precautions awareness  -VG      Personal Safety Interventions fall prevention program maintained;gait belt;nonskid shoes/slippers when out of bed;supervised activity  -VG      Recorded by [VG] Korina Edwards, PT 08/15/18 1146      Row Name 08/15/18 1114             Bed Mobility Assessment/Treatment    Bed Mobility Assessment/Treatment supine-sit;sit-supine  -VG      Supine-Sit [place] (Bed Mobility) minimum assist (75% patient effort);2 person assist;verbal cues  -VG      Sit-Supine [place] (Bed Mobility) minimum assist (75% patient effort);2 person assist;verbal cues  -VG      Bed Mobility, Safety Issues decreased use of arms for pushing/pulling;decreased use of legs for bridging/pushing  -VG      Assistive Device (Bed Mobility) bed rails;head of bed elevated  -VG      Comment (Bed Mobility) Vc's for sequencing. Physical assist required for managing RLE and trunk. No dizziness noted upon sitting EOB.  -VG      Recorded by [VG] Korina Edwards, PT 08/15/18 1146      Row Name 08/15/18 1114             Transfer Assessment/Treatment    Transfer Assessment/Treatment sit-stand transfer;stand-sit transfer;toilet transfer  -VG      Comment (Transfers) Vc's for hand placement and turning all the way around and backing up to transfer surface before sitting. Completed toileting requiring A x 2 for maintaining standing balance while managing LB dressing.  -VG      Recorded by [VG] Korina Edwards, PT 08/15/18 1146      Row Name 08/15/18 1114             Sit-Stand Transfer    Sit-Stand [place] (Transfers) 2 person assist;verbal cues;minimum assist (75% patient effort)  -VG      Assistive Device (Sit-Stand Transfers) walker, front-wheeled  -VG      Recorded by [VG] Korina Edwards, PT 08/15/18 1146      Row Name 08/15/18 1114             Stand-Sit Transfer     Stand-Sit [place] (Transfers) minimum assist (75% patient effort);2 person assist;verbal cues  -VG      Assistive Device (Stand-Sit Transfers) walker, front-wheeled  -VG      Recorded by [VG] Korina Edwards, PT 08/15/18 1146      Row Name 08/15/18 1114             Toilet Transfer    Type (Toilet Transfer) sit-stand;stand-sit  -VG      [place] Level (Toilet Transfer) minimum assist (75% patient effort);2 person assist;verbal cues  -VG      Assistive Device (Toilet Transfer) raised toilet seat;grab bars/safety frame;walker, front-wheeled  -VG      Recorded by [VG] Korina Edwards, PT 08/15/18 1146      Row Name 08/15/18 1114             Gait/Stairs Assessment/Training    45836 - Gait Training Minutes  13  -VG      Gait/Stairs Assessment/Training gait/ambulation independence;gait/ambulation assistive device;distance ambulated;gait pattern;gait deviations  -VG      [place] Level (Gait) moderate assist (50% patient effort);2 person assist;verbal cues  -VG      Assistive Device (Gait) walker, front-wheeled  -VG      Distance in Feet (Gait) 30  -VG      Pattern (Gait) step-through;step-to  -VG      Deviations/Abnormal Patterns (Gait) right sided deviations;taran decreased;stride length decreased  -VG      Bilateral Gait Deviations knee buckling, right side;heel strike decreased;foot drop/toe drag;weight shift ability decreased  -VG      Right Sided Gait Deviations knee buckling, right side;weight shift ability decreased  -VG      Comment (Gait/Stairs) Distance limited by RLE weakness and SOA. Vc's for upright posture, sequencing, and bracing thru BUEs on RW when wbing on RLE to avoid buckling. Slight improvement noted in muscle activation on RLE.  -VG      Recorded by [VG] Korina Edwards, PT 08/15/18 1146      Row Name 08/15/18 1114             Therapeutic Exercise    11840 - PT Therapeutic Exercise Minutes 10  -VG      Recorded by [VG] Korina Edwards, PT 08/15/18 1146      Row Name 08/15/18 1114              Therapeutic Exercise    Lower Extremity (Therapeutic Exercise) heel slides, right;SLR (straight leg raise), right  -VG      Lower Extremity Range of Motion (Therapeutic Exercise) hip abduction/adduction, right;ankle dorsiflexion/plantar flexion, bilateral  -VG      Exercise Type (Therapeutic Exercise) AAROM (active assistive range of motion)  -VG      Position (Therapeutic Exercise) supine  -VG      Sets/Reps (Therapeutic Exercise) 15x  -VG      Comment (Therapeutic Exercise) Cues for technique. Required MIN-MODA at RLE.  -VG      Recorded by [VG] Korina Edwards, PT 08/15/18 1146      Row Name 08/15/18 1114             Static Sitting Balance    Level of [place] (Unsupported Sitting, Static Balance) minimal assist, 75% patient effort;other (see comments)   x2  -VG      Sitting Position (Unsupported Sitting, Static Balance) sitting on edge of bed  -VG      Time Able to Maintain Position (Unsupported Sitting, Static Balance) 1 to 2 minutes  -VG      Comment (Unsupported Sitting, Static Balance) Required [place] x 2 to maintain upright sitting balance 2/2 fatigue and trunk weakness.  -VG      Recorded by [VG] Korina Edwards, PT 08/15/18 1146      Row Name 08/15/18 1114             Positioning and Restraints    Pre-Treatment Position in bed  -VG      Post Treatment Position bed  -VG      In Bed fowlers;call light within reach;encouraged to call for assist;exit alarm on;side rails up x2  -VG      Recorded by [VG] Korina Edwards, PT 08/15/18 1146      Row Name 08/15/18 1114             Pain Scale: Numbers Pre/Post-Treatment    Pain Scale: Numbers, Pretreatment 0/10 - no pain  -VG      Pain Scale: Numbers, Post-Treatment 0/10 - no pain  -VG      Recorded by [VG] Korina Edwards, PT 08/15/18 1146      Row Name                Wound 08/08/18 2010 Right lateral foot     Wound - Properties Group Date first assessed: 08/08/18 [TL] Time first assessed: 2010 [TL] Present On Admission : yes [TL] Side: Right [TL]  Orientation: lateral [TL] Location: foot [TL] Additional Comments: right great toe; Diabetic ulcer [CP] Recorded by:  [CP] Marycarmen Mcfarland APRN 08/09/18 1558 [TL] Sherita Walsh LPN 08/09/18 0743    Row Name 08/15/18 1114             Coping    Observed Emotional State accepting  -VG      Verbalized Emotional State acceptance  -VG      Recorded by [VG] Korina Edwards, PT 08/15/18 1146      Row Name 08/15/18 1114             Plan of Care Review    Plan of Care Reviewed With patient  -VG      Recorded by [VG] Korina Edwards, PT 08/15/18 1146      Row Name 08/15/18 1114             Outcome Summary/Treatment Plan (PT)    Daily Summary of Progress (PT) progress toward functional goals is good  -VG      Anticipated Equipment Needs at Discharge (PT) other (see comments)   defer to rehab  -VG      Anticipated Discharge Disposition (PT) inpatient rehabilitation facility  -VG      Recorded by [VG] Korina Edwards, PT 08/15/18 1146        User Key  (r) = Recorded By, (t) = Taken By, (c) = Cosigned By    Initials Name Effective Dates Discipline    CP Marycarmen Mcfarland, CRYS 06/08/18 -  Nurse    TL Sherita Walsh LPN 03/14/16 -  Nurse    VG Korina Edwards, PT 05/29/18 -  PT          Wound 08/08/18 2010 Right lateral foot  (Active)   Dressing Appearance open to air 8/15/2018  8:00 AM   Base dry;clean 8/15/2018  8:00 AM   Periwound pink;dry;intact 8/15/2018  8:00 AM   [place] Temperature warm 8/15/2018  8:00 AM   [place] Skin Turgor firm 8/15/2018  8:00 AM   Edges callused;irregular 8/15/2018  8:00 AM   Dressing Care, Wound open to air 8/14/2018  8:15 PM               PT Rehab Goals     Row Name 08/15/18 1114             Bed Mobility Goal 1 (PT)    Activity/Assistive Device (Bed Mobility Goal 1, PT) bed mobility activities, all  -VG      [place] Level/Cues Needed (Bed Mobility Goal 1, PT) independent  -VG      Time Frame (Bed Mobility Goal 1, PT) 1 week  -VG      Progress/Outcomes (Bed Mobility Goal 1,  PT) goal ongoing  -VG         Transfer Goal 1 (PT)    Activity/Assistive Device (Transfer Goal 1, PT) sit-to-stand/stand-to-sit;bed-to-chair/chair-to-bed;walker, rolling  -VG      [place] Level/Cues Needed (Transfer Goal 1, PT) conditional independence  -VG      Time Frame (Transfer Goal 1, PT) 1 week  -VG      Progress/Outcome (Transfer Goal 1, PT) goal ongoing  -VG         Gait Training Goal 1 (PT)    Activity/Assistive Device (Gait Training Goal 1, PT) gait (walking locomotion);walker, rolling  -VG      [place] Level (Gait Training Goal 1, PT) minimum assist (75% or more patient effort)  -VG      Distance (Gait Goal 1, PT) 50  -VG      Time Frame (Gait Training Goal 1, PT) 1 week  -VG      Progress/Outcome (Gait Training Goal 1, PT) goal ongoing;goal revised this date  -VG         Stairs Goal 1 (PT)    Activity/Assistive Device (Stairs Goal 1, PT) stairs, all skills  -VG      [place] Level/Cues Needed (Stairs Goal 1, PT) minimum assist (75% or more patient effort)  -VG      Number of Stairs (Stairs Goal 1, PT) 4  -VG      Time Frame (Stairs Goal 1, PT) 2 weeks  -VG      Progress/Outcome (Stairs Goal 1, PT) goal no longer appropriate  -VG        User Key  (r) = Recorded By, (t) = Taken By, (c) = Cosigned By    Initials Name Provider Type Discipline    VG Korina Edwards, PT Physical Therapist PT          Physical Therapy Education     Title: PT OT SLP Therapies (Active)     Topic: Physical Therapy (Done)     Point: Mobility training (Done)    Learning Progress Summary     Learner Status Readiness Method Response Comment Documented by    Patient Done Acceptance E VU Educated on HEP and correct mechanics for safe functional mobility. VG 08/15/18 1146     Done Acceptance E VU,NR BENEFITS OF OOB ACTIVITY, PROGRESSION OF POC, D/C PLANNING. POSTURAL AWARENESS, THER EX, TRANSFER/GAIT TRAINING. CD 08/13/18 1448     Done Acceptance E VU Educated on HEP and correct mechanics for safe functional mobility. VG  08/10/18 1534          Point: Home exercise program (Done)    Learning Progress Summary     Learner Status Readiness Method Response Comment Documented by    Patient Done Acceptance E VU Educated on HEP and correct mechanics for safe functional mobility.  08/15/18 1146     Done Acceptance E VU,NR BENEFITS OF OOB ACTIVITY, PROGRESSION OF POC, D/C PLANNING. POSTURAL AWARENESS, THER EX, TRANSFER/GAIT TRAINING.  08/13/18 1448     Done Acceptance E VU Educated on HEP and correct mechanics for safe functional mobility.  08/10/18 1534          Point: Body mechanics (Done)    Learning Progress Summary     Learner Status Readiness Method Response Comment Documented by    Patient Done Acceptance E VU Educated on HEP and correct mechanics for safe functional mobility.  08/15/18 1146     Done Acceptance E VU,NR BENEFITS OF OOB ACTIVITY, PROGRESSION OF POC, D/C PLANNING. POSTURAL AWARENESS, THER EX, TRANSFER/GAIT TRAINING.  08/13/18 1448     Done Acceptance E VU Educated on HEP and correct mechanics for safe functional mobility.  08/10/18 1534          Point: Precautions (Done)    Learning Progress Summary     Learner Status Readiness Method Response Comment Documented by    Patient Done Acceptance E VU Educated on HEP and correct mechanics for safe functional mobility.  08/15/18 1146     Done Acceptance E VU,NR BENEFITS OF OOB ACTIVITY, PROGRESSION OF POC, D/C PLANNING. POSTURAL AWARENESS, THER EX, TRANSFER/GAIT TRAINING.  08/13/18 1448     Done Acceptance E VU Educated on HEP and correct mechanics for safe functional mobility.  08/10/18 1534                      User Key     Initials Effective Dates Name Provider Type Discipline     06/19/15 -  Colette Mccoy, PT Physical Therapist PT     05/29/18 -  Korina Edwards, PT Physical Therapist PT                    PT Recommendation and Plan  Anticipated Discharge Disposition (PT): inpatient rehabilitation facility  Therapy Frequency (PT Clinical Impression):  daily  Outcome Summary/Treatment Plan (PT)  Daily Summary of Progress (PT): progress toward functional goals is good  Anticipated Equipment Needs at Discharge (PT): other (see comments) (defer to rehab)  Anticipated Discharge Disposition (PT): inpatient rehabilitation facility  Plan of Care Reviewed With: patient  Progress: improving  Outcome Summary: Pt ambulated 30 ft with MODA x 2 and RW, distance limited by RLE weakness and SOA. Continues to exhibit RLE buckling and foot drop, although improved muscle activation noted this session. Impaired sitting balance 2/2 trunk weakness. Will continue to progres pt as able per POC.          Outcome Measures     Row Name 08/15/18 1114 08/13/18 1505          How much help from another person do you currently need...    Turning from your back to your side while in flat bed without using bedrails? 2  -VG 2  -CD     Moving from lying on back to sitting on the side of a flat bed without bedrails? 2  -VG 2  -CD     Moving to and from a bed to a chair (including a wheelchair)? 2  -VG 2  -CD     Standing up from a chair using your arms (e.g., wheelchair, bedside chair)? 2  -VG 2  -CD     Climbing 3-5 steps with a railing? 1  -VG 2  -CD     To walk in hospital room? 2  -VG 2  -CD     AM-PAC 6 Clicks Score 11  -VG 12  -CD        Modified Marie Scale    Modified Marie Scale 4 - Moderately severe disability.  Unable to walk without assistance, and unable to attend to own bodily needs without assistance.  -VG 4 - Moderately severe disability.  Unable to walk without assistance, and unable to attend to own bodily needs without assistance.  -CD        Functional Assessment    Outcome Measure Options AM-PAC 6 Clicks Basic Mobility (PT);Modified Marie  -VG AM-PAC 6 Clicks Basic Mobility (PT);Modified [place]  -CD       User Key  (r) = Recorded By, (t) = Taken By, (c) = Cosigned By    Initials Name Provider Type    CD Colette Mccoy, PT Physical Therapist    Korina Toussaint, PT Physical  Therapist           Time Calculation:         PT Charges     Row Name 08/15/18 1114             Time Calculation    Start Time 1114  -VG      PT Received On 08/15/18  -VG      PT Goal Re-Cert Due Date 08/25/18  -VG         Time Calculation- PT    Total Timed Code Minutes- PT 23 minute(s)  -VG         Timed Charges    72645 - PT Therapeutic Exercise Minutes 10  -VG      62623 - Gait Training Minutes  13  -VG        User Key  (r) = Recorded By, (t) = Taken By, (c) = Cosigned By    Initials Name Provider Type    VG Korina Edwards, PT Physical Therapist        Therapy Suggested Charges     Code   Minutes Charges    90520 (CPT®) Hc Pt Neuromusc Re Education Ea 15 Min      64590 (CPT®) Hc Pt Ther Proc Ea 15 Min 10 1    23685 (CPT®) Hc Gait Training Ea 15 Min 13 1    96893 (CPT®) Hc Pt Therapeutic Act Ea 15 Min      00959 (CPT®) Hc Pt Manual Therapy Ea 15 Min      63267 (CPT®) Hc Pt Iontophoresis Ea 15 Min      34972 (CPT®) Hc Pt Elec Stim Ea-Per 15 Min      67419 (CPT®) Hc Pt Ultrasound Ea 15 Min      92590 (CPT®) Hc Pt Self Care/Mgmt/Train Ea 15 Min      87145 (CPT®) Hc Pt Prosthetic (S) Train Initial Encounter, Each 15 Min      05054 (CPT®) Hc Pt Orthotic(S)/Prosthetic(S) Encounter, Each 15 Min      26543 (CPT®) Hc Orthotic(S) Mgmt/Train Initial Encounter, Each 15min      Total  23 2        Therapy Charges for Today     Code Description Service Date Service Provider Modifiers Qty    99768192278 HC PT THER PROC EA 15 MIN 8/15/2018 Korina Edwards, PT GP 1    09233390834 HC GAIT TRAINING EA 15 MIN 8/15/2018 Korina Edwards, PT GP 1    18506700691 HC PT THER SUPP EA 15 MIN 8/15/2018 Korina Edwards, PT GP 1          PT G-Codes  PT Professional Judgement Used?: Yes  Outcome Measure Options: AM-PAC 6 Clicks Basic Mobility (PT), Modified [place]  Score: 18  Functional Limitation: Mobility: Walking and moving around  Mobility: Walking and Moving Around Current Status (): At least 40 percent but less than 60 percent  impaired, limited or restricted  Mobility: Walking and Moving Around Goal Status (): At least 20 percent but less than 40 percent impaired, limited or restricted    Maia Edwards, PT  8/15/2018

## 2018-08-16 PROBLEM — I69.30 HISTORY OF CVA WITH RESIDUAL DEFICIT: Status: ACTIVE | Noted: 2018-08-16

## 2018-08-16 PROBLEM — R79.89 ELEVATED LFTS: Status: ACTIVE | Noted: 2018-08-16

## 2018-08-16 PROBLEM — I10 HYPERTENSION: Status: ACTIVE | Noted: 2018-08-16

## 2018-08-16 PROBLEM — E11.9 TYPE 2 DIABETES MELLITUS (HCC): Status: ACTIVE | Noted: 2018-08-16

## 2018-08-16 LAB
A/G RATIO: 1.2 (ref 0.8–2)
ALBUMIN SERPL-MCNC: 4.1 G/DL (ref 3.4–4.8)
ALP BLD-CCNC: 105 U/L (ref 25–100)
ALT SERPL-CCNC: 37 U/L (ref 4–36)
ANION GAP SERPL CALCULATED.3IONS-SCNC: 14 MMOL/L (ref 3–16)
AST SERPL-CCNC: 40 U/L (ref 8–33)
BASOPHILS ABSOLUTE: 0.1 K/UL (ref 0–0.1)
BASOPHILS RELATIVE PERCENT: 0.7 %
BILIRUB SERPL-MCNC: 1.1 MG/DL (ref 0.3–1.2)
BUN BLDV-MCNC: 36 MG/DL (ref 6–20)
CALCIUM SERPL-MCNC: 9.7 MG/DL (ref 8.5–10.5)
CHLORIDE BLD-SCNC: 97 MMOL/L (ref 98–107)
CO2: 27 MMOL/L (ref 20–30)
CREAT SERPL-MCNC: 1.2 MG/DL (ref 0.4–1.2)
EOSINOPHILS ABSOLUTE: 0.4 K/UL (ref 0–0.4)
EOSINOPHILS RELATIVE PERCENT: 4.4 %
GFR AFRICAN AMERICAN: >59
GFR NON-AFRICAN AMERICAN: >59
GLOBULIN: 3.3 G/DL
GLUCOSE BLD-MCNC: 156 MG/DL (ref 74–106)
GLUCOSE BLD-MCNC: 163 MG/DL (ref 74–106)
GLUCOSE BLD-MCNC: 189 MG/DL (ref 74–106)
GLUCOSE BLD-MCNC: 211 MG/DL (ref 74–106)
GLUCOSE BLD-MCNC: 227 MG/DL (ref 74–106)
HCT VFR BLD CALC: 49.4 % (ref 40–54)
HEMOGLOBIN: 16.9 G/DL (ref 13–18)
IMMATURE GRANULOCYTES #: 0 K/UL
IMMATURE GRANULOCYTES %: 0.3 % (ref 0–5)
LYMPHOCYTES ABSOLUTE: 2.5 K/UL (ref 1.5–4)
LYMPHOCYTES RELATIVE PERCENT: 25.8 %
MCH RBC QN AUTO: 30.2 PG (ref 27–32)
MCHC RBC AUTO-ENTMCNC: 34.2 G/DL (ref 31–35)
MCV RBC AUTO: 88.2 FL (ref 80–100)
MONOCYTES ABSOLUTE: 0.9 K/UL (ref 0.2–0.8)
MONOCYTES RELATIVE PERCENT: 8.9 %
NEUTROPHILS ABSOLUTE: 5.8 K/UL (ref 2–7.5)
NEUTROPHILS RELATIVE PERCENT: 59.9 %
PDW BLD-RTO: 12.3 % (ref 11–16)
PERFORMED ON: ABNORMAL
PLATELET # BLD: 223 K/UL (ref 150–400)
PMV BLD AUTO: 11.7 FL (ref 6–10)
POTASSIUM REFLEX MAGNESIUM: 4.1 MMOL/L (ref 3.4–5.1)
RBC # BLD: 5.6 M/UL (ref 4.5–6)
RENIN PLAS-CCNC: 1.91 NG/ML/HR (ref 0.17–5.38)
SODIUM BLD-SCNC: 138 MMOL/L (ref 136–145)
TOTAL PROTEIN: 7.4 G/DL (ref 6.4–8.3)
WBC # BLD: 9.6 K/UL (ref 4–11)

## 2018-08-16 PROCEDURE — G8998 SWALLOW D/C STATUS: HCPCS

## 2018-08-16 PROCEDURE — G8997 SWALLOW GOAL STATUS: HCPCS

## 2018-08-16 PROCEDURE — 85025 COMPLETE CBC W/AUTO DIFF WBC: CPT

## 2018-08-16 PROCEDURE — G8978 MOBILITY CURRENT STATUS: HCPCS

## 2018-08-16 PROCEDURE — 6360000002 HC RX W HCPCS: Performed by: INTERNAL MEDICINE

## 2018-08-16 PROCEDURE — 36415 COLL VENOUS BLD VENIPUNCTURE: CPT

## 2018-08-16 PROCEDURE — 97530 THERAPEUTIC ACTIVITIES: CPT

## 2018-08-16 PROCEDURE — 97165 OT EVAL LOW COMPLEX 30 MIN: CPT

## 2018-08-16 PROCEDURE — 97161 PT EVAL LOW COMPLEX 20 MIN: CPT

## 2018-08-16 PROCEDURE — G8988 SELF CARE GOAL STATUS: HCPCS

## 2018-08-16 PROCEDURE — 94760 N-INVAS EAR/PLS OXIMETRY 1: CPT

## 2018-08-16 PROCEDURE — 6370000000 HC RX 637 (ALT 250 FOR IP): Performed by: INTERNAL MEDICINE

## 2018-08-16 PROCEDURE — 92610 EVALUATE SWALLOWING FUNCTION: CPT

## 2018-08-16 PROCEDURE — 99305 1ST NF CARE MODERATE MDM 35: CPT | Performed by: INTERNAL MEDICINE

## 2018-08-16 PROCEDURE — 97802 MEDICAL NUTRITION INDIV IN: CPT

## 2018-08-16 PROCEDURE — 80053 COMPREHEN METABOLIC PANEL: CPT

## 2018-08-16 PROCEDURE — G8987 SELF CARE CURRENT STATUS: HCPCS

## 2018-08-16 PROCEDURE — 1200000002 HC SEMI PRIVATE SWING BED

## 2018-08-16 PROCEDURE — G8979 MOBILITY GOAL STATUS: HCPCS

## 2018-08-16 PROCEDURE — G8996 SWALLOW CURRENT STATUS: HCPCS

## 2018-08-16 RX ORDER — CLONIDINE HYDROCHLORIDE 0.1 MG/1
0.1 TABLET ORAL ONCE
Status: COMPLETED | OUTPATIENT
Start: 2018-08-16 | End: 2018-08-16

## 2018-08-16 RX ORDER — ZOLPIDEM TARTRATE 5 MG/1
5 TABLET ORAL NIGHTLY PRN
Status: DISCONTINUED | OUTPATIENT
Start: 2018-08-16 | End: 2018-09-13 | Stop reason: HOSPADM

## 2018-08-16 RX ADMIN — AMLODIPINE BESYLATE 10 MG: 5 TABLET ORAL at 08:48

## 2018-08-16 RX ADMIN — INSULIN LISPRO 2 UNITS: 100 INJECTION, SOLUTION INTRAVENOUS; SUBCUTANEOUS at 17:25

## 2018-08-16 RX ADMIN — INSULIN LISPRO 4 UNITS: 100 INJECTION, SOLUTION INTRAVENOUS; SUBCUTANEOUS at 08:50

## 2018-08-16 RX ADMIN — PANTOPRAZOLE SODIUM 40 MG: 40 TABLET, DELAYED RELEASE ORAL at 06:31

## 2018-08-16 RX ADMIN — ASPIRIN 325 MG: 325 TABLET, COATED ORAL at 08:48

## 2018-08-16 RX ADMIN — INSULIN GLARGINE 30 UNITS: 100 INJECTION, SOLUTION SUBCUTANEOUS at 20:18

## 2018-08-16 RX ADMIN — CLONIDINE HYDROCHLORIDE 0.1 MG: 0.1 TABLET ORAL at 00:37

## 2018-08-16 RX ADMIN — CLOPIDOGREL BISULFATE 75 MG: 75 TABLET, FILM COATED ORAL at 08:48

## 2018-08-16 RX ADMIN — ATORVASTATIN CALCIUM 80 MG: 80 TABLET, FILM COATED ORAL at 20:13

## 2018-08-16 RX ADMIN — INSULIN LISPRO 8 UNITS: 100 INJECTION, SOLUTION INTRAVENOUS; SUBCUTANEOUS at 11:39

## 2018-08-16 RX ADMIN — CARVEDILOL 12.5 MG: 12.5 TABLET, FILM COATED ORAL at 20:13

## 2018-08-16 RX ADMIN — ZOLPIDEM TARTRATE 5 MG: 5 TABLET ORAL at 20:52

## 2018-08-16 RX ADMIN — METFORMIN HYDROCHLORIDE 500 MG: 500 TABLET, FILM COATED ORAL at 08:49

## 2018-08-16 RX ADMIN — ENOXAPARIN SODIUM 40 MG: 40 INJECTION, SOLUTION INTRAVENOUS; SUBCUTANEOUS at 08:48

## 2018-08-16 RX ADMIN — INSULIN LISPRO 1 UNITS: 100 INJECTION, SOLUTION INTRAVENOUS; SUBCUTANEOUS at 20:18

## 2018-08-16 RX ADMIN — HYDROCHLOROTHIAZIDE 25 MG: 25 TABLET ORAL at 08:49

## 2018-08-16 RX ADMIN — INSULIN LISPRO 4 UNITS: 100 INJECTION, SOLUTION INTRAVENOUS; SUBCUTANEOUS at 11:38

## 2018-08-16 RX ADMIN — INSULIN LISPRO 8 UNITS: 100 INJECTION, SOLUTION INTRAVENOUS; SUBCUTANEOUS at 06:29

## 2018-08-16 RX ADMIN — INSULIN LISPRO 8 UNITS: 100 INJECTION, SOLUTION INTRAVENOUS; SUBCUTANEOUS at 17:27

## 2018-08-16 RX ADMIN — CARVEDILOL 12.5 MG: 12.5 TABLET, FILM COATED ORAL at 08:49

## 2018-08-16 ASSESSMENT — PAIN SCALES - GENERAL: PAINLEVEL_OUTOF10: 0

## 2018-08-16 NOTE — PROGRESS NOTES
Speech Language Pathology  Facility/Department: [address] [place] [address] SURG   BEDSIDE SWALLOW EVALUATION    NAME: Yvette Aviles  : 1965  MRN: [id number]    ADMISSION DATE: 8/15/2018  ADMITTING DIAGNOSIS: has Declining functional status on his problem list.  ONSET DATE: 08/15/2018    Recent Chest Xray/CT of Chest: (08/15/2018)      Impression       1. No acute disease.                     Date of Eval: 2018  Evaluating Therapist: Andrzej Rivera    Current Diet level:  Current Diet : Regular  Current Liquid Diet : Thin      Primary Complaint  Patient Complaint: Right side facial weakness and numbness    Pain:  Pain Assessment  Patient Currently in Pain: Denies    Reason for Referral  Yvette Aviles was referred for a bedside swallow evaluation to assess the efficiency of his swallow function, identify signs and symptoms of aspiration and make recommendations regarding safe dietary consistencies, effective compensatory strategies, and safe eating environment. Impression  Dysphagia Diagnosis: Mild oral stage dysphagia  Dysphagia Outcome Severity Scale: Level 6: Within functional limits/Modified independence     Treatment Plan  Requires SLP Intervention: Yes  Duration/Frequency of Treatment: 1-5x/wkx2-4 weeks  D/C Recommendations: To be determined       Recommended Diet and Intervention  Diet Solids Recommendation: Regular  Liquid Consistency Recommendation: Thin  Recommended Form of Meds: PO  Therapeutic Interventions: Patient/Family education;Vital Stim/NMES;Diet tolerance monitoring;Oral motor exercises    Compensatory Swallowing Strategies  Compensatory Swallowing Strategies: Alternate solids and liquids;Eat/Feed slowly;Upright as possible for all oral intake;Remain upright for 30-45 minutes after meals;Small bites/sips; Lingual sweep    Treatment/Goals  Short-term Goals  Timeframe for Short-term Goals: 1 week  Goal 1: Patient will complete oral motor exercises 9/10 times over 3 consecutive sessions to improve oral motor function. Goal 2: Patient will tolerate regular diet texture 9/10 times over 3 consecutive sessions. Goal 3: Patient will demonstrate use and understanding of compensatory strategies with 90% accuracy over 3 consecutive sessions to reduce aspiration risk. Long-term Goals  Timeframe for Long-term Goals: 2-4 weeks  Goal 1: Patient will tolerate least restrictive diet without overt s/sx dysphagia 100% of the time. General  Chart Reviewed: Yes  Subjective  Subjective: Pt upright in bed, pleasant and agreeable to ST eval.   Behavior/Cognition: Alert; Cooperative;Pleasant mood  Respiratory Status: Room air  Communication Observation: Functional  Follows Directions: Complex  Dentition: Adequate  Patient Positioning: Upright in bed  Baseline Vocal Quality: Normal  Volitional Cough: Strong  Prior Dysphagia History: None reported or indicated. Consistencies Administered: Reg solid; Thin - straw    Vision/Hearing  Vision  Vision: Impaired  Vision Exceptions: Wears glasses at all times  Hearing  Hearing: Within functional limits    Oral Motor Deficits  Oral/Motor  Oral Motor: Exceptions to [facility]  Labial Symmetry: Abnormal symmetry right  Labial Strength: Reduced  Lingual Strength: Reduced    Oral Phase Dysfunction  Oral Phase  Oral Phase: Exceptions  Oral Phase Dysfunction  Decreased Anterior to Posterior Transit: Reg solid (delayed, but cleared effectively.)  Lingual/Palatal Residue: Reg solid (minimal stasis, but cleared with compensatory strategies.)  Oral Phase  Oral Phase - Comment: min oral phase dysphagia, characterized by right side weakness, decreased A-P transit and min oral stasis.       Indicators of Pharyngeal Phase Dysfunction   Pharyngeal Phase  Pharyngeal Phase: WFL    Prognosis  Prognosis  Prognosis for safe diet advancement: excellent  Barriers to reach goals: motivation  Individuals consulted  Consulted and agree with results and recommendations: Patient    Education  Patient Education: aspiration precaution guidelines, safe swallow strategies. Patient Education Response: Verbalizes understanding;Demonstrated understanding      G-Code  SLP G-Codes  Functional Assessment Tool Used: informal bedside swallow assessment. Functional Limitations: Swallowing  Swallow Current Status (): At least 20 percent but less than 40 percent impaired, limited or restricted  Swallow Goal Status ():  At least 1 percent but less than 20 percent impaired, limited or restricted  Swallow Discharge Status (): 0 percent impaired, limited or restricted         Therapy Time  SLP Individual Minutes  Time In: 0830  Time Out: 0845  Minutes: 1842 Harshad Downing 149, SLP  8/16/2018 9:40 AM

## 2018-08-16 NOTE — PROGRESS NOTES
Educated patient on insulin while reviewing home medication list.  Insulins started by prior facility, patient has not had to give himself to date. Counseled on administration, timing with meals, and how to treat hypoglycemia.

## 2018-08-16 NOTE — THERAPY DISCHARGE NOTE
Acute Care - Physical Therapy Discharge Summary  [facility]       Patient Name: Mele Rossi  : 1965  MRN: [id number]    Today's Date: 2018  Onset of Illness/Injury or Date of Surgery: 18    Date of Referral to PT: 18  Referring Physician: DANIEL Barajas MD      Admit Date: 2018      PT Recommendation and Plan    Visit Dx:    ICD-10-CM ICD-9-CM   1. Dysarthria R47.1 784.51   2. Impaired mobility and ADLs Z74.09 799.89   3. Impaired functional mobility, balance, gait, and endurance Z74.09 V49.89             Outcome Measures     Row Name 18 1030 08/15/18 1114 18 1335       How much help from another person do you currently need...    Turning from your back to your side while in flat bed without using bedrails?  -- 2  -VG 2  -CD    Moving from lying on back to sitting on the side of a flat bed without bedrails?  -- 2  -VG 2  -CD    Moving to and from a bed to a chair (including a wheelchair)?  -- 2  -VG 2  -CD    Standing up from a chair using your arms (e.g., wheelchair, bedside chair)?  -- 2  -VG 2  -CD    Climbing 3-5 steps with a railing?  -- 1  -VG 2  -CD    To walk in hospital room?  -- 2  -VG 2  -CD    AM-PAC 6 Clicks Score  -- 11  -VG 12  -CD       Modified Le[place] Scale    Modified [place] Scale 4 - Moderately severe disability.  Unable to walk without assistance, and unable to attend to own bodily needs without assistance.  -MC 4 - Moderately severe disability.  Unable to walk without assistance, and unable to attend to own bodily needs without assistance.  -VG 4 - Moderately severe disability.  Unable to walk without assistance, and unable to attend to own bodily needs without assistance.  -CD       Functional Assessment    Outcome Measure Options  -- AM-PAC 6 Clicks Basic Mobility (PT);Modified [place]  -VG AM-PAC 6 Clicks Basic Mobility (PT);Modified [place]  -CD      User Key  (r) = Recorded By, (t) = Taken By, (c) = Cosigned By    Initials Name Provider Type    CD  Colette Mccoy, PT Physical Therapist    Elizabeth Huerta, PT Physical Therapist    Korina Toussaint, PT Physical Therapist            Therapy Suggested Charges     Code   Minutes Charges    21952 (CPT®) Hc Pt Neuromusc Re Education Ea 15 Min      77934 (CPT®) Hc Pt Ther Proc Ea 15 Min 10 1    28676 (CPT®) Hc Gait Training Ea 15 Min 13 1    16794 (CPT®) Hc Pt Therapeutic Act Ea 15 Min      21772 (CPT®) Hc Pt Manual Therapy Ea 15 Min      15300 (CPT®) Hc Pt Iontophoresis Ea 15 Min      72733 (CPT®) Hc Pt Elec Stim Ea-Per 15 Min      74784 (CPT®) Hc Pt Ultrasound Ea 15 Min      57985 (CPT®) Hc Pt Self Care/Mgmt/Train Ea 15 Min      59263 (CPT®) Hc Pt Prosthetic (S) Train Initial Encounter, Each 15 Min      08692 (CPT®) Hc Pt Orthotic(S)/Prosthetic(S) Encounter, Each 15 Min      38909 (CPT®) Hc Orthotic(S) Mgmt/Train Initial Encounter, Each 15min      Total  23 2                PT Rehab Goals     Row Name 08/16/18 1030             Bed Mobility Goal 1 (PT)    Activity/Assistive Device (Bed Mobility Goal 1, PT) bed mobility activities, all  -MC      [place] Level/Cues Needed (Bed Mobility Goal 1, PT) independent  -MC      Time Frame (Bed Mobility Goal 1, PT) 1 week  -MC      Progress/Outcomes (Bed Mobility Goal 1, PT) goal ongoing  -MC         Transfer Goal 1 (PT)    Activity/Assistive Device (Transfer Goal 1, PT) sit-to-stand/stand-to-sit;bed-to-chair/chair-to-bed;walker, rolling  -MC      [place] Level/Cues Needed (Transfer Goal 1, PT) conditional independence  -MC      Time Frame (Transfer Goal 1, PT) 1 week  -MC      Progress/Outcome (Transfer Goal 1, PT) goal ongoing  -MC         Gait Training Goal 1 (PT)    Activity/Assistive Device (Gait Training Goal 1, PT) gait (walking locomotion);walker, rolling  -MC      [place] Level (Gait Training Goal 1, PT) minimum assist (75% or more patient effort)  -MC      Distance (Gait Goal 1, PT) 50  -MC      Time Frame (Gait Training Goal 1, PT) 1 week  -MC       Progress/Outcome (Gait Training Goal 1, PT) goal ongoing;goal revised this date  -         Stairs Goal 1 (PT)    Activity/Assistive Device (Stairs Goal 1, PT) stairs, all skills  -MC      [place] Level/Cues Needed (Stairs Goal 1, PT) minimum assist (75% or more patient effort)  -MC      Number of Stairs (Stairs Goal 1, PT) 4  -MC      Time Frame (Stairs Goal 1, PT) 2 weeks  -MC      Progress/Outcome (Stairs Goal 1, PT) goal no longer appropriate  -        User Key  (r) = Recorded By, (t) = Taken By, (c) = Cosigned By    Initials Name Provider Type Discipline    Elizabeth Huerta, PT Physical Therapist PT              PT Discharge Summary  Anticipated Discharge Disposition (PT): inpatient rehabilitation facility  Reason for Discharge: Discharge from facility  Outcomes Achieved: Refer to plan of care for updates on goals achieved      Elizabeth Shin, PT   8/16/2018

## 2018-08-16 NOTE — PROGRESS NOTES
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     Objective          AROM RLE (degrees)  RLE General AROM: limited knee extension and knee flexion, no active movement in hip or foot/ankle  AROM LLE (degrees)  LLE AROM : WNL  PROM RUE (degrees)  RUE PROM: WFL  AROM RUE (degrees)  RUE General AROM: very limited shoulder elevation, no active movement in elbow/wrist  AROM LUE (degrees)  LUE AROM : WNL  Strength RLE  Comment: knee 2/5, no active movement in hip/foot/ankle  Strength LLE  Comment: grossly 5/5  Strength RUE  Comment: see OT eval  Strength LUE  Comment: see OT eval     Sensation  Overall Sensation Status: Impaired  Light Touch: Partial deficits in the RLE;Partial deficits in the LLE (decreased sensation to light touch noted of B feet/toes)     Bed mobility  Supine to Sit: Minimal assistance  Sit to Supine: Modified independent  Scooting: Modified independent  Comment: Patient able to sit at edge of bed independently     Transfers  Sit to Stand: Contact guard assistance  Stand to sit: Contact guard assistance     Ambulation  Ambulation?: Yes  Ambulation 1  Surface: level tile  Device: Rolling Walker  Assistance: Contact guard assistance  Quality of Gait: R foot drop, occasional assistance required to re-position R hand  Distance: 180 feet     Balance  Posture: Good  Sitting - Static: Good  Sitting - Dynamic: Good;-  Standing - Static: Fair;+  Standing - Dynamic: Fair;+ (at walker)        Assessment   Body structures, Functions, Activity limitations: Decreased functional mobility ; Decreased strength;Decreased balance;Decreased Individual Concurrent Group Co-treatment   Time In 0932         Time Out 1007         Minutes 2801 Louann Vazquez, PT       This note serves as D/C summary if patient is discharged prior to next visit.

## 2018-08-16 NOTE — PLAN OF CARE
Problem: Neurological  Goal: Maximum potential motor/sensory/cognitive function  Outcome: Met This Shift

## 2018-08-16 NOTE — CONSULTS
factor)  · Estimated Daily Protein (g): 72-90    Estimated Intake vs Estimated Needs: Intake Meets Needs    Nutrition Risk Level: Moderate    Nutrition Interventions:   Continue current diet, ONS not indicated (patient still healthy BMI, slightly overwt and eating 100% of meals.)  Continued Inpatient Monitoring, Coordination of Care, Education declined (patient familiar, says he was just given edu from RD at [facility]. Encouraged patient to ask for RD if needed any further instruction or had additional questions.)    Nutrition Evaluation:   · Evaluation: Goals set   · Goals: to maintain nutritional status and stablized weight. Will monitor PO intakes, weight, and pertinent labs    · Monitoring: Meal Intake, Weight, Pertinent Labs    See Adult Nutrition Doc Flowsheet for more detail.      Electronically signed by Blade Storm on 8/16/18 at 4:24 PM

## 2018-08-16 NOTE — PROGRESS NOTES
4/29/7930    I certify that the skilled nursing and/or rehabilitation services are required to be given on a daily basis, which as a practical matter can only be provided in a skilled nursing unit on an inpatient basis.     Electronically signed by Simon Nur RN on 8/16/2018 at 8:16 AM

## 2018-08-17 LAB
GLUCOSE BLD-MCNC: 184 MG/DL (ref 74–106)
GLUCOSE BLD-MCNC: 224 MG/DL (ref 74–106)
GLUCOSE BLD-MCNC: 283 MG/DL (ref 74–106)
GLUCOSE BLD-MCNC: 285 MG/DL (ref 74–106)
PERFORMED ON: ABNORMAL

## 2018-08-17 PROCEDURE — 94760 N-INVAS EAR/PLS OXIMETRY 1: CPT

## 2018-08-17 PROCEDURE — 6360000002 HC RX W HCPCS: Performed by: INTERNAL MEDICINE

## 2018-08-17 PROCEDURE — 6370000000 HC RX 637 (ALT 250 FOR IP): Performed by: INTERNAL MEDICINE

## 2018-08-17 PROCEDURE — 97110 THERAPEUTIC EXERCISES: CPT

## 2018-08-17 PROCEDURE — 1200000002 HC SEMI PRIVATE SWING BED

## 2018-08-17 PROCEDURE — 97803 MED NUTRITION INDIV SUBSEQ: CPT

## 2018-08-17 PROCEDURE — 97530 THERAPEUTIC ACTIVITIES: CPT

## 2018-08-17 PROCEDURE — 6370000000 HC RX 637 (ALT 250 FOR IP): Performed by: NURSE PRACTITIONER

## 2018-08-17 RX ORDER — INSULIN GLARGINE 100 [IU]/ML
35 INJECTION, SOLUTION SUBCUTANEOUS NIGHTLY
Status: DISCONTINUED | OUTPATIENT
Start: 2018-08-17 | End: 2018-08-20

## 2018-08-17 RX ORDER — DOXAZOSIN MESYLATE 1 MG/1
1 TABLET ORAL DAILY
Status: DISCONTINUED | OUTPATIENT
Start: 2018-08-17 | End: 2018-08-18

## 2018-08-17 RX ORDER — ECHINACEA PURPUREA EXTRACT 125 MG
1 TABLET ORAL PRN
Status: DISCONTINUED | OUTPATIENT
Start: 2018-08-17 | End: 2018-09-13 | Stop reason: HOSPADM

## 2018-08-17 RX ADMIN — AMLODIPINE BESYLATE 10 MG: 5 TABLET ORAL at 08:07

## 2018-08-17 RX ADMIN — INSULIN LISPRO 6 UNITS: 100 INJECTION, SOLUTION INTRAVENOUS; SUBCUTANEOUS at 17:38

## 2018-08-17 RX ADMIN — INSULIN LISPRO 2 UNITS: 100 INJECTION, SOLUTION INTRAVENOUS; SUBCUTANEOUS at 11:49

## 2018-08-17 RX ADMIN — INSULIN LISPRO 8 UNITS: 100 INJECTION, SOLUTION INTRAVENOUS; SUBCUTANEOUS at 08:10

## 2018-08-17 RX ADMIN — DOXAZOSIN 1 MG: 1 TABLET ORAL at 11:57

## 2018-08-17 RX ADMIN — CARVEDILOL 12.5 MG: 12.5 TABLET, FILM COATED ORAL at 19:49

## 2018-08-17 RX ADMIN — PANTOPRAZOLE SODIUM 40 MG: 40 TABLET, DELAYED RELEASE ORAL at 06:10

## 2018-08-17 RX ADMIN — HYDROCHLOROTHIAZIDE 25 MG: 25 TABLET ORAL at 08:07

## 2018-08-17 RX ADMIN — ZOLPIDEM TARTRATE 5 MG: 5 TABLET ORAL at 19:49

## 2018-08-17 RX ADMIN — INSULIN LISPRO 8 UNITS: 100 INJECTION, SOLUTION INTRAVENOUS; SUBCUTANEOUS at 11:51

## 2018-08-17 RX ADMIN — ASPIRIN 325 MG: 325 TABLET, COATED ORAL at 08:07

## 2018-08-17 RX ADMIN — SALINE NASAL SPRAY 1 SPRAY: 1.5 SOLUTION NASAL at 16:34

## 2018-08-17 RX ADMIN — CLOPIDOGREL BISULFATE 75 MG: 75 TABLET, FILM COATED ORAL at 08:07

## 2018-08-17 RX ADMIN — INSULIN LISPRO 2 UNITS: 100 INJECTION, SOLUTION INTRAVENOUS; SUBCUTANEOUS at 19:53

## 2018-08-17 RX ADMIN — METFORMIN HYDROCHLORIDE 500 MG: 500 TABLET, FILM COATED ORAL at 08:07

## 2018-08-17 RX ADMIN — ATORVASTATIN CALCIUM 80 MG: 80 TABLET, FILM COATED ORAL at 19:48

## 2018-08-17 RX ADMIN — INSULIN LISPRO 6 UNITS: 100 INJECTION, SOLUTION INTRAVENOUS; SUBCUTANEOUS at 08:08

## 2018-08-17 RX ADMIN — CARVEDILOL 12.5 MG: 12.5 TABLET, FILM COATED ORAL at 08:07

## 2018-08-17 RX ADMIN — ENOXAPARIN SODIUM 40 MG: 40 INJECTION, SOLUTION INTRAVENOUS; SUBCUTANEOUS at 08:06

## 2018-08-17 RX ADMIN — INSULIN LISPRO 8 UNITS: 100 INJECTION, SOLUTION INTRAVENOUS; SUBCUTANEOUS at 17:40

## 2018-08-17 RX ADMIN — INSULIN GLARGINE 35 UNITS: 100 INJECTION, SOLUTION SUBCUTANEOUS at 19:51

## 2018-08-17 NOTE — PROGRESS NOTES
exercises 4: sit to stand x 10   Other exercises 5: lower trunk rotation x 15 B  Other exercises 6: rhythmic stabilization in hooklying at knees x 2 minutes                        Assessment   Body structures, Functions, Activity limitations: Decreased functional mobility ; Decreased strength;Decreased balance;Decreased endurance  Assessment: Patient required very minimal A to come to sit today with bed flat. Patient able to come to stand with only SBA to CGA. Patient able to ambulate but exhibits R foot drop with R hip circumduction. Required occasional assistance for R hand support. Patient able to perform therex with some difficulty but participated well with therapy. Treatment Diagnosis: Weakness, s/p CVA  REQUIRES PT FOLLOW UP: Yes  Activity Tolerance  Activity Tolerance: Patient Tolerated treatment well     Goals  Short term goals  Time Frame for Short term goals: 2 weeks  Short term goal 1: Patient will perform all basic transfers independently with safety present. Short term goal 2: Increase strength and endurance to tolerate 30 minutes of therex/ther. activity. Short term goal 3: Increase strength to ambulate 400 feet with least restrictive A.D with safety present. Short term goal 4: Patient will ambulate with Good balance. Short term goal 5: Patient will be independent with home exercise program, and perform therex independently.      Plan    Plan  Times per week: 4-5x/week  Times per day: Daily  Plan weeks: 2 weeks  Current Treatment Recommendations: Strengthening, ROM, Balance Training, Functional Mobility Training, Transfer Training, Gait Training, Stair training, Endurance Training, Neuromuscular Re-education, Patient/Caregiver Education & Training, Safety Education & Training  Safety Devices  Type of devices: Left in chair, Call light within reach, Patient at risk for falls     Therapy Time   Individual Concurrent Group Co-treatment   Time In 0918         Time Out 1011         Minutes 53 Angella Moore, PT     This note serves as D/C summary if patient is discharged prior to next visit.

## 2018-08-17 NOTE — PROGRESS NOTES
feet with RW with CGA. Pt left in bed with call light in reach. Plan   Plan  Times per week: 3-5  Times per day: Daily  Current Treatment Recommendations: ROM, Functional Mobility Training, Safety Education & Training, Self-Care / ADL, Endurance Training, Neuromuscular Re-education, Balance Training, Strengthening    Goals  Short term goals  Time Frame for Short term goals: 1 week  Short term goal 1: Pt to complete LB dressing with CGA and UB with SBA. Short term goal 2: Pt to complete toileting with CGA. Short term goal 3: Pt to complete bathing with CGA. Short term goal 4: Pt to complete bathing transfer with SBA. Short term goal 5: Pt to demonstrate independence in self PROM in RUE. Long term goals  Time Frame for Long term goals : 2 weeks. Long term goal 1: Pt to complete dynamic/standing tolerance activity x10 minutes to decrease risk of falls. Long term goal 2: Pt to complete dressing with MOD I. Long term goal 3: Pt to complete toileting with MOD I. Long term goal 4: Pt to complete bathing SUP. Therapy Time   Individual Concurrent Group Co-treatment   Time In 0130         Time Out 0225         Minutes 55           This note serves as a DC summary in the event of pt discharge.    Eli Matias OTR/L

## 2018-08-17 NOTE — H&P
% external solution Apply topically daily Apply topically as needed. Yes Historical Provider, MD   metFORMIN (GLUCOPHAGE) 500 MG tablet Take 500 mg by mouth daily   Yes Historical Provider, MD   amLODIPine (NORVASC) 10 MG tablet Take 1 tablet by mouth daily 8/7/18  Yes Yanet Bustamante MD       Allergies:  Lisinopril    Social History:   TOBACCO:   reports that he quit smoking about 38 years ago. His smoking use included Cigarettes. He has never used smokeless tobacco.  ETOH:   reports that he does not drink alcohol. Family History:   History reviewed. No pertinent family history. Review of system  Constitutional:  Denies fever or chills. Positive for fatigue   Eyes:  Denies eye pain or redness  HENT:  Denies nasal congestion or sore throat   Respiratory:  Denies cough or shortness of breath   Cardiovascular:  Denies chest pain or palpitations  GI:  Denies abdominal pain, nausea, vomiting, bloody stools or diarrhea   :  Denies dysuria or frequency  Musculoskeletal:  Denies acute neck pain or body aches. Positive for chronic arthralgias  Integument:  Denies rash or itching  Neurologic:  Denies headache or dizziness. Positive for left-sided weakness from recent CVA in addition to generalized weakness. Psychiatric:  Positive for some intermittent mild anxiety and mild depression    Vitals:    08/16/18 0945 08/16/18 1606 08/16/18 2013 08/16/18 2207   BP: 132/80  (!) 158/93    Pulse:   77    Resp:   16 18   Temp:   98.7 °F (37.1 °C)    TempSrc:   Oral    SpO2:   98% 97%   Weight:       Height:  6' (1.829 m)       vital signs reviewed in electronic chart    Physical exam  Constitutional:  Well developed, well nourished, no acute distress  Eyes:  PERRL, no scleral icterus, conjunctiva normal   HENT:  Atraumatic, external ears normal, nose normal, oropharynx moist, no pharyngeal exudates.  Neck- supple, no JVD, no lymphadenopathy  Respiratory:  No respiratory distress, breath sounds decreased bilateral bases no wheezing, rales or rhonchi detected  Cardiovascular:  Normal rate, normal rhythm, no murmurs, no gallops, no rubs, no edema   GI:  Soft, nondistended, normal bowel sounds, nontender, no voluntary guarding  Musculoskeletal:  No cyanosis or obvious acute deformity. Moving all extremities   Integument:  Warm and dry. No rash noted exposed skin area  Neurologic:  Alert & oriented x 3, moderate-severe hemiparesis right upper and right lower extremity from recent CVA, answers questions appropriately, follows commands, he does lift right upper and lower extremity off the bed against gravity with a fairly significant amount of effort   Psychiatric:  Speech and behavior appropriate         Lab Results   Component Value Date     08/16/2018    K 4.1 08/16/2018    CL 97 (L) 08/16/2018    CO2 27 08/16/2018    BUN 36 (H) 08/16/2018    CREATININE 1.2 08/16/2018    GLUCOSE 189 (H) 08/16/2018    CALCIUM 9.7 08/16/2018    PROT 7.4 08/16/2018    LABALBU 4.1 08/16/2018    BILITOT 1.1 08/16/2018    ALKPHOS 105 (H) 08/16/2018    AST 40 (H) 08/16/2018    ALT 37 (H) 08/16/2018    LABGLOM >59 08/16/2018    GFRAA >59 08/16/2018    AGRATIO 1.2 08/16/2018    GLOB 3.3 08/16/2018           Lab Results   Component Value Date    WBC 9.6 08/16/2018    HGB 16.9 08/16/2018    HCT 49.4 08/16/2018    MCV 88.2 08/16/2018     08/16/2018         Assessment and Plan     Active Hospital Problems    Diagnosis Date Noted    Type 2 diabetes mellitus ([facility] [address] [E11.9]  Continue regimen. Cover with sliding scale insulin protocol. Indicated. Hypoglycemia protocol. Monitor fingersticks closely   08/16/2018    History of CVA with residual deficit [I69.30]  Positive for recent CVA with residual right-sided deficit. Proceed with PT/OT consult. Proceed with rehab.  following for assistance with discharge planning. Monitor nutritional and functional status.  Continue dual antiplatelet therapy per neurology recommendations   08/16/2018   

## 2018-08-17 NOTE — PLAN OF CARE
Problem: Pain Control  Goal: Maintain pain level at or below patient's acceptable level (or 5 if patient is unable to determine acceptable level)  Outcome: Ongoing      Problem: Neurological  Goal: Maximum potential motor/sensory/cognitive function  Outcome: Ongoing      Problem: Cardiovascular  Goal: No DVT, peripheral vascular complications  Outcome: Ongoing      Problem: Respiratory  Goal: No pulmonary complications  Outcome: Ongoing    Goal: O2 Sat > 90%  Outcome: Ongoing      Problem: Nutrition  Goal: Optimal nutrition therapy  Outcome: Ongoing

## 2018-08-18 LAB
GLUCOSE BLD-MCNC: 211 MG/DL (ref 74–106)
GLUCOSE BLD-MCNC: 215 MG/DL (ref 74–106)
GLUCOSE BLD-MCNC: 269 MG/DL (ref 74–106)
GLUCOSE BLD-MCNC: 272 MG/DL (ref 74–106)
PERFORMED ON: ABNORMAL

## 2018-08-18 PROCEDURE — 94760 N-INVAS EAR/PLS OXIMETRY 1: CPT

## 2018-08-18 PROCEDURE — 6370000000 HC RX 637 (ALT 250 FOR IP): Performed by: INTERNAL MEDICINE

## 2018-08-18 PROCEDURE — 6370000000 HC RX 637 (ALT 250 FOR IP): Performed by: NURSE PRACTITIONER

## 2018-08-18 PROCEDURE — 6360000002 HC RX W HCPCS: Performed by: INTERNAL MEDICINE

## 2018-08-18 PROCEDURE — 1200000002 HC SEMI PRIVATE SWING BED

## 2018-08-18 RX ORDER — DOXAZOSIN MESYLATE 4 MG/1
4 TABLET ORAL NIGHTLY
Status: DISCONTINUED | OUTPATIENT
Start: 2018-08-18 | End: 2018-09-13 | Stop reason: HOSPADM

## 2018-08-18 RX ADMIN — INSULIN LISPRO 2 UNITS: 100 INJECTION, SOLUTION INTRAVENOUS; SUBCUTANEOUS at 19:51

## 2018-08-18 RX ADMIN — INSULIN LISPRO 8 UNITS: 100 INJECTION, SOLUTION INTRAVENOUS; SUBCUTANEOUS at 11:10

## 2018-08-18 RX ADMIN — INSULIN LISPRO 6 UNITS: 100 INJECTION, SOLUTION INTRAVENOUS; SUBCUTANEOUS at 11:08

## 2018-08-18 RX ADMIN — INSULIN GLARGINE 35 UNITS: 100 INJECTION, SOLUTION SUBCUTANEOUS at 19:51

## 2018-08-18 RX ADMIN — INSULIN LISPRO 4 UNITS: 100 INJECTION, SOLUTION INTRAVENOUS; SUBCUTANEOUS at 17:14

## 2018-08-18 RX ADMIN — ENOXAPARIN SODIUM 40 MG: 40 INJECTION, SOLUTION INTRAVENOUS; SUBCUTANEOUS at 08:41

## 2018-08-18 RX ADMIN — ZOLPIDEM TARTRATE 5 MG: 5 TABLET ORAL at 21:40

## 2018-08-18 RX ADMIN — INSULIN LISPRO 8 UNITS: 100 INJECTION, SOLUTION INTRAVENOUS; SUBCUTANEOUS at 08:50

## 2018-08-18 RX ADMIN — INSULIN LISPRO 6 UNITS: 100 INJECTION, SOLUTION INTRAVENOUS; SUBCUTANEOUS at 08:45

## 2018-08-18 RX ADMIN — METFORMIN HYDROCHLORIDE 500 MG: 500 TABLET, FILM COATED ORAL at 08:42

## 2018-08-18 RX ADMIN — CARVEDILOL 12.5 MG: 12.5 TABLET, FILM COATED ORAL at 19:50

## 2018-08-18 RX ADMIN — ATORVASTATIN CALCIUM 80 MG: 80 TABLET, FILM COATED ORAL at 19:50

## 2018-08-18 RX ADMIN — INSULIN LISPRO 8 UNITS: 100 INJECTION, SOLUTION INTRAVENOUS; SUBCUTANEOUS at 17:19

## 2018-08-18 RX ADMIN — ASPIRIN 325 MG: 325 TABLET, COATED ORAL at 08:42

## 2018-08-18 RX ADMIN — AMLODIPINE BESYLATE 10 MG: 5 TABLET ORAL at 08:41

## 2018-08-18 RX ADMIN — DOXAZOSIN 1 MG: 1 TABLET ORAL at 08:42

## 2018-08-18 RX ADMIN — HYDROCHLOROTHIAZIDE 25 MG: 25 TABLET ORAL at 08:42

## 2018-08-18 RX ADMIN — PANTOPRAZOLE SODIUM 40 MG: 40 TABLET, DELAYED RELEASE ORAL at 05:34

## 2018-08-18 RX ADMIN — CLOPIDOGREL BISULFATE 75 MG: 75 TABLET, FILM COATED ORAL at 08:41

## 2018-08-18 RX ADMIN — DOXAZOSIN 4 MG: 4 TABLET ORAL at 23:11

## 2018-08-18 RX ADMIN — CARVEDILOL 12.5 MG: 12.5 TABLET, FILM COATED ORAL at 08:41

## 2018-08-18 ASSESSMENT — PAIN SCALES - GENERAL: PAINLEVEL_OUTOF10: 0

## 2018-08-19 LAB
GLUCOSE BLD-MCNC: 190 MG/DL (ref 74–106)
GLUCOSE BLD-MCNC: 196 MG/DL (ref 74–106)
GLUCOSE BLD-MCNC: 266 MG/DL (ref 74–106)
GLUCOSE BLD-MCNC: 348 MG/DL (ref 74–106)
PERFORMED ON: ABNORMAL

## 2018-08-19 PROCEDURE — 94760 N-INVAS EAR/PLS OXIMETRY 1: CPT

## 2018-08-19 PROCEDURE — 6370000000 HC RX 637 (ALT 250 FOR IP): Performed by: NURSE PRACTITIONER

## 2018-08-19 PROCEDURE — 6360000002 HC RX W HCPCS: Performed by: INTERNAL MEDICINE

## 2018-08-19 PROCEDURE — 6370000000 HC RX 637 (ALT 250 FOR IP): Performed by: INTERNAL MEDICINE

## 2018-08-19 PROCEDURE — 1200000002 HC SEMI PRIVATE SWING BED

## 2018-08-19 RX ORDER — CARVEDILOL 12.5 MG/1
12.5 TABLET ORAL ONCE
Status: COMPLETED | OUTPATIENT
Start: 2018-08-19 | End: 2018-08-19

## 2018-08-19 RX ORDER — CARVEDILOL 25 MG/1
25 TABLET ORAL 2 TIMES DAILY
Status: DISCONTINUED | OUTPATIENT
Start: 2018-08-19 | End: 2018-08-20

## 2018-08-19 RX ADMIN — CARVEDILOL 12.5 MG: 12.5 TABLET, FILM COATED ORAL at 09:59

## 2018-08-19 RX ADMIN — INSULIN LISPRO 11 UNITS: 100 INJECTION, SOLUTION INTRAVENOUS; SUBCUTANEOUS at 16:48

## 2018-08-19 RX ADMIN — HYDROCHLOROTHIAZIDE 25 MG: 25 TABLET ORAL at 08:43

## 2018-08-19 RX ADMIN — PANTOPRAZOLE SODIUM 40 MG: 40 TABLET, DELAYED RELEASE ORAL at 06:13

## 2018-08-19 RX ADMIN — INSULIN LISPRO 6 UNITS: 100 INJECTION, SOLUTION INTRAVENOUS; SUBCUTANEOUS at 08:45

## 2018-08-19 RX ADMIN — INSULIN LISPRO 2 UNITS: 100 INJECTION, SOLUTION INTRAVENOUS; SUBCUTANEOUS at 11:59

## 2018-08-19 RX ADMIN — INSULIN GLARGINE 35 UNITS: 100 INJECTION, SOLUTION SUBCUTANEOUS at 19:48

## 2018-08-19 RX ADMIN — CLOPIDOGREL BISULFATE 75 MG: 75 TABLET, FILM COATED ORAL at 08:43

## 2018-08-19 RX ADMIN — ENOXAPARIN SODIUM 40 MG: 40 INJECTION, SOLUTION INTRAVENOUS; SUBCUTANEOUS at 08:43

## 2018-08-19 RX ADMIN — CARVEDILOL 25 MG: 25 TABLET, FILM COATED ORAL at 19:44

## 2018-08-19 RX ADMIN — AMLODIPINE BESYLATE 10 MG: 5 TABLET ORAL at 08:43

## 2018-08-19 RX ADMIN — INSULIN LISPRO 11 UNITS: 100 INJECTION, SOLUTION INTRAVENOUS; SUBCUTANEOUS at 12:03

## 2018-08-19 RX ADMIN — METFORMIN HYDROCHLORIDE 500 MG: 500 TABLET, FILM COATED ORAL at 08:43

## 2018-08-19 RX ADMIN — INSULIN LISPRO 11 UNITS: 100 INJECTION, SOLUTION INTRAVENOUS; SUBCUTANEOUS at 08:46

## 2018-08-19 RX ADMIN — ASPIRIN 325 MG: 325 TABLET, COATED ORAL at 08:42

## 2018-08-19 RX ADMIN — CARVEDILOL 12.5 MG: 12.5 TABLET, FILM COATED ORAL at 08:43

## 2018-08-19 RX ADMIN — INSULIN LISPRO 4 UNITS: 100 INJECTION, SOLUTION INTRAVENOUS; SUBCUTANEOUS at 19:48

## 2018-08-19 RX ADMIN — INSULIN LISPRO 2 UNITS: 100 INJECTION, SOLUTION INTRAVENOUS; SUBCUTANEOUS at 16:45

## 2018-08-19 RX ADMIN — ZOLPIDEM TARTRATE 5 MG: 5 TABLET ORAL at 21:30

## 2018-08-19 RX ADMIN — DOXAZOSIN 4 MG: 4 TABLET ORAL at 19:44

## 2018-08-19 RX ADMIN — ATORVASTATIN CALCIUM 80 MG: 80 TABLET, FILM COATED ORAL at 19:44

## 2018-08-19 ASSESSMENT — PAIN SCALES - GENERAL: PAINLEVEL_OUTOF10: 0

## 2018-08-19 NOTE — FLOWSHEET NOTE
08/19/18 0843   Assessment   Charting Type Shift assessment   Neurological   Neuro (WDL) X   Level of Consciousness 0   Orientation Level Oriented X4   Cognition Appropriate judgement; Appropriate safety awareness; Follows commands   Language Clear   R Hand  Absent   L Hand  Strong   L Foot Dorsiflexion Moderate   L Foot Plantar Flexion Moderate   R Pupil Shape Round   R Pupil Reaction Brisk   L Pupil Shape Round   L Pupil Reaction Brisk   RUE Motor Response Flaccid   LUE Motor Response Responds to command;Normal extension;Normal flexion   RLE Motor Response Responds to command   LLE Motor Response Responds to command   Size R Pupil (mm) 2   Size L Pupil (mm) 2   R Foot Dorsiflexion Weak   R Foot Plantar Flexion Weak   [place] Coma Scale   Eye Opening 4   Best Verbal Response 5   Best Motor Response 6   [place] Coma Scale Score 15   HEENT   HEENT (WDL) WDL   Right Eye Impaired vision   Left Eye Impaired vision   Nose Intact   Throat Intact   Tongue Pink & moist   Voice Normal   Mucous Membrane Pink;Moist   Respiratory   Respiratory (WDL) WDL   Respiratory Pattern Regular   Respiratory Depth Normal   Breath Sounds   Right Upper Lobe Clear   Right Middle Lobe Clear   Right Lower Lobe Clear   Left Upper Lobe Clear   Left Lower Lobe Clear   Cardiac   Cardiac (WDL) WDL   Gastrointestinal   Abdominal (WDL) WDL   Bowel Sounds (All Quadrants) Active   Peripheral Vascular   Peripheral Vascular (WDL) WDL   Sensation RUE Full sensation   Sensation LUE Full sensation   Sensation RLE Full sensation   Sensation LLE Full sensation   Skin Color/Condition   Skin Color/Condition (WDL) WDL   Skin Integrity   Skin Integrity (WDL) WDL   Musculoskeletal   Musculoskeletal (WDL) X   RUE Other (Comment)  (flaccid)   LUE Full movement   RL Extremity Weakness   LL Extremity Full movement   Genitourinary   Genitourinary (WDL) WDL   Flank Tenderness No   Psychosocial   Psychosocial (WDL) WDL   Pt awake in bed. Pt alert and oriented.  Pt

## 2018-08-20 ENCOUNTER — APPOINTMENT (OUTPATIENT)
Dept: CT IMAGING | Facility: HOSPITAL | Age: 53
DRG: 057 | End: 2018-08-20
Attending: INTERNAL MEDICINE
Payer: COMMERCIAL

## 2018-08-20 LAB
ANION GAP SERPL CALCULATED.3IONS-SCNC: 10 MMOL/L (ref 3–16)
BUN BLDV-MCNC: 22 MG/DL (ref 6–20)
CALCIUM SERPL-MCNC: 9.1 MG/DL (ref 8.5–10.5)
CHLORIDE BLD-SCNC: 98 MMOL/L (ref 98–107)
CO2: 28 MMOL/L (ref 20–30)
CREAT SERPL-MCNC: 1.3 MG/DL (ref 0.4–1.2)
GFR AFRICAN AMERICAN: >59
GFR NON-AFRICAN AMERICAN: 57
GLUCOSE BLD-MCNC: 179 MG/DL (ref 74–106)
GLUCOSE BLD-MCNC: 195 MG/DL (ref 74–106)
GLUCOSE BLD-MCNC: 207 MG/DL (ref 74–106)
GLUCOSE BLD-MCNC: 213 MG/DL (ref 74–106)
GLUCOSE BLD-MCNC: 264 MG/DL (ref 74–106)
GLUCOSE BLD-MCNC: 338 MG/DL (ref 74–106)
HBA1C MFR BLD: 10.6 %
HCT VFR BLD CALC: 41.2 % (ref 40–54)
HEMOGLOBIN: 14.4 G/DL (ref 13–18)
MCH RBC QN AUTO: 29.9 PG (ref 27–32)
MCHC RBC AUTO-ENTMCNC: 35 G/DL (ref 31–35)
MCV RBC AUTO: 85.7 FL (ref 80–100)
PDW BLD-RTO: 11.9 % (ref 11–16)
PERFORMED ON: ABNORMAL
PLATELET # BLD: 185 K/UL (ref 150–400)
PMV BLD AUTO: 10.8 FL (ref 6–10)
POTASSIUM SERPL-SCNC: 4.1 MMOL/L (ref 3.4–5.1)
RBC # BLD: 4.81 M/UL (ref 4.5–6)
SODIUM BLD-SCNC: 136 MMOL/L (ref 136–145)
TROPONIN: <0.3 NG/ML
WBC # BLD: 8.5 K/UL (ref 4–11)

## 2018-08-20 PROCEDURE — 85027 COMPLETE CBC AUTOMATED: CPT

## 2018-08-20 PROCEDURE — 83036 HEMOGLOBIN GLYCOSYLATED A1C: CPT

## 2018-08-20 PROCEDURE — 36415 COLL VENOUS BLD VENIPUNCTURE: CPT

## 2018-08-20 PROCEDURE — 6370000000 HC RX 637 (ALT 250 FOR IP): Performed by: INTERNAL MEDICINE

## 2018-08-20 PROCEDURE — 93005 ELECTROCARDIOGRAM TRACING: CPT

## 2018-08-20 PROCEDURE — 6370000000 HC RX 637 (ALT 250 FOR IP): Performed by: PHYSICIAN ASSISTANT

## 2018-08-20 PROCEDURE — 80048 BASIC METABOLIC PNL TOTAL CA: CPT

## 2018-08-20 PROCEDURE — 97535 SELF CARE MNGMENT TRAINING: CPT

## 2018-08-20 PROCEDURE — 97110 THERAPEUTIC EXERCISES: CPT

## 2018-08-20 PROCEDURE — 1200000002 HC SEMI PRIVATE SWING BED

## 2018-08-20 PROCEDURE — 6370000000 HC RX 637 (ALT 250 FOR IP): Performed by: NURSE PRACTITIONER

## 2018-08-20 PROCEDURE — 6360000002 HC RX W HCPCS: Performed by: INTERNAL MEDICINE

## 2018-08-20 PROCEDURE — 97530 THERAPEUTIC ACTIVITIES: CPT

## 2018-08-20 PROCEDURE — 70450 CT HEAD/BRAIN W/O DYE: CPT

## 2018-08-20 PROCEDURE — 84484 ASSAY OF TROPONIN QUANT: CPT

## 2018-08-20 PROCEDURE — 2580000003 HC RX 258: Performed by: NURSE PRACTITIONER

## 2018-08-20 PROCEDURE — 92526 ORAL FUNCTION THERAPY: CPT

## 2018-08-20 RX ORDER — 0.9 % SODIUM CHLORIDE 0.9 %
250 INTRAVENOUS SOLUTION INTRAVENOUS ONCE
Status: COMPLETED | OUTPATIENT
Start: 2018-08-20 | End: 2018-08-20

## 2018-08-20 RX ORDER — SODIUM CHLORIDE 9 MG/ML
INJECTION, SOLUTION INTRAVENOUS CONTINUOUS
Status: DISCONTINUED | OUTPATIENT
Start: 2018-08-20 | End: 2018-08-20

## 2018-08-20 RX ORDER — INSULIN GLARGINE 100 [IU]/ML
40 INJECTION, SOLUTION SUBCUTANEOUS NIGHTLY
Status: DISCONTINUED | OUTPATIENT
Start: 2018-08-20 | End: 2018-08-21

## 2018-08-20 RX ORDER — INSULIN GLARGINE 100 [IU]/ML
45 INJECTION, SOLUTION SUBCUTANEOUS NIGHTLY
Status: DISCONTINUED | OUTPATIENT
Start: 2018-08-20 | End: 2018-08-20

## 2018-08-20 RX ORDER — CARVEDILOL 12.5 MG/1
12.5 TABLET ORAL 2 TIMES DAILY
Status: DISCONTINUED | OUTPATIENT
Start: 2018-08-20 | End: 2018-08-20

## 2018-08-20 RX ADMIN — METFORMIN HYDROCHLORIDE 500 MG: 500 TABLET, FILM COATED ORAL at 08:20

## 2018-08-20 RX ADMIN — ZOLPIDEM TARTRATE 5 MG: 5 TABLET ORAL at 20:57

## 2018-08-20 RX ADMIN — INSULIN LISPRO 1 UNITS: 100 INJECTION, SOLUTION INTRAVENOUS; SUBCUTANEOUS at 20:58

## 2018-08-20 RX ADMIN — MAGNESIUM HYDROXIDE 30 ML: 400 SUSPENSION ORAL at 20:57

## 2018-08-20 RX ADMIN — SODIUM CHLORIDE 250 ML: 9 INJECTION, SOLUTION INTRAVENOUS at 00:43

## 2018-08-20 RX ADMIN — INSULIN LISPRO 15 UNITS: 100 INJECTION, SOLUTION INTRAVENOUS; SUBCUTANEOUS at 17:50

## 2018-08-20 RX ADMIN — SODIUM CHLORIDE: 9 INJECTION, SOLUTION INTRAVENOUS at 02:15

## 2018-08-20 RX ADMIN — PANTOPRAZOLE SODIUM 40 MG: 40 TABLET, DELAYED RELEASE ORAL at 05:59

## 2018-08-20 RX ADMIN — INSULIN LISPRO 11 UNITS: 100 INJECTION, SOLUTION INTRAVENOUS; SUBCUTANEOUS at 08:26

## 2018-08-20 RX ADMIN — INSULIN LISPRO 1 UNITS: 100 INJECTION, SOLUTION INTRAVENOUS; SUBCUTANEOUS at 17:47

## 2018-08-20 RX ADMIN — ENOXAPARIN SODIUM 40 MG: 40 INJECTION, SOLUTION INTRAVENOUS; SUBCUTANEOUS at 08:21

## 2018-08-20 RX ADMIN — INSULIN LISPRO 15 UNITS: 100 INJECTION, SOLUTION INTRAVENOUS; SUBCUTANEOUS at 12:10

## 2018-08-20 RX ADMIN — SODIUM CHLORIDE: 9 INJECTION, SOLUTION INTRAVENOUS at 05:57

## 2018-08-20 RX ADMIN — ASPIRIN 325 MG: 325 TABLET, COATED ORAL at 08:21

## 2018-08-20 RX ADMIN — CLOPIDOGREL BISULFATE 75 MG: 75 TABLET, FILM COATED ORAL at 08:21

## 2018-08-20 RX ADMIN — HYDROCHLOROTHIAZIDE 25 MG: 25 TABLET ORAL at 08:20

## 2018-08-20 RX ADMIN — ATORVASTATIN CALCIUM 80 MG: 80 TABLET, FILM COATED ORAL at 20:57

## 2018-08-20 RX ADMIN — DOXAZOSIN 4 MG: 4 TABLET ORAL at 20:57

## 2018-08-20 RX ADMIN — INSULIN GLARGINE 40 UNITS: 100 INJECTION, SOLUTION SUBCUTANEOUS at 20:58

## 2018-08-20 RX ADMIN — INSULIN LISPRO 8 UNITS: 100 INJECTION, SOLUTION INTRAVENOUS; SUBCUTANEOUS at 08:22

## 2018-08-20 RX ADMIN — AMLODIPINE BESYLATE 10 MG: 5 TABLET ORAL at 10:26

## 2018-08-20 ASSESSMENT — PAIN SCALES - GENERAL: PAINLEVEL_OUTOF10: 0

## 2018-08-20 NOTE — FLOWSHEET NOTE
08/20/18 0145   Vital Signs   /77   informed omayra mills of bp, and of new onset slurred speech, pt is alert and oriented to person, place, time, and situation, is able to follow commands, and was able to walk back to bed with assistance x 3 and walker. Orders given to continue ns at 100 x 10 hours and to obtain ct of head wo contrast stat, called and informed perez xray tech.

## 2018-08-20 NOTE — PROGRESS NOTES
Physical Therapy  Facility/Department: [facility] MED SURG  Daily Treatment Note    NAME: Stanley Beck  : 1965  MRN: [id number]    Date of Service: 2018    Discharge Recommendations:  Continue to assess pending progress        Patient Diagnosis(es): There were no encounter diagnoses. has a past medical history of Diabetes mellitus ([facility] [address] Hypertension; and Stroke [facility]). has no past surgical history on file. Restrictions  Restrictions/Precautions  Restrictions/Precautions: General Precautions, Fall Risk  Required Braces or Orthoses?: No     Subjective   General  Chart Reviewed: Yes  Family / Caregiver Present: Yes (sister in room)  Subjective  Subjective: Patient in bed and agreeable to PT treatment.     Pain Screening  Patient Currently in Pain: Denies  Vital Signs  Patient Currently in Pain: Denies       Orientation  Orientation  Overall Orientation Status: Within Functional Limits     Objective   Bed mobility  Supine to Sit: Modified independent  Scooting: Modified independent     Transfers  Sit to Stand: Stand by assistance  Stand to sit: Stand by assistance     Ambulation  Ambulation?: Yes  Ambulation 1  Surface: level tile  Device: Rolling Walker  Assistance: Contact guard assistance  Quality of Gait: R foot drop with R hip circumduction   Distance: 180 feet     Balance  Sitting - Static: Good  Sitting - Dynamic: Good;-  Standing - Static: Fair;+  Standing - Dynamic: Fair;+ (at walker)     Exercises  Bridging:  (x10 )  Heelslides: 2x10 B, with hand to contralateral knee (assisted with R LE and R UE)  Gluteal Sets: add/glut sets x 10   Hip Flexion: standing x10 R (hip hike)  Hip Abduction: x10 B  Knee Passive Range of Motion: AA/P ROM R hip and knee and ankle x 20 each  Ankle Pumps: PROM DF/PF x20 each R, resisted PF  Other exercises  Other exercises?: Yes  Other exercises 1: Marching in place 2 sets of 5 B  Other exercises 2: Hip flexion/extension (gait activity) - with bed rail on L and Training  Safety Devices  Type of devices: Left in chair, Call light within reach, Patient at risk for falls, Sitter present     Therapy Time   Individual Concurrent Group Co-treatment   Time In [address] [place] [address]     This note serves as D/C summary if patient is discharged prior to next visit.

## 2018-08-20 NOTE — FLOWSHEET NOTE
08/20/18 0349   Vital Signs   Temp 98.1 °F (36.7 °C)   Temp Source Oral   Pulse 64   Heart Rate Source Monitor   Resp 17   /82   BP Location Left Arm   BP Upper/Lower Upper   MAP (mmHg) 94   Oxygen Therapy   SpO2 97 %   O2 Device None (Room air)   informed omayra mills of vitals, neuro assessment, and ekg results, see new orders placed for troponin, cbc, bmp, hga1c, U/A, and neuro checks

## 2018-08-20 NOTE — PROGRESS NOTES
Speech Language Pathology  Facility/Department: [facility] MED SURG  Dysphagia Daily Treatment Note    NAME: Harjeet Ward  : 1965  MRN: [id number]    Patient Diagnosis(es):   Patient Active Problem List    Diagnosis Date Noted    Type 2 diabetes mellitus ([facility] [address] 2018    History of CVA with residual deficit 2018    Hypertension 2018    Elevated LFTs 2018    Declining functional status 08/15/2018     Allergies: Allergies   Allergen Reactions    Lisinopril Other (See Comments)     syncope  Vision problems, fainting, weakness       Onset Date: 08/15/2018  Current Diet Level:  Regular texture with thin liquids       Impression  Dysphagia Diagnosis: Mild oral stage dysphagia  Dysphagia Outcome Severity Scale: Level 6: Within functional limits/Modified independence     S: Patient upright in bed, consuming AM meal; pleasant and agreeable to ST tx.     O: Dysphagia treatment 45 minutes this date to address oral phase dysphagia:    1: Patient will complete oral motor exercises 9/10 times over 3 consecutive sessions to improve oral motor function. 10X3    2: Patient will tolerate regular diet texture 9/10 times over 3 consecutive sessions. 9/10x (x1)    3: Patient will demonstrate use and understanding of compensatory strategies with 90% accuracy over 3 consecutive sessions to reduce aspiration risk. 80% acc with min cues. A: Patient tolerated regular diet texture/thin liquids without overt s/sx aspiration. Patient demonstrated min oral stasis, however, cleared with liquid wash/reswallow when given min cues. Patient also utilized lingual sweep when indicated independently. Patient completed oral motor exercises to improve labial and lingual strength/ROM when given visual, verbal and written cues. P: Continue skilled ST services under current POC to address oral phase dysphagia.        Rico Thurman, SLP

## 2018-08-20 NOTE — PROGRESS NOTES
Assessment   Assessment: Pt agreeable to therapy services and motivated to participate. Pt completed dressing training for LB in long sitting position, seated at EOB. Pt educated in several techniques to promote independence in this area. Pt able to complete task with min A with verbal cuing needed for compensatory strategy. Pt felt painful sensation after resting left on side of bed to complete dressing task. Pt tolerated ROM exercises with fatigue and several rest breaks noted. Pt able to improve assist with AAROM in Adduction lying supine. Pt required several cues for breathing while completing ROM. Pt ambulated to bathroom and completing grooming in standing with CGA while completing weight bearing on RUE. Pt left seated EOB with sister present in room. Plan   Plan  Times per week: 3-5  Times per day: Daily  Current Treatment Recommendations: ROM, Functional Mobility Training, Safety Education & Training, Self-Care / ADL, Endurance Training, Neuromuscular Re-education, Balance Training, Strengthening       Goals  Short term goals  Time Frame for Short term goals: 1 week  Short term goal 1: Pt to complete LB dressing with CGA and UB with SBA. Short term goal 2: Pt to complete toileting with CGA. Short term goal 3: Pt to complete bathing with CGA. Short term goal 4: Pt to complete bathing transfer with SBA. Short term goal 5: Pt to demonstrate independence in self PROM in RUE. Long term goals  Time Frame for Long term goals : 2 weeks. Long term goal 1: Pt to complete dynamic/standing tolerance activity x10 minutes to decrease risk of falls. Long term goal 2: Pt to complete dressing with MOD I. Long term goal 3: Pt to complete toileting with MOD I. Long term goal 4: Pt to complete bathing SUP.         Therapy Time   Individual Concurrent Group Co-treatment   Time In 0130         Time Out 5762         Minutes 70              This note serves as a DC summary in the event of pt

## 2018-08-21 LAB
BACTERIA: ABNORMAL /HPF
BILIRUBIN URINE: NEGATIVE
BLOOD, URINE: NEGATIVE
CLARITY: CLEAR
COLOR: YELLOW
EPITHELIAL CELLS, UA: ABNORMAL /HPF
GLUCOSE BLD-MCNC: 169 MG/DL (ref 74–106)
GLUCOSE BLD-MCNC: 211 MG/DL (ref 74–106)
GLUCOSE BLD-MCNC: 336 MG/DL (ref 74–106)
GLUCOSE BLD-MCNC: 377 MG/DL (ref 74–106)
GLUCOSE BLD-MCNC: 569 MG/DL (ref 74–106)
GLUCOSE URINE: 500 MG/DL
KETONES, URINE: NEGATIVE MG/DL
LEUKOCYTE ESTERASE, URINE: NEGATIVE
MICROSCOPIC EXAMINATION: YES
MUCUS: ABNORMAL /LPF
NITRITE, URINE: NEGATIVE
PERFORMED ON: ABNORMAL
PH UA: 6.5
PROTEIN UA: ABNORMAL MG/DL
RBC UA: ABNORMAL /HPF (ref 0–2)
SPECIFIC GRAVITY UA: 1.02
URINE REFLEX TO CULTURE: ABNORMAL
URINE TYPE: ABNORMAL
UROBILINOGEN, URINE: 2 E.U./DL
WBC UA: ABNORMAL /HPF (ref 0–5)

## 2018-08-21 PROCEDURE — 6370000000 HC RX 637 (ALT 250 FOR IP): Performed by: INTERNAL MEDICINE

## 2018-08-21 PROCEDURE — 6370000000 HC RX 637 (ALT 250 FOR IP): Performed by: NURSE PRACTITIONER

## 2018-08-21 PROCEDURE — 97110 THERAPEUTIC EXERCISES: CPT

## 2018-08-21 PROCEDURE — G8988 SELF CARE GOAL STATUS: HCPCS

## 2018-08-21 PROCEDURE — 97530 THERAPEUTIC ACTIVITIES: CPT

## 2018-08-21 PROCEDURE — 6370000000 HC RX 637 (ALT 250 FOR IP): Performed by: PHYSICIAN ASSISTANT

## 2018-08-21 PROCEDURE — 6360000002 HC RX W HCPCS: Performed by: INTERNAL MEDICINE

## 2018-08-21 PROCEDURE — 1200000002 HC SEMI PRIVATE SWING BED

## 2018-08-21 PROCEDURE — 81001 URINALYSIS AUTO W/SCOPE: CPT

## 2018-08-21 PROCEDURE — G8987 SELF CARE CURRENT STATUS: HCPCS

## 2018-08-21 RX ORDER — INSULIN GLARGINE 100 [IU]/ML
45 INJECTION, SOLUTION SUBCUTANEOUS NIGHTLY
Status: DISCONTINUED | OUTPATIENT
Start: 2018-08-21 | End: 2018-08-23

## 2018-08-21 RX ADMIN — INSULIN GLARGINE 45 UNITS: 100 INJECTION, SOLUTION SUBCUTANEOUS at 20:34

## 2018-08-21 RX ADMIN — INSULIN LISPRO 12 UNITS: 100 INJECTION, SOLUTION INTRAVENOUS; SUBCUTANEOUS at 09:04

## 2018-08-21 RX ADMIN — METFORMIN HYDROCHLORIDE 500 MG: 500 TABLET, FILM COATED ORAL at 08:56

## 2018-08-21 RX ADMIN — DOXAZOSIN 4 MG: 4 TABLET ORAL at 20:30

## 2018-08-21 RX ADMIN — AMLODIPINE BESYLATE 10 MG: 5 TABLET ORAL at 08:57

## 2018-08-21 RX ADMIN — ZOLPIDEM TARTRATE 5 MG: 5 TABLET ORAL at 20:30

## 2018-08-21 RX ADMIN — INSULIN LISPRO 1 UNITS: 100 INJECTION, SOLUTION INTRAVENOUS; SUBCUTANEOUS at 12:14

## 2018-08-21 RX ADMIN — ENOXAPARIN SODIUM 40 MG: 40 INJECTION, SOLUTION INTRAVENOUS; SUBCUTANEOUS at 08:57

## 2018-08-21 RX ADMIN — PANTOPRAZOLE SODIUM 40 MG: 40 TABLET, DELAYED RELEASE ORAL at 08:57

## 2018-08-21 RX ADMIN — CLOPIDOGREL BISULFATE 75 MG: 75 TABLET, FILM COATED ORAL at 08:57

## 2018-08-21 RX ADMIN — INSULIN LISPRO 2 UNITS: 100 INJECTION, SOLUTION INTRAVENOUS; SUBCUTANEOUS at 16:55

## 2018-08-21 RX ADMIN — ASPIRIN 325 MG: 325 TABLET, COATED ORAL at 08:57

## 2018-08-21 RX ADMIN — HYDROCHLOROTHIAZIDE 25 MG: 25 TABLET ORAL at 08:57

## 2018-08-21 RX ADMIN — INSULIN LISPRO 5 UNITS: 100 INJECTION, SOLUTION INTRAVENOUS; SUBCUTANEOUS at 08:59

## 2018-08-21 RX ADMIN — ATORVASTATIN CALCIUM 80 MG: 80 TABLET, FILM COATED ORAL at 20:30

## 2018-08-21 RX ADMIN — INSULIN LISPRO 2 UNITS: 100 INJECTION, SOLUTION INTRAVENOUS; SUBCUTANEOUS at 20:34

## 2018-08-21 RX ADMIN — INSULIN LISPRO 15 UNITS: 100 INJECTION, SOLUTION INTRAVENOUS; SUBCUTANEOUS at 16:57

## 2018-08-21 RX ADMIN — INSULIN LISPRO 12 UNITS: 100 INJECTION, SOLUTION INTRAVENOUS; SUBCUTANEOUS at 12:17

## 2018-08-21 NOTE — PROGRESS NOTES
Physical Therapy  Facility/Department: [facility] MED SURG  Daily Treatment Note    NAME: Merlin Ozuna  : 1965  MRN: [id number]    Date of Service: 2018    Discharge Recommendations:  Continue to assess pending progress        Patient Diagnosis(es): There were no encounter diagnoses. has a past medical history of Diabetes mellitus ([facility] [address] Hypertension; and Stroke [facility]). has no past surgical history on file. Restrictions  Restrictions/Precautions  Restrictions/Precautions: General Precautions, Fall Risk  Required Braces or Orthoses?: No     Subjective   General  Chart Reviewed: Yes  Family / Caregiver Present: No  Subjective  Subjective: Patient sitting at edge of bed and agreeable to PT treatment. Patient reports no change.    Pain Screening  Patient Currently in Pain: Denies  Vital Signs  Patient Currently in Pain: Denies       Orientation  Orientation  Overall Orientation Status: Within Functional Limits     Objective   Bed mobility  Supine to Sit: Modified independent  Sit to Supine: Modified independent  Scooting: Modified independent     Transfers  Sit to Stand: Stand by assistance  Stand to sit: Stand by assistance     Ambulation  Ambulation?: Yes  Ambulation 1  Surface: level tile  Device: Rolling Walker  Assistance: Contact guard assistance  Quality of Gait: R foot drop with R hip circumduction   Distance: 270 feet     Balance  Sitting - Static: Good  Sitting - Dynamic: Good;-  Standing - Static: Fair;+  Standing - Dynamic: Fair;+ (at walker)     Exercises  Bridging:  (x20)  Quad Sets: x20 B  Heelslides: 2x10 B, with hand to contralateral knee (assisted with R LE and R UE)  Gluteal Sets: add/glut sets x 10   Hip Abduction: x20 B, AA on R  Knee Long Arc Quad: x20 B  Knee Passive Range of Motion: AA/P ROM R hip and knee and ankle x 20 each  Ankle Pumps: PROM DF/PF x20 each R, resisted PF  Other exercises  Other exercises 3: LE pedicycle x 8 minutes, 4 minute retro and 4 minute forward  Other Co-treatment   Time In 1000         Time Out [address], PT     This note serves as D/C summary if patient is discharged prior to next visit.

## 2018-08-21 NOTE — PROGRESS NOTES
percent impaired, limited or restricted  Self Care Goal Status (): At least 1 percent but less than 20 percent impaired, limited or restricted       Goals  Short term goals  Time Frame for Short term goals: 1 week  Short term goal 1: Pt to complete LB dressing with CGA and UB with SBA. Short term goal 2: Pt to complete toileting with CGA. Short term goal 3: Pt to complete bathing with CGA. Short term goal 4: Pt to complete bathing transfer with SBA. Short term goal 5: Pt to demonstrate independence in self PROM in RUE. Long term goals  Time Frame for Long term goals : 2 weeks. Long term goal 1: Pt to complete dynamic/standing tolerance activity x10 minutes to decrease risk of falls. Long term goal 2: Pt to complete dressing with MOD I. Long term goal 3: Pt to complete toileting with MOD I. Long term goal 4: Pt to complete bathing SUP. Therapy Time   Individual Concurrent Group Co-treatment   Time In 737         Time Out 1007         Minutes 35              This note serves as a DC summary in the event of pt discharge.      Eli Matias, OTR/L

## 2018-08-21 NOTE — PLAN OF CARE
Problem: Falls - Risk of:  Goal: Will remain free from falls  Will remain free from falls   Outcome: Ongoing      Problem: Musculor/Skeletal Functional Status  Goal: Highest potential functional level  Outcome: Ongoing      Problem: Daily Care:  Goal: Daily care needs are met  Daily care needs are met   Outcome: Ongoing

## 2018-08-21 NOTE — PROGRESS NOTES
Occupational Therapy  Facility/Department: [facility] MED SURG  Daily Treatment Note  NAME: Ricky Sethi  : 1965  MRN: [id number]    Date of Service: 2018    Discharge Recommendations:  Continue to assess pending progress       Patient Diagnosis(es): There were no encounter diagnoses. has a past medical history of Diabetes mellitus ([facility] [address] Hypertension; and Stroke [facility]). has no past surgical history on file. Restrictions  Restrictions/Precautions  Restrictions/Precautions: General Precautions, Fall Risk  Required Braces or Orthoses?: No  Subjective   General  Chart Reviewed: Yes  Patient assessed for rehabilitation services?: Yes  Family / Caregiver Present: No  Referring Practitioner: GARETH Maher  Diagnosis: CVA  Subjective  Subjective: I am ready for therapy. I don't have any pain. Orientation     Objective             Functional Mobility  Functional - Mobility Device: Rolling Walker  Activity: Other  Assist Level: Contact guard assistance  Functional Mobility Comments: Pt ambulated with RW demo good safety awareness x110 feet. Pt required repositioning of RUE hand on RW. Pt with fatigue after ambulation with difficulty moving RLE. Type of ROM/Therapeutic Exercise  Type of ROM/Therapeutic Exercise: AAROM;PROM;AROM  Exercises  Scapular Protraction: X5  Scapular Retraction: X5  Shoulder Depression: X5  Shoulder Elevation: x5  Shoulder Flexion: x15  Shoulder Extension: x15  Shoulder ABduction: AAROM in supine x20  Shoulder ADduction: AROM x20 in supine  Horizontal ABduction: x10  Horizontal ADduction: x10  Elbow Flexion: x15  Elbow Extension: x15  Supination: x15  Wrist Extension: x15  Finger Flexion: x15  Finger Extension: x15  Other: IR/ER in supine x10     Assessment   Assessment: Pt agreeable to OT services. Pt completed supine AAROM and AROM exercises in bed. Pt with improved motion lying supine at this time. Pt demo improved gripping.  Pt educated and provided with rolled cloth to complete gripping exercises. pt demo improved ability to complete elbow flexion with poor control. Pt tolerate with no pain. Pt ambulated x110 feet with RW with occasional need for repositioning hand on walker. Plan   Plan  Times per week: 3-5  Times per day: Daily  Current Treatment Recommendations: ROM, Functional Mobility Training, Safety Education & Training, Self-Care / ADL, Endurance Training, Neuromuscular Re-education, Balance Training, Strengthening       Goals  Short term goals  Time Frame for Short term goals: 1 week  Short term goal 1: Pt to complete LB dressing with CGA and UB with SBA. Short term goal 2: Pt to complete toileting with CGA. Short term goal 3: Pt to complete bathing with CGA. Short term goal 4: Pt to complete bathing transfer with SBA. Short term goal 5: Pt to demonstrate independence in self PROM in RUE. Long term goals  Time Frame for Long term goals : 2 weeks. Long term goal 1: Pt to complete dynamic/standing tolerance activity x10 minutes to decrease risk of falls. Long term goal 2: Pt to complete dressing with MOD I. Long term goal 3: Pt to complete toileting with MOD I. Long term goal 4: Pt to complete bathing SUP. Therapy Time   Individual Concurrent Group Co-treatment   Time In 0123         Time Out 0211         Minutes 48              This note serves as a DC summary in the event of pt discharge.      RIANNA Stephens/L

## 2018-08-21 NOTE — FLOWSHEET NOTE
Color/Condition (WDL) WDL   Skin Integrity   Skin Integrity (WDL) WDL   Musculoskeletal   Musculoskeletal (WDL) X   RUE Other (Comment)  (extreme weakness)   LUE Full movement   RL Extremity Weakness   LL Extremity Full movement   Genitourinary   Genitourinary (WDL) WDL   Flank Tenderness No   Psychosocial   Psychosocial (WDL) WDL   Pt awake in bed. Pt alert and oriented. Pt appears in no acute distress. Pt currently on telemetry monitoring showing NSR-ST. Pt lung sounds clear zhane. Pt encouraged to cough and deep breathe. Pt states rt side is \"drawing\" more. Pt call bell and bedside table within reach. Will continue to monitor pt.

## 2018-08-22 LAB
GLUCOSE BLD-MCNC: 254 MG/DL (ref 74–106)
GLUCOSE BLD-MCNC: 257 MG/DL (ref 74–106)
GLUCOSE BLD-MCNC: 282 MG/DL (ref 74–106)
GLUCOSE BLD-MCNC: 318 MG/DL (ref 74–106)
GLUCOSE BLD-MCNC: 95 MG/DL (ref 74–106)
PERFORMED ON: ABNORMAL
PERFORMED ON: NORMAL

## 2018-08-22 PROCEDURE — 99308 SBSQ NF CARE LOW MDM 20: CPT | Performed by: INTERNAL MEDICINE

## 2018-08-22 PROCEDURE — 94760 N-INVAS EAR/PLS OXIMETRY 1: CPT

## 2018-08-22 PROCEDURE — 6360000002 HC RX W HCPCS: Performed by: INTERNAL MEDICINE

## 2018-08-22 PROCEDURE — 92526 ORAL FUNCTION THERAPY: CPT

## 2018-08-22 PROCEDURE — 6370000000 HC RX 637 (ALT 250 FOR IP): Performed by: NURSE PRACTITIONER

## 2018-08-22 PROCEDURE — 6370000000 HC RX 637 (ALT 250 FOR IP): Performed by: INTERNAL MEDICINE

## 2018-08-22 PROCEDURE — 1200000002 HC SEMI PRIVATE SWING BED

## 2018-08-22 PROCEDURE — 97110 THERAPEUTIC EXERCISES: CPT

## 2018-08-22 PROCEDURE — 97535 SELF CARE MNGMENT TRAINING: CPT

## 2018-08-22 PROCEDURE — 97530 THERAPEUTIC ACTIVITIES: CPT

## 2018-08-22 PROCEDURE — 6370000000 HC RX 637 (ALT 250 FOR IP): Performed by: PHYSICIAN ASSISTANT

## 2018-08-22 RX ADMIN — PANTOPRAZOLE SODIUM 40 MG: 40 TABLET, DELAYED RELEASE ORAL at 05:01

## 2018-08-22 RX ADMIN — Medication 4 UNITS: at 17:01

## 2018-08-22 RX ADMIN — HYDROCHLOROTHIAZIDE 25 MG: 25 TABLET ORAL at 05:01

## 2018-08-22 RX ADMIN — Medication 3 UNITS: at 14:15

## 2018-08-22 RX ADMIN — ATORVASTATIN CALCIUM 80 MG: 80 TABLET, FILM COATED ORAL at 20:16

## 2018-08-22 RX ADMIN — AMLODIPINE BESYLATE 10 MG: 5 TABLET ORAL at 05:02

## 2018-08-22 RX ADMIN — CLOPIDOGREL BISULFATE 75 MG: 75 TABLET, FILM COATED ORAL at 08:24

## 2018-08-22 RX ADMIN — INSULIN LISPRO 2 UNITS: 100 INJECTION, SOLUTION INTRAVENOUS; SUBCUTANEOUS at 20:20

## 2018-08-22 RX ADMIN — ASPIRIN 325 MG: 325 TABLET, COATED ORAL at 08:24

## 2018-08-22 RX ADMIN — INSULIN LISPRO 3 UNITS: 100 INJECTION, SOLUTION INTRAVENOUS; SUBCUTANEOUS at 08:26

## 2018-08-22 RX ADMIN — METFORMIN HYDROCHLORIDE 500 MG: 500 TABLET, FILM COATED ORAL at 08:24

## 2018-08-22 RX ADMIN — ENOXAPARIN SODIUM 40 MG: 40 INJECTION, SOLUTION INTRAVENOUS; SUBCUTANEOUS at 08:24

## 2018-08-22 RX ADMIN — INSULIN GLARGINE 45 UNITS: 100 INJECTION, SOLUTION SUBCUTANEOUS at 20:20

## 2018-08-22 RX ADMIN — INSULIN LISPRO 15 UNITS: 100 INJECTION, SOLUTION INTRAVENOUS; SUBCUTANEOUS at 08:27

## 2018-08-22 RX ADMIN — ZOLPIDEM TARTRATE 5 MG: 5 TABLET ORAL at 20:17

## 2018-08-22 RX ADMIN — INSULIN LISPRO 15 UNITS: 100 INJECTION, SOLUTION INTRAVENOUS; SUBCUTANEOUS at 17:03

## 2018-08-22 RX ADMIN — DOXAZOSIN 4 MG: 4 TABLET ORAL at 20:16

## 2018-08-22 NOTE — PROGRESS NOTES
Physical Therapy  Facility/Department: [facility] MED SURG  Daily Treatment Note    NAME: Lieutenant Ospina  : 1965  MRN: [id number]    Date of Service: 2018    Discharge Recommendations:  Continue to assess pending progress        Patient Diagnosis(es): There were no encounter diagnoses. has a past medical history of Diabetes mellitus ([facility] [address] Hypertension; and Stroke [facility]). has no past surgical history on file. Restrictions  Restrictions/Precautions  Restrictions/Precautions: General Precautions, Fall Risk  Required Braces or Orthoses?: No     Subjective   General  Chart Reviewed: Yes  Family / Caregiver Present: No  Subjective  Subjective: Patient sitting at edge of bed and agreeable to PT treatment.     Pain Screening  Patient Currently in Pain: Denies  Vital Signs  Patient Currently in Pain: Denies       Orientation  Orientation  Overall Orientation Status: Within Functional Limits     Objective   Bed mobility  Supine to Sit: Modified independent  Sit to Supine: Modified independent  Scooting: Modified independent     Transfers  Sit to Stand: Stand by assistance  Stand to sit: Stand by assistance     Ambulation  Ambulation?: Yes  Ambulation 1  Surface: level tile  Device: Rolling Walker  Assistance: Contact guard assistance  Quality of Gait: R foot drop with R hip circumduction   Distance: 200 feet x 2 reps     Balance  Posture: Good  Sitting - Static: Good  Sitting - Dynamic: Good;-  Standing - Static: Fair;+  Standing - Dynamic: Fair;+ (at walker)     Exercises  Bridging:  (x20)  Straight Leg Raise: x5 R  Hamstring Sets: unable to perform  Quad Sets: x20 B  Heelslides: 2x10 B, with hand to contralateral knee (assisted with R LE and R UE)  Gluteal Sets: add/glut sets x 20   Hip Abduction: x20 B, AA on R  Knee Long Arc Quad: x20 B  Knee Passive Range of Motion: AA/P ROM R hip and knee and ankle x 20 each  Ankle Pumps: PROM DF/PF x20 each R, resisted PF  Other exercises  Other exercises 1:

## 2018-08-22 NOTE — PROGRESS NOTES
Progress Note      Subjective:   Chief complaint: Declining Functional status    Interval History: at time of exam today patient resting in bed. appears comfortable and in no acute distress. Appetite stable and good states he \"eats everything on the plate every time. \" having regular bowel movement. Blood glucose has fluctuated and have been titrating insulin regimen. He remains weak especially on right side however states this is gradually improving with therapy everyday. Review of systems:     Constitutional:  Denies fever or chills , admits to generalized weakness- improving   Eyes:  Denies eye pain or redness  HENT:  Denies nasal congestion or sore throat   Respiratory:  Denies cough or shortness of breath   Cardiovascular:  Denies chest pain or edema   GI:  Denies abdominal pain, nausea, vomiting, bloody stools or diarrhea   :  Denies dysuria or frequency  Musculoskeletal:  Denies acute neck pain or body aches  Integument:  Denies rash or itching  Neurologic:  Denies headache, dizziness, numbness, tingling. Positive for right sided weakness s/p cva. Psychiatric:  Denies acute depression or acute anxiety      Past medical history, surgical history, family history and social history reviewed and unchanged compared to H&P earlier this admission.     Medications:   Scheduled Meds:   insulin lispro  0-6 Units Subcutaneous TID WC    insulin lispro  0-3 Units Subcutaneous Nightly    insulin glargine  45 Units Subcutaneous Nightly    insulin lispro  15 Units Subcutaneous TID AC    doxazosin  4 mg Oral Nightly    amLODIPine  10 mg Oral Daily    metFORMIN  500 mg Oral Daily    aspirin  325 mg Oral Daily    atorvastatin  80 mg Oral Nightly    clopidogrel  75 mg Oral Daily    hydrochlorothiazide  25 mg Oral Daily    enoxaparin  40 mg Subcutaneous Daily    pantoprazole  40 mg Oral QAM AC     Continuous Infusions:   dextrose         Objective:   Vitals: BP (!) 155/90   Pulse 82   Temp 98.3 °F (36.8 °C)

## 2018-08-22 NOTE — PROGRESS NOTES
Speech Language Pathology  Facility/Department: [facility] MED SURG  Dysphagia Daily Treatment Note     NAME: Ila Serrano  : 1965  MRN: [id number]     Patient Diagnosis(es):        Patient Active Problem List     Diagnosis Date Noted    Type 2 diabetes mellitus ([facility] [address] 2018    History of CVA with residual deficit 2018    Hypertension 2018    Elevated LFTs 2018    Declining functional status 08/15/2018      Allergies: Allergies   Allergen Reactions    Lisinopril Other (See Comments)       syncope  Vision problems, fainting, weakness         Onset Date: 08/15/2018  Current Diet Level:  Regular texture with thin liquids        Impression  Dysphagia Diagnosis: Mild oral stage dysphagia  Dysphagia Outcome Severity Scale: Level 6: Within functional limits/Modified independence      S: Patient upright in bed, consuming AM meal; pleasant and agreeable to ST tx.      O: Dysphagia treatment 30 minutes this date to address oral phase dysphagia:     1: Patient will complete oral motor exercises 9/10 times over 3 consecutive sessions to improve oral motor function. Not targeted this date.   2: Patient will tolerate regular diet texture 9/10 times over 3 consecutive sessions. 9/10x (x2)     3: Patient will demonstrate use and understanding of compensatory strategies with 90% accuracy over 3 consecutive sessions to reduce aspiration risk. 90% acc with min cues (x1)     A: Patient tolerated regular diet texture/thin liquids without overt s/sx aspiration. Patient verbalized and demonstrated use of compensatory strategies (alternating liquids/solids, small bites and sips, appropriate rate) independently. Patient continues to complete oral motor exercises independently with written instruction.      P: Continue skilled ST services under current POC to address oral phase dysphagia.      Completed by Erwin Cage, [facility] speech-pathology student under the supervision of RUTH Blackwood.

## 2018-08-22 NOTE — PROGRESS NOTES
Occupational Therapy  Facility/Department: [address] [place] [address] MED SURG  Daily Treatment Note  NAME: Harjeet Ward  : 1965  MRN: [id number]    Date of Service: 2018    Discharge Recommendations:  Continue to assess pending progress       Patient Diagnosis(es): There were no encounter diagnoses. has a past medical history of Diabetes mellitus ([facility] [address] Hypertension; and Stroke [facility]). has no past surgical history on file. Restrictions  Restrictions/Precautions  Restrictions/Precautions: General Precautions, Fall Risk  Required Braces or Orthoses?: No  Subjective   General  Chart Reviewed: Yes  Patient assessed for rehabilitation services?: Yes  Family / Caregiver Present: No  [facility]  Diagnosis: CVA  Subjective  Subjective: Pt fell in parking lot at work. hx of elevated BP pt hospitalized at [facility] and transferred here. Orientation     Objective    ADL  LE Bathing: Contact guard assistance  UE Dressing: Stand by assistance  LE Dressing: Contact guard assistance (seated EOB.  Min assist seated in chair. )        Balance  Sitting Balance: Independent  Standing Balance: Contact guard assistance  Standing Balance  Time: 2 minutes  Activity: Lower body sponge bathing  Sit to stand: Contact guard assistance  Functional Mobility  Functional - Mobility Device: Rolling Walker  Activity: To/from bathroom  Assist Level: Contact guard assistance  Bed mobility  Supine to Sit: Modified independent  Sit to Supine: Modified independent  Scooting: Modified independent  Transfers  Sit to stand: Contact guard assistance      Type of ROM/Therapeutic Exercise  Type of ROM/Therapeutic Exercise: AAROM;PROM;AROM  Exercises  Scapular Protraction: X5  Scapular Retraction: X5  Shoulder Depression: X5  Shoulder Elevation: x5  Shoulder Flexion: x15 AAROM  Shoulder Extension: x15  Shoulder ABduction: AROM in supine x20  Shoulder ADduction: AROM x20 in supine  Horizontal ABduction: x10  Elbow Flexion: x15 AAROM  Elbow Extension: x15  Wrist Extension: x10  Finger Extension: x10  Grasp/Release: completed grasp/release activity working on holding cones and working on finger flexion/extension. Pt requires [place] assist to complete task. Assessment   Assessment: Pt demo improved ability to complete LB dressing seated EOB as opposed to sitting in chair. Pt able to mobilize leg to EOB and assist with dressing. Pt required min assist to inititate task seated in chair. Completed grasp and release tasks. Pt continues to exhibit movement in shoulder lying in supine. Pt left in supine position with call light in reach. Plan   Plan  Times per week: 3-5  Times per day: Daily  Plan weeks: 2  Current Treatment Recommendations: ROM, Functional Mobility Training, Safety Education & Training, Self-Care / ADL, Endurance Training, Neuromuscular Re-education, Balance Training, Strengthening       Goals  Short term goals  Time Frame for Short term goals: 1 week  Short term goal 1: Pt to complete LB dressing with CGA and UB with SBA. Short term goal 2: Pt to complete toileting with CGA. Short term goal 3: Pt to complete bathing with CGA. Short term goal 4: Pt to complete bathing transfer with SBA. Short term goal 5: Pt to demonstrate independence in self PROM in RUE. Long term goals  Time Frame for Long term goals : 2 weeks. Long term goal 1: Pt to complete dynamic/standing tolerance activity x10 minutes to decrease risk of falls. Long term goal 2: Pt to complete dressing with MOD I. Long term goal 3: Pt to complete toileting with MOD I. Long term goal 4: Pt to complete bathing SUP. Therapy Time   Individual Concurrent Group Co-treatment   Time In 0138         Time Out 0229         Minutes 51              This note serves as a DC summary in the event of pt discharge.      Eli Matias, OTR/L

## 2018-08-22 NOTE — FLOWSHEET NOTE
08/22/18 0832   Assessment   Charting Type Shift assessment   Neurological   Neuro (WDL) X   Level of Consciousness 0   Orientation Level Oriented X4   Cognition Appropriate judgement; Appropriate safety awareness; Appropriate attention/concentration; Appropriate for developmental age; Follows commands   Language Clear   R Hand  Absent   L Hand  Strong   L Foot Dorsiflexion Moderate   L Foot Plantar Flexion Moderate   R Pupil Shape Round   R Pupil Reaction Brisk   L Pupil Shape Round   L Pupil Reaction Brisk   RUE Motor Response Responds to command  (very weak movement)   LUE Motor Response Responds to command   RLE Motor Response Responds to command   LLE Motor Response Responds to command   Size R Pupil (mm) 2   Size L Pupil (mm) 2   R Foot Dorsiflexion Weak   R Foot Plantar Flexion Weak   [place] Coma Scale   Eye Opening 4   Best Verbal Response 5   Best Motor Response 6   [place] Coma Scale Score 15   HEENT   HEENT (WDL) X   Right Eye Impaired vision   Left Eye Impaired vision   Nose Intact   Throat Intact   Tongue Pink & moist   Voice Normal   Mucous Membrane Pink;Moist   Respiratory   Respiratory (WDL) WDL   Respiratory Pattern Regular   Respiratory Depth Normal   Breath Sounds   Right Upper Lobe Clear   Right Middle Lobe Clear   Right Lower Lobe Clear   Left Upper Lobe Clear   Left Lower Lobe Clear   Cardiac   Cardiac (WDL) WDL   Cardiac Regularity Regular   Cardiac Rhythm NSR  (tacycardic at times with exertion)   Heart Sounds S1, S2   Cardiac Monitor   Telemetry Monitor On Yes   Telemetry Audible Yes   Telemetry Alarms Set Yes   Telemetry Box Number [id number]   Telemetry Monitor Alarm Parameters    Gastrointestinal   Abdominal (WDL) WDL   Peripheral Vascular   Peripheral Vascular (WDL) WDL  (right wrist pulse slightly weaker than left)   Edema None   Sensation RUE Full sensation   Sensation LUE Full sensation   Sensation RLE Full sensation   Sensation LLE Full sensation   Skin Color/Condition   Skin

## 2018-08-23 LAB
A/G RATIO: 1.3 (ref 0.8–2)
ALBUMIN SERPL-MCNC: 3.8 G/DL (ref 3.4–4.8)
ALP BLD-CCNC: 89 U/L (ref 25–100)
ALT SERPL-CCNC: 23 U/L (ref 4–36)
ANION GAP SERPL CALCULATED.3IONS-SCNC: 11 MMOL/L (ref 3–16)
AST SERPL-CCNC: 20 U/L (ref 8–33)
BILIRUB SERPL-MCNC: 0.4 MG/DL (ref 0.3–1.2)
BUN BLDV-MCNC: 19 MG/DL (ref 6–20)
CALCIUM SERPL-MCNC: 9.2 MG/DL (ref 8.5–10.5)
CHLORIDE BLD-SCNC: 97 MMOL/L (ref 98–107)
CO2: 30 MMOL/L (ref 20–30)
CREAT SERPL-MCNC: 1.2 MG/DL (ref 0.4–1.2)
GFR AFRICAN AMERICAN: >59
GFR NON-AFRICAN AMERICAN: >59
GLOBULIN: 2.9 G/DL
GLUCOSE BLD-MCNC: 116 MG/DL (ref 74–106)
GLUCOSE BLD-MCNC: 209 MG/DL (ref 74–106)
GLUCOSE BLD-MCNC: 245 MG/DL (ref 74–106)
GLUCOSE BLD-MCNC: 267 MG/DL (ref 74–106)
GLUCOSE BLD-MCNC: 283 MG/DL (ref 74–106)
HCT VFR BLD CALC: 39.9 % (ref 40–54)
HEMOGLOBIN: 13.6 G/DL (ref 13–18)
MCH RBC QN AUTO: 29.6 PG (ref 27–32)
MCHC RBC AUTO-ENTMCNC: 34.1 G/DL (ref 31–35)
MCV RBC AUTO: 86.9 FL (ref 80–100)
PDW BLD-RTO: 12 % (ref 11–16)
PERFORMED ON: ABNORMAL
PLATELET # BLD: 201 K/UL (ref 150–400)
PMV BLD AUTO: 11.2 FL (ref 6–10)
POTASSIUM SERPL-SCNC: 3.5 MMOL/L (ref 3.4–5.1)
RBC # BLD: 4.59 M/UL (ref 4.5–6)
SODIUM BLD-SCNC: 138 MMOL/L (ref 136–145)
TOTAL PROTEIN: 6.7 G/DL (ref 6.4–8.3)
WBC # BLD: 7.7 K/UL (ref 4–11)

## 2018-08-23 PROCEDURE — 36415 COLL VENOUS BLD VENIPUNCTURE: CPT

## 2018-08-23 PROCEDURE — 97530 THERAPEUTIC ACTIVITIES: CPT

## 2018-08-23 PROCEDURE — 6370000000 HC RX 637 (ALT 250 FOR IP): Performed by: NURSE PRACTITIONER

## 2018-08-23 PROCEDURE — 6370000000 HC RX 637 (ALT 250 FOR IP): Performed by: INTERNAL MEDICINE

## 2018-08-23 PROCEDURE — 94760 N-INVAS EAR/PLS OXIMETRY 1: CPT

## 2018-08-23 PROCEDURE — 1200000002 HC SEMI PRIVATE SWING BED

## 2018-08-23 PROCEDURE — 80053 COMPREHEN METABOLIC PANEL: CPT

## 2018-08-23 PROCEDURE — 85027 COMPLETE CBC AUTOMATED: CPT

## 2018-08-23 PROCEDURE — 6360000002 HC RX W HCPCS: Performed by: INTERNAL MEDICINE

## 2018-08-23 PROCEDURE — 97110 THERAPEUTIC EXERCISES: CPT

## 2018-08-23 PROCEDURE — 6370000000 HC RX 637 (ALT 250 FOR IP): Performed by: PHYSICIAN ASSISTANT

## 2018-08-23 RX ORDER — CARVEDILOL 3.12 MG/1
3.12 TABLET ORAL 2 TIMES DAILY WITH MEALS
Status: DISCONTINUED | OUTPATIENT
Start: 2018-08-23 | End: 2018-08-25

## 2018-08-23 RX ORDER — CYCLOBENZAPRINE HCL 10 MG
5 TABLET ORAL 3 TIMES DAILY PRN
Status: DISCONTINUED | OUTPATIENT
Start: 2018-08-23 | End: 2018-08-24

## 2018-08-23 RX ORDER — INSULIN GLARGINE 100 [IU]/ML
60 INJECTION, SOLUTION SUBCUTANEOUS NIGHTLY
Status: DISCONTINUED | OUTPATIENT
Start: 2018-08-23 | End: 2018-08-25

## 2018-08-23 RX ADMIN — METFORMIN HYDROCHLORIDE 500 MG: 500 TABLET, FILM COATED ORAL at 08:37

## 2018-08-23 RX ADMIN — HYDROCHLOROTHIAZIDE 25 MG: 25 TABLET ORAL at 08:37

## 2018-08-23 RX ADMIN — Medication 3 UNITS: at 08:50

## 2018-08-23 RX ADMIN — CYCLOBENZAPRINE HYDROCHLORIDE 5 MG: 10 TABLET, FILM COATED ORAL at 10:36

## 2018-08-23 RX ADMIN — INSULIN LISPRO 15 UNITS: 100 INJECTION, SOLUTION INTRAVENOUS; SUBCUTANEOUS at 08:46

## 2018-08-23 RX ADMIN — AMLODIPINE BESYLATE 10 MG: 5 TABLET ORAL at 08:37

## 2018-08-23 RX ADMIN — ATORVASTATIN CALCIUM 80 MG: 80 TABLET, FILM COATED ORAL at 20:19

## 2018-08-23 RX ADMIN — Medication 2 UNITS: at 17:22

## 2018-08-23 RX ADMIN — CARVEDILOL 3.12 MG: 3.12 TABLET, FILM COATED ORAL at 17:18

## 2018-08-23 RX ADMIN — CLOPIDOGREL BISULFATE 75 MG: 75 TABLET, FILM COATED ORAL at 08:37

## 2018-08-23 RX ADMIN — CARVEDILOL 3.12 MG: 3.12 TABLET, FILM COATED ORAL at 10:36

## 2018-08-23 RX ADMIN — PANTOPRAZOLE SODIUM 40 MG: 40 TABLET, DELAYED RELEASE ORAL at 06:16

## 2018-08-23 RX ADMIN — ENOXAPARIN SODIUM 40 MG: 40 INJECTION, SOLUTION INTRAVENOUS; SUBCUTANEOUS at 08:37

## 2018-08-23 RX ADMIN — INSULIN LISPRO 2 UNITS: 100 INJECTION, SOLUTION INTRAVENOUS; SUBCUTANEOUS at 20:22

## 2018-08-23 RX ADMIN — INSULIN GLARGINE 60 UNITS: 100 INJECTION, SOLUTION SUBCUTANEOUS at 20:22

## 2018-08-23 RX ADMIN — ZOLPIDEM TARTRATE 5 MG: 5 TABLET ORAL at 20:20

## 2018-08-23 RX ADMIN — CYCLOBENZAPRINE HYDROCHLORIDE 5 MG: 10 TABLET, FILM COATED ORAL at 21:50

## 2018-08-23 RX ADMIN — INSULIN LISPRO 15 UNITS: 100 INJECTION, SOLUTION INTRAVENOUS; SUBCUTANEOUS at 17:21

## 2018-08-23 RX ADMIN — DOXAZOSIN 4 MG: 4 TABLET ORAL at 20:19

## 2018-08-23 RX ADMIN — ASPIRIN 325 MG: 325 TABLET, COATED ORAL at 08:40

## 2018-08-23 NOTE — PROGRESS NOTES
Reported to oncoming RN to verify patient is supposed to be on tele. No order for tele at this time.

## 2018-08-23 NOTE — PROGRESS NOTES
Occupational Therapy  Facility/Department: [address] [place] [address] MED SURG  Daily Treatment Note  NAME: Yvette Aviles  : 1965  MRN: [id number]    Date of Service: 2018    Discharge Recommendations:  Continue to assess pending progress       Patient Diagnosis(es): There were no encounter diagnoses. has a past medical history of Diabetes mellitus ([facility] [address] Hypertension; and Stroke [facility]). has no past surgical history on file. Restrictions  Restrictions/Precautions  Restrictions/Precautions: General Precautions, Fall Risk  Required Braces or Orthoses?: No  Subjective   General  Chart Reviewed: Yes  Patient assessed for rehabilitation services?: Yes  Family / Caregiver Present: No  Referring Practitioner: GARETH Vallecillo  Diagnosis: CVA  Subjective  Subjective: My legs have been cramping and they give me a flexoril and it has made me really tired today. Pain Assessment  Patient Currently in Pain: Denies  Vital Signs  Patient Currently in Pain: Denies   Orientation     Objective    ADL  Toileting: Stand by assistance        Functional Mobility  Functional - Mobility Device: Rolling Walker  Activity: To/from bathroom  Assist Level: Contact guard assistance     Type of ROM/Therapeutic Exercise  Type of ROM/Therapeutic Exercise: AAROM;PROM;AROM  Exercises  Scapular Protraction: X10  Scapular Retraction: X10  Shoulder Depression: X10  Shoulder Elevation: X10  Shoulder Flexion: x15 AAROM  Shoulder Extension: x15  Shoulder ABduction: AROM in supine x20  Shoulder ADduction: AROM x20 in supine  Elbow Flexion: x15 AAROM  Elbow Extension: x15   Supination: x15 PROM  Wrist Extension: x10 PROM  Finger Flexion: x15 PROM  Finger Extension: x15 PROM  Grasp/Release: completed grasp/release activity working on holding cones and working on finger flexion/extension. Pt requires [facility] assist to complete task. Assessment   Assessment: Pt ambulated to bathroom with RW with CGA. Pt completed toileting with SBA.  Pt completed

## 2018-08-24 LAB
GLUCOSE BLD-MCNC: 157 MG/DL (ref 74–106)
GLUCOSE BLD-MCNC: 194 MG/DL (ref 74–106)
GLUCOSE BLD-MCNC: 246 MG/DL (ref 74–106)
GLUCOSE BLD-MCNC: 257 MG/DL (ref 74–106)
PERFORMED ON: ABNORMAL

## 2018-08-24 PROCEDURE — 6370000000 HC RX 637 (ALT 250 FOR IP): Performed by: INTERNAL MEDICINE

## 2018-08-24 PROCEDURE — 97110 THERAPEUTIC EXERCISES: CPT

## 2018-08-24 PROCEDURE — 6370000000 HC RX 637 (ALT 250 FOR IP): Performed by: PHYSICIAN ASSISTANT

## 2018-08-24 PROCEDURE — 6360000002 HC RX W HCPCS: Performed by: INTERNAL MEDICINE

## 2018-08-24 PROCEDURE — 97530 THERAPEUTIC ACTIVITIES: CPT

## 2018-08-24 PROCEDURE — 1200000002 HC SEMI PRIVATE SWING BED

## 2018-08-24 PROCEDURE — 6370000000 HC RX 637 (ALT 250 FOR IP): Performed by: NURSE PRACTITIONER

## 2018-08-24 PROCEDURE — 97803 MED NUTRITION INDIV SUBSEQ: CPT

## 2018-08-24 RX ORDER — ROPINIROLE 0.25 MG/1
0.5 TABLET, FILM COATED ORAL NIGHTLY
Status: DISCONTINUED | OUTPATIENT
Start: 2018-08-24 | End: 2018-09-13 | Stop reason: HOSPADM

## 2018-08-24 RX ORDER — METHOCARBAMOL 750 MG/1
750 TABLET, FILM COATED ORAL ONCE
Status: COMPLETED | OUTPATIENT
Start: 2018-08-24 | End: 2018-08-24

## 2018-08-24 RX ADMIN — INSULIN LISPRO 15 UNITS: 100 INJECTION, SOLUTION INTRAVENOUS; SUBCUTANEOUS at 09:31

## 2018-08-24 RX ADMIN — ZOLPIDEM TARTRATE 5 MG: 5 TABLET ORAL at 20:36

## 2018-08-24 RX ADMIN — METHOCARBAMOL 750 MG: 750 TABLET ORAL at 12:05

## 2018-08-24 RX ADMIN — PANTOPRAZOLE SODIUM 40 MG: 40 TABLET, DELAYED RELEASE ORAL at 05:04

## 2018-08-24 RX ADMIN — INSULIN LISPRO 15 UNITS: 100 INJECTION, SOLUTION INTRAVENOUS; SUBCUTANEOUS at 17:28

## 2018-08-24 RX ADMIN — Medication 3 UNITS: at 09:33

## 2018-08-24 RX ADMIN — CLOPIDOGREL BISULFATE 75 MG: 75 TABLET, FILM COATED ORAL at 09:30

## 2018-08-24 RX ADMIN — HYDROCHLOROTHIAZIDE 25 MG: 25 TABLET ORAL at 06:14

## 2018-08-24 RX ADMIN — AMLODIPINE BESYLATE 10 MG: 5 TABLET ORAL at 06:14

## 2018-08-24 RX ADMIN — DOXAZOSIN 4 MG: 4 TABLET ORAL at 20:36

## 2018-08-24 RX ADMIN — ASPIRIN 325 MG: 325 TABLET, COATED ORAL at 09:30

## 2018-08-24 RX ADMIN — ATORVASTATIN CALCIUM 80 MG: 80 TABLET, FILM COATED ORAL at 20:36

## 2018-08-24 RX ADMIN — ENOXAPARIN SODIUM 40 MG: 40 INJECTION, SOLUTION INTRAVENOUS; SUBCUTANEOUS at 09:30

## 2018-08-24 RX ADMIN — Medication 1 UNITS: at 17:28

## 2018-08-24 RX ADMIN — INSULIN GLARGINE 60 UNITS: 100 INJECTION, SOLUTION SUBCUTANEOUS at 20:39

## 2018-08-24 RX ADMIN — CARVEDILOL 3.12 MG: 3.12 TABLET, FILM COATED ORAL at 17:28

## 2018-08-24 RX ADMIN — INSULIN LISPRO 15 UNITS: 100 INJECTION, SOLUTION INTRAVENOUS; SUBCUTANEOUS at 12:05

## 2018-08-24 RX ADMIN — INSULIN LISPRO 1 UNITS: 100 INJECTION, SOLUTION INTRAVENOUS; SUBCUTANEOUS at 20:40

## 2018-08-24 RX ADMIN — METFORMIN HYDROCHLORIDE 500 MG: 500 TABLET, FILM COATED ORAL at 09:30

## 2018-08-24 RX ADMIN — Medication 1 UNITS: at 12:05

## 2018-08-24 RX ADMIN — CARVEDILOL 3.12 MG: 3.12 TABLET, FILM COATED ORAL at 06:14

## 2018-08-24 RX ADMIN — ROPINIROLE HYDROCHLORIDE 0.5 MG: 0.25 TABLET, FILM COATED ORAL at 20:36

## 2018-08-24 ASSESSMENT — PAIN SCALES - GENERAL
PAINLEVEL_OUTOF10: 0
PAINLEVEL_OUTOF10: 0

## 2018-08-24 NOTE — PLAN OF CARE
Problem: Falls - Risk of:  Goal: Will remain free from falls  Will remain free from falls   Outcome: Ongoing      Problem: Pain Control  Goal: Maintain pain level at or below patient's acceptable level (or 5 if patient is unable to determine acceptable level)  Outcome: Ongoing      Problem: Cardiovascular  Goal: No DVT, peripheral vascular complications  Outcome: Ongoing      Problem: Respiratory  Goal: No pulmonary complications  Outcome: Ongoing    Goal: O2 Sat > 90%  Outcome: Ongoing

## 2018-08-24 NOTE — PROGRESS NOTES
Occupational Therapy  Facility/Department: [address] [place] [address] MED SURG  Daily Treatment Note  NAME: Cheli Roldan  : 1965  MRN: [id number]    Date of Service: 2018    Discharge Recommendations:  Continue to assess pending progress       Patient Diagnosis(es): There were no encounter diagnoses. has a past medical history of Diabetes mellitus ([facility] [address] Hypertension; and Stroke [facility]). has no past surgical history on file. Restrictions  Restrictions/Precautions  Restrictions/Precautions: General Precautions, Fall Risk  Required Braces or Orthoses?: No  Subjective   General  Chart Reviewed: Yes  Patient assessed for rehabilitation services?: Yes  Family / Caregiver Present: No  Referring Practitioner: GARETH Barillas  Diagnosis: CVA  Subjective  Subjective: My legs have been cramping and they give me a flexoril and it has made me really tired today. Orientation     Objective    ADL  LE Dressing: Setup (Donning socks at EOB)        Standing Balance  Time: 4 minutes  Activity: dynamic standing balance activity working on functional reaching tasks. Pt able to reach in closet, drawer, and reach upright without LOB.    Sit to stand: Stand by assistance  Bed mobility  Supine to Sit: Modified independent  Sit to Supine: Modified independent  Scooting: Modified independent  Transfers  Sit to stand: Stand by assistance        Type of ROM/Therapeutic Exercise  Type of ROM/Therapeutic Exercise: AAROM;PROM;AROM  Exercises  Scapular Protraction: X10  Scapular Retraction: X10  Shoulder Depression: X10  Shoulder Elevation: X10  Shoulder Flexion: x15 AAROM  Shoulder Extension: x15  Shoulder ABduction: AROM in supine x20  Shoulder ADduction: AROM x20 in supine  Horizontal ABduction: x10  Horizontal ADduction: x10  Elbow Flexion: x15 AAROM  Elbow Extension: x15   Supination: x15 PROM  Wrist Extension: x10 PROM  Finger Flexion: x15 PROM  Finger Extension: x15 PROM  Grasp/Release: completed grasp/release activity

## 2018-08-24 NOTE — PROGRESS NOTES
x20 seconds    Other exercises 2: NuStep x 10 minutes (L-8)  Other exercises 4: sit to stand x 10   Other exercises 5: lower trunk rotation x 20 B  Other exercises 7: marching in place x 10 B  Other exercises 9: step-ups L x 10 (6 in step) with assistance                        Assessment   Body structures, Functions, Activity limitations: Decreased functional mobility ; Decreased strength;Decreased balance;Decreased endurance  Assessment: Patient demonstrates some improvement with hip flexion today. Patient performed step-ups with L foot today with assistance. Continued R foot drop with R hip circumduction. Treatment Diagnosis: Weakness, s/p CVA  Prognosis: Good  REQUIRES PT FOLLOW UP: Yes  Activity Tolerance  Activity Tolerance: Patient Tolerated treatment well     Goals  Short term goals  Time Frame for Short term goals: 2 weeks  Short term goal 1: Patient will perform all basic transfers independently with safety present. Short term goal 2: Increase strength and endurance to tolerate 30 minutes of therex/ther. activity. Short term goal 3: Increase strength to ambulate 400 feet with least restrictive A.D with safety present. Short term goal 4: Patient will ambulate with Good balance. Short term goal 5: Patient will be independent with home exercise program, and perform therex independently.      Plan    Plan  Times per week: 4-5x/week  Times per day: Daily  Plan weeks: 2 weeks  Current Treatment Recommendations: Strengthening, ROM, Balance Training, Functional Mobility Training, Transfer Training, Gait Training, Stair training, Endurance Training, Neuromuscular Re-education, Patient/Caregiver Education & Training, Safety Education & Training  Safety Devices  Type of devices: Left in bed, Call light within reach, Patient at risk for falls     Therapy Time   Individual Concurrent Group Co-treatment   Time In 1015         Time Out 1112         Minutes 57                 Larry Sanabria, PT     This note serves as D/C summary if patient is discharged prior to next visit.

## 2018-08-24 NOTE — PROGRESS NOTES
Nutrition Assessment    Type and Reason for Visit:  (follow up)    Nutrition Recommendations: no new recommendations. Will continue to monitor PO intakes and pertinent labs. Malnutrition Assessment:  · Malnutrition Status: At risk for malnutrition  · Context: Acute illness or injury  · Findings of the 6 clinical characteristics of malnutrition (Minimum of 2 out of 6 clinical characteristics is required to make the diagnosis of moderate or severe Protein Calorie Malnutrition based on AND/ASPEN Guidelines):  1. Energy Intake-Greater than 75%, not able to assess    2. Weight Loss-2% loss or greater, in 1 week (4% in 1 week)  3. Fat Loss-No significant subcutaneous fat loss,    4. Muscle Loss-No significant muscle mass loss,    5. Fluid Accumulation-No significant fluid accumulation,    6.  Strength-Normal    Nutrition Diagnosis:   · Problem: No nutrition diagnosis at this time  · Etiology: related to  (CVA)     Signs and symptoms:  as evidenced by  (patient eating good at this time.  )    Nutrition Assessment:  · Subjective Assessment: Patient appears well nourished. presents with 4% wt loss in 1 week.   Patient has had a recent stroke on 8/9/18 and has diagnosis of functional decline  · Nutrition-Focused Physical Findings:    · Wound Type:    · Current Nutrition Therapies:  · Oral Diet Orders: Carb Control 5 Carbs/Meal, Cardiac   · Oral Diet intake: %  · Oral Nutrition Supplement (ONS) Orders: None  · ONS intake:    · Anthropometric Measures:  · Ht: 6' (182.9 cm)   · Current Body Wt: 199 lb (90.3 kg)  · Admission Body Wt:    · Usual Body Wt:    · % Weight Change: 4,  1 week  · Ideal Body Wt: 178 lb (80.7 kg), % Ideal Body 112  · Adjusted Body Wt:  , body weight adjusted for    · BMI Classification: BMI 25.0 - 29.9 Overweight (27.3)  · Comparative Standards (Estimated Nutrition Needs):  · Estimated Daily Total Kcal: 5285-6556 (1.2-1.3 activity factor)  · Estimated Daily Protein (g):

## 2018-08-25 LAB
GLUCOSE BLD-MCNC: 137 MG/DL (ref 74–106)
GLUCOSE BLD-MCNC: 154 MG/DL (ref 74–106)
GLUCOSE BLD-MCNC: 164 MG/DL (ref 74–106)
GLUCOSE BLD-MCNC: 297 MG/DL (ref 74–106)
PERFORMED ON: ABNORMAL

## 2018-08-25 PROCEDURE — 6370000000 HC RX 637 (ALT 250 FOR IP): Performed by: INTERNAL MEDICINE

## 2018-08-25 PROCEDURE — 6360000002 HC RX W HCPCS: Performed by: INTERNAL MEDICINE

## 2018-08-25 PROCEDURE — 6370000000 HC RX 637 (ALT 250 FOR IP): Performed by: PHYSICIAN ASSISTANT

## 2018-08-25 PROCEDURE — 6370000000 HC RX 637 (ALT 250 FOR IP): Performed by: NURSE PRACTITIONER

## 2018-08-25 PROCEDURE — 1200000002 HC SEMI PRIVATE SWING BED

## 2018-08-25 RX ORDER — INSULIN GLARGINE 100 [IU]/ML
70 INJECTION, SOLUTION SUBCUTANEOUS NIGHTLY
Status: DISCONTINUED | OUTPATIENT
Start: 2018-08-25 | End: 2018-09-13 | Stop reason: HOSPADM

## 2018-08-25 RX ORDER — METHOCARBAMOL 500 MG/1
1000 TABLET, FILM COATED ORAL 3 TIMES DAILY
Status: DISCONTINUED | OUTPATIENT
Start: 2018-08-25 | End: 2018-09-13 | Stop reason: HOSPADM

## 2018-08-25 RX ORDER — CARVEDILOL 6.25 MG/1
6.25 TABLET ORAL 2 TIMES DAILY WITH MEALS
Status: DISCONTINUED | OUTPATIENT
Start: 2018-08-25 | End: 2018-09-01

## 2018-08-25 RX ORDER — GABAPENTIN 100 MG/1
100 CAPSULE ORAL 2 TIMES DAILY
Status: DISCONTINUED | OUTPATIENT
Start: 2018-08-25 | End: 2018-09-13 | Stop reason: HOSPADM

## 2018-08-25 RX ADMIN — METHOCARBAMOL 1000 MG: 500 TABLET ORAL at 13:15

## 2018-08-25 RX ADMIN — INSULIN GLARGINE 70 UNITS: 100 INJECTION, SOLUTION SUBCUTANEOUS at 20:13

## 2018-08-25 RX ADMIN — Medication 1 UNITS: at 17:29

## 2018-08-25 RX ADMIN — AMLODIPINE BESYLATE 10 MG: 5 TABLET ORAL at 09:01

## 2018-08-25 RX ADMIN — HYDROCHLOROTHIAZIDE 25 MG: 25 TABLET ORAL at 09:01

## 2018-08-25 RX ADMIN — CARVEDILOL 3.12 MG: 3.12 TABLET, FILM COATED ORAL at 09:01

## 2018-08-25 RX ADMIN — ZOLPIDEM TARTRATE 5 MG: 5 TABLET ORAL at 20:11

## 2018-08-25 RX ADMIN — INSULIN LISPRO 18 UNITS: 100 INJECTION, SOLUTION INTRAVENOUS; SUBCUTANEOUS at 12:08

## 2018-08-25 RX ADMIN — CLOPIDOGREL BISULFATE 75 MG: 75 TABLET, FILM COATED ORAL at 09:01

## 2018-08-25 RX ADMIN — CARVEDILOL 6.25 MG: 6.25 TABLET, FILM COATED ORAL at 17:23

## 2018-08-25 RX ADMIN — ASPIRIN 325 MG: 325 TABLET, COATED ORAL at 09:01

## 2018-08-25 RX ADMIN — METFORMIN HYDROCHLORIDE 500 MG: 500 TABLET, FILM COATED ORAL at 17:23

## 2018-08-25 RX ADMIN — INSULIN LISPRO 18 UNITS: 100 INJECTION, SOLUTION INTRAVENOUS; SUBCUTANEOUS at 17:26

## 2018-08-25 RX ADMIN — DOXAZOSIN 4 MG: 4 TABLET ORAL at 20:10

## 2018-08-25 RX ADMIN — PANTOPRAZOLE SODIUM 40 MG: 40 TABLET, DELAYED RELEASE ORAL at 07:03

## 2018-08-25 RX ADMIN — INSULIN LISPRO 15 UNITS: 100 INJECTION, SOLUTION INTRAVENOUS; SUBCUTANEOUS at 07:03

## 2018-08-25 RX ADMIN — Medication 3 UNITS: at 09:05

## 2018-08-25 RX ADMIN — ACETAMINOPHEN 650 MG: 325 TABLET, FILM COATED ORAL at 03:15

## 2018-08-25 RX ADMIN — METHOCARBAMOL 1000 MG: 500 TABLET ORAL at 20:10

## 2018-08-25 RX ADMIN — ENOXAPARIN SODIUM 40 MG: 40 INJECTION, SOLUTION INTRAVENOUS; SUBCUTANEOUS at 09:01

## 2018-08-25 RX ADMIN — ROPINIROLE HYDROCHLORIDE 0.5 MG: 0.25 TABLET, FILM COATED ORAL at 20:10

## 2018-08-25 RX ADMIN — GABAPENTIN 100 MG: 100 CAPSULE ORAL at 07:03

## 2018-08-25 RX ADMIN — ATORVASTATIN CALCIUM 80 MG: 80 TABLET, FILM COATED ORAL at 20:11

## 2018-08-25 RX ADMIN — LINAGLIPTIN 5 MG: 5 TABLET, FILM COATED ORAL at 11:42

## 2018-08-25 RX ADMIN — Medication 1 UNITS: at 12:08

## 2018-08-25 RX ADMIN — METFORMIN HYDROCHLORIDE 500 MG: 500 TABLET, FILM COATED ORAL at 09:01

## 2018-08-25 ASSESSMENT — PAIN SCALES - GENERAL: PAINLEVEL_OUTOF10: 6

## 2018-08-25 NOTE — PLAN OF CARE
Problem: Falls - Risk of:  Goal: Will remain free from falls  Will remain free from falls   Outcome: Ongoing      Problem: Pain Control  Goal: Maintain pain level at or below patient's acceptable level (or 5 if patient is unable to determine acceptable level)  Outcome: Ongoing      Problem: Neurological  Goal: Maximum potential motor/sensory/cognitive function  Outcome: Ongoing      Problem: Cardiovascular  Goal: No DVT, peripheral vascular complications  Outcome: Ongoing      Problem: Respiratory  Goal: No pulmonary complications  Outcome: Ongoing      Problem: GI  Goal: No bowel complications  Outcome: Ongoing      Problem:   Goal: Adequate urinary output  Outcome: Ongoing

## 2018-08-26 LAB
GLUCOSE BLD-MCNC: 181 MG/DL (ref 74–106)
GLUCOSE BLD-MCNC: 199 MG/DL (ref 74–106)
GLUCOSE BLD-MCNC: 284 MG/DL (ref 74–106)
GLUCOSE BLD-MCNC: 358 MG/DL (ref 74–106)
PERFORMED ON: ABNORMAL

## 2018-08-26 PROCEDURE — 6370000000 HC RX 637 (ALT 250 FOR IP): Performed by: NURSE PRACTITIONER

## 2018-08-26 PROCEDURE — 6370000000 HC RX 637 (ALT 250 FOR IP): Performed by: PHYSICIAN ASSISTANT

## 2018-08-26 PROCEDURE — 6370000000 HC RX 637 (ALT 250 FOR IP): Performed by: INTERNAL MEDICINE

## 2018-08-26 PROCEDURE — 1200000002 HC SEMI PRIVATE SWING BED

## 2018-08-26 PROCEDURE — 6360000002 HC RX W HCPCS: Performed by: INTERNAL MEDICINE

## 2018-08-26 RX ORDER — SENNA AND DOCUSATE SODIUM 50; 8.6 MG/1; MG/1
2 TABLET, FILM COATED ORAL DAILY
Status: DISCONTINUED | OUTPATIENT
Start: 2018-08-26 | End: 2018-09-13 | Stop reason: HOSPADM

## 2018-08-26 RX ORDER — POLYETHYLENE GLYCOL 3350 17 G/17G
17 POWDER, FOR SOLUTION ORAL DAILY
Status: DISCONTINUED | OUTPATIENT
Start: 2018-08-26 | End: 2018-09-13 | Stop reason: HOSPADM

## 2018-08-26 RX ADMIN — DOXAZOSIN 4 MG: 4 TABLET ORAL at 21:27

## 2018-08-26 RX ADMIN — CARVEDILOL 6.25 MG: 6.25 TABLET, FILM COATED ORAL at 08:21

## 2018-08-26 RX ADMIN — INSULIN GLARGINE 70 UNITS: 100 INJECTION, SOLUTION SUBCUTANEOUS at 21:29

## 2018-08-26 RX ADMIN — METHOCARBAMOL 1000 MG: 500 TABLET ORAL at 21:27

## 2018-08-26 RX ADMIN — METFORMIN HYDROCHLORIDE 500 MG: 500 TABLET, FILM COATED ORAL at 17:36

## 2018-08-26 RX ADMIN — Medication 1 UNITS: at 11:57

## 2018-08-26 RX ADMIN — ACETAMINOPHEN 650 MG: 325 TABLET, FILM COATED ORAL at 06:28

## 2018-08-26 RX ADMIN — GABAPENTIN 100 MG: 100 CAPSULE ORAL at 21:27

## 2018-08-26 RX ADMIN — INSULIN LISPRO 18 UNITS: 100 INJECTION, SOLUTION INTRAVENOUS; SUBCUTANEOUS at 06:29

## 2018-08-26 RX ADMIN — LINAGLIPTIN 5 MG: 5 TABLET, FILM COATED ORAL at 08:21

## 2018-08-26 RX ADMIN — ZOLPIDEM TARTRATE 5 MG: 5 TABLET ORAL at 21:28

## 2018-08-26 RX ADMIN — INSULIN LISPRO 18 UNITS: 100 INJECTION, SOLUTION INTRAVENOUS; SUBCUTANEOUS at 11:55

## 2018-08-26 RX ADMIN — ASPIRIN 325 MG: 325 TABLET, COATED ORAL at 08:21

## 2018-08-26 RX ADMIN — METFORMIN HYDROCHLORIDE 500 MG: 500 TABLET, FILM COATED ORAL at 08:22

## 2018-08-26 RX ADMIN — AMLODIPINE BESYLATE 10 MG: 5 TABLET ORAL at 08:21

## 2018-08-26 RX ADMIN — ROPINIROLE HYDROCHLORIDE 0.5 MG: 0.25 TABLET, FILM COATED ORAL at 21:28

## 2018-08-26 RX ADMIN — PANTOPRAZOLE SODIUM 40 MG: 40 TABLET, DELAYED RELEASE ORAL at 06:26

## 2018-08-26 RX ADMIN — Medication 3 UNITS: at 17:44

## 2018-08-26 RX ADMIN — METHOCARBAMOL 1000 MG: 500 TABLET ORAL at 15:23

## 2018-08-26 RX ADMIN — METHOCARBAMOL 1000 MG: 500 TABLET ORAL at 08:21

## 2018-08-26 RX ADMIN — HYDROCHLOROTHIAZIDE 25 MG: 25 TABLET ORAL at 08:22

## 2018-08-26 RX ADMIN — INSULIN LISPRO 18 UNITS: 100 INJECTION, SOLUTION INTRAVENOUS; SUBCUTANEOUS at 17:37

## 2018-08-26 RX ADMIN — POLYETHYLENE GLYCOL 3350 17 G: 17 POWDER, FOR SOLUTION ORAL at 14:13

## 2018-08-26 RX ADMIN — GABAPENTIN 100 MG: 100 CAPSULE ORAL at 04:14

## 2018-08-26 RX ADMIN — INSULIN LISPRO 1 UNITS: 100 INJECTION, SOLUTION INTRAVENOUS; SUBCUTANEOUS at 21:29

## 2018-08-26 RX ADMIN — Medication 5 UNITS: at 08:24

## 2018-08-26 RX ADMIN — DOCUSATE SODIUM AND SENNOSIDES 2 TABLET: 8.6; 5 TABLET, FILM COATED ORAL at 14:11

## 2018-08-26 RX ADMIN — CARVEDILOL 6.25 MG: 6.25 TABLET, FILM COATED ORAL at 00:00

## 2018-08-26 RX ADMIN — GABAPENTIN 100 MG: 100 CAPSULE ORAL at 08:21

## 2018-08-26 RX ADMIN — ATORVASTATIN CALCIUM 80 MG: 80 TABLET, FILM COATED ORAL at 21:28

## 2018-08-26 RX ADMIN — MAGNESIUM HYDROXIDE 30 ML: 400 SUSPENSION ORAL at 06:26

## 2018-08-26 RX ADMIN — CLOPIDOGREL BISULFATE 75 MG: 75 TABLET, FILM COATED ORAL at 08:22

## 2018-08-26 RX ADMIN — ENOXAPARIN SODIUM 40 MG: 40 INJECTION, SOLUTION INTRAVENOUS; SUBCUTANEOUS at 08:22

## 2018-08-26 ASSESSMENT — PAIN SCALES - GENERAL: PAINLEVEL_OUTOF10: 6

## 2018-08-26 NOTE — PLAN OF CARE
Problem: Falls - Risk of:  Goal: Will remain free from falls  Will remain free from falls   Outcome: Ongoing      Problem: Skin Integrity/Risk  Goal: No skin breakdown during hospitalization  Outcome: Ongoing      Problem: Pain:  Goal: Patient's pain/discomfort is manageable  Patient's pain/discomfort is manageable   Outcome: Ongoing

## 2018-08-27 LAB
GLUCOSE BLD-MCNC: 153 MG/DL (ref 74–106)
GLUCOSE BLD-MCNC: 203 MG/DL (ref 74–106)
GLUCOSE BLD-MCNC: 225 MG/DL (ref 74–106)
GLUCOSE BLD-MCNC: 309 MG/DL (ref 74–106)
GLUCOSE BLD-MCNC: 69 MG/DL (ref 74–106)
GLUCOSE BLD-MCNC: 81 MG/DL (ref 74–106)
PERFORMED ON: ABNORMAL
PERFORMED ON: NORMAL

## 2018-08-27 PROCEDURE — 94760 N-INVAS EAR/PLS OXIMETRY 1: CPT

## 2018-08-27 PROCEDURE — 6370000000 HC RX 637 (ALT 250 FOR IP): Performed by: PHYSICIAN ASSISTANT

## 2018-08-27 PROCEDURE — 6370000000 HC RX 637 (ALT 250 FOR IP): Performed by: INTERNAL MEDICINE

## 2018-08-27 PROCEDURE — 97530 THERAPEUTIC ACTIVITIES: CPT

## 2018-08-27 PROCEDURE — 6360000002 HC RX W HCPCS: Performed by: INTERNAL MEDICINE

## 2018-08-27 PROCEDURE — 97110 THERAPEUTIC EXERCISES: CPT

## 2018-08-27 PROCEDURE — 6370000000 HC RX 637 (ALT 250 FOR IP): Performed by: NURSE PRACTITIONER

## 2018-08-27 PROCEDURE — 1200000002 HC SEMI PRIVATE SWING BED

## 2018-08-27 PROCEDURE — 92526 ORAL FUNCTION THERAPY: CPT

## 2018-08-27 RX ADMIN — AMLODIPINE BESYLATE 10 MG: 5 TABLET ORAL at 08:49

## 2018-08-27 RX ADMIN — ROPINIROLE HYDROCHLORIDE 0.5 MG: 0.25 TABLET, FILM COATED ORAL at 20:21

## 2018-08-27 RX ADMIN — METHOCARBAMOL 1000 MG: 500 TABLET ORAL at 08:49

## 2018-08-27 RX ADMIN — Medication 1 UNITS: at 12:57

## 2018-08-27 RX ADMIN — ENOXAPARIN SODIUM 40 MG: 40 INJECTION, SOLUTION INTRAVENOUS; SUBCUTANEOUS at 08:49

## 2018-08-27 RX ADMIN — INSULIN GLARGINE 70 UNITS: 100 INJECTION, SOLUTION SUBCUTANEOUS at 20:24

## 2018-08-27 RX ADMIN — METFORMIN HYDROCHLORIDE 500 MG: 500 TABLET, FILM COATED ORAL at 08:49

## 2018-08-27 RX ADMIN — Medication 2 UNITS: at 08:50

## 2018-08-27 RX ADMIN — ZOLPIDEM TARTRATE 5 MG: 5 TABLET ORAL at 20:21

## 2018-08-27 RX ADMIN — METHOCARBAMOL 1000 MG: 500 TABLET ORAL at 20:21

## 2018-08-27 RX ADMIN — INSULIN LISPRO 18 UNITS: 100 INJECTION, SOLUTION INTRAVENOUS; SUBCUTANEOUS at 08:52

## 2018-08-27 RX ADMIN — LINAGLIPTIN 5 MG: 5 TABLET, FILM COATED ORAL at 08:49

## 2018-08-27 RX ADMIN — ATORVASTATIN CALCIUM 80 MG: 80 TABLET, FILM COATED ORAL at 20:21

## 2018-08-27 RX ADMIN — CARVEDILOL 6.25 MG: 6.25 TABLET, FILM COATED ORAL at 08:49

## 2018-08-27 RX ADMIN — INSULIN LISPRO 18 UNITS: 100 INJECTION, SOLUTION INTRAVENOUS; SUBCUTANEOUS at 17:44

## 2018-08-27 RX ADMIN — DOCUSATE SODIUM AND SENNOSIDES 2 TABLET: 8.6; 5 TABLET, FILM COATED ORAL at 08:48

## 2018-08-27 RX ADMIN — HYDROCHLOROTHIAZIDE 25 MG: 25 TABLET ORAL at 08:49

## 2018-08-27 RX ADMIN — ASPIRIN 325 MG: 325 TABLET, COATED ORAL at 08:49

## 2018-08-27 RX ADMIN — INSULIN LISPRO 1 UNITS: 100 INJECTION, SOLUTION INTRAVENOUS; SUBCUTANEOUS at 20:24

## 2018-08-27 RX ADMIN — GABAPENTIN 100 MG: 100 CAPSULE ORAL at 08:49

## 2018-08-27 RX ADMIN — DOXAZOSIN 4 MG: 4 TABLET ORAL at 20:21

## 2018-08-27 RX ADMIN — PANTOPRAZOLE SODIUM 40 MG: 40 TABLET, DELAYED RELEASE ORAL at 05:28

## 2018-08-27 RX ADMIN — CARVEDILOL 6.25 MG: 6.25 TABLET, FILM COATED ORAL at 17:43

## 2018-08-27 RX ADMIN — Medication 4 UNITS: at 17:44

## 2018-08-27 RX ADMIN — CLOPIDOGREL BISULFATE 75 MG: 75 TABLET, FILM COATED ORAL at 08:49

## 2018-08-27 RX ADMIN — GABAPENTIN 100 MG: 100 CAPSULE ORAL at 20:21

## 2018-08-27 RX ADMIN — METHOCARBAMOL 1000 MG: 500 TABLET ORAL at 15:01

## 2018-08-27 RX ADMIN — METFORMIN HYDROCHLORIDE 500 MG: 500 TABLET, FILM COATED ORAL at 17:43

## 2018-08-27 NOTE — PLAN OF CARE
Problem: Falls - Risk of:  Goal: Will remain free from falls  Will remain free from falls   Outcome: Ongoing      Problem: Pain Control  Goal: Maintain pain level at or below patient's acceptable level (or 5 if patient is unable to determine acceptable level)  Outcome: Ongoing      Problem: Cardiovascular  Goal: No DVT, peripheral vascular complications  Outcome: Ongoing    Goal: Hemodynamic stability  Outcome: Ongoing      Problem: Nutrition  Goal: Optimal nutrition therapy  Outcome: Ongoing      Problem: Spiritual  Goal: Coping methods/spiritual issues explored  Outcome: Ongoing

## 2018-08-27 NOTE — PROGRESS NOTES
Speech Language Pathology  Facility/Department: [facility] MED SURG  Dysphagia Daily Treatment Note     NAME: Avani Tavarez  : 1965  MRN: [id number]     Patient Diagnosis(es):           Patient Active Problem List     Diagnosis Date Noted    Type 2 diabetes mellitus ([facility] [address] 2018    History of CVA with residual deficit 2018    Hypertension 2018    Elevated LFTs 2018    Declining functional status 08/15/2018      Allergies:             Allergies   Allergen Reactions    Lisinopril Other (See Comments)       syncope  Vision problems, fainting, weakness         Onset Date: 08/15/2018  Current Diet Level:  Regular texture with thin liquids        Impression  Dysphagia Diagnosis: Mild oral stage dysphagia  Dysphagia Outcome Severity Scale: Level 6: Within functional limits/Modified independence      S: Patient just returned from PT; reported he was tired and had been feeling light headed. RN at bedside checked glucose and reported it was low (69) possibly causing light headedness. Patient consuming afternoon meal; pleasant and agreeable to ST tx.      O: Dysphagia treatment 30 minutes this date to address oral phase dysphagia:     1: Patient will complete oral motor exercises 9/10 times over 3 consecutive sessions to improve oral motor function. Not targeted this date.     2: Patient will tolerate regular diet texture 9/10 times over 3 consecutive sessions. 9/10x (x3) MET GOAL     3: Patient will demonstrate use and understanding of compensatory strategies with 90% accuracy over 3 consecutive sessions to reduce aspiration risk. 90% acc with min cues (x2)     A: Patient tolerated regular diet texture/thin liquids without overt s/sx aspiration. Patient demonstrated use of compensatory strategies (alternating liquids/solids, small bites and sips, appropriate rate) independently. Patient continues to complete oral motor exercises independently with written instruction.

## 2018-08-27 NOTE — PROGRESS NOTES
Speech Language Pathology  Speech & Language Pathology Discharge Report  Date: 2018  Patient: Gary Horne  : 1965    Date of Last Treatment Session:  Past Medical History:   Diagnosis Date    Diabetes mellitus ([facility] [address]     Hypertension     Stroke ([facility] 2018       Status at initiation of therapy:    Treatment Area(s):  Swallow    G-Code:  SLP G-Codes  Functional Assessment Tool Used: informal bedside swallow assessment. Functional Limitations: Swallowing  Swallow Current Status (): At least 20 percent but less than 40 percent impaired, limited or restricted  Swallow Goal Status (): At least 1 percent but less than 20 percent impaired, limited or restricted  Swallow Discharge Status (): 0 percent impaired, limited or restricted    Met Goals: 1 out of 3 Total Goals    Participation in Treatment (at discharge): Cooperative    Functional Status at time of Discharge:    · Cognition: Patient demonstrates no cognitive deficits. · Communication: Patient demonstrates no communication deficits. · Language: Patient demonstrates no language deficits. · Motor Speech: Patient demonstrates no motor speech deficits. · Swallow: Patient demonstrates no dysphagia. Swallowing Guidelines: Patient tolerating  regular diet             no help required    Patient is discharged to Rehab    Assessment:   Patient continues to demonstrate min decreased right side oral weakness however, tolerates regular diet textures/thin liquids without overt s/sx of aspiration/choking. Patient continues to complete OMEs independently with written cues. Completed by Chetan Casiano, [facility] speech-pathology student under the supervision of RUTH Darling.     Enrique Kenyon

## 2018-08-27 NOTE — CARE COORDINATION
Spoke with pt at bs. Pt is going to ask wife to transport to appt for AFO fitting on 8/30/18 @ 1:00. Pt states he is improving and doing better. Hoping to go home soon.

## 2018-08-27 NOTE — PROGRESS NOTES
Physical Therapy  Facility/Department: [facility] MED SURG  Daily Treatment Note    NAME: Avani Tavarez  : 1965  MRN: [id number]    Date of Service: 2018    Discharge Recommendations:  Continue to assess pending progress        Patient Diagnosis(es): There were no encounter diagnoses. has a past medical history of Diabetes mellitus ([facility] [address] Hypertension; and Stroke [facility]). has no past surgical history on file. Restrictions  Restrictions/Precautions  Restrictions/Precautions: General Precautions, Fall Risk  Required Braces or Orthoses?: No     Subjective   General  Chart Reviewed: Yes  Family / Caregiver Present: No  Subjective  Subjective: Patient in bed and agreeable to PT treatment. Patient reports he has been having spasms in his R leg and arm all weekend.    Pain Screening  Patient Currently in Pain: Denies  Vital Signs  Patient Currently in Pain: Denies       Orientation  Orientation  Overall Orientation Status: Within Functional Limits     Objective   Bed mobility  Supine to Sit: Modified independent  Sit to Supine: Modified independent  Scooting: Modified independent     Transfers  Sit to Stand: Stand by assistance  Stand to sit: Stand by assistance     Ambulation  Ambulation?: Yes  Ambulation 1  Surface: level tile  Device: Rolling Walker  Assistance: Contact guard assistance  Quality of Gait: R foot drop with R hip circumduction   Distance: 270 feet  Wheelchair Activities  Propulsion: Yes  Propulsion 1  Propulsion: Manual  Level: Level Tile  Level of Assistance: Dependent/Total  Description/ Details: PT propelled patient in wheelchair     Balance  Posture: Good  Sitting - Static: Good  Sitting - Dynamic: Good  Standing - Static: Fair;+  Standing - Dynamic: Fair;+ (at walker)     Exercises  Bridging:  (x20)  Straight Leg Raise: x5 R  Quad Sets: x20 B  Heelslides: 2x10 B, with hand to contralateral knee (assisted with R LE and R UE)  Gluteal Sets: x20  Hip Abduction: supine x 10 B, AA on R Knee Passive Range of Motion: AA/P ROM R hip and knee and ankle x 20 each  Ankle Pumps: PROM DF/PF x20 each R, resisted PF  Other exercises  Other exercises 1: adductor squeezes x20 seconds    Other exercises 2: NuStep x 12 minutes (L-8)  Other exercises 3: resisted hip abduction in sitting 20 seconds, green t-band   Other exercises 5: lower trunk rotation x 20 B                        Assessment   Body structures, Functions, Activity limitations: Decreased functional mobility ; Decreased strength;Decreased balance;Decreased endurance  Assessment: Patient continues to report episodes of pain and spasms in R LE and UE. Patient MI/I with all bed mobility and requires only SBA for come to stand. Continued only minimal hip flexion and knee extension R LE. Continued R foot drop with R hip circumduction. Patient unable to propel self in wheelchair. Treatment Diagnosis: Weakness, s/p CVA  Prognosis: Good  REQUIRES PT FOLLOW UP: Yes  Activity Tolerance  Activity Tolerance: Patient Tolerated treatment well  Activity Tolerance: Patient not feeling well today - thinks it is some of the medicine he is taking - RN informed. Goals  Short term goals  Time Frame for Short term goals: 2 weeks  Short term goal 1: Patient will perform all basic transfers independently with safety present. Short term goal 2: Increase strength and endurance to tolerate 30 minutes of therex/ther. activity. Short term goal 3: Increase strength to ambulate 400 feet with least restrictive A.D with safety present. Short term goal 4: Patient will ambulate with Good balance. Short term goal 5: Patient will be independent with home exercise program, and perform therex independently.      Plan    Plan  Times per week: 4-5x/week  Times per day: Daily  Plan weeks: 2 weeks  Current Treatment Recommendations: Strengthening, ROM, Balance Training, Functional Mobility Training, Transfer Training, Gait Training, Stair training, Endurance Training, Neuromuscular Re-education, Patient/Caregiver Education & Training, Safety Education & Training  Safety Devices  Type of devices: Left in bed, Call light within reach, Patient at risk for falls (speech therapy with patient)     Therapy Time   Individual Concurrent Group Co-treatment   Time In 1106         Time Out 1159         Minutes 5 [facility],      This note serves as D/C summary if patient is discharged prior to next visit.

## 2018-08-27 NOTE — FLOWSHEET NOTE
08/27/18 0856   Assessment   Charting Type Shift assessment   Neurological   Neuro (WDL) X   Level of Consciousness 0   Orientation Level Oriented X4   Cognition Appropriate judgement; Appropriate safety awareness; Appropriate attention/concentration; Follows commands   Language Clear   R Hand  Weak  (Pt has minimal movement in his fingers)   L Hand  Strong   L Foot Dorsiflexion Strong   L Foot Plantar Flexion Strong   R Pupil Shape Round   R Pupil Reaction Brisk   L Pupil Shape Round   L Pupil Reaction Brisk   RUE Motor Response Responds to command   LUE Motor Response Responds to command   RLE Motor Response Responds to command   LLE Motor Response Responds to command   Size R Pupil (mm) 2   Size L Pupil (mm) 2   R Foot Dorsiflexion Weak   R Foot Plantar Flexion Weak   Breann Coma Scale   Eye Opening 4   Best Verbal Response 5   Best Motor Response 6   Breann Coma Scale Score 15   HEENT   HEENT (WDL) X   Right Eye Impaired vision   Left Eye Impaired vision   Nose Intact   Throat Intact   Tongue Pink & moist   Voice Normal   Mucous Membrane Pink;Moist   Respiratory   Respiratory (WDL) WDL   Respiratory Pattern Regular   Respiratory Depth Normal   Breath Sounds   Right Upper Lobe Clear   Right Middle Lobe Clear   Right Lower Lobe Clear   Left Upper Lobe Clear   Left Lower Lobe Clear   Cardiac   Cardiac (WDL) WDL   Cardiac Regularity Regular   Heart Sounds S1, S2   Cardiac Monitor   Telemetry Monitor On No   Gastrointestinal   Abdominal (WDL) WDL   Abdomen Inspection Soft   Last BM (including prior to admit) 08/27/18   Passing Flatus Y   RUQ Bowel Sounds Active   LUQ Bowel Sounds Active   RLQ Bowel Sounds Active   LLQ Bowel Sounds Active   Bowel Sounds (All Quadrants) Active   Peripheral Vascular   Peripheral Vascular (WDL) WDL   Edema None   Sensation RUE Full sensation   Sensation LUE Full sensation   Sensation RLE Tingling;Numbness   Sensation LLE Full sensation;Tingling   Skin Color/Condition   Skin Color/Condition (WDL) WDL   Skin Integrity   Skin Integrity (WDL) WDL   Musculoskeletal   Musculoskeletal (WDL) X   RUE Limited movement;Weakness   LUE Full movement   RL Extremity Weakness; Unsteady   LL Extremity Full movement   Genitourinary   Genitourinary (WDL) WDL   Flank Tenderness No   Suprapubic Tenderness No   Dysuria No   Psychosocial   Psychosocial (WDL) WDL   Pt resting in bed with no complaints at this time. Pt took medications with no difficulty. Pt with no requests.

## 2018-08-28 LAB
GLUCOSE BLD-MCNC: 129 MG/DL (ref 74–106)
GLUCOSE BLD-MCNC: 191 MG/DL (ref 74–106)
GLUCOSE BLD-MCNC: 207 MG/DL (ref 74–106)
GLUCOSE BLD-MCNC: 266 MG/DL (ref 74–106)
GLUCOSE BLD-MCNC: 96 MG/DL (ref 74–106)
PERFORMED ON: ABNORMAL
PERFORMED ON: NORMAL

## 2018-08-28 PROCEDURE — 94760 N-INVAS EAR/PLS OXIMETRY 1: CPT

## 2018-08-28 PROCEDURE — 97110 THERAPEUTIC EXERCISES: CPT

## 2018-08-28 PROCEDURE — 6370000000 HC RX 637 (ALT 250 FOR IP): Performed by: PHYSICIAN ASSISTANT

## 2018-08-28 PROCEDURE — 6370000000 HC RX 637 (ALT 250 FOR IP): Performed by: INTERNAL MEDICINE

## 2018-08-28 PROCEDURE — 1200000002 HC SEMI PRIVATE SWING BED

## 2018-08-28 PROCEDURE — 97530 THERAPEUTIC ACTIVITIES: CPT

## 2018-08-28 PROCEDURE — 97535 SELF CARE MNGMENT TRAINING: CPT

## 2018-08-28 PROCEDURE — 6360000002 HC RX W HCPCS: Performed by: INTERNAL MEDICINE

## 2018-08-28 PROCEDURE — 6370000000 HC RX 637 (ALT 250 FOR IP): Performed by: NURSE PRACTITIONER

## 2018-08-28 RX ADMIN — Medication 1 UNITS: at 16:59

## 2018-08-28 RX ADMIN — CARVEDILOL 6.25 MG: 6.25 TABLET, FILM COATED ORAL at 08:32

## 2018-08-28 RX ADMIN — ATORVASTATIN CALCIUM 80 MG: 80 TABLET, FILM COATED ORAL at 20:15

## 2018-08-28 RX ADMIN — METHOCARBAMOL 1000 MG: 500 TABLET ORAL at 20:15

## 2018-08-28 RX ADMIN — ZOLPIDEM TARTRATE 5 MG: 5 TABLET ORAL at 20:15

## 2018-08-28 RX ADMIN — Medication 3 UNITS: at 08:36

## 2018-08-28 RX ADMIN — METFORMIN HYDROCHLORIDE 500 MG: 500 TABLET, FILM COATED ORAL at 08:32

## 2018-08-28 RX ADMIN — CLOPIDOGREL BISULFATE 75 MG: 75 TABLET, FILM COATED ORAL at 08:31

## 2018-08-28 RX ADMIN — Medication 15 UNITS: at 08:33

## 2018-08-28 RX ADMIN — ROPINIROLE HYDROCHLORIDE 0.5 MG: 0.25 TABLET, FILM COATED ORAL at 20:15

## 2018-08-28 RX ADMIN — ASPIRIN 325 MG: 325 TABLET, COATED ORAL at 08:31

## 2018-08-28 RX ADMIN — METFORMIN HYDROCHLORIDE 500 MG: 500 TABLET, FILM COATED ORAL at 16:56

## 2018-08-28 RX ADMIN — DOXAZOSIN 4 MG: 4 TABLET ORAL at 20:15

## 2018-08-28 RX ADMIN — HYDROCHLOROTHIAZIDE 25 MG: 25 TABLET ORAL at 08:31

## 2018-08-28 RX ADMIN — METHOCARBAMOL 1000 MG: 500 TABLET ORAL at 14:47

## 2018-08-28 RX ADMIN — GABAPENTIN 100 MG: 100 CAPSULE ORAL at 20:15

## 2018-08-28 RX ADMIN — Medication 15 UNITS: at 17:04

## 2018-08-28 RX ADMIN — GABAPENTIN 100 MG: 100 CAPSULE ORAL at 08:31

## 2018-08-28 RX ADMIN — DOCUSATE SODIUM AND SENNOSIDES 2 TABLET: 8.6; 5 TABLET, FILM COATED ORAL at 08:32

## 2018-08-28 RX ADMIN — AMLODIPINE BESYLATE 10 MG: 5 TABLET ORAL at 08:31

## 2018-08-28 RX ADMIN — CARVEDILOL 6.25 MG: 6.25 TABLET, FILM COATED ORAL at 16:56

## 2018-08-28 RX ADMIN — PANTOPRAZOLE SODIUM 40 MG: 40 TABLET, DELAYED RELEASE ORAL at 06:12

## 2018-08-28 RX ADMIN — ENOXAPARIN SODIUM 40 MG: 40 INJECTION, SOLUTION INTRAVENOUS; SUBCUTANEOUS at 08:33

## 2018-08-28 RX ADMIN — INSULIN GLARGINE 70 UNITS: 100 INJECTION, SOLUTION SUBCUTANEOUS at 20:21

## 2018-08-28 RX ADMIN — Medication 2 UNITS: at 12:09

## 2018-08-28 RX ADMIN — LINAGLIPTIN 5 MG: 5 TABLET, FILM COATED ORAL at 08:31

## 2018-08-28 RX ADMIN — METHOCARBAMOL 1000 MG: 500 TABLET ORAL at 08:31

## 2018-08-28 RX ADMIN — Medication 15 UNITS: at 12:11

## 2018-08-28 NOTE — PROGRESS NOTES
Occupational Therapy  Facility/Department: [address] [place] [address] MED SURG  Daily Treatment Note  NAME: Elvira Gleason  : 1965  MRN: [id number]    Date of Service: 2018    Discharge Recommendations:  Continue to assess pending progress       Patient Diagnosis(es): There were no encounter diagnoses. has a past medical history of Diabetes mellitus ([facility] [address] Hypertension; and Stroke [facility]). has no past surgical history on file. Restrictions  Restrictions/Precautions  Restrictions/Precautions: General Precautions, Fall Risk  Required Braces or Orthoses?: No  Subjective   General  Chart Reviewed: Yes  Patient assessed for rehabilitation services?: Yes  Family / Caregiver Present: No  Referring Practitioner: GARETH Weber  Diagnosis: CVA  Subjective  Subjective: My legs have been cramping and they give me a flexoril and it has made me really tired today. Orientation     Objective             Toilet Transfers  Toilet - Technique: Ambulating  Equipment Used: Standard bedside commode  Toilet Transfer: Stand by assistance        Type of ROM/Therapeutic Exercise  Type of ROM/Therapeutic Exercise: AAROM;PROM;AROM  Exercises  Scapular Protraction: X10  Scapular Retraction: X10  Shoulder Depression: X10  Shoulder Elevation: X10  Shoulder Flexion: x15 AAROM  Shoulder Extension: x15  Shoulder ABduction: AROM in supine x20  Shoulder ADduction: AROM x20 in supine  Horizontal ABduction: x10  Horizontal ADduction: x10  Elbow Flexion: x15 AAROM in supine  Elbow Extension: x15   Supination: x15 PROM  Finger Flexion: x15 PROM  Finger Extension: x15 PROM  Grasp/Release: completed grasp/release activity working on holding cones and working on finger flexion/extension. Pt requires [place] assist to complete task. Other: IR/ER in supine x10      Assessment   Assessment: Pt agreeable to OT services. Pt states he feels as if he has more energy today. Pt states he doesn't have grab bars in bathroom by toilet and shower.  Pt

## 2018-08-28 NOTE — PROGRESS NOTES
Krystian Dickerson, PT     This note serves as D/C summary if patient is discharged prior to next visit.

## 2018-08-28 NOTE — FLOWSHEET NOTE
08/28/18 0848   Assessment   Charting Type Shift assessment   Neurological   Neuro (WDL) X   Level of Consciousness 0   Orientation Level Oriented X4   Cognition Appropriate judgement; Appropriate safety awareness; Appropriate attention/concentration; Follows commands   Language Clear   R Hand  Weak  (Pt has minimal movement in his fingers)   L Hand  Strong   L Foot Dorsiflexion Strong   L Foot Plantar Flexion Strong   RUE Motor Response Responds to command   LUE Motor Response Responds to command   RLE Motor Response Responds to command   LLE Motor Response Responds to command   R Foot Dorsiflexion Weak   R Foot Plantar Flexion Weak   [place] Coma Scale   Eye Opening 4   Best Verbal Response 5   Best Motor Response 6   Breann Coma Scale Score 15   HEENT   HEENT (WDL) X   Right Eye Impaired vision   Left Eye Impaired vision   Nose Intact   Throat Intact   Tongue Pink & moist   Voice Normal   Mucous Membrane Pink;Moist   Respiratory   Respiratory (WDL) WDL   Respiratory Pattern Regular   Respiratory Depth Normal   Breath Sounds   Right Upper Lobe Clear   Right Middle Lobe Clear   Right Lower Lobe Clear   Left Upper Lobe Clear   Left Lower Lobe Clear   Cardiac   Cardiac (WDL) WDL   Cardiac Regularity Regular   Heart Sounds S1, S2   Cardiac Monitor   Telemetry Monitor On No   Gastrointestinal   Abdominal (WDL) WDL   Abdomen Inspection Soft   Passing Flatus Y   RUQ Bowel Sounds Active   LUQ Bowel Sounds Active   RLQ Bowel Sounds Active   LLQ Bowel Sounds Active   Bowel Sounds (All Quadrants) Active   Peripheral Vascular   Peripheral Vascular (WDL) WDL   Edema None   Sensation RUE Full sensation   Sensation LUE Full sensation   Sensation RLE Tingling;Numbness   Sensation LLE Full sensation;Tingling   Skin Color/Condition   Skin Color/Condition (WDL) WDL   Skin Integrity   Skin Integrity (WDL) WDL   Musculoskeletal   Musculoskeletal (WDL) X   RUE Limited movement;Weakness   LUE Full movement   RL Extremity Weakness; Unsteady   LL Extremity Full movement   Genitourinary   Genitourinary (WDL) WDL   Flank Tenderness No   Suprapubic Tenderness No   Dysuria No   Psychosocial   Psychosocial (WDL) WDL      08/28/18 0848   Assessment   Charting Type Shift assessment   Neurological   Neuro (WDL) X   Level of Consciousness 0   Orientation Level Oriented X4   Cognition Appropriate judgement; Appropriate safety awareness; Appropriate attention/concentration; Follows commands   Language Clear   R Hand  Weak  (Pt has minimal movement in his fingers)   L Hand  Strong   L Foot Dorsiflexion Strong   L Foot Plantar Flexion Strong   RUE Motor Response Responds to command   LUE Motor Response Responds to command   RLE Motor Response Responds to command   LLE Motor Response Responds to command   R Foot Dorsiflexion Weak   R Foot Plantar Flexion Weak   Breann Coma Scale   Eye Opening 4   Best Verbal Response 5   Best Motor Response 6   Breann Coma Scale Score 15   HEENT   HEENT (WDL) X   Right Eye Impaired vision   Left Eye Impaired vision   Nose Intact   Throat Intact   Tongue Pink & moist   Voice Normal   Mucous Membrane Pink;Moist   Respiratory   Respiratory (WDL) WDL   Respiratory Pattern Regular   Respiratory Depth Normal   Breath Sounds   Right Upper Lobe Clear   Right Middle Lobe Clear   Right Lower Lobe Clear   Left Upper Lobe Clear   Left Lower Lobe Clear   Cardiac   Cardiac (WDL) WDL   Cardiac Regularity Regular   Heart Sounds S1, S2   Cardiac Monitor   Telemetry Monitor On No   Gastrointestinal   Abdominal (WDL) WDL   Abdomen Inspection Soft   Passing Flatus Y   RUQ Bowel Sounds Active   LUQ Bowel Sounds Active   RLQ Bowel Sounds Active   LLQ Bowel Sounds Active   Bowel Sounds (All Quadrants) Active   Peripheral Vascular   Peripheral Vascular (WDL) WDL   Edema None   Sensation RUE Full sensation   Sensation LUE Full sensation   Sensation RLE Tingling;Numbness   Sensation LLE Full sensation;Tingling   Skin Color/Condition

## 2018-08-29 LAB
GLUCOSE BLD-MCNC: 163 MG/DL (ref 74–106)
GLUCOSE BLD-MCNC: 271 MG/DL (ref 74–106)
GLUCOSE BLD-MCNC: 275 MG/DL (ref 74–106)
GLUCOSE BLD-MCNC: 88 MG/DL (ref 74–106)
PERFORMED ON: ABNORMAL
PERFORMED ON: NORMAL

## 2018-08-29 PROCEDURE — 97530 THERAPEUTIC ACTIVITIES: CPT

## 2018-08-29 PROCEDURE — 1200000002 HC SEMI PRIVATE SWING BED

## 2018-08-29 PROCEDURE — 6370000000 HC RX 637 (ALT 250 FOR IP): Performed by: NURSE PRACTITIONER

## 2018-08-29 PROCEDURE — G8978 MOBILITY CURRENT STATUS: HCPCS

## 2018-08-29 PROCEDURE — 94760 N-INVAS EAR/PLS OXIMETRY 1: CPT

## 2018-08-29 PROCEDURE — 97110 THERAPEUTIC EXERCISES: CPT

## 2018-08-29 PROCEDURE — 6370000000 HC RX 637 (ALT 250 FOR IP): Performed by: INTERNAL MEDICINE

## 2018-08-29 PROCEDURE — 6370000000 HC RX 637 (ALT 250 FOR IP): Performed by: PHYSICIAN ASSISTANT

## 2018-08-29 PROCEDURE — G8987 SELF CARE CURRENT STATUS: HCPCS

## 2018-08-29 PROCEDURE — G8988 SELF CARE GOAL STATUS: HCPCS

## 2018-08-29 PROCEDURE — 6360000002 HC RX W HCPCS: Performed by: INTERNAL MEDICINE

## 2018-08-29 PROCEDURE — G8979 MOBILITY GOAL STATUS: HCPCS

## 2018-08-29 RX ADMIN — Medication 3 UNITS: at 08:26

## 2018-08-29 RX ADMIN — METHOCARBAMOL 1000 MG: 500 TABLET ORAL at 08:22

## 2018-08-29 RX ADMIN — CLOPIDOGREL BISULFATE 75 MG: 75 TABLET, FILM COATED ORAL at 08:21

## 2018-08-29 RX ADMIN — HYDROCHLOROTHIAZIDE 25 MG: 25 TABLET ORAL at 08:21

## 2018-08-29 RX ADMIN — Medication 15 UNITS: at 12:04

## 2018-08-29 RX ADMIN — METFORMIN HYDROCHLORIDE 500 MG: 500 TABLET, FILM COATED ORAL at 16:51

## 2018-08-29 RX ADMIN — ATORVASTATIN CALCIUM 80 MG: 80 TABLET, FILM COATED ORAL at 20:52

## 2018-08-29 RX ADMIN — GABAPENTIN 100 MG: 100 CAPSULE ORAL at 08:21

## 2018-08-29 RX ADMIN — METFORMIN HYDROCHLORIDE 500 MG: 500 TABLET, FILM COATED ORAL at 08:21

## 2018-08-29 RX ADMIN — METHOCARBAMOL 1000 MG: 500 TABLET ORAL at 13:53

## 2018-08-29 RX ADMIN — ASPIRIN 325 MG: 325 TABLET, COATED ORAL at 08:22

## 2018-08-29 RX ADMIN — Medication 15 UNITS: at 08:31

## 2018-08-29 RX ADMIN — LINAGLIPTIN 5 MG: 5 TABLET, FILM COATED ORAL at 08:22

## 2018-08-29 RX ADMIN — ENOXAPARIN SODIUM 40 MG: 40 INJECTION, SOLUTION INTRAVENOUS; SUBCUTANEOUS at 08:22

## 2018-08-29 RX ADMIN — Medication 15 UNITS: at 16:56

## 2018-08-29 RX ADMIN — Medication 3 UNITS: at 16:53

## 2018-08-29 RX ADMIN — AMLODIPINE BESYLATE 10 MG: 5 TABLET ORAL at 08:21

## 2018-08-29 RX ADMIN — METHOCARBAMOL 1000 MG: 500 TABLET ORAL at 20:52

## 2018-08-29 RX ADMIN — GABAPENTIN 100 MG: 100 CAPSULE ORAL at 20:52

## 2018-08-29 RX ADMIN — INSULIN GLARGINE 70 UNITS: 100 INJECTION, SOLUTION SUBCUTANEOUS at 20:51

## 2018-08-29 RX ADMIN — PANTOPRAZOLE SODIUM 40 MG: 40 TABLET, DELAYED RELEASE ORAL at 06:30

## 2018-08-29 RX ADMIN — ROPINIROLE HYDROCHLORIDE 0.5 MG: 0.25 TABLET, FILM COATED ORAL at 20:52

## 2018-08-29 RX ADMIN — INSULIN LISPRO 1 UNITS: 100 INJECTION, SOLUTION INTRAVENOUS; SUBCUTANEOUS at 20:51

## 2018-08-29 RX ADMIN — CARVEDILOL 6.25 MG: 6.25 TABLET, FILM COATED ORAL at 08:22

## 2018-08-29 RX ADMIN — DOXAZOSIN 4 MG: 4 TABLET ORAL at 20:52

## 2018-08-29 RX ADMIN — CARVEDILOL 6.25 MG: 6.25 TABLET, FILM COATED ORAL at 16:51

## 2018-08-29 NOTE — FLOWSHEET NOTE
Weakness; Unsteady   LL Extremity Full movement   Genitourinary   Genitourinary (WDL) WDL   Flank Tenderness No   Suprapubic Tenderness No   Dysuria No   Psychosocial   Psychosocial (WDL) WDL   Pt awake in bed. Pt alert and oriented. Pt appears in no acute distress. Pt lung sounds clear zhane. Pt encouraged to cough and deep breathe. Pt states having spasms in legs in am. Pt call bell and bedside table within reach. Will continue to monitor pt.

## 2018-08-29 NOTE — PROGRESS NOTES
Extension: x15   Supination: x15 PROM  Wrist Flexion: x5 AAROM  Wrist Extension: x10 PROM  Finger Flexion: X15 AROM Pt is demo improved finger flexion. Finger Extension: x15 AROM  Grasp/Release: completed grasp/release activity working on holding cones and working on finger flexion/extension. Pt requires [place] assist to complete task. Other: IR/ER in supine x10    Assessment   Assessment: Pt agreeable to OT services. Pt demo improved AROM in RUE in shoulder flexion, ABD, and finger flexion. Pt demo improved grasp and release using cones with [place] assist to control RUE. Pt requires cues to breathe during task. Pt is motivated to participate and is making slow steady progress. Pt presents with difficulty with wrist and finger extension. Continuing to progress in this area. Plan   Plan  Times per week: 3-5  Times per day: Daily  Plan weeks: 2  Current Treatment Recommendations: ROM, Functional Mobility Training, Safety Education & Training, Self-Care / ADL, Endurance Training, Neuromuscular Re-education, Balance Training, Strengthening  G-Code  OT G-codes  Functional Limitation: Self care  Self Care Current Status (): At least 20 percent but less than 40 percent impaired, limited or restricted  Self Care Goal Status (): 0 percent impaired, limited or restricted    Goals  Short term goals  Time Frame for Short term goals: 1 week  Short term goal 1: Pt to complete LB dressing with CGA and UB with SBA. GOAL MET  Short term goal 2: Pt to complete toileting with CGA. GOAL MET  Short term goal 3: Pt to complete bathing with CGA. GOAL MET with sponge bathing. Short term goal 4: Pt to complete bathing transfer with SBA. (NOT ADDRESSED. Pt asks for spouse to assist with task and doesn't want to address in therapy services at this time.)  Short term goal 5: Pt to demonstrate independence in self PROM in RUE. GOAL MET     Pt is making slow steady progress towards all goals.  Pt is continuing to make progress towards LTG's at this time. Pt able to dress self without physical assist with compensatory strategy. Pt demo good safety awareness during all self care and mobility. Long term goals  Time Frame for Long term goals : 2 weeks. Long term goal 1: Pt to complete dynamic/standing tolerance activity x10 minutes to decrease risk of falls. Long term goal 2: Pt to complete dressing with MOD I. Long term goal 3: Pt to complete toileting with MOD I. Long term goal 4: Pt to complete bathing SUP. Therapy Time   Individual Concurrent Group Co-treatment   Time In 0207         Time Out 0302         Minutes 55           This note serves as a DC summary in the event of pt discharge.         Eli Matias, OTR/L

## 2018-08-29 NOTE — PROGRESS NOTES
and ankle x 20 each  Ankle Pumps: PROM DF/PF x20 each R, resisted PF  Other exercises  Other exercises 2: NuStep x 12 minutes (L-8), with UEs x 5 minutes  Other exercises 4: sit to stand x 10   Other exercises 5: lower trunk rotation x 20 B  Other exercises 7: marching in place x 10 B      Strength RLE  Comment: quads 3/5, hip extension at least 4-/5, hip abd 2/5,  minimal activation of hip flexors, plantarflexion  Strength LLE  Comment: grossly 5/5                 Assessment   Body structures, Functions, Activity limitations: Decreased functional mobility ; Decreased strength;Decreased balance;Decreased endurance  Assessment: Patient continues to demonstrate gradual improvements in strength and mobility, but continues to exhibit R LE and UE weakness. Continued R foot drop with R hip circumduction with gait. Treatment Diagnosis: Weakness, s/p CVA  Prognosis: Good  REQUIRES PT FOLLOW UP: Yes  Activity Tolerance  Activity Tolerance: Patient Tolerated treatment well     G-Code  PT G-Codes  Functional Assessment Tool Used: clinical judgement  Functional Limitation: Mobility: Walking and moving around  Mobility: Walking and Moving Around Current Status (): At least 40 percent but less than 60 percent impaired, limited or restricted  Mobility: Walking and Moving Around Goal Status (): At least 1 percent but less than 20 percent impaired, limited or restricted    Goals  Short term goals  Time Frame for Short term goals: 2 weeks  Short term goal 1: Patient will perform all basic transfers independently with safety present.    (CGA)  Short term goal 2: Increase strength and endurance to tolerate 30 minutes of therex/ther. activity. MET  Short term goal 3: Increase strength to ambulate 400 feet with least restrictive A.D with safety present.    (ambulating 270 feet +)  Short term goal 4: Patient will ambulate with Good balance.    (fair + with RW)  Short term goal 5: Patient will be independent with home

## 2018-08-30 LAB
GLUCOSE BLD-MCNC: 248 MG/DL (ref 74–106)
GLUCOSE BLD-MCNC: 271 MG/DL (ref 74–106)
GLUCOSE BLD-MCNC: 316 MG/DL (ref 74–106)
PERFORMED ON: ABNORMAL

## 2018-08-30 PROCEDURE — 99406 BEHAV CHNG SMOKING 3-10 MIN: CPT

## 2018-08-30 PROCEDURE — 6370000000 HC RX 637 (ALT 250 FOR IP): Performed by: NURSE PRACTITIONER

## 2018-08-30 PROCEDURE — 6370000000 HC RX 637 (ALT 250 FOR IP): Performed by: PHYSICIAN ASSISTANT

## 2018-08-30 PROCEDURE — 6360000002 HC RX W HCPCS: Performed by: INTERNAL MEDICINE

## 2018-08-30 PROCEDURE — 6370000000 HC RX 637 (ALT 250 FOR IP): Performed by: INTERNAL MEDICINE

## 2018-08-30 PROCEDURE — 1200000002 HC SEMI PRIVATE SWING BED

## 2018-08-30 RX ORDER — DOXAZOSIN MESYLATE 1 MG/1
2 TABLET ORAL DAILY
Status: DISCONTINUED | OUTPATIENT
Start: 2018-08-30 | End: 2018-09-05

## 2018-08-30 RX ADMIN — LINAGLIPTIN 5 MG: 5 TABLET, FILM COATED ORAL at 08:38

## 2018-08-30 RX ADMIN — Medication 4 UNITS: at 17:42

## 2018-08-30 RX ADMIN — PANTOPRAZOLE SODIUM 40 MG: 40 TABLET, DELAYED RELEASE ORAL at 06:21

## 2018-08-30 RX ADMIN — HYDROCHLOROTHIAZIDE 25 MG: 25 TABLET ORAL at 08:38

## 2018-08-30 RX ADMIN — Medication 15 UNITS: at 16:00

## 2018-08-30 RX ADMIN — INSULIN LISPRO 1 UNITS: 100 INJECTION, SOLUTION INTRAVENOUS; SUBCUTANEOUS at 20:45

## 2018-08-30 RX ADMIN — CARVEDILOL 6.25 MG: 6.25 TABLET, FILM COATED ORAL at 17:48

## 2018-08-30 RX ADMIN — CARVEDILOL 6.25 MG: 6.25 TABLET, FILM COATED ORAL at 08:38

## 2018-08-30 RX ADMIN — CLOPIDOGREL BISULFATE 75 MG: 75 TABLET, FILM COATED ORAL at 08:38

## 2018-08-30 RX ADMIN — ROPINIROLE HYDROCHLORIDE 0.5 MG: 0.25 TABLET, FILM COATED ORAL at 20:44

## 2018-08-30 RX ADMIN — INSULIN GLARGINE 70 UNITS: 100 INJECTION, SOLUTION SUBCUTANEOUS at 20:45

## 2018-08-30 RX ADMIN — ZOLPIDEM TARTRATE 5 MG: 5 TABLET ORAL at 20:44

## 2018-08-30 RX ADMIN — GABAPENTIN 100 MG: 100 CAPSULE ORAL at 20:45

## 2018-08-30 RX ADMIN — METHOCARBAMOL 1000 MG: 500 TABLET ORAL at 08:38

## 2018-08-30 RX ADMIN — DOXAZOSIN 4 MG: 4 TABLET ORAL at 20:45

## 2018-08-30 RX ADMIN — Medication 15 UNITS: at 06:22

## 2018-08-30 RX ADMIN — ATORVASTATIN CALCIUM 80 MG: 80 TABLET, FILM COATED ORAL at 20:44

## 2018-08-30 RX ADMIN — GABAPENTIN 100 MG: 100 CAPSULE ORAL at 08:38

## 2018-08-30 RX ADMIN — METFORMIN HYDROCHLORIDE 500 MG: 500 TABLET, FILM COATED ORAL at 17:48

## 2018-08-30 RX ADMIN — Medication 3 UNITS: at 08:40

## 2018-08-30 RX ADMIN — AMLODIPINE BESYLATE 10 MG: 5 TABLET ORAL at 08:38

## 2018-08-30 RX ADMIN — ASPIRIN 325 MG: 325 TABLET, COATED ORAL at 08:38

## 2018-08-30 RX ADMIN — ENOXAPARIN SODIUM 40 MG: 40 INJECTION, SOLUTION INTRAVENOUS; SUBCUTANEOUS at 08:38

## 2018-08-30 RX ADMIN — METFORMIN HYDROCHLORIDE 500 MG: 500 TABLET, FILM COATED ORAL at 08:38

## 2018-08-30 RX ADMIN — METHOCARBAMOL 1000 MG: 500 TABLET ORAL at 20:44

## 2018-08-30 RX ADMIN — DOXAZOSIN 2 MG: 1 TABLET ORAL at 10:43

## 2018-08-30 ASSESSMENT — PAIN SCALES - GENERAL
PAINLEVEL_OUTOF10: 0
PAINLEVEL_OUTOF10: 0

## 2018-08-30 NOTE — PROGRESS NOTES
Dr. Riccardo Bear notified that pt is ready for departure to appointment. Emely to bedside. Pt departed with Sister and Wife, to go to Doctors appointment with [employer]. Form is signed. . Pt departed in [facility], with Jean. Reports will return by 1900.

## 2018-08-30 NOTE — FLOWSHEET NOTE
RLE Tingling;Numbness   Sensation LLE Full sensation;Tingling   Skin Color/Condition   Skin Color/Condition (WDL) WDL   Skin Integrity   Skin Integrity (WDL) WDL   Musculoskeletal   Musculoskeletal (WDL) X   RUE Limited movement;Weakness   LUE Full movement   RL Extremity Weakness; Unsteady   LL Extremity Full movement   Genitourinary   Genitourinary (WDL) WDL   Flank Tenderness No   Suprapubic Tenderness No   Dysuria No   Psychosocial   Psychosocial (WDL) WDL   Pt laying in bed, Alert, No acute distress noted at this time. Pt continues on RA. Pt is Alert and Oriented x 4. Pt refused Stool Softeners and Miralax, reports \"immoving just fine, more then normally actually\". No change from previous assessment. POC for the day reviewed, Pt denies questions. Pt denies any Needs at this time. Will continue to monitor.

## 2018-08-30 NOTE — PLAN OF CARE
Problem: Neurological  Goal: Maximum potential motor/sensory/cognitive function  Outcome: Ongoing      Problem: Safety:  Goal: Free from accidental physical injury  Free from accidental physical injury   Outcome: Ongoing      Problem: Daily Care:  Goal: Daily care needs are met  Daily care needs are met   Outcome: Ongoing

## 2018-08-30 NOTE — PROGRESS NOTES
Nutrition Assessment    Type and Reason for Visit:  (follow up)    Nutrition Recommendations: no new recommendations at this time. Will continue to monitor PO intakes and pertinent labs. Nutrition Diagnosis:   · Problem: No nutrition diagnosis at this time  · Etiology: related to  (CVA)     Signs and symptoms:  as evidenced by  (patient eating good at this time.  )      Nutrition Risk Level: Low, Moderate    Nutrition Interventions:   Continue current diet  Continued Inpatient Monitoring, Coordination of Care    Nutrition Evaluation:   · Evaluation: Progressing toward goals   · Goals: to maintain nutritional status and stablized weight. Will monitor PO intakes, weight, and pertinent labs    · Monitoring: Meal Intake, Pertinent Labs    See Adult Nutrition Doc Flowsheet for more detail.      Electronically signed by Shreya Dye on 8/30/18 at 2:32 PM

## 2018-08-31 LAB
ANION GAP SERPL CALCULATED.3IONS-SCNC: 12 MMOL/L (ref 3–16)
BUN BLDV-MCNC: 18 MG/DL (ref 6–20)
CALCIUM SERPL-MCNC: 8.9 MG/DL (ref 8.5–10.5)
CHLORIDE BLD-SCNC: 101 MMOL/L (ref 98–107)
CO2: 27 MMOL/L (ref 20–30)
CREAT SERPL-MCNC: 1.1 MG/DL (ref 0.4–1.2)
GFR AFRICAN AMERICAN: >59
GFR NON-AFRICAN AMERICAN: >59
GLUCOSE BLD-MCNC: 101 MG/DL (ref 74–106)
GLUCOSE BLD-MCNC: 205 MG/DL (ref 74–106)
GLUCOSE BLD-MCNC: 216 MG/DL (ref 74–106)
GLUCOSE BLD-MCNC: 225 MG/DL (ref 74–106)
GLUCOSE BLD-MCNC: 248 MG/DL (ref 74–106)
GLUCOSE BLD-MCNC: 68 MG/DL (ref 74–106)
HCT VFR BLD CALC: 38.4 % (ref 40–54)
HEMOGLOBIN: 12.9 G/DL (ref 13–18)
MCH RBC QN AUTO: 29.9 PG (ref 27–32)
MCHC RBC AUTO-ENTMCNC: 33.6 G/DL (ref 31–35)
MCV RBC AUTO: 89.1 FL (ref 80–100)
PDW BLD-RTO: 12.4 % (ref 11–16)
PERFORMED ON: ABNORMAL
PERFORMED ON: NORMAL
PLATELET # BLD: 146 K/UL (ref 150–400)
PMV BLD AUTO: 11 FL (ref 6–10)
POTASSIUM SERPL-SCNC: 3.9 MMOL/L (ref 3.4–5.1)
RBC # BLD: 4.31 M/UL (ref 4.5–6)
SODIUM BLD-SCNC: 140 MMOL/L (ref 136–145)
WBC # BLD: 5.4 K/UL (ref 4–11)

## 2018-08-31 PROCEDURE — 6370000000 HC RX 637 (ALT 250 FOR IP): Performed by: INTERNAL MEDICINE

## 2018-08-31 PROCEDURE — 6370000000 HC RX 637 (ALT 250 FOR IP): Performed by: NURSE PRACTITIONER

## 2018-08-31 PROCEDURE — 6370000000 HC RX 637 (ALT 250 FOR IP): Performed by: PHYSICIAN ASSISTANT

## 2018-08-31 PROCEDURE — 97530 THERAPEUTIC ACTIVITIES: CPT

## 2018-08-31 PROCEDURE — 36415 COLL VENOUS BLD VENIPUNCTURE: CPT

## 2018-08-31 PROCEDURE — 85027 COMPLETE CBC AUTOMATED: CPT

## 2018-08-31 PROCEDURE — 1200000002 HC SEMI PRIVATE SWING BED

## 2018-08-31 PROCEDURE — 97110 THERAPEUTIC EXERCISES: CPT

## 2018-08-31 PROCEDURE — 97535 SELF CARE MNGMENT TRAINING: CPT

## 2018-08-31 PROCEDURE — 6360000002 HC RX W HCPCS: Performed by: INTERNAL MEDICINE

## 2018-08-31 PROCEDURE — 80048 BASIC METABOLIC PNL TOTAL CA: CPT

## 2018-08-31 RX ADMIN — METHOCARBAMOL 1000 MG: 500 TABLET ORAL at 08:25

## 2018-08-31 RX ADMIN — GABAPENTIN 100 MG: 100 CAPSULE ORAL at 21:45

## 2018-08-31 RX ADMIN — DOCUSATE SODIUM AND SENNOSIDES 2 TABLET: 8.6; 5 TABLET, FILM COATED ORAL at 08:24

## 2018-08-31 RX ADMIN — INSULIN LISPRO 1 UNITS: 100 INJECTION, SOLUTION INTRAVENOUS; SUBCUTANEOUS at 21:46

## 2018-08-31 RX ADMIN — GABAPENTIN 100 MG: 100 CAPSULE ORAL at 08:24

## 2018-08-31 RX ADMIN — INSULIN GLARGINE 70 UNITS: 100 INJECTION, SOLUTION SUBCUTANEOUS at 21:46

## 2018-08-31 RX ADMIN — CLOPIDOGREL BISULFATE 75 MG: 75 TABLET, FILM COATED ORAL at 08:25

## 2018-08-31 RX ADMIN — HYDROCHLOROTHIAZIDE 25 MG: 25 TABLET ORAL at 08:25

## 2018-08-31 RX ADMIN — CARVEDILOL 6.25 MG: 6.25 TABLET, FILM COATED ORAL at 17:09

## 2018-08-31 RX ADMIN — Medication 2 UNITS: at 17:10

## 2018-08-31 RX ADMIN — ATORVASTATIN CALCIUM 80 MG: 80 TABLET, FILM COATED ORAL at 21:45

## 2018-08-31 RX ADMIN — ENOXAPARIN SODIUM 40 MG: 40 INJECTION, SOLUTION INTRAVENOUS; SUBCUTANEOUS at 08:25

## 2018-08-31 RX ADMIN — CARVEDILOL 6.25 MG: 6.25 TABLET, FILM COATED ORAL at 08:25

## 2018-08-31 RX ADMIN — ROPINIROLE HYDROCHLORIDE 0.5 MG: 0.25 TABLET, FILM COATED ORAL at 21:45

## 2018-08-31 RX ADMIN — METFORMIN HYDROCHLORIDE 500 MG: 500 TABLET, FILM COATED ORAL at 08:25

## 2018-08-31 RX ADMIN — AMLODIPINE BESYLATE 10 MG: 5 TABLET ORAL at 08:24

## 2018-08-31 RX ADMIN — DOXAZOSIN 4 MG: 4 TABLET ORAL at 21:45

## 2018-08-31 RX ADMIN — Medication 2 UNITS: at 08:32

## 2018-08-31 RX ADMIN — METFORMIN HYDROCHLORIDE 500 MG: 500 TABLET, FILM COATED ORAL at 17:09

## 2018-08-31 RX ADMIN — DOXAZOSIN 2 MG: 1 TABLET ORAL at 08:25

## 2018-08-31 RX ADMIN — METHOCARBAMOL 1000 MG: 500 TABLET ORAL at 21:45

## 2018-08-31 RX ADMIN — PANTOPRAZOLE SODIUM 40 MG: 40 TABLET, DELAYED RELEASE ORAL at 06:03

## 2018-08-31 RX ADMIN — METHOCARBAMOL 1000 MG: 500 TABLET ORAL at 15:05

## 2018-08-31 RX ADMIN — Medication 15 UNITS: at 17:13

## 2018-08-31 RX ADMIN — ASPIRIN 325 MG: 325 TABLET, COATED ORAL at 08:24

## 2018-08-31 RX ADMIN — ZOLPIDEM TARTRATE 5 MG: 5 TABLET ORAL at 21:45

## 2018-08-31 RX ADMIN — LINAGLIPTIN 5 MG: 5 TABLET, FILM COATED ORAL at 08:34

## 2018-08-31 RX ADMIN — Medication 15 UNITS: at 08:30

## 2018-08-31 NOTE — PROGRESS NOTES
Physical Therapy  Facility/Department: [facility] MED SURG  Daily Treatment Note    NAME: Ricky Sethi  : 1965  MRN: [id number]    Date of Service: 2018    Discharge Recommendations:  Continue to assess pending progress        Patient Diagnosis(es): There were no encounter diagnoses. has a past medical history of Diabetes mellitus ([facility] [address] Hypertension; and Stroke [facility]). has no past surgical history on file. Restrictions  Restrictions/Precautions  Restrictions/Precautions: General Precautions, Fall Risk  Required Braces or Orthoses?: No     Subjective   General  Chart Reviewed: Yes  Family / Caregiver Present: No  Subjective  Subjective: Patient sitting at edge of bed and agreeable to PT treatment. Patient went to [facility] yesterday for AFO fitting and also went to his house. Says he just went to garage because he did not try the steps into his house. Patient says he is tired today. Continued drawing and cramping in leg.    Pain Screening  Patient Currently in Pain: Denies  Vital Signs  Patient Currently in Pain: Denies       Orientation  Orientation  Overall Orientation Status: Within Functional Limits     Objective   Bed mobility  Supine to Sit: Modified independent  Sit to Supine: Independent  Scooting: Modified independent     Transfers  Sit to Stand: Stand by assistance  Stand to sit: Stand by assistance     Ambulation  Ambulation?: Yes  Ambulation 1  Surface: level tile  Device: Rolling Walker  Assistance: Contact guard assistance  Quality of Gait: R foot drop with R hip circumduction   Distance: 270 feet x 2 reps     Balance  Sitting - Static: Good  Sitting - Dynamic: Good  Standing - Static: Fair;+  Standing - Dynamic: Fair;+ (with walker)     Exercises  Bridging:  (x20 )  Quad Sets: x20 B  Heelslides: x10 B, with hand to contralateral knee (assisted with R LE and R UE)  Gluteal Sets: x20  Hip Abduction: supine x 20 B, AA on R   Knee Long Arc Quad: x20 B  Knee Passive Range of Motion:

## 2018-08-31 NOTE — PROGRESS NOTES
x10 PROM  Wrist Extension: x10 PROM  Finger Flexion: X15 AROM Pt is demo improved finger flexion. Difficulty with index finger flexion. Finger Extension: x15 AROM  Grasp/Release: completed grasp/release activity working on holding cones and working on finger flexion/extension. Pt required decreased [place] rubina to complete task. Other: IR/ER in supine x10    Assessment   Assessment: Pt agreeable to OT services. Pt completed AROM/PROM exercises to assist in decreasing spasticity and improving AROM. Pt demo improved ability to grasp objects and pick them up from table and release. Pt is unable to  weighted items. Pt continues to have poor wrist motion and requires assist with wrist extension/flexion. Pt continues to demo slow steady progress towards movement and goals. Plan   Plan  Times per week: 3-5  Times per day: Daily  Plan weeks: 2  Current Treatment Recommendations: ROM, Functional Mobility Training, Safety Education & Training, Self-Care / ADL, Endurance Training, Neuromuscular Re-education, Balance Training, Strengthening    Goals  Short term goals  Time Frame for Short term goals: 1 week  Short term goal 1: Pt to complete LB dressing with CGA and UB with SBA. Short term goal 2: Pt to complete toileting with CGA. Short term goal 3: Pt to complete bathing with CGA. Short term goal 4: Pt to complete bathing transfer with SBA. Short term goal 5: Pt to improve AROM in RUE to assist with functional activity. Long term goals  Time Frame for Long term goals : 2 weeks. Long term goal 1: Pt to complete dynamic/standing tolerance activity x10 minutes to decrease risk of falls. Long term goal 2: Pt to complete dressing with MOD I. Long term goal 3: Pt to complete toileting with MOD I. Long term goal 4: Pt to complete bathing SUP. Long term goal 5: Pt to improve grasp on RUE to grasp cups and assist with ADL tasks.         Therapy Time   Individual Concurrent Group Co-treatment

## 2018-08-31 NOTE — PLAN OF CARE
Problem: Falls - Risk of:  Goal: Will remain free from falls  Will remain free from falls   Outcome: Ongoing      Problem: Pain:  Goal: Patient's pain/discomfort is manageable  Patient's pain/discomfort is manageable   Outcome: Ongoing

## 2018-09-01 LAB
GLUCOSE BLD-MCNC: 101 MG/DL (ref 74–106)
GLUCOSE BLD-MCNC: 179 MG/DL (ref 74–106)
GLUCOSE BLD-MCNC: 190 MG/DL (ref 74–106)
GLUCOSE BLD-MCNC: 226 MG/DL (ref 74–106)
PERFORMED ON: ABNORMAL
PERFORMED ON: NORMAL

## 2018-09-01 PROCEDURE — 1200000002 HC SEMI PRIVATE SWING BED

## 2018-09-01 PROCEDURE — 6370000000 HC RX 637 (ALT 250 FOR IP): Performed by: INTERNAL MEDICINE

## 2018-09-01 PROCEDURE — 6370000000 HC RX 637 (ALT 250 FOR IP): Performed by: NURSE PRACTITIONER

## 2018-09-01 PROCEDURE — 6360000002 HC RX W HCPCS: Performed by: INTERNAL MEDICINE

## 2018-09-01 PROCEDURE — 6370000000 HC RX 637 (ALT 250 FOR IP): Performed by: PHYSICIAN ASSISTANT

## 2018-09-01 RX ORDER — CARVEDILOL 12.5 MG/1
12.5 TABLET ORAL 2 TIMES DAILY WITH MEALS
Status: DISCONTINUED | OUTPATIENT
Start: 2018-09-01 | End: 2018-09-13 | Stop reason: HOSPADM

## 2018-09-01 RX ADMIN — AMLODIPINE BESYLATE 10 MG: 5 TABLET ORAL at 08:46

## 2018-09-01 RX ADMIN — ATORVASTATIN CALCIUM 80 MG: 80 TABLET, FILM COATED ORAL at 20:57

## 2018-09-01 RX ADMIN — METFORMIN HYDROCHLORIDE 500 MG: 500 TABLET, FILM COATED ORAL at 17:17

## 2018-09-01 RX ADMIN — INSULIN GLARGINE 70 UNITS: 100 INJECTION, SOLUTION SUBCUTANEOUS at 20:54

## 2018-09-01 RX ADMIN — PANTOPRAZOLE SODIUM 40 MG: 40 TABLET, DELAYED RELEASE ORAL at 06:21

## 2018-09-01 RX ADMIN — Medication 15 UNITS: at 17:18

## 2018-09-01 RX ADMIN — METFORMIN HYDROCHLORIDE 500 MG: 500 TABLET, FILM COATED ORAL at 08:46

## 2018-09-01 RX ADMIN — ZOLPIDEM TARTRATE 5 MG: 5 TABLET ORAL at 20:58

## 2018-09-01 RX ADMIN — HYDROCHLOROTHIAZIDE 25 MG: 25 TABLET ORAL at 08:47

## 2018-09-01 RX ADMIN — LINAGLIPTIN 5 MG: 5 TABLET, FILM COATED ORAL at 08:46

## 2018-09-01 RX ADMIN — ROPINIROLE HYDROCHLORIDE 0.5 MG: 0.25 TABLET, FILM COATED ORAL at 20:57

## 2018-09-01 RX ADMIN — GABAPENTIN 100 MG: 100 CAPSULE ORAL at 20:57

## 2018-09-01 RX ADMIN — CLOPIDOGREL BISULFATE 75 MG: 75 TABLET, FILM COATED ORAL at 08:46

## 2018-09-01 RX ADMIN — ASPIRIN 325 MG: 325 TABLET, COATED ORAL at 08:46

## 2018-09-01 RX ADMIN — DOXAZOSIN 2 MG: 1 TABLET ORAL at 08:46

## 2018-09-01 RX ADMIN — METHOCARBAMOL 1000 MG: 500 TABLET ORAL at 08:46

## 2018-09-01 RX ADMIN — INSULIN LISPRO 1 UNITS: 100 INJECTION, SOLUTION INTRAVENOUS; SUBCUTANEOUS at 20:55

## 2018-09-01 RX ADMIN — GABAPENTIN 100 MG: 100 CAPSULE ORAL at 08:46

## 2018-09-01 RX ADMIN — METHOCARBAMOL 1000 MG: 500 TABLET ORAL at 15:22

## 2018-09-01 RX ADMIN — DOCUSATE SODIUM AND SENNOSIDES 2 TABLET: 8.6; 5 TABLET, FILM COATED ORAL at 08:47

## 2018-09-01 RX ADMIN — METHOCARBAMOL 1000 MG: 500 TABLET ORAL at 20:57

## 2018-09-01 RX ADMIN — CARVEDILOL 12.5 MG: 12.5 TABLET, FILM COATED ORAL at 17:17

## 2018-09-01 RX ADMIN — Medication 1 UNITS: at 08:54

## 2018-09-01 RX ADMIN — DOXAZOSIN 4 MG: 4 TABLET ORAL at 20:57

## 2018-09-01 RX ADMIN — CARVEDILOL 6.25 MG: 6.25 TABLET, FILM COATED ORAL at 08:47

## 2018-09-01 RX ADMIN — ENOXAPARIN SODIUM 40 MG: 40 INJECTION, SOLUTION INTRAVENOUS; SUBCUTANEOUS at 08:47

## 2018-09-01 RX ADMIN — Medication 2 UNITS: at 17:20

## 2018-09-01 RX ADMIN — Medication 15 UNITS: at 08:50

## 2018-09-01 NOTE — PLAN OF CARE
Problem: Falls - Risk of:  Goal: Will remain free from falls  Will remain free from falls   Outcome: Ongoing      Problem: Skin Integrity/Risk  Goal: No skin breakdown during hospitalization  Outcome: Ongoing

## 2018-09-01 NOTE — PLAN OF CARE
Problem: Falls - Risk of:  Goal: Will remain free from falls  Will remain free from falls   Outcome: Ongoing    Goal: Absence of physical injury  Absence of physical injury   Outcome: Ongoing      Problem: Pain Control  Goal: Maintain pain level at or below patient's acceptable level (or 5 if patient is unable to determine acceptable level)  Outcome: Ongoing    Goal: Improvement in pain related behaviors BP/HR WNL  Outcome: Ongoing      Problem: Neurological  Goal: Maximum potential motor/sensory/cognitive function  Outcome: Ongoing      Problem: Cardiovascular  Goal: No DVT, peripheral vascular complications  Outcome: Ongoing    Goal: Hemodynamic stability  Outcome: Ongoing    Goal: Anticoagulate/Hct stable  Outcome: Ongoing    Goal: Agreement to quit smoking  Outcome: Ongoing    Goal: Weight maintained or lost  Outcome: Ongoing    Goal: Understanding of dietary restrictions  Outcome: Ongoing      Problem: Respiratory  Goal: No pulmonary complications  Outcome: Ongoing    Goal: O2 Sat > 90%  Outcome: Ongoing    Goal: Supplemental O2 requirements decreased  Outcome: Ongoing    Goal: Agreement to quit smoking  Outcome: Ongoing    Goal: Pneumonia vaccine at discharge as needed  Outcome: Ongoing      Problem: GI  Goal: No bowel complications  Outcome: Ongoing    Goal: Bowel movement at least every other day  Outcome: Ongoing      Problem:   Goal: Adequate urinary output  Outcome: Ongoing    Goal: No urinary complication  Outcome: Ongoing      Problem: Nutrition  Goal: Optimal nutrition therapy  Outcome: Ongoing    Goal: Understanding of nutritional guidelines  Outcome: Ongoing      Problem: Skin Integrity/Risk  Goal: No skin breakdown during hospitalization  Outcome: Ongoing    Goal: Wound healing  Outcome: Ongoing      Problem: Musculor/Skeletal Functional Status  Goal: Highest potential functional level  Outcome: Ongoing    Goal: Absence of falls  Outcome: Ongoing      Problem: Intellectual/Education/Knowledge

## 2018-09-02 LAB
GLUCOSE BLD-MCNC: 128 MG/DL (ref 74–106)
GLUCOSE BLD-MCNC: 135 MG/DL (ref 74–106)
GLUCOSE BLD-MCNC: 169 MG/DL (ref 74–106)
GLUCOSE BLD-MCNC: 323 MG/DL (ref 74–106)
PERFORMED ON: ABNORMAL

## 2018-09-02 PROCEDURE — 6360000002 HC RX W HCPCS: Performed by: INTERNAL MEDICINE

## 2018-09-02 PROCEDURE — 6370000000 HC RX 637 (ALT 250 FOR IP): Performed by: PHYSICIAN ASSISTANT

## 2018-09-02 PROCEDURE — 6370000000 HC RX 637 (ALT 250 FOR IP): Performed by: NURSE PRACTITIONER

## 2018-09-02 PROCEDURE — 94760 N-INVAS EAR/PLS OXIMETRY 1: CPT

## 2018-09-02 PROCEDURE — 6370000000 HC RX 637 (ALT 250 FOR IP): Performed by: INTERNAL MEDICINE

## 2018-09-02 PROCEDURE — 1200000002 HC SEMI PRIVATE SWING BED

## 2018-09-02 RX ADMIN — Medication 15 UNITS: at 08:30

## 2018-09-02 RX ADMIN — METHOCARBAMOL 1000 MG: 500 TABLET ORAL at 20:24

## 2018-09-02 RX ADMIN — ACETAMINOPHEN 650 MG: 325 TABLET, FILM COATED ORAL at 03:51

## 2018-09-02 RX ADMIN — METHOCARBAMOL 1000 MG: 500 TABLET ORAL at 15:04

## 2018-09-02 RX ADMIN — METFORMIN HYDROCHLORIDE 500 MG: 500 TABLET, FILM COATED ORAL at 08:24

## 2018-09-02 RX ADMIN — Medication 4 UNITS: at 08:25

## 2018-09-02 RX ADMIN — PANTOPRAZOLE SODIUM 40 MG: 40 TABLET, DELAYED RELEASE ORAL at 03:52

## 2018-09-02 RX ADMIN — CARVEDILOL 12.5 MG: 12.5 TABLET, FILM COATED ORAL at 18:06

## 2018-09-02 RX ADMIN — AMLODIPINE BESYLATE 10 MG: 5 TABLET ORAL at 08:24

## 2018-09-02 RX ADMIN — DOXAZOSIN 2 MG: 1 TABLET ORAL at 08:24

## 2018-09-02 RX ADMIN — INSULIN GLARGINE 70 UNITS: 100 INJECTION, SOLUTION SUBCUTANEOUS at 20:22

## 2018-09-02 RX ADMIN — LINAGLIPTIN 5 MG: 5 TABLET, FILM COATED ORAL at 08:24

## 2018-09-02 RX ADMIN — GABAPENTIN 100 MG: 100 CAPSULE ORAL at 08:24

## 2018-09-02 RX ADMIN — ENOXAPARIN SODIUM 40 MG: 40 INJECTION, SOLUTION INTRAVENOUS; SUBCUTANEOUS at 08:25

## 2018-09-02 RX ADMIN — ZOLPIDEM TARTRATE 5 MG: 5 TABLET ORAL at 20:24

## 2018-09-02 RX ADMIN — ASPIRIN 325 MG: 325 TABLET, COATED ORAL at 08:24

## 2018-09-02 RX ADMIN — METHOCARBAMOL 1000 MG: 500 TABLET ORAL at 08:24

## 2018-09-02 RX ADMIN — ROPINIROLE HYDROCHLORIDE 0.5 MG: 0.25 TABLET, FILM COATED ORAL at 20:24

## 2018-09-02 RX ADMIN — HYDROCHLOROTHIAZIDE 25 MG: 25 TABLET ORAL at 08:25

## 2018-09-02 RX ADMIN — CLOPIDOGREL BISULFATE 75 MG: 75 TABLET, FILM COATED ORAL at 08:25

## 2018-09-02 RX ADMIN — Medication 15 UNITS: at 18:06

## 2018-09-02 RX ADMIN — METFORMIN HYDROCHLORIDE 500 MG: 500 TABLET, FILM COATED ORAL at 18:06

## 2018-09-02 RX ADMIN — Medication 15 UNITS: at 12:10

## 2018-09-02 RX ADMIN — INSULIN LISPRO 1 UNITS: 100 INJECTION, SOLUTION INTRAVENOUS; SUBCUTANEOUS at 20:22

## 2018-09-02 RX ADMIN — CARVEDILOL 12.5 MG: 12.5 TABLET, FILM COATED ORAL at 08:25

## 2018-09-02 RX ADMIN — PANTOPRAZOLE SODIUM 40 MG: 40 TABLET, DELAYED RELEASE ORAL at 08:24

## 2018-09-02 RX ADMIN — GABAPENTIN 100 MG: 100 CAPSULE ORAL at 20:24

## 2018-09-02 RX ADMIN — ATORVASTATIN CALCIUM 80 MG: 80 TABLET, FILM COATED ORAL at 20:24

## 2018-09-02 ASSESSMENT — PAIN SCALES - GENERAL
PAINLEVEL_OUTOF10: 7
PAINLEVEL_OUTOF10: 0

## 2018-09-02 NOTE — PLAN OF CARE
Problem: Falls - Risk of:  Goal: Will remain free from falls  Will remain free from falls   Outcome: Ongoing    Goal: Absence of physical injury  Absence of physical injury   Outcome: Ongoing      Problem: Pain Control  Goal: Maintain pain level at or below patient's acceptable level (or 5 if patient is unable to determine acceptable level)  Outcome: Ongoing    Goal: Improvement in pain related behaviors BP/HR WNL  Outcome: Ongoing      Problem: Neurological  Goal: Maximum potential motor/sensory/cognitive function  Outcome: Ongoing      Problem: Cardiovascular  Goal: No DVT, peripheral vascular complications  Outcome: Ongoing    Goal: Hemodynamic stability  Outcome: Ongoing    Goal: Anticoagulate/Hct stable  Outcome: Ongoing    Goal: Agreement to quit smoking  Outcome: Ongoing    Goal: Weight maintained or lost  Outcome: Ongoing    Goal: Understanding of dietary restrictions  Outcome: Ongoing      Problem: Respiratory  Goal: No pulmonary complications  Outcome: Ongoing    Goal: O2 Sat > 90%  Outcome: Ongoing    Goal: Supplemental O2 requirements decreased  Outcome: Ongoing    Goal: Agreement to quit smoking  Outcome: Ongoing    Goal: Pneumonia vaccine at discharge as needed  Outcome: Ongoing      Problem: GI  Goal: No bowel complications  Outcome: Ongoing      Problem:   Goal: Adequate urinary output  Outcome: Ongoing    Goal: No urinary complication  Outcome: Ongoing      Problem: Nutrition  Goal: Optimal nutrition therapy  Outcome: Ongoing    Goal: Understanding of nutritional guidelines  Outcome: Ongoing      Problem: Skin Integrity/Risk  Goal: No skin breakdown during hospitalization  Outcome: Ongoing    Goal: Wound healing  Outcome: Ongoing      Problem: Musculor/Skeletal Functional Status  Goal: Highest potential functional level  Outcome: Ongoing    Goal: Absence of falls  Outcome: Ongoing      Problem: Intellectual/Education/Knowledge Deficit  Goal: Teaching initiated upon admission  Outcome: Ongoing    Goal: Written Disposition Instruction form completed  Outcome: Ongoing      Problem: Emotional/Psychosocial  Goal: Understanding of community resources  Outcome: Ongoing      Problem: Spiritual  Goal: Coping methods/spiritual issues explored  Outcome: Not Met This Shift    Goal: Acknowledge understanding of advance directives  Outcome: Ongoing    Goal: Supportive care provided  Outcome: Ongoing      Problem: Infection:  Goal: Will remain free from infection  Will remain free from infection   Outcome: Ongoing      Problem: Safety:  Goal: Free from accidental physical injury  Free from accidental physical injury   Outcome: Not Met This Shift    Goal: Free from intentional harm  Free from intentional harm   Outcome: Ongoing      Problem: Daily Care:  Goal: Daily care needs are met  Daily care needs are met   Outcome: Ongoing      Problem: Pain:  Goal: Patient's pain/discomfort is manageable  Patient's pain/discomfort is manageable   Outcome: Ongoing      Problem: Skin Integrity:  Goal: Skin integrity will stabilize  Skin integrity will stabilize   Outcome: Ongoing      Problem: Discharge Planning:  Goal: Patients continuum of care needs are met  Patients continuum of care needs are met   Outcome: Ongoing

## 2018-09-03 LAB
GLUCOSE BLD-MCNC: 143 MG/DL (ref 74–106)
GLUCOSE BLD-MCNC: 172 MG/DL (ref 74–106)
GLUCOSE BLD-MCNC: 183 MG/DL (ref 74–106)
GLUCOSE BLD-MCNC: 236 MG/DL (ref 74–106)
PERFORMED ON: ABNORMAL

## 2018-09-03 PROCEDURE — 94760 N-INVAS EAR/PLS OXIMETRY 1: CPT

## 2018-09-03 PROCEDURE — 6370000000 HC RX 637 (ALT 250 FOR IP): Performed by: NURSE PRACTITIONER

## 2018-09-03 PROCEDURE — 6370000000 HC RX 637 (ALT 250 FOR IP): Performed by: INTERNAL MEDICINE

## 2018-09-03 PROCEDURE — 6370000000 HC RX 637 (ALT 250 FOR IP): Performed by: PHYSICIAN ASSISTANT

## 2018-09-03 PROCEDURE — 1200000002 HC SEMI PRIVATE SWING BED

## 2018-09-03 PROCEDURE — 6360000002 HC RX W HCPCS: Performed by: INTERNAL MEDICINE

## 2018-09-03 RX ADMIN — PANTOPRAZOLE SODIUM 40 MG: 40 TABLET, DELAYED RELEASE ORAL at 05:40

## 2018-09-03 RX ADMIN — CLOPIDOGREL BISULFATE 75 MG: 75 TABLET, FILM COATED ORAL at 08:21

## 2018-09-03 RX ADMIN — GABAPENTIN 100 MG: 100 CAPSULE ORAL at 20:24

## 2018-09-03 RX ADMIN — ROPINIROLE HYDROCHLORIDE 0.5 MG: 0.25 TABLET, FILM COATED ORAL at 20:23

## 2018-09-03 RX ADMIN — DOXAZOSIN 2 MG: 1 TABLET ORAL at 08:21

## 2018-09-03 RX ADMIN — Medication 2 UNITS: at 08:31

## 2018-09-03 RX ADMIN — GABAPENTIN 100 MG: 100 CAPSULE ORAL at 08:22

## 2018-09-03 RX ADMIN — ASPIRIN 325 MG: 325 TABLET, COATED ORAL at 08:21

## 2018-09-03 RX ADMIN — INSULIN LISPRO 1 UNITS: 100 INJECTION, SOLUTION INTRAVENOUS; SUBCUTANEOUS at 20:28

## 2018-09-03 RX ADMIN — ENOXAPARIN SODIUM 40 MG: 40 INJECTION, SOLUTION INTRAVENOUS; SUBCUTANEOUS at 08:22

## 2018-09-03 RX ADMIN — INSULIN GLARGINE 70 UNITS: 100 INJECTION, SOLUTION SUBCUTANEOUS at 20:28

## 2018-09-03 RX ADMIN — ZOLPIDEM TARTRATE 5 MG: 5 TABLET ORAL at 20:28

## 2018-09-03 RX ADMIN — CARVEDILOL 12.5 MG: 12.5 TABLET, FILM COATED ORAL at 17:07

## 2018-09-03 RX ADMIN — METHOCARBAMOL 1000 MG: 500 TABLET ORAL at 20:24

## 2018-09-03 RX ADMIN — METFORMIN HYDROCHLORIDE 500 MG: 500 TABLET, FILM COATED ORAL at 17:07

## 2018-09-03 RX ADMIN — LINAGLIPTIN 5 MG: 5 TABLET, FILM COATED ORAL at 08:21

## 2018-09-03 RX ADMIN — DOCUSATE SODIUM AND SENNOSIDES 2 TABLET: 8.6; 5 TABLET, FILM COATED ORAL at 08:22

## 2018-09-03 RX ADMIN — METFORMIN HYDROCHLORIDE 500 MG: 500 TABLET, FILM COATED ORAL at 08:22

## 2018-09-03 RX ADMIN — AMLODIPINE BESYLATE 10 MG: 5 TABLET ORAL at 08:22

## 2018-09-03 RX ADMIN — Medication 15 UNITS: at 08:30

## 2018-09-03 RX ADMIN — CARVEDILOL 12.5 MG: 12.5 TABLET, FILM COATED ORAL at 08:21

## 2018-09-03 RX ADMIN — METHOCARBAMOL 1000 MG: 500 TABLET ORAL at 08:22

## 2018-09-03 RX ADMIN — HYDROCHLOROTHIAZIDE 25 MG: 25 TABLET ORAL at 08:21

## 2018-09-03 RX ADMIN — Medication 1 UNITS: at 17:54

## 2018-09-03 RX ADMIN — Medication 15 UNITS: at 12:01

## 2018-09-03 RX ADMIN — DOXAZOSIN 4 MG: 4 TABLET ORAL at 20:24

## 2018-09-03 RX ADMIN — Medication 1 UNITS: at 12:02

## 2018-09-03 RX ADMIN — Medication 15 UNITS: at 17:52

## 2018-09-03 RX ADMIN — ATORVASTATIN CALCIUM 80 MG: 80 TABLET, FILM COATED ORAL at 20:24

## 2018-09-03 RX ADMIN — METHOCARBAMOL 1000 MG: 500 TABLET ORAL at 14:05

## 2018-09-03 NOTE — PLAN OF CARE
Ongoing    Goal: Written Disposition Instruction form completed  Outcome: Ongoing      Problem: Emotional/Psychosocial  Goal: Understanding of community resources  Outcome: Ongoing      Problem: Spiritual  Goal: Coping methods/spiritual issues explored  Outcome: Ongoing    Goal: Acknowledge understanding of advance directives  Outcome: Ongoing    Goal: Supportive care provided  Outcome: Ongoing      Problem: Infection:  Goal: Will remain free from infection  Will remain free from infection   Outcome: Ongoing      Problem: Safety:  Goal: Free from accidental physical injury  Free from accidental physical injury   Outcome: Ongoing    Goal: Free from intentional harm  Free from intentional harm   Outcome: Ongoing      Problem: Daily Care:  Goal: Daily care needs are met  Daily care needs are met   Outcome: Ongoing      Problem: Pain:  Goal: Patient's pain/discomfort is manageable  Patient's pain/discomfort is manageable   Outcome: Ongoing      Problem: Skin Integrity:  Goal: Skin integrity will stabilize  Skin integrity will stabilize   Outcome: Ongoing      Problem: Discharge Planning:  Goal: Patients continuum of care needs are met  Patients continuum of care needs are met   Outcome: Ongoing

## 2018-09-03 NOTE — PLAN OF CARE
Problem: Falls - Risk of:  Goal: Will remain free from falls  Will remain free from falls   Outcome: Ongoing      Problem: Daily Care:  Goal: Daily care needs are met  Daily care needs are met   Outcome: Ongoing

## 2018-09-04 LAB
ANION GAP SERPL CALCULATED.3IONS-SCNC: 11 MMOL/L (ref 3–16)
BASOPHILS ABSOLUTE: 0 K/UL (ref 0–0.1)
BASOPHILS RELATIVE PERCENT: 0.7 %
BUN BLDV-MCNC: 20 MG/DL (ref 6–20)
CALCIUM SERPL-MCNC: 8.8 MG/DL (ref 8.5–10.5)
CHLORIDE BLD-SCNC: 102 MMOL/L (ref 98–107)
CO2: 29 MMOL/L (ref 20–30)
CREAT SERPL-MCNC: 1.3 MG/DL (ref 0.4–1.2)
EOSINOPHILS ABSOLUTE: 0.2 K/UL (ref 0–0.4)
EOSINOPHILS RELATIVE PERCENT: 3.6 %
GFR AFRICAN AMERICAN: >59
GFR NON-AFRICAN AMERICAN: 57
GLUCOSE BLD-MCNC: 115 MG/DL (ref 74–106)
GLUCOSE BLD-MCNC: 160 MG/DL (ref 74–106)
GLUCOSE BLD-MCNC: 198 MG/DL (ref 74–106)
GLUCOSE BLD-MCNC: 215 MG/DL (ref 74–106)
GLUCOSE BLD-MCNC: 241 MG/DL (ref 74–106)
GLUCOSE BLD-MCNC: 79 MG/DL (ref 74–106)
HCT VFR BLD CALC: 37.6 % (ref 40–54)
HEMOGLOBIN: 12.6 G/DL (ref 13–18)
IMMATURE GRANULOCYTES #: 0 K/UL
IMMATURE GRANULOCYTES %: 0.7 % (ref 0–5)
LYMPHOCYTES ABSOLUTE: 1.2 K/UL (ref 1.5–4)
LYMPHOCYTES RELATIVE PERCENT: 21.1 %
MCH RBC QN AUTO: 30.3 PG (ref 27–32)
MCHC RBC AUTO-ENTMCNC: 33.5 G/DL (ref 31–35)
MCV RBC AUTO: 90.4 FL (ref 80–100)
MONOCYTES ABSOLUTE: 0.6 K/UL (ref 0.2–0.8)
MONOCYTES RELATIVE PERCENT: 9.5 %
NEUTROPHILS ABSOLUTE: 3.8 K/UL (ref 2–7.5)
NEUTROPHILS RELATIVE PERCENT: 64.4 %
PDW BLD-RTO: 12.6 % (ref 11–16)
PERFORMED ON: ABNORMAL
PERFORMED ON: NORMAL
PLATELET # BLD: 139 K/UL (ref 150–400)
PMV BLD AUTO: 11.3 FL (ref 6–10)
POTASSIUM SERPL-SCNC: 4.2 MMOL/L (ref 3.4–5.1)
RBC # BLD: 4.16 M/UL (ref 4.5–6)
SODIUM BLD-SCNC: 142 MMOL/L (ref 136–145)
WBC # BLD: 5.9 K/UL (ref 4–11)

## 2018-09-04 PROCEDURE — 6360000002 HC RX W HCPCS: Performed by: INTERNAL MEDICINE

## 2018-09-04 PROCEDURE — 97530 THERAPEUTIC ACTIVITIES: CPT

## 2018-09-04 PROCEDURE — 36415 COLL VENOUS BLD VENIPUNCTURE: CPT

## 2018-09-04 PROCEDURE — 1200000002 HC SEMI PRIVATE SWING BED

## 2018-09-04 PROCEDURE — 85025 COMPLETE CBC W/AUTO DIFF WBC: CPT

## 2018-09-04 PROCEDURE — 6370000000 HC RX 637 (ALT 250 FOR IP): Performed by: NURSE PRACTITIONER

## 2018-09-04 PROCEDURE — 97110 THERAPEUTIC EXERCISES: CPT

## 2018-09-04 PROCEDURE — 6370000000 HC RX 637 (ALT 250 FOR IP): Performed by: INTERNAL MEDICINE

## 2018-09-04 PROCEDURE — 80048 BASIC METABOLIC PNL TOTAL CA: CPT

## 2018-09-04 PROCEDURE — 6370000000 HC RX 637 (ALT 250 FOR IP): Performed by: PHYSICIAN ASSISTANT

## 2018-09-04 RX ADMIN — ATORVASTATIN CALCIUM 80 MG: 80 TABLET, FILM COATED ORAL at 21:17

## 2018-09-04 RX ADMIN — Medication 15 UNITS: at 14:25

## 2018-09-04 RX ADMIN — DOXAZOSIN 4 MG: 4 TABLET ORAL at 21:18

## 2018-09-04 RX ADMIN — Medication 15 UNITS: at 09:19

## 2018-09-04 RX ADMIN — INSULIN GLARGINE 70 UNITS: 100 INJECTION, SOLUTION SUBCUTANEOUS at 21:19

## 2018-09-04 RX ADMIN — METHOCARBAMOL 1000 MG: 500 TABLET ORAL at 21:17

## 2018-09-04 RX ADMIN — METFORMIN HYDROCHLORIDE 500 MG: 500 TABLET, FILM COATED ORAL at 16:55

## 2018-09-04 RX ADMIN — CLOPIDOGREL BISULFATE 75 MG: 75 TABLET, FILM COATED ORAL at 09:11

## 2018-09-04 RX ADMIN — CARVEDILOL 12.5 MG: 12.5 TABLET, FILM COATED ORAL at 09:12

## 2018-09-04 RX ADMIN — ENOXAPARIN SODIUM 40 MG: 40 INJECTION, SOLUTION INTRAVENOUS; SUBCUTANEOUS at 09:09

## 2018-09-04 RX ADMIN — METFORMIN HYDROCHLORIDE 500 MG: 500 TABLET, FILM COATED ORAL at 09:10

## 2018-09-04 RX ADMIN — ZOLPIDEM TARTRATE 5 MG: 5 TABLET ORAL at 21:19

## 2018-09-04 RX ADMIN — HYDROCHLOROTHIAZIDE 25 MG: 25 TABLET ORAL at 09:11

## 2018-09-04 RX ADMIN — ROPINIROLE HYDROCHLORIDE 0.5 MG: 0.25 TABLET, FILM COATED ORAL at 21:18

## 2018-09-04 RX ADMIN — Medication 1 UNITS: at 09:17

## 2018-09-04 RX ADMIN — GABAPENTIN 100 MG: 100 CAPSULE ORAL at 09:11

## 2018-09-04 RX ADMIN — ASPIRIN 325 MG: 325 TABLET, COATED ORAL at 09:11

## 2018-09-04 RX ADMIN — PANTOPRAZOLE SODIUM 40 MG: 40 TABLET, DELAYED RELEASE ORAL at 06:00

## 2018-09-04 RX ADMIN — GABAPENTIN 100 MG: 100 CAPSULE ORAL at 21:18

## 2018-09-04 RX ADMIN — DOXAZOSIN 2 MG: 1 TABLET ORAL at 09:11

## 2018-09-04 RX ADMIN — METHOCARBAMOL 1000 MG: 500 TABLET ORAL at 14:24

## 2018-09-04 RX ADMIN — LINAGLIPTIN 5 MG: 5 TABLET, FILM COATED ORAL at 09:10

## 2018-09-04 RX ADMIN — Medication 1 UNITS: at 14:28

## 2018-09-04 RX ADMIN — AMLODIPINE BESYLATE 10 MG: 5 TABLET ORAL at 09:09

## 2018-09-04 RX ADMIN — METHOCARBAMOL 1000 MG: 500 TABLET ORAL at 09:16

## 2018-09-04 RX ADMIN — CARVEDILOL 12.5 MG: 12.5 TABLET, FILM COATED ORAL at 16:55

## 2018-09-04 NOTE — PLAN OF CARE
Deficit  Goal: Teaching initiated upon admission  Outcome: Ongoing    Goal: Written Disposition Instruction form completed  Outcome: Ongoing      Problem: Emotional/Psychosocial  Goal: Understanding of community resources  Outcome: Ongoing      Problem: Spiritual  Goal: Coping methods/spiritual issues explored  Outcome: Ongoing    Goal: Acknowledge understanding of advance directives  Outcome: Ongoing    Goal: Supportive care provided  Outcome: Ongoing      Problem: Infection:  Goal: Will remain free from infection  Will remain free from infection   Outcome: Ongoing      Problem: Safety:  Goal: Free from accidental physical injury  Free from accidental physical injury   Outcome: Ongoing    Goal: Free from intentional harm  Free from intentional harm   Outcome: Ongoing      Problem: Daily Care:  Goal: Daily care needs are met  Daily care needs are met   Outcome: Ongoing      Problem: Pain:  Goal: Patient's pain/discomfort is manageable  Patient's pain/discomfort is manageable   Outcome: Ongoing      Problem: Skin Integrity:  Goal: Skin integrity will stabilize  Skin integrity will stabilize   Outcome: Ongoing      Problem: Discharge Planning:  Goal: Patients continuum of care needs are met  Patients continuum of care needs are met   Outcome: Ongoing

## 2018-09-04 NOTE — FLOWSHEET NOTE
09/04/18 0945   Assessment   Charting Type Shift assessment   Neurological   Neuro (WDL) X   Level of Consciousness 0   Orientation Level Oriented X4   Cognition Appropriate judgement; Appropriate safety awareness; Appropriate attention/concentration; Appropriate for developmental age   Language Clear   R Hand  Weak   L Hand  Strong   L Foot Dorsiflexion Strong   L Foot Plantar Flexion Strong   RUE Motor Response Responds to command   LUE Motor Response Responds to command   RLE Motor Response Responds to command   LLE Motor Response Responds to command   R Foot Dorsiflexion Weak   R Foot Plantar Flexion Weak   [place] Coma Scale   Eye Opening 4   Best Verbal Response 5   Best Motor Response 6   [place] Coma Scale Score 15   HEENT   HEENT (WDL) X   Right Eye Impaired vision   Left Eye Impaired vision   Nose Intact   Throat Intact   Tongue Pink & moist   Voice Normal   Mucous Membrane Pink;Moist   Respiratory   Respiratory (WDL) WDL   Respiratory Pattern Regular   Respiratory Depth Normal   Breath Sounds   Right Upper Lobe Clear   Right Middle Lobe Clear   Right Lower Lobe Clear   Left Upper Lobe Clear   Left Lower Lobe Clear   Cardiac Monitor   Telemetry Monitor On No   Gastrointestinal   Abdominal (WDL) WDL   Abdomen Inspection Soft   Tenderness Nontender   Passing Flatus Y   RUQ Bowel Sounds Active   LUQ Bowel Sounds Active   RLQ Bowel Sounds Active   LLQ Bowel Sounds Active   Bowel Sounds (All Quadrants) Active   Peripheral Vascular   Peripheral Vascular (WDL) WDL   Edema None   Sensation RUE Full sensation   Sensation LUE Full sensation   Sensation RLE Numbness;Tingling   Sensation LLE Full sensation   Skin Color/Condition   Skin Color/Condition (WDL) WDL   Skin Integrity   Skin Integrity (WDL) WDL   Musculoskeletal   Musculoskeletal (WDL) X   RUE Limited movement   LUE Full movement   RL Extremity Weakness; Unsteady   LL Extremity Full movement   Genitourinary   Genitourinary (WDL) WDL   Flank Tenderness

## 2018-09-04 NOTE — PROGRESS NOTES
Physical Therapy  Facility/Department: [facility] MED SURG  Daily Treatment Note    NAME: Rahul Larry  : 1965  MRN: [id number]    Date of Service: 2018    Discharge Recommendations:  Continue to assess pending progress        Patient Diagnosis(es): There were no encounter diagnoses. has a past medical history of Diabetes mellitus ([facility] [address] Hypertension; and Stroke [facility]). has no past surgical history on file. Restrictions  Restrictions/Precautions  Restrictions/Precautions: General Precautions, Fall Risk  Required Braces or Orthoses?: No     Subjective   General  Chart Reviewed: Yes  Family / Caregiver Present: No  Subjective  Subjective: Patient sitting at edge of bed and agreeable to PT treatment. Patient says he is still waking up every morning with cramping and \"drawing\" in his legs and arms (R>L).     Pain Screening  Patient Currently in Pain: Denies  Vital Signs  Patient Currently in Pain: Denies       Orientation  Orientation  Overall Orientation Status: Within Functional Limits     Objective   Bed mobility  Supine to Sit: Modified independent  Sit to Supine: Independent  Scooting: Modified independent     Transfers  Sit to Stand: Stand by assistance  Stand to sit: Stand by assistance     Ambulation  Ambulation?: Yes  Ambulation 1  Surface: level tile  Device: Rolling Walker  Assistance: Contact guard assistance  Quality of Gait: R foot drop with R hip circumduction   Distance: 250 feet x 2 reps     Balance  Sitting - Static: Good  Sitting - Dynamic: Good  Standing - Static: Fair;+  Standing - Dynamic: Fair;+ (with walker)     Exercises  Bridging:  (x20 )  Quad Sets: x20 B  Heelslides: x10 B, with hand to contralateral knee (assisted with R LE and R UE)  Gluteal Sets: x20  Hip Abduction: supine x 20 B, AA on R   Knee Long Arc Quad: x20 B  Knee Passive Range of Motion: AA/P ROM R hip and knee and ankle x 20 each  Ankle Pumps: PROM DF/PF x20 each R, resisted PF  Other exercises  Other exercises 2: NuStep x 12 minutes (L-8), with UEs x 7 minutes  Other exercises 4: sit to stand x 10   Other exercises 5: lower trunk rotation x 20 B  Other exercises 9: step-ups L x 10 (6 in step) with assistance  Other exercises 10: Patient able to ascend/descend 4 steps with handrails with CGA                        Assessment   Body structures, Functions, Activity limitations: Decreased functional mobility ; Decreased strength;Decreased balance;Decreased endurance  Assessment: Patient continues to demonstrate improving strength and mobility. Patient is MI with all bed mobility, and requires only SBA for come to stand, and CGA for ambulation with RW. Continued R foot drop with R hip circumduction with gait. Patient able to ascend and descend 4 steps today with handrails and CGA. Patient also able to perform step-ups on with L LE. Treatment Diagnosis: Weakness, s/p CVA  Prognosis: Good  REQUIRES PT FOLLOW UP: Yes  Activity Tolerance  Activity Tolerance: Patient Tolerated treatment well     Goals  Short term goals  Time Frame for Short term goals: 2 weeks  Short term goal 1: Patient will perform all basic transfers independently with safety present.    (CGA)  Short term goal 2: Increase strength and endurance to tolerate 30 minutes of therex/ther. activity. MET  Short term goal 3: Increase strength to ambulate 400 feet with least restrictive A.D with safety present.    (ambulating 270 feet +)  Short term goal 4: Patient will ambulate with Good balance. (fair + with RW)  Short term goal 5: Patient will be independent with home exercise program, and perform therex independently.        MET    Plan    Plan  Times per week: 4-5x/week  Times per day: Daily  Plan weeks: 2 weeks  Current Treatment Recommendations: Strengthening, ROM, Balance Training, Functional Mobility Training, Transfer Training, Gait Training, Stair training, Endurance Training, Neuromuscular Re-education, Patient/Caregiver Education & Training, Safety

## 2018-09-04 NOTE — PROGRESS NOTES
Occupational Therapy  Facility/Department: [address] [place] [address] MED SURG  Daily Treatment Note  NAME: Reuben Johnson  : 1965  MRN: [id number]    Date of Service: 2018    Discharge Recommendations:  Continue to assess pending progress       Patient Diagnosis(es): There were no encounter diagnoses. has a past medical history of Diabetes mellitus ([facility] [address] Hypertension; and Stroke [facility]). has no past surgical history on file. Restrictions  Restrictions/Precautions  Restrictions/Precautions: General Precautions, Fall Risk  Required Braces or Orthoses?: No  Subjective   General  Chart Reviewed: Yes  Patient assessed for rehabilitation services?: Yes  Family / Caregiver Present: No  Referring Practitioner: GARETH Cline  Diagnosis: CVA  Subjective  Subjective: I keep having cramps in my leg and arm. Pain Assessment  Patient Currently in Pain: Denies  Vital Signs  Patient Currently in Pain: Denies   Orientation     Objective             Functional Mobility  Functional - Mobility Device: Rolling Walker  Activity: Other  Assist Level: Contact guard assistance  Functional Mobility Comments: ambulated throughout room retrieving items from closet to simulate grabbing items from fridge and carrying in room. Type of ROM/Therapeutic Exercise  Type of ROM/Therapeutic Exercise: AAROM;PROM;AROM  Exercises  Scapular Protraction: x15 AROM  Scapular Retraction: x15 AROM  Shoulder Depression: x15 AROM  Shoulder Elevation: x15 AROM  Shoulder Flexion: x10  Shoulder ABduction: AROM in supine x15  Shoulder ADduction: AROM x15 in supine  Horizontal ABduction: x10 AAROM seated upright at EOB  Horizontal ADduction: x10  Elbow Extension: x15 AAROM  Supination: x15 PROM  Finger Flexion: X15 AROM Pt is demo improved finger flexion. Difficulty with index finger flexion. Finger Extension: x15 AROM continues to have limited finger extension.    Grasp/Release: completed grasp/release activity working on holding cones and

## 2018-09-05 LAB
GLUCOSE BLD-MCNC: 124 MG/DL (ref 74–106)
GLUCOSE BLD-MCNC: 179 MG/DL (ref 74–106)
GLUCOSE BLD-MCNC: 246 MG/DL (ref 74–106)
GLUCOSE BLD-MCNC: 99 MG/DL (ref 74–106)
PERFORMED ON: ABNORMAL
PERFORMED ON: NORMAL

## 2018-09-05 PROCEDURE — 6360000002 HC RX W HCPCS: Performed by: INTERNAL MEDICINE

## 2018-09-05 PROCEDURE — 99308 SBSQ NF CARE LOW MDM 20: CPT | Performed by: INTERNAL MEDICINE

## 2018-09-05 PROCEDURE — 6370000000 HC RX 637 (ALT 250 FOR IP): Performed by: NURSE PRACTITIONER

## 2018-09-05 PROCEDURE — 97110 THERAPEUTIC EXERCISES: CPT

## 2018-09-05 PROCEDURE — 1200000002 HC SEMI PRIVATE SWING BED

## 2018-09-05 PROCEDURE — 97530 THERAPEUTIC ACTIVITIES: CPT

## 2018-09-05 PROCEDURE — 6370000000 HC RX 637 (ALT 250 FOR IP): Performed by: INTERNAL MEDICINE

## 2018-09-05 PROCEDURE — 6370000000 HC RX 637 (ALT 250 FOR IP): Performed by: PHYSICIAN ASSISTANT

## 2018-09-05 RX ORDER — DOXAZOSIN MESYLATE 4 MG/1
4 TABLET ORAL DAILY
Status: DISCONTINUED | OUTPATIENT
Start: 2018-09-06 | End: 2018-09-13 | Stop reason: HOSPADM

## 2018-09-05 RX ORDER — TIZANIDINE 4 MG/1
2 TABLET ORAL 2 TIMES DAILY PRN
Status: DISCONTINUED | OUTPATIENT
Start: 2018-09-05 | End: 2018-09-13 | Stop reason: HOSPADM

## 2018-09-05 RX ORDER — DOXAZOSIN MESYLATE 1 MG/1
2 TABLET ORAL ONCE
Status: COMPLETED | OUTPATIENT
Start: 2018-09-05 | End: 2018-09-05

## 2018-09-05 RX ADMIN — Medication 15 UNITS: at 08:13

## 2018-09-05 RX ADMIN — CLOPIDOGREL BISULFATE 75 MG: 75 TABLET, FILM COATED ORAL at 08:07

## 2018-09-05 RX ADMIN — ENOXAPARIN SODIUM 40 MG: 40 INJECTION, SOLUTION INTRAVENOUS; SUBCUTANEOUS at 08:08

## 2018-09-05 RX ADMIN — CARVEDILOL 12.5 MG: 12.5 TABLET, FILM COATED ORAL at 16:49

## 2018-09-05 RX ADMIN — METHOCARBAMOL 1000 MG: 500 TABLET ORAL at 20:16

## 2018-09-05 RX ADMIN — Medication 15 UNITS: at 16:56

## 2018-09-05 RX ADMIN — INSULIN LISPRO 2 UNITS: 100 INJECTION, SOLUTION INTRAVENOUS; SUBCUTANEOUS at 16:53

## 2018-09-05 RX ADMIN — HYDROCHLOROTHIAZIDE 25 MG: 25 TABLET ORAL at 08:07

## 2018-09-05 RX ADMIN — Medication 15 UNITS: at 11:40

## 2018-09-05 RX ADMIN — Medication 2 UNITS: at 08:08

## 2018-09-05 RX ADMIN — MAGNESIUM HYDROXIDE 30 ML: 400 SUSPENSION ORAL at 21:08

## 2018-09-05 RX ADMIN — DOXAZOSIN 4 MG: 4 TABLET ORAL at 20:17

## 2018-09-05 RX ADMIN — PANTOPRAZOLE SODIUM 40 MG: 40 TABLET, DELAYED RELEASE ORAL at 06:27

## 2018-09-05 RX ADMIN — AMLODIPINE BESYLATE 10 MG: 5 TABLET ORAL at 08:07

## 2018-09-05 RX ADMIN — METHOCARBAMOL 1000 MG: 500 TABLET ORAL at 13:07

## 2018-09-05 RX ADMIN — ROPINIROLE HYDROCHLORIDE 0.5 MG: 0.25 TABLET, FILM COATED ORAL at 20:16

## 2018-09-05 RX ADMIN — METFORMIN HYDROCHLORIDE 500 MG: 500 TABLET, FILM COATED ORAL at 16:49

## 2018-09-05 RX ADMIN — GABAPENTIN 100 MG: 100 CAPSULE ORAL at 08:07

## 2018-09-05 RX ADMIN — DOXAZOSIN 2 MG: 1 TABLET ORAL at 08:07

## 2018-09-05 RX ADMIN — ZOLPIDEM TARTRATE 5 MG: 5 TABLET ORAL at 20:17

## 2018-09-05 RX ADMIN — ATORVASTATIN CALCIUM 80 MG: 80 TABLET, FILM COATED ORAL at 20:17

## 2018-09-05 RX ADMIN — METHOCARBAMOL 1000 MG: 500 TABLET ORAL at 08:07

## 2018-09-05 RX ADMIN — METFORMIN HYDROCHLORIDE 500 MG: 500 TABLET, FILM COATED ORAL at 08:07

## 2018-09-05 RX ADMIN — GABAPENTIN 100 MG: 100 CAPSULE ORAL at 20:15

## 2018-09-05 RX ADMIN — CARVEDILOL 12.5 MG: 12.5 TABLET, FILM COATED ORAL at 08:07

## 2018-09-05 RX ADMIN — DOXAZOSIN 2 MG: 1 TABLET ORAL at 11:36

## 2018-09-05 RX ADMIN — ASPIRIN 325 MG: 325 TABLET, COATED ORAL at 08:07

## 2018-09-05 RX ADMIN — LINAGLIPTIN 5 MG: 5 TABLET, FILM COATED ORAL at 08:06

## 2018-09-05 RX ADMIN — INSULIN GLARGINE 70 UNITS: 100 INJECTION, SOLUTION SUBCUTANEOUS at 20:15

## 2018-09-05 NOTE — PROGRESS NOTES
Progress Note      Subjective:   Chief complaint: Declining functional status     Interval History: No acute events reported. Reports gradual improvement in his strength and right sided weakness. Appetite/oral intake stable. Reportedly having normal BM. Has been participating with rehabilitative efforts. Patient does note some occasional muscle spasm on the right which typically occurs early mornings after sleeping. Review of systems:   Constitutional:  Denies fever or chills. Positive for improving fatigue  HENT:  Denies nasal congestion or sore throat   Respiratory:  Denies cough or shortness of breath   Cardiovascular:  Denies chest pain or edema   GI:  Denies abdominal pain, nausea, vomiting or diarrhea   :  Denies dysuria or frequency  Musculoskeletal:  Denies acute neck pain or body aches. Positive for muscle spasm  Integument:  Denies rash or itching  Neurologic:  Denies headache or dizziness. Positive for right sided weakness from prior CVA  Psychiatric:  Denies acute depression or acute anxiety      Past medical history, surgical history, family history and social history reviewed and unchanged compared to H&P earlier this admission.     Medications:   Scheduled Meds:   [START ON 9/6/2018] doxazosin  4 mg Oral Daily    insulin lispro  0-12 Units Subcutaneous TID WC    insulin lispro  0-6 Units Subcutaneous Nightly    carvedilol  12.5 mg Oral BID WC    insulin lispro  15 Units Subcutaneous TID AC    sennosides-docusate sodium  2 tablet Oral Daily    polyethylene glycol  17 g Oral Daily    gabapentin  100 mg Oral BID    insulin glargine  70 Units Subcutaneous Nightly    metFORMIN  500 mg Oral BID WC    linagliptin  5 mg Oral Daily    methocarbamol  1,000 mg Oral TID    rOPINIRole  0.5 mg Oral Nightly    doxazosin  4 mg Oral Nightly    amLODIPine  10 mg Oral Daily    aspirin  325 mg Oral Daily    atorvastatin  80 mg Oral Nightly    clopidogrel  75 mg Oral Daily    hydrochlorothiazide

## 2018-09-05 NOTE — FLOWSHEET NOTE
09/05/18 0815   Assessment   Charting Type Shift assessment   Neurological   Neuro (WDL) X   Level of Consciousness 0   Orientation Level Oriented X4   Cognition Appropriate judgement; Appropriate safety awareness; Appropriate attention/concentration; Appropriate for developmental age   Language Clear   R Hand  Weak   L Hand  Strong   L Foot Dorsiflexion Strong   L Foot Plantar Flexion Strong   RUE Motor Response Responds to command   LUE Motor Response Responds to command   RLE Motor Response Responds to command   LLE Motor Response Responds to command   R Foot Dorsiflexion Weak   R Foot Plantar Flexion Weak   [place] Coma Scale   Eye Opening 4   Best Verbal Response 5   Best Motor Response 6   [place] Coma Scale Score 15   HEENT   HEENT (WDL) X   Right Eye Impaired vision   Left Eye Impaired vision   Nose Intact   Throat Intact   Tongue Pink & moist   Voice Normal   Mucous Membrane Pink;Moist   Respiratory   Respiratory (WDL) WDL   Respiratory Pattern Regular   Respiratory Depth Normal   Breath Sounds   Right Upper Lobe Clear   Right Middle Lobe Clear   Right Lower Lobe Clear   Left Upper Lobe Clear   Left Lower Lobe Clear   Cardiac   Cardiac (WDL) WDL   Cardiac Monitor   Telemetry Monitor On No   Gastrointestinal   Abdominal (WDL) WDL   Abdomen Inspection Soft   Tenderness Nontender   Passing Flatus Y   RUQ Bowel Sounds Active   LUQ Bowel Sounds Active   RLQ Bowel Sounds Active   LLQ Bowel Sounds Active   Bowel Sounds (All Quadrants) Active   Peripheral Vascular   Peripheral Vascular (WDL) WDL   Edema None   Sensation RUE Full sensation   Sensation LUE Full sensation   Sensation RLE Numbness;Tingling   Sensation LLE Full sensation   Skin Color/Condition   Skin Color/Condition (WDL) WDL   Skin Integrity   Skin Integrity (WDL) WDL   Musculoskeletal   Musculoskeletal (WDL) X   RUE Limited movement   LUE Full movement   RL Extremity Weakness; Unsteady   LL Extremity Full movement   Genitourinary   Genitourinary (WDL) WDL   Flank Tenderness No   Suprapubic Tenderness No   Dysuria No   Psychosocial   Psychosocial (WDL) WDL   Pt awake in bed. Pt alert and oriented. Pt appears in no acute distress. Pt lung sounds clear zhane. Pt encouraged to cough and deep breathe. Pt call bell and bedside table within reach. Will continue to monitor pt.

## 2018-09-05 NOTE — PROGRESS NOTES
Nutrition Assessment    Type and Reason for Visit:  (follow up)    Nutrition Recommendations: no new recommendations. Will continue to monitor PO intakes and weights. Patient had a 1.9% change in wt this week. He gained 4.1 lbs. Nutrition Diagnosis:   · Problem: No nutrition diagnosis at this time  · Etiology: related to  (CVA)     Signs and symptoms:  as evidenced by  (patient eating good at this time.  )    Nutrition Assessment:  · Subjective Assessment: Patient appears well nourished. presents with 4% wt loss in 1 week. Patient has had a recent stroke on 8/9/18 and has diagnosis of functional decline  · Nutrition-Focused Physical Findings:    · Wound Type:    · Current Nutrition Therapies:  · Oral Diet Orders: Carb Control 4 Carbs/Meal, Cardiac   · Oral Diet intake: %  · Oral Nutrition Supplement (ONS) Orders: None  · ONS intake:    · Anthropometric Measures:  · Ht: 6' (182.9 cm)   · Current Body Wt: 214 lb (97.1 kg)  · Admission Body Wt:    · Usual Body Wt:    · % Weight Change: 1.9,  1 week  · Ideal Body Wt: 178 lb (80.7 kg), % Ideal Body 112  · Adjusted Body Wt:  , body weight adjusted for    · BMI Classification: BMI 25.0 - 29.9 Overweight (27.3)  · Comparative Standards (Estimated Nutrition Needs):  · Estimated Daily Total Kcal: 4489-9615 (1.2-1.3 activity factor)  · Estimated Daily Protein (g): 72-90    Estimated Intake vs Estimated Needs: Intake Meets Needs    Nutrition Risk Level: Low, Moderate    Nutrition Interventions:   Continue current diet  Continued Inpatient Monitoring, Coordination of Care    Nutrition Evaluation:   · Evaluation: Progressing toward goals   · Goals: to maintain nutritional status and stablized weight. Will monitor PO intakes, weight, and pertinent labs    · Monitoring: Meal Intake, Pertinent Labs, Weight    See Adult Nutrition Doc Flowsheet for more detail.      Electronically signed by Tereso Lott on 9/5/18 at 1:12 PM

## 2018-09-06 LAB
GLUCOSE BLD-MCNC: 120 MG/DL (ref 74–106)
GLUCOSE BLD-MCNC: 136 MG/DL (ref 74–106)
GLUCOSE BLD-MCNC: 190 MG/DL (ref 74–106)
GLUCOSE BLD-MCNC: 234 MG/DL (ref 74–106)
GLUCOSE BLD-MCNC: 310 MG/DL (ref 74–106)
GLUCOSE BLD-MCNC: 81 MG/DL (ref 74–106)
PERFORMED ON: ABNORMAL
PERFORMED ON: NORMAL

## 2018-09-06 PROCEDURE — 97110 THERAPEUTIC EXERCISES: CPT

## 2018-09-06 PROCEDURE — 6370000000 HC RX 637 (ALT 250 FOR IP): Performed by: NURSE PRACTITIONER

## 2018-09-06 PROCEDURE — 97530 THERAPEUTIC ACTIVITIES: CPT

## 2018-09-06 PROCEDURE — 1200000002 HC SEMI PRIVATE SWING BED

## 2018-09-06 PROCEDURE — 6370000000 HC RX 637 (ALT 250 FOR IP): Performed by: INTERNAL MEDICINE

## 2018-09-06 PROCEDURE — 6360000002 HC RX W HCPCS: Performed by: INTERNAL MEDICINE

## 2018-09-06 PROCEDURE — 6370000000 HC RX 637 (ALT 250 FOR IP): Performed by: PHYSICIAN ASSISTANT

## 2018-09-06 RX ADMIN — DOXAZOSIN 4 MG: 4 TABLET ORAL at 21:01

## 2018-09-06 RX ADMIN — INSULIN LISPRO 4 UNITS: 100 INJECTION, SOLUTION INTRAVENOUS; SUBCUTANEOUS at 21:03

## 2018-09-06 RX ADMIN — GABAPENTIN 100 MG: 100 CAPSULE ORAL at 21:01

## 2018-09-06 RX ADMIN — METHOCARBAMOL 1000 MG: 500 TABLET ORAL at 09:48

## 2018-09-06 RX ADMIN — GABAPENTIN 100 MG: 100 CAPSULE ORAL at 09:48

## 2018-09-06 RX ADMIN — METHOCARBAMOL 1000 MG: 500 TABLET ORAL at 21:01

## 2018-09-06 RX ADMIN — ATORVASTATIN CALCIUM 80 MG: 80 TABLET, FILM COATED ORAL at 21:01

## 2018-09-06 RX ADMIN — ROPINIROLE HYDROCHLORIDE 0.5 MG: 0.25 TABLET, FILM COATED ORAL at 21:01

## 2018-09-06 RX ADMIN — DOXAZOSIN 4 MG: 4 TABLET ORAL at 09:48

## 2018-09-06 RX ADMIN — ASPIRIN 325 MG: 325 TABLET, COATED ORAL at 09:48

## 2018-09-06 RX ADMIN — TIZANIDINE 2 MG: 4 TABLET ORAL at 02:32

## 2018-09-06 RX ADMIN — METHOCARBAMOL 1000 MG: 500 TABLET ORAL at 15:01

## 2018-09-06 RX ADMIN — AMLODIPINE BESYLATE 10 MG: 5 TABLET ORAL at 09:48

## 2018-09-06 RX ADMIN — LINAGLIPTIN 5 MG: 5 TABLET, FILM COATED ORAL at 09:48

## 2018-09-06 RX ADMIN — HYDROCHLOROTHIAZIDE 25 MG: 25 TABLET ORAL at 09:48

## 2018-09-06 RX ADMIN — METFORMIN HYDROCHLORIDE 500 MG: 500 TABLET, FILM COATED ORAL at 17:53

## 2018-09-06 RX ADMIN — CLOPIDOGREL BISULFATE 75 MG: 75 TABLET, FILM COATED ORAL at 09:48

## 2018-09-06 RX ADMIN — ZOLPIDEM TARTRATE 5 MG: 5 TABLET ORAL at 21:13

## 2018-09-06 RX ADMIN — ENOXAPARIN SODIUM 40 MG: 40 INJECTION, SOLUTION INTRAVENOUS; SUBCUTANEOUS at 09:49

## 2018-09-06 RX ADMIN — Medication 15 UNITS: at 11:59

## 2018-09-06 RX ADMIN — INSULIN LISPRO 2 UNITS: 100 INJECTION, SOLUTION INTRAVENOUS; SUBCUTANEOUS at 12:00

## 2018-09-06 RX ADMIN — Medication 15 UNITS: at 09:54

## 2018-09-06 RX ADMIN — PANTOPRAZOLE SODIUM 40 MG: 40 TABLET, DELAYED RELEASE ORAL at 06:14

## 2018-09-06 RX ADMIN — METFORMIN HYDROCHLORIDE 500 MG: 500 TABLET, FILM COATED ORAL at 09:48

## 2018-09-06 RX ADMIN — CARVEDILOL 12.5 MG: 12.5 TABLET, FILM COATED ORAL at 17:52

## 2018-09-06 RX ADMIN — INSULIN GLARGINE 70 UNITS: 100 INJECTION, SOLUTION SUBCUTANEOUS at 21:02

## 2018-09-06 RX ADMIN — CARVEDILOL 12.5 MG: 12.5 TABLET, FILM COATED ORAL at 09:48

## 2018-09-06 RX ADMIN — INSULIN LISPRO 4 UNITS: 100 INJECTION, SOLUTION INTRAVENOUS; SUBCUTANEOUS at 09:50

## 2018-09-06 NOTE — PROGRESS NOTES
Therapy Time   Individual Concurrent Group Co-treatment   Time In 9051         Time Out 1547         Minutes 97 Lexy Jones, PT     This note serves as D/C summary if patient is discharged prior to next visit.

## 2018-09-06 NOTE — PROGRESS NOTES
PROM  Finger Flexion: X15 AROM Pt is demo improved finger flexion. Difficulty with index finger flexion. Finger Extension: x15 AROM continues to have limited finger extension. Grasp/Release: Grasp and release x20  Other: IR/ER in supine x10 PROM     Assessment   Assessment: Pt agreeable to OT services. Pt sitting at EOB independently. Pt completed standing tasks reaching across midline reaching with LUE without LOB. Pt able to reach to ipsilateral side on R in order to grasp cones. Pt continues to have limted extension in fingers and wrist. Pt limited in wrist movement. OT completed PROM in wrist extension/flexion and radial/ulnar deviation. Pt completed scapular stabilization activity using medicine ball. Pt cotninues to have difficulty with finger extension when releasing objects. Pt requires cues for breathing during AROM/PROM on RUE. Pronation and supination continue to be limited in AROM. Pt has poor gross/fine motor control in RUE. He has improved AROM in shoulder extension. Pt demo good safety awareness during self care and mobility. Plan   Plan  Times per week: 3-5  Times per day: Daily  Plan weeks: 2  Current Treatment Recommendations: ROM, Functional Mobility Training, Safety Education & Training, Self-Care / ADL, Endurance Training, Neuromuscular Re-education, Balance Training, Strengthening    Goals  Short term goals  Time Frame for Short term goals: 1 week  Short term goal 1: Pt to complete LB dressing with CGA and UB with SBA. Short term goal 2: Pt to complete toileting with CGA. Short term goal 3: Pt to complete bathing with CGA. Short term goal 4: Pt to complete bathing transfer with SBA. Short term goal 5: Pt to improve AROM in RUE to assist with functional activity. Long term goals  Time Frame for Long term goals : 2 weeks. Long term goal 1: Pt to complete dynamic/standing tolerance activity x10 minutes to decrease risk of falls.    Long term goal 2: Pt to complete dressing with MOD I. Long term goal 3: Pt to complete toileting with MOD I. Long term goal 4: Pt to complete bathing SUP. Long term goal 5: Pt to improve grasp on RUE to grasp cups and assist with ADL tasks. Therapy Time   Individual Concurrent Group Co-treatment   Time In 4694         Time Out 1201         Minutes 70              This note serves as a DC summary in the event of pt discharge.      Eli Matias, OTR/L

## 2018-09-07 LAB
GLUCOSE BLD-MCNC: 119 MG/DL (ref 74–106)
GLUCOSE BLD-MCNC: 119 MG/DL (ref 74–106)
GLUCOSE BLD-MCNC: 146 MG/DL (ref 74–106)
GLUCOSE BLD-MCNC: 218 MG/DL (ref 74–106)
GLUCOSE BLD-MCNC: 239 MG/DL (ref 74–106)
GLUCOSE BLD-MCNC: 261 MG/DL (ref 74–106)
GLUCOSE BLD-MCNC: 89 MG/DL (ref 74–106)
PERFORMED ON: ABNORMAL
PERFORMED ON: NORMAL

## 2018-09-07 PROCEDURE — 97530 THERAPEUTIC ACTIVITIES: CPT

## 2018-09-07 PROCEDURE — 6370000000 HC RX 637 (ALT 250 FOR IP): Performed by: NURSE PRACTITIONER

## 2018-09-07 PROCEDURE — 6360000002 HC RX W HCPCS: Performed by: INTERNAL MEDICINE

## 2018-09-07 PROCEDURE — 97110 THERAPEUTIC EXERCISES: CPT

## 2018-09-07 PROCEDURE — 6370000000 HC RX 637 (ALT 250 FOR IP): Performed by: PHYSICIAN ASSISTANT

## 2018-09-07 PROCEDURE — 94760 N-INVAS EAR/PLS OXIMETRY 1: CPT

## 2018-09-07 PROCEDURE — 1200000002 HC SEMI PRIVATE SWING BED

## 2018-09-07 PROCEDURE — 6370000000 HC RX 637 (ALT 250 FOR IP): Performed by: INTERNAL MEDICINE

## 2018-09-07 RX ADMIN — METHOCARBAMOL 1000 MG: 500 TABLET ORAL at 08:39

## 2018-09-07 RX ADMIN — METFORMIN HYDROCHLORIDE 500 MG: 500 TABLET, FILM COATED ORAL at 08:39

## 2018-09-07 RX ADMIN — LINAGLIPTIN 5 MG: 5 TABLET, FILM COATED ORAL at 08:41

## 2018-09-07 RX ADMIN — INSULIN LISPRO 6 UNITS: 100 INJECTION, SOLUTION INTRAVENOUS; SUBCUTANEOUS at 08:41

## 2018-09-07 RX ADMIN — CARVEDILOL 12.5 MG: 12.5 TABLET, FILM COATED ORAL at 08:39

## 2018-09-07 RX ADMIN — METFORMIN HYDROCHLORIDE 500 MG: 500 TABLET, FILM COATED ORAL at 00:00

## 2018-09-07 RX ADMIN — Medication 15 UNITS: at 06:04

## 2018-09-07 RX ADMIN — GABAPENTIN 100 MG: 100 CAPSULE ORAL at 20:43

## 2018-09-07 RX ADMIN — ZOLPIDEM TARTRATE 5 MG: 5 TABLET ORAL at 23:21

## 2018-09-07 RX ADMIN — INSULIN LISPRO 4 UNITS: 100 INJECTION, SOLUTION INTRAVENOUS; SUBCUTANEOUS at 17:22

## 2018-09-07 RX ADMIN — METHOCARBAMOL 1000 MG: 500 TABLET ORAL at 14:25

## 2018-09-07 RX ADMIN — METHOCARBAMOL 1000 MG: 500 TABLET ORAL at 20:43

## 2018-09-07 RX ADMIN — DOXAZOSIN 4 MG: 4 TABLET ORAL at 23:19

## 2018-09-07 RX ADMIN — ATORVASTATIN CALCIUM 80 MG: 80 TABLET, FILM COATED ORAL at 20:43

## 2018-09-07 RX ADMIN — Medication 15 UNITS: at 17:42

## 2018-09-07 RX ADMIN — INSULIN GLARGINE 70 UNITS: 100 INJECTION, SOLUTION SUBCUTANEOUS at 23:23

## 2018-09-07 RX ADMIN — DOXAZOSIN 4 MG: 4 TABLET ORAL at 08:40

## 2018-09-07 RX ADMIN — ROPINIROLE HYDROCHLORIDE 0.5 MG: 0.25 TABLET, FILM COATED ORAL at 20:43

## 2018-09-07 RX ADMIN — AMLODIPINE BESYLATE 10 MG: 5 TABLET ORAL at 08:39

## 2018-09-07 RX ADMIN — Medication 15 UNITS: at 12:38

## 2018-09-07 RX ADMIN — TIZANIDINE 2 MG: 4 TABLET ORAL at 03:47

## 2018-09-07 RX ADMIN — GABAPENTIN 100 MG: 100 CAPSULE ORAL at 08:39

## 2018-09-07 RX ADMIN — PANTOPRAZOLE SODIUM 40 MG: 40 TABLET, DELAYED RELEASE ORAL at 06:10

## 2018-09-07 RX ADMIN — ENOXAPARIN SODIUM 40 MG: 40 INJECTION, SOLUTION INTRAVENOUS; SUBCUTANEOUS at 08:40

## 2018-09-07 RX ADMIN — ASPIRIN 325 MG: 325 TABLET, COATED ORAL at 08:39

## 2018-09-07 RX ADMIN — CLOPIDOGREL BISULFATE 75 MG: 75 TABLET, FILM COATED ORAL at 08:39

## 2018-09-07 RX ADMIN — HYDROCHLOROTHIAZIDE 25 MG: 25 TABLET ORAL at 08:39

## 2018-09-07 ASSESSMENT — PAIN SCALES - GENERAL: PAINLEVEL_OUTOF10: 0

## 2018-09-07 NOTE — PROGRESS NOTES
Physical Therapy  Facility/Department: [facility] MED SURG  Daily Treatment Note     NAME: Cheli Roldan  : 1965  MRN: [id number]    Date of Service: 2018    Discharge Recommendations:  Continue to assess pending progress        Patient Diagnosis(es): There were no encounter diagnoses. has a past medical history of Diabetes mellitus ([facility] [address] Hypertension; and Stroke [facility]). has no past surgical history on file. Restrictions  Restrictions/Precautions  Restrictions/Precautions: General Precautions, Fall Risk  Required Braces or Orthoses?: No     Subjective   General  Chart Reviewed: Yes  Family / Caregiver Present: No  Subjective  Subjective: Patient in bed and agreeable to PT treatment.    Pain Screening  Patient Currently in Pain: Denies  Vital Signs  Patient Currently in Pain: Denies       Orientation  Orientation  Overall Orientation Status: Within Functional Limits     Objective   Bed mobility  Supine to Sit: Modified independent  Sit to Supine: Independent  Scooting: Modified independent     Transfers  Sit to Stand: Stand by assistance  Stand to sit: Stand by assistance     Ambulation  Ambulation?: Yes  Ambulation 1  Surface: level tile  Device: Rolling Walker  Assistance: Contact guard assistance;Stand by assistance  Quality of Gait: R foot drop with R hip circumduction   Distance: 250 feet x 2 reps     Balance  Sitting - Static: Good  Sitting - Dynamic: Good  Standing - Static: Fair;+  Standing - Dynamic: Fair;+ (at walker)     Exercises  Bridging:  (x20)  Quad Sets: x20 B  Heelslides: x10 B, with hand to contralateral knee (assisted with R LE and R UE), heel slides (AA) x10 R  Gluteal Sets: x20  Hip Flexion: standing x15 R to incline  Hip Abduction: supine x 20 B, AA on R   Knee Long Arc Quad: x20 R  Knee Passive Range of Motion: AA/P ROM R hip and knee and ankle x 20 each  Ankle Pumps: PROM DF/PF x20 each R, resisted PF  Other exercises  Other exercises 2: NuStep x 10 minutes (L-8), with UEs x7

## 2018-09-07 NOTE — PROGRESS NOTES
Occupational Therapy  Facility/Department: [facility] MED SURG  Daily Treatment Note  NAME: Chandu Oconnell  : 1965  MRN: [id number]    Date of Service: 2018    Discharge Recommendations:  Continue to assess pending progress       Patient Diagnosis(es): There were no encounter diagnoses. has a past medical history of Diabetes mellitus ([facility] [address] Hypertension; and Stroke [facility]). has no past surgical history on file. Restrictions  Restrictions/Precautions  Restrictions/Precautions: General Precautions, Fall Risk  Required Braces or Orthoses?: No  Subjective   General  Chart Reviewed: Yes  Patient assessed for rehabilitation services?: Yes  Family / Caregiver Present: No  Referring Practitioner: GARETH Mathur  Diagnosis: CVA  Subjective  Subjective: I took a muscle relaxer last night and I am still tired. I don't like that feeling. Pain Assessment  Patient Currently in Pain: Denies  Vital Signs  Patient Currently in Pain: Denies   Orientation     Objective             Balance  Sitting Balance: Independent  Standing Balance: Supervision  Standing Balance  Time: 5 minutes  Activity: Completed reaching across midline with LUE. Pt completed RUE reaching from table and extending shoulder and placing cones on bed with CGA. Pt with increased difficulty placing cones on top of table.    Sit to stand: Supervision  Toilet Transfers  Toilet - Technique: Ambulating  Equipment Used: Standard toilet  Toilet Transfer: Supervision  Bed mobility  Supine to Sit: Modified independent  Sit to Supine: Independent  Scooting: Modified independent  Transfers  Stand Pivot Transfers: Stand by assistance  Sit to stand: Supervision     Type of ROM/Therapeutic Exercise  Type of ROM/Therapeutic Exercise: AAROM;PROM;AROM  Exercises  Scapular Protraction: x15 AROM  Scapular Retraction: x15 AROM  Shoulder Depression: x15 AROM  Shoulder Elevation: x15 AROM  Shoulder Flexion: x20  Shoulder Extension: x20 AROM seated upright at EOB.   Shoulder ABduction: AROM in supine x20  Shoulder ADduction: AROM x20 in supine  Horizontal ABduction: x10 AAROM seated upright at EOB  Elbow Flexion: x20 AAROM in supine  Elbow Extension: x20 AAROM  Supination: x15 AROM in supine  Pronation: x15  Wrist Flexion: x20 PROM  Wrist Extension: x20 PROM  Finger Flexion: X15 AROM Pt is demo improved finger flexion. Difficulty with index finger flexion. Finger Extension: x15 AROM continues to have limited finger extension. Grasp/Release: Grasp and release x20  Other: IR/ER in supine x10 PROM     Assessment   Assessment: Agreeable to OT services. Continued focus on AROM in RUE. Pt continues to demonstrate significant limitations in extensors in wrist, and digits. Continued focus on greasp and release using cones. Pt having difficulty picking up weighted objects. Pt worked on isolated finger movements on this date focusing of flexion and extension using blocking method. Pt will continue to benefit from skilled OT services. Plan   Plan  Times per week: 3-5  Times per day: Daily  Plan weeks: 2  Current Treatment Recommendations: ROM, Functional Mobility Training, Safety Education & Training, Self-Care / ADL, Endurance Training, Neuromuscular Re-education, Balance Training, Strengthening       Goals  Short term goals  Time Frame for Short term goals: 1 week  Short term goal 1: Pt to complete LB dressing with CGA and UB with SBA. Short term goal 2: Pt to complete toileting with CGA. Short term goal 3: Pt to complete bathing with CGA. Short term goal 4: Pt to complete bathing transfer with SBA. Short term goal 5: Pt to improve AROM in RUE to assist with functional activity. Long term goals  Time Frame for Long term goals : 2 weeks. Long term goal 1: Pt to complete dynamic/standing tolerance activity x10 minutes to decrease risk of falls. Long term goal 2: Pt to complete dressing with MOD I.    Long term goal 3: Pt to complete toileting with MOD

## 2018-09-08 LAB
GLUCOSE BLD-MCNC: 122 MG/DL (ref 74–106)
GLUCOSE BLD-MCNC: 125 MG/DL (ref 74–106)
GLUCOSE BLD-MCNC: 130 MG/DL (ref 74–106)
GLUCOSE BLD-MCNC: 248 MG/DL (ref 74–106)
PERFORMED ON: ABNORMAL

## 2018-09-08 PROCEDURE — 6370000000 HC RX 637 (ALT 250 FOR IP): Performed by: NURSE PRACTITIONER

## 2018-09-08 PROCEDURE — 94760 N-INVAS EAR/PLS OXIMETRY 1: CPT

## 2018-09-08 PROCEDURE — 6370000000 HC RX 637 (ALT 250 FOR IP): Performed by: INTERNAL MEDICINE

## 2018-09-08 PROCEDURE — 6360000002 HC RX W HCPCS: Performed by: INTERNAL MEDICINE

## 2018-09-08 PROCEDURE — 1200000002 HC SEMI PRIVATE SWING BED

## 2018-09-08 PROCEDURE — 6370000000 HC RX 637 (ALT 250 FOR IP): Performed by: PHYSICIAN ASSISTANT

## 2018-09-08 RX ADMIN — METHOCARBAMOL 1000 MG: 500 TABLET ORAL at 09:51

## 2018-09-08 RX ADMIN — METFORMIN HYDROCHLORIDE 500 MG: 500 TABLET, FILM COATED ORAL at 09:51

## 2018-09-08 RX ADMIN — CLOPIDOGREL BISULFATE 75 MG: 75 TABLET, FILM COATED ORAL at 09:51

## 2018-09-08 RX ADMIN — METFORMIN HYDROCHLORIDE 500 MG: 500 TABLET, FILM COATED ORAL at 16:11

## 2018-09-08 RX ADMIN — INSULIN LISPRO 4 UNITS: 100 INJECTION, SOLUTION INTRAVENOUS; SUBCUTANEOUS at 09:52

## 2018-09-08 RX ADMIN — CARVEDILOL 12.5 MG: 12.5 TABLET, FILM COATED ORAL at 09:50

## 2018-09-08 RX ADMIN — METHOCARBAMOL 1000 MG: 500 TABLET ORAL at 21:16

## 2018-09-08 RX ADMIN — Medication 15 UNITS: at 17:18

## 2018-09-08 RX ADMIN — LINAGLIPTIN 5 MG: 5 TABLET, FILM COATED ORAL at 09:51

## 2018-09-08 RX ADMIN — GABAPENTIN 100 MG: 100 CAPSULE ORAL at 21:16

## 2018-09-08 RX ADMIN — TIZANIDINE 2 MG: 4 TABLET ORAL at 12:23

## 2018-09-08 RX ADMIN — ROPINIROLE HYDROCHLORIDE 0.5 MG: 0.25 TABLET, FILM COATED ORAL at 21:17

## 2018-09-08 RX ADMIN — HYDROCHLOROTHIAZIDE 25 MG: 25 TABLET ORAL at 09:51

## 2018-09-08 RX ADMIN — ATORVASTATIN CALCIUM 80 MG: 80 TABLET, FILM COATED ORAL at 21:16

## 2018-09-08 RX ADMIN — TIZANIDINE 2 MG: 4 TABLET ORAL at 06:39

## 2018-09-08 RX ADMIN — PANTOPRAZOLE SODIUM 40 MG: 40 TABLET, DELAYED RELEASE ORAL at 06:39

## 2018-09-08 RX ADMIN — INSULIN GLARGINE 70 UNITS: 100 INJECTION, SOLUTION SUBCUTANEOUS at 21:19

## 2018-09-08 RX ADMIN — CARVEDILOL 12.5 MG: 12.5 TABLET, FILM COATED ORAL at 16:11

## 2018-09-08 RX ADMIN — ZOLPIDEM TARTRATE 5 MG: 5 TABLET ORAL at 21:16

## 2018-09-08 RX ADMIN — Medication 15 UNITS: at 12:24

## 2018-09-08 RX ADMIN — METHOCARBAMOL 1000 MG: 500 TABLET ORAL at 15:00

## 2018-09-08 RX ADMIN — Medication 15 UNITS: at 06:40

## 2018-09-08 RX ADMIN — DOXAZOSIN 4 MG: 4 TABLET ORAL at 09:51

## 2018-09-08 RX ADMIN — AMLODIPINE BESYLATE 10 MG: 5 TABLET ORAL at 09:50

## 2018-09-08 RX ADMIN — ASPIRIN 325 MG: 325 TABLET, COATED ORAL at 09:50

## 2018-09-08 RX ADMIN — GABAPENTIN 100 MG: 100 CAPSULE ORAL at 09:51

## 2018-09-08 RX ADMIN — ENOXAPARIN SODIUM 40 MG: 40 INJECTION, SOLUTION INTRAVENOUS; SUBCUTANEOUS at 09:50

## 2018-09-08 RX ADMIN — DOXAZOSIN 4 MG: 4 TABLET ORAL at 21:17

## 2018-09-08 NOTE — PLAN OF CARE
Problem: Falls - Risk of:  Goal: Absence of physical injury  Absence of physical injury   Outcome: Ongoing      Problem: Pain Control  Goal: Improvement in pain related behaviors BP/HR WNL  Outcome: Ongoing      Problem: Respiratory  Goal: O2 Sat > 90%  Outcome: Ongoing

## 2018-09-09 LAB
GLUCOSE BLD-MCNC: 143 MG/DL (ref 74–106)
GLUCOSE BLD-MCNC: 149 MG/DL (ref 74–106)
GLUCOSE BLD-MCNC: 153 MG/DL (ref 74–106)
GLUCOSE BLD-MCNC: 192 MG/DL (ref 74–106)
GLUCOSE BLD-MCNC: 303 MG/DL (ref 74–106)
PERFORMED ON: ABNORMAL

## 2018-09-09 PROCEDURE — 1200000002 HC SEMI PRIVATE SWING BED

## 2018-09-09 PROCEDURE — 6370000000 HC RX 637 (ALT 250 FOR IP): Performed by: PHYSICIAN ASSISTANT

## 2018-09-09 PROCEDURE — 6370000000 HC RX 637 (ALT 250 FOR IP): Performed by: NURSE PRACTITIONER

## 2018-09-09 PROCEDURE — 94760 N-INVAS EAR/PLS OXIMETRY 1: CPT

## 2018-09-09 PROCEDURE — 6370000000 HC RX 637 (ALT 250 FOR IP): Performed by: INTERNAL MEDICINE

## 2018-09-09 PROCEDURE — 6360000002 HC RX W HCPCS: Performed by: INTERNAL MEDICINE

## 2018-09-09 RX ADMIN — METFORMIN HYDROCHLORIDE 500 MG: 500 TABLET, FILM COATED ORAL at 10:20

## 2018-09-09 RX ADMIN — PANTOPRAZOLE SODIUM 40 MG: 40 TABLET, DELAYED RELEASE ORAL at 06:29

## 2018-09-09 RX ADMIN — DOXAZOSIN 4 MG: 4 TABLET ORAL at 20:50

## 2018-09-09 RX ADMIN — ROPINIROLE HYDROCHLORIDE 0.5 MG: 0.25 TABLET, FILM COATED ORAL at 20:49

## 2018-09-09 RX ADMIN — Medication 15 UNITS: at 12:51

## 2018-09-09 RX ADMIN — METHOCARBAMOL 1000 MG: 500 TABLET ORAL at 20:49

## 2018-09-09 RX ADMIN — ZOLPIDEM TARTRATE 5 MG: 5 TABLET ORAL at 20:50

## 2018-09-09 RX ADMIN — METHOCARBAMOL 1000 MG: 500 TABLET ORAL at 15:47

## 2018-09-09 RX ADMIN — AMLODIPINE BESYLATE 10 MG: 5 TABLET ORAL at 10:20

## 2018-09-09 RX ADMIN — CARVEDILOL 12.5 MG: 12.5 TABLET, FILM COATED ORAL at 10:20

## 2018-09-09 RX ADMIN — DOXAZOSIN 4 MG: 4 TABLET ORAL at 10:21

## 2018-09-09 RX ADMIN — CLOPIDOGREL BISULFATE 75 MG: 75 TABLET, FILM COATED ORAL at 10:20

## 2018-09-09 RX ADMIN — ASPIRIN 325 MG: 325 TABLET, COATED ORAL at 10:21

## 2018-09-09 RX ADMIN — Medication 15 UNITS: at 18:00

## 2018-09-09 RX ADMIN — INSULIN LISPRO 8 UNITS: 100 INJECTION, SOLUTION INTRAVENOUS; SUBCUTANEOUS at 10:23

## 2018-09-09 RX ADMIN — CARVEDILOL 12.5 MG: 12.5 TABLET, FILM COATED ORAL at 18:00

## 2018-09-09 RX ADMIN — INSULIN LISPRO 2 UNITS: 100 INJECTION, SOLUTION INTRAVENOUS; SUBCUTANEOUS at 12:52

## 2018-09-09 RX ADMIN — METHOCARBAMOL 1000 MG: 500 TABLET ORAL at 10:20

## 2018-09-09 RX ADMIN — Medication 15 UNITS: at 06:25

## 2018-09-09 RX ADMIN — HYDROCHLOROTHIAZIDE 25 MG: 25 TABLET ORAL at 10:20

## 2018-09-09 RX ADMIN — GABAPENTIN 100 MG: 100 CAPSULE ORAL at 20:50

## 2018-09-09 RX ADMIN — ENOXAPARIN SODIUM 40 MG: 40 INJECTION, SOLUTION INTRAVENOUS; SUBCUTANEOUS at 10:21

## 2018-09-09 RX ADMIN — INSULIN LISPRO 2 UNITS: 100 INJECTION, SOLUTION INTRAVENOUS; SUBCUTANEOUS at 18:00

## 2018-09-09 RX ADMIN — GABAPENTIN 100 MG: 100 CAPSULE ORAL at 10:20

## 2018-09-09 RX ADMIN — ATORVASTATIN CALCIUM 80 MG: 80 TABLET, FILM COATED ORAL at 20:50

## 2018-09-09 RX ADMIN — LINAGLIPTIN 5 MG: 5 TABLET, FILM COATED ORAL at 10:21

## 2018-09-09 RX ADMIN — TIZANIDINE 2 MG: 4 TABLET ORAL at 06:29

## 2018-09-09 RX ADMIN — INSULIN GLARGINE 70 UNITS: 100 INJECTION, SOLUTION SUBCUTANEOUS at 20:52

## 2018-09-09 RX ADMIN — METFORMIN HYDROCHLORIDE 500 MG: 500 TABLET, FILM COATED ORAL at 18:00

## 2018-09-09 RX ADMIN — INSULIN LISPRO 1 UNITS: 100 INJECTION, SOLUTION INTRAVENOUS; SUBCUTANEOUS at 20:52

## 2018-09-10 LAB
GLUCOSE BLD-MCNC: 123 MG/DL (ref 74–106)
GLUCOSE BLD-MCNC: 140 MG/DL (ref 74–106)
GLUCOSE BLD-MCNC: 167 MG/DL (ref 74–106)
GLUCOSE BLD-MCNC: 221 MG/DL (ref 74–106)
GLUCOSE BLD-MCNC: 278 MG/DL (ref 74–106)
PERFORMED ON: ABNORMAL

## 2018-09-10 PROCEDURE — 6370000000 HC RX 637 (ALT 250 FOR IP): Performed by: NURSE PRACTITIONER

## 2018-09-10 PROCEDURE — 97110 THERAPEUTIC EXERCISES: CPT

## 2018-09-10 PROCEDURE — 1200000002 HC SEMI PRIVATE SWING BED

## 2018-09-10 PROCEDURE — 6370000000 HC RX 637 (ALT 250 FOR IP): Performed by: PHYSICIAN ASSISTANT

## 2018-09-10 PROCEDURE — 6360000002 HC RX W HCPCS: Performed by: INTERNAL MEDICINE

## 2018-09-10 PROCEDURE — 6370000000 HC RX 637 (ALT 250 FOR IP): Performed by: INTERNAL MEDICINE

## 2018-09-10 PROCEDURE — 97530 THERAPEUTIC ACTIVITIES: CPT

## 2018-09-10 PROCEDURE — 94760 N-INVAS EAR/PLS OXIMETRY 1: CPT

## 2018-09-10 RX ADMIN — ROPINIROLE HYDROCHLORIDE 0.5 MG: 0.25 TABLET, FILM COATED ORAL at 20:59

## 2018-09-10 RX ADMIN — GABAPENTIN 100 MG: 100 CAPSULE ORAL at 09:47

## 2018-09-10 RX ADMIN — GABAPENTIN 100 MG: 100 CAPSULE ORAL at 20:59

## 2018-09-10 RX ADMIN — ASPIRIN 325 MG: 325 TABLET, COATED ORAL at 09:46

## 2018-09-10 RX ADMIN — METFORMIN HYDROCHLORIDE 500 MG: 500 TABLET, FILM COATED ORAL at 17:46

## 2018-09-10 RX ADMIN — Medication 15 UNITS: at 17:48

## 2018-09-10 RX ADMIN — HYDROCHLOROTHIAZIDE 25 MG: 25 TABLET ORAL at 09:46

## 2018-09-10 RX ADMIN — INSULIN LISPRO 2 UNITS: 100 INJECTION, SOLUTION INTRAVENOUS; SUBCUTANEOUS at 21:01

## 2018-09-10 RX ADMIN — CLOPIDOGREL BISULFATE 75 MG: 75 TABLET, FILM COATED ORAL at 09:46

## 2018-09-10 RX ADMIN — Medication 15 UNITS: at 12:00

## 2018-09-10 RX ADMIN — INSULIN LISPRO 2 UNITS: 100 INJECTION, SOLUTION INTRAVENOUS; SUBCUTANEOUS at 09:49

## 2018-09-10 RX ADMIN — INSULIN GLARGINE 70 UNITS: 100 INJECTION, SOLUTION SUBCUTANEOUS at 21:00

## 2018-09-10 RX ADMIN — Medication 15 UNITS: at 06:36

## 2018-09-10 RX ADMIN — METHOCARBAMOL 1000 MG: 500 TABLET ORAL at 20:59

## 2018-09-10 RX ADMIN — ENOXAPARIN SODIUM 40 MG: 40 INJECTION, SOLUTION INTRAVENOUS; SUBCUTANEOUS at 09:47

## 2018-09-10 RX ADMIN — METFORMIN HYDROCHLORIDE 500 MG: 500 TABLET, FILM COATED ORAL at 10:01

## 2018-09-10 RX ADMIN — CARVEDILOL 12.5 MG: 12.5 TABLET, FILM COATED ORAL at 10:01

## 2018-09-10 RX ADMIN — INSULIN LISPRO 6 UNITS: 100 INJECTION, SOLUTION INTRAVENOUS; SUBCUTANEOUS at 17:48

## 2018-09-10 RX ADMIN — CARVEDILOL 12.5 MG: 12.5 TABLET, FILM COATED ORAL at 17:46

## 2018-09-10 RX ADMIN — METHOCARBAMOL 1000 MG: 500 TABLET ORAL at 09:46

## 2018-09-10 RX ADMIN — AMLODIPINE BESYLATE 10 MG: 5 TABLET ORAL at 09:46

## 2018-09-10 RX ADMIN — ATORVASTATIN CALCIUM 80 MG: 80 TABLET, FILM COATED ORAL at 20:59

## 2018-09-10 RX ADMIN — DOXAZOSIN 4 MG: 4 TABLET ORAL at 21:00

## 2018-09-10 RX ADMIN — LINAGLIPTIN 5 MG: 5 TABLET, FILM COATED ORAL at 09:46

## 2018-09-10 RX ADMIN — METHOCARBAMOL 1000 MG: 500 TABLET ORAL at 15:00

## 2018-09-10 RX ADMIN — ZOLPIDEM TARTRATE 5 MG: 5 TABLET ORAL at 21:09

## 2018-09-10 RX ADMIN — DOXAZOSIN 4 MG: 4 TABLET ORAL at 09:46

## 2018-09-10 RX ADMIN — PANTOPRAZOLE SODIUM 40 MG: 40 TABLET, DELAYED RELEASE ORAL at 06:36

## 2018-09-10 NOTE — PROGRESS NOTES
AROM seated upright at EOB. Shoulder ABduction: AROM in supine x20; 10 AROM seated EOB  Shoulder ADduction: AROM x20 in supine  Horizontal ADduction: x10  Elbow Flexion: x20 AROM in supine  Elbow Extension: x20 AAROM supine  Supination: x15 PROM   Pronation: x15 PROM  Wrist Flexion: x20 PROM  Wrist Extension: x20 PROM  Finger Flexion: X15 AROM Pt is demo improved finger flexion. Difficulty with index finger flexion. Finger Extension: x15 AROM continues to have limited finger extension. /PROM of prolonged static stretch  Grasp/Release: Grasp and release x20  Other: IR/ER in supine x10 PROM                    Assessment   Assessment: Pt agreeable to OT services. Pt able to  weighted objects from floor level. Activity graded up to challenge pt. Pt able to complete task with SUP. Pt demo improved lateral pinch and thumb movement in RUE. Pt completed lateral pinch task and picking objects up from table. Pt with difficulty completing lateral pinch grasp to remove items from table. Pt will benefit from continued focus on improving thumb movement and pinch in order to improve functional grasp and pinch. Completed task bringing spoon using hand to mouth pattern. Pt unable to flex elbow and shoulder to bring spoon to mouth at this time. Pt continued to work on grasp and release of cones working on shoulder flexion/extension to release cones. Continued supine exercises in gravity eliminated plane to improve RUE shoulder movements. Pt demo significant improvement with shoulder ABD in gravity eliminated plane. Pt completed shower transfer training with SBA. Pt changed rooms during weekend and has new shower setup. Pt left seated at EOB with call light in reach.              Plan   Plan  Times per week: 3-5  Times per day: Daily  Plan weeks: 2  Current Treatment Recommendations: ROM, Functional Mobility Training, Safety Education & Training, Self-Care / ADL, Endurance Training, Neuromuscular Re-education, Balance Training, Strengthening    Goals  Short term goals  Time Frame for Short term goals: 1 week  Short term goal 1: Pt to complete LB dressing with CGA and UB with SBA. Short term goal 2: Pt to complete toileting with CGA. Short term goal 3: Pt to complete bathing with CGA. Short term goal 4: Pt to complete bathing transfer with SBA. Short term goal 5: Pt to improve AROM in RUE to assist with functional activity. Long term goals  Time Frame for Long term goals : 2 weeks. Long term goal 1: Pt to complete dynamic/standing tolerance activity x10 minutes to decrease risk of falls. Long term goal 2: Pt to complete dressing with MOD I. Long term goal 3: Pt to complete toileting with MOD I. Long term goal 4: Pt to complete bathing SUP. Long term goal 5: Pt to improve grasp on RUE to grasp cups and assist with ADL tasks. Therapy Time   Individual Concurrent Group Co-treatment   Time In 1031         Time Out 1141         Minutes 70              This note serves as a DC summary in the event of pt discharge.      Eli Matias, OTR/L

## 2018-09-10 NOTE — FLOWSHEET NOTE
09/10/18 1000   Assessment   Charting Type Shift assessment   Neurological   Neuro (WDL) X   Level of Consciousness 0   Orientation Level Oriented X4   Cognition Appropriate judgement; Appropriate safety awareness; Appropriate attention/concentration; Appropriate for developmental age   Language Clear   R Hand  Moderate   L Hand  Strong   L Foot Dorsiflexion Strong   L Foot Plantar Flexion Strong   R Pupil Shape Round   R Pupil Reaction Brisk   L Pupil Shape Round   L Pupil Reaction Brisk   RUE Motor Response Responds to command   LUE Motor Response Responds to command   RLE Motor Response Responds to command   LLE Motor Response Responds to command   Size R Pupil (mm) 2   Size L Pupil (mm) 2   R Foot Dorsiflexion Weak   R Foot Plantar Flexion Weak   [place] Coma Scale   Eye Opening 4   Best Verbal Response 5   Best Motor Response 6   Breann Coma Scale Score 15   Sensory   Sensation Generalized Normal   HEENT   HEENT (WDL) X   Right Eye Impaired vision   Left Eye Impaired vision   Nose Intact   Throat Intact   Tongue Pink & moist   Voice Normal   Mucous Membrane Pink;Moist   Respiratory   Respiratory (WDL) WDL   Respiratory Pattern Regular   Respiratory Depth Normal   Breath Sounds   Right Upper Lobe Clear   Right Middle Lobe Clear   Right Lower Lobe Clear   Left Upper Lobe Clear   Left Lower Lobe Clear   Cardiac   Cardiac (WDL) WDL   Cardiac Regularity Regular   Cardiac Monitor   Telemetry Monitor On No   Gastrointestinal   Abdominal (WDL) WDL   Abdomen Inspection Soft   Last BM (including prior to admit) 09/09/18   Tenderness Nontender   Passing Flatus Y   RUQ Bowel Sounds Active   LUQ Bowel Sounds Active   RLQ Bowel Sounds Active   LLQ Bowel Sounds Active   Bowel Sounds (All Quadrants) Active   Peripheral Vascular   Peripheral Vascular (WDL) WDL   Edema None   Sensation RUE Full sensation   Sensation LUE Full sensation   Sensation RLE Numbness;Tingling   Sensation LLE Full sensation   Skin Color/Condition

## 2018-09-10 NOTE — PROGRESS NOTES
Physical Therapy  Facility/Department: [facility] MED SURG  Daily Treatment Note    NAME: Guillermo Oden  : 1965  MRN: [id number]    Date of Service: 9/10/2018    Discharge Recommendations:  Continue to assess pending progress        Patient Diagnosis(es): There were no encounter diagnoses. has a past medical history of Diabetes mellitus ([facility] [address] Hypertension; and Stroke [facility]). has no past surgical history on file. Restrictions  Restrictions/Precautions  Restrictions/Precautions: General Precautions, Fall Risk  Required Braces or Orthoses?: No     Subjective   General  Chart Reviewed: Yes  Family / Caregiver Present: No  Subjective  Subjective: Patient in bed and agreeable to PT treatment. Reports he has been having spasms since about 4 this morning.    Pain Screening  Patient Currently in Pain: Denies  Vital Signs  Patient Currently in Pain: Denies       Orientation  Orientation  Overall Orientation Status: Within Functional Limits     Objective   Bed mobility  Supine to Sit: Modified independent  Sit to Supine: Independent  Scooting: Modified independent     Transfers  Sit to Stand: Stand by assistance  Stand to sit: Stand by assistance     Ambulation  Ambulation?: Yes  Ambulation 1  Surface: level tile  Device: Rolling Walker  Assistance: Contact guard assistance;Stand by assistance  Quality of Gait: R foot drop with R hip circumduction   Distance: 300 feet x 2 reps     Balance  Sitting - Static: Good  Sitting - Dynamic: Good  Standing - Static: Fair;+  Standing - Dynamic: Fair;+ (at walker)     Exercises  Bridging:  (x20 )  Straight Leg Raise: x5 R  Quad Sets: x20 B  Heelslides: x10 B, with hand to contralateral knee (assisted with R LE and R UE), heel slides (AA) x10 R  Gluteal Sets: x20  Hip Flexion: standing x10 R to incline  Hip Abduction: supine x 20 B, AA on R   Knee Long Arc Quad: x20 R  Knee Passive Range of Motion: AA/P ROM R hip and knee and ankle x 20 each  Ankle Pumps: PROM DF/PF x20 each R, resisted PF  Other exercises  Other exercises 1: adductor squeezes x20 seconds    Other exercises 2: NuStep x 11 minutes (L-8), with UEs x7 minutes  Other exercises 3: resisted hip abduction in x20, green t-band   Other exercises 4: sit to stand x 10   Other exercises 5: lower trunk rotation 2x20 B  Other exercises 6: rhythmic stabilization in hooklying at knees x 2 minutes  Other exercises 7: heel raises x 20 (standing at walker)  Other exercises 9: step-ups L x 15 (6 in step) with SBA  Other exercises 10: Patient able to ascend/descend 4 steps with handrails with SBA                        Assessment   Body structures, Functions, Activity limitations: Decreased functional mobility ; Decreased strength;Decreased balance;Decreased endurance  Assessment: Patient continues to demonstrate gradual improvements in strength, balance, and mobility. Patient continues to exhibit R foot drop with R hip circumduction with ambulation. However, patient requiring decreased assistance with climbing stairs and requiring decreased assistance with therex. Progressing well. Treatment Diagnosis: Weakness, s/p CVA  Prognosis: Good  REQUIRES PT FOLLOW UP: Yes  Activity Tolerance  Activity Tolerance: Patient Tolerated treatment well     Goals  Short term goals  Time Frame for Short term goals: 2 weeks  Short term goal 1: Patient will perform all basic transfers independently with safety present.    (CGA)  Short term goal 2: Increase strength and endurance to tolerate 30 minutes of therex/ther. activity. MET  Short term goal 3: Increase strength to ambulate 400 feet with least restrictive A.D with safety present.    (ambulating 270 feet +)  Short term goal 4: Patient will ambulate with Good balance. (fair + with RW)  Short term goal 5: Patient will be independent with home exercise program, and perform therex independently.        MET    Plan    Plan  Times per week: 4-5x/week  Times per day: Daily  Plan weeks: 2 weeks  Current Treatment Recommendations: Strengthening, ROM, Balance Training, Functional Mobility Training, Transfer Training, Gait Training, Stair training, Endurance Training, Neuromuscular Re-education, Patient/Caregiver Education & Training, Safety Education & Training  Safety Devices  Type of devices: Call light within reach, Patient at risk for falls, Left in bed     Therapy Time   Individual Concurrent Group Co-treatment   Time In 0904         Time Out 1014         Minutes 70                 Olga Lidia Escobar, PT     This note serves as D/C summary if patient is discharged prior to next visit.

## 2018-09-11 LAB
GLUCOSE BLD-MCNC: 147 MG/DL (ref 74–106)
GLUCOSE BLD-MCNC: 172 MG/DL (ref 74–106)
GLUCOSE BLD-MCNC: 212 MG/DL (ref 74–106)
GLUCOSE BLD-MCNC: 86 MG/DL (ref 74–106)
PERFORMED ON: ABNORMAL
PERFORMED ON: NORMAL

## 2018-09-11 PROCEDURE — 6360000002 HC RX W HCPCS: Performed by: INTERNAL MEDICINE

## 2018-09-11 PROCEDURE — 6370000000 HC RX 637 (ALT 250 FOR IP): Performed by: NURSE PRACTITIONER

## 2018-09-11 PROCEDURE — 1200000002 HC SEMI PRIVATE SWING BED

## 2018-09-11 PROCEDURE — 97535 SELF CARE MNGMENT TRAINING: CPT

## 2018-09-11 PROCEDURE — 97110 THERAPEUTIC EXERCISES: CPT

## 2018-09-11 PROCEDURE — 6370000000 HC RX 637 (ALT 250 FOR IP): Performed by: PHYSICIAN ASSISTANT

## 2018-09-11 PROCEDURE — 6370000000 HC RX 637 (ALT 250 FOR IP): Performed by: INTERNAL MEDICINE

## 2018-09-11 PROCEDURE — 94760 N-INVAS EAR/PLS OXIMETRY 1: CPT

## 2018-09-11 PROCEDURE — 97530 THERAPEUTIC ACTIVITIES: CPT

## 2018-09-11 RX ADMIN — CARVEDILOL 12.5 MG: 12.5 TABLET, FILM COATED ORAL at 17:09

## 2018-09-11 RX ADMIN — ENOXAPARIN SODIUM 40 MG: 40 INJECTION, SOLUTION INTRAVENOUS; SUBCUTANEOUS at 08:41

## 2018-09-11 RX ADMIN — ZOLPIDEM TARTRATE 5 MG: 5 TABLET ORAL at 20:51

## 2018-09-11 RX ADMIN — ROPINIROLE HYDROCHLORIDE 0.5 MG: 0.25 TABLET, FILM COATED ORAL at 20:48

## 2018-09-11 RX ADMIN — METFORMIN HYDROCHLORIDE 500 MG: 500 TABLET, FILM COATED ORAL at 08:42

## 2018-09-11 RX ADMIN — TIZANIDINE 2 MG: 4 TABLET ORAL at 04:34

## 2018-09-11 RX ADMIN — LINAGLIPTIN 5 MG: 5 TABLET, FILM COATED ORAL at 08:41

## 2018-09-11 RX ADMIN — Medication 15 UNITS: at 17:15

## 2018-09-11 RX ADMIN — INSULIN LISPRO 4 UNITS: 100 INJECTION, SOLUTION INTRAVENOUS; SUBCUTANEOUS at 08:43

## 2018-09-11 RX ADMIN — ASPIRIN 325 MG: 325 TABLET, COATED ORAL at 08:42

## 2018-09-11 RX ADMIN — AMLODIPINE BESYLATE 10 MG: 5 TABLET ORAL at 08:41

## 2018-09-11 RX ADMIN — DOXAZOSIN 4 MG: 4 TABLET ORAL at 20:48

## 2018-09-11 RX ADMIN — CLOPIDOGREL BISULFATE 75 MG: 75 TABLET, FILM COATED ORAL at 08:42

## 2018-09-11 RX ADMIN — Medication 15 UNITS: at 08:42

## 2018-09-11 RX ADMIN — CARVEDILOL 12.5 MG: 12.5 TABLET, FILM COATED ORAL at 08:41

## 2018-09-11 RX ADMIN — GABAPENTIN 100 MG: 100 CAPSULE ORAL at 08:42

## 2018-09-11 RX ADMIN — HYDROCHLOROTHIAZIDE 25 MG: 25 TABLET ORAL at 08:42

## 2018-09-11 RX ADMIN — METHOCARBAMOL 1000 MG: 500 TABLET ORAL at 14:42

## 2018-09-11 RX ADMIN — INSULIN GLARGINE 70 UNITS: 100 INJECTION, SOLUTION SUBCUTANEOUS at 20:51

## 2018-09-11 RX ADMIN — PANTOPRAZOLE SODIUM 40 MG: 40 TABLET, DELAYED RELEASE ORAL at 08:42

## 2018-09-11 RX ADMIN — METFORMIN HYDROCHLORIDE 500 MG: 500 TABLET, FILM COATED ORAL at 17:09

## 2018-09-11 RX ADMIN — DOXAZOSIN 4 MG: 4 TABLET ORAL at 08:41

## 2018-09-11 RX ADMIN — GABAPENTIN 100 MG: 100 CAPSULE ORAL at 20:48

## 2018-09-11 RX ADMIN — ATORVASTATIN CALCIUM 80 MG: 80 TABLET, FILM COATED ORAL at 20:47

## 2018-09-11 RX ADMIN — INSULIN LISPRO 1 UNITS: 100 INJECTION, SOLUTION INTRAVENOUS; SUBCUTANEOUS at 20:51

## 2018-09-11 RX ADMIN — INSULIN LISPRO 2 UNITS: 100 INJECTION, SOLUTION INTRAVENOUS; SUBCUTANEOUS at 17:09

## 2018-09-11 RX ADMIN — METHOCARBAMOL 1000 MG: 500 TABLET ORAL at 08:41

## 2018-09-11 RX ADMIN — METHOCARBAMOL 1000 MG: 500 TABLET ORAL at 20:47

## 2018-09-11 NOTE — PLAN OF CARE
Problem: Falls - Risk of:  Goal: Absence of physical injury  Absence of physical injury   Outcome: Ongoing      Problem: Pain Control  Goal: Improvement in pain related behaviors BP/HR WNL  Outcome: Ongoing      Problem:   Goal: Adequate urinary output  Outcome: Ongoing

## 2018-09-11 NOTE — PROGRESS NOTES
pinch in order to grasp blocks and pick them up and place them in points of interest. Pt required min assist to grasp. difficulty with supination and pronation in order to manipulate object in hand. Type of ROM/Therapeutic Exercise  Type of ROM/Therapeutic Exercise: AAROM;PROM;AROM  Exercises  Scapular Protraction: x15 AROM  Scapular Retraction: x15 AROM  Shoulder Depression: x15 AROM  Shoulder Elevation: x15 AROM  Shoulder Flexion: x20 AROM supine, x10 seated at EOB  Shoulder Extension: x20 AROM seated upright at EOB. Shoulder ABduction: AROM in supine x20; 10 AROM seated EOB  Shoulder ADduction: AROM x20 in supine  Horizontal ABduction: x10 AAROM seated upright at EOB  Horizontal ADduction: x10  Elbow Flexion: x20 AROM in supine  Elbow Extension: x20 AAROM supine  Supination: x15 PROM   Pronation: x15 PROM  Wrist Flexion: x20 PROM  Wrist Extension: x20 PROM  Finger Flexion: X15 AROM Pt is demo improved finger flexion. Difficulty with index finger flexion. Finger Extension: x15 AROM continues to have limited finger extension. /PROM of prolonged static stretch  Grasp/Release: Grasp and release x20  Other: IR/ER in supine x10 PROM    Assessment   Assessment: Pt agreeable to OT services. Completed AROM in sitting at EOB and lying supine. Pt able to complete hand to mouth pattern lying in bed with decreased accuracy. Pt will need to continue to focus on this movement pattern. Pt continues to have difficulty with wrist motion and is limited in wrist extension/flexion, supination and pronation. Pt demo improved thumb adduction. Worked on lateral pinch on this date picking up blocks and placing them in points of interest to improve [address] and strength. Pt requires PROM and prolonged stretch in fingers. Pt completed hygiene and grooming with SUP. Pt educated to use R UE and place on counter for weight bearing. Pt demo excellent follow through. Pt left seated EOB with call light in reach.                Plan Plan  Times per week: 3-5  Times per day: Daily  Plan weeks: 2  Current Treatment Recommendations: ROM, Functional Mobility Training, Safety Education & Training, Self-Care / ADL, Endurance Training, Neuromuscular Re-education, Balance Training, Strengthening    Goals  Short term goals  Time Frame for Short term goals: 1 week  Short term goal 1: Pt to complete LB dressing with CGA and UB with SBA. Short term goal 2: Pt to complete toileting with CGA. Short term goal 3: Pt to complete bathing with CGA. Short term goal 4: Pt to complete bathing transfer with SBA. Short term goal 5: Pt to improve AROM in RUE to assist with functional activity. Long term goals  Time Frame for Long term goals : 2 weeks. Long term goal 1: Pt to complete dynamic/standing tolerance activity x10 minutes to decrease risk of falls. Long term goal 2: Pt to complete dressing with MOD I. Long term goal 3: Pt to complete toileting with MOD I. Long term goal 4: Pt to complete bathing SUP. Long term goal 5: Pt to improve grasp on RUE to grasp cups and assist with ADL tasks. Therapy Time   Individual Concurrent Group Co-treatment   Time In 1004         Time Out 2107         Minutes 68              This note serves as a DC summary in the event of pt discharge.      RIANNA Stephens/L

## 2018-09-11 NOTE — PROGRESS NOTES
Physical Therapy  Facility/Department: [facility] MED SURG  Daily Treatment Note    NAME: Lavern Macias  : 1965  MRN: [id number]    Date of Service: 2018    Discharge Recommendations:  Continue to assess pending progress        Patient Diagnosis(es): There were no encounter diagnoses. has a past medical history of Diabetes mellitus ([facility] [address] Hypertension; and Stroke [facility]). has no past surgical history on file. Restrictions  Restrictions/Precautions  Restrictions/Precautions: General Precautions, Fall Risk  Required Braces or Orthoses?: No     Subjective   General  Chart Reviewed: Yes  Family / Caregiver Present: No  Subjective  Subjective: Patient in bed and agreeable to PT treatment.    Pain Screening  Patient Currently in Pain: Denies  Vital Signs  Patient Currently in Pain: Denies       Orientation  Orientation  Overall Orientation Status: Within Normal Limits     Objective   Bed mobility  Supine to Sit: Modified independent  Sit to Supine: Independent  Scooting: Modified independent     Transfers  Sit to Stand: Stand by assistance  Stand to sit: Stand by assistance     Ambulation  Ambulation?: Yes  Ambulation 1  Surface: level tile  Device: Rolling Walker  Assistance: Stand by assistance  Quality of Gait: R foot drop with R hip circumduction   Distance: 300 feet x 2 reps     Balance  Sitting - Static: Good  Sitting - Dynamic: Good  Standing - Static: Fair;+  Standing - Dynamic: Fair;+ (at walker)     Exercises  Bridging:  (x20)  Heelslides: x10 B, with hand to contralateral knee (assisted with R LE and R UE), heel slides (AA) x10 R  Knee Passive Range of Motion: AA/P ROM R hip and knee and ankle x 20 each  Ankle Pumps: PROM DF/PF x20 each R, resisted PF  Other exercises  Other exercises 1: adductor squeezes x20 seconds    Other exercises 2: NuStep x 12 minutes (L-8), with UEs x 8 minutes  Other exercises 3: resisted hip abduction in x20, green t-band   Other exercises 4: sit to stand x 10   Other

## 2018-09-11 NOTE — FLOWSHEET NOTE
09/10/18 2100   Assessment   Charting Type Shift assessment   Neurological   Neuro (WDL) X   Level of Consciousness 0   Orientation Level Oriented X4   Cognition Appropriate judgement; Appropriate safety awareness; Appropriate attention/concentration; Appropriate for developmental age   Language Clear   R Hand  Moderate   L Hand  Strong   L Foot Dorsiflexion Strong   L Foot Plantar Flexion Strong   R Pupil Shape Round   R Pupil Reaction Brisk   L Pupil Shape Round   L Pupil Reaction Brisk   RUE Motor Response Responds to command   LUE Motor Response Responds to command   RLE Motor Response Responds to command   LLE Motor Response Responds to command   Size R Pupil (mm) 2   Size L Pupil (mm) 2   R Foot Dorsiflexion Weak   R Foot Plantar Flexion Weak   [place] Coma Scale   Eye Opening 4   Best Verbal Response 5   Best Motor Response 6   Breann Coma Scale Score 15   HEENT   HEENT (WDL) X   Right Eye Impaired vision   Left Eye Impaired vision   Nose Intact   Throat Intact   Tongue Pink & moist   Voice Normal   Mucous Membrane Pink;Moist   Respiratory   Respiratory (WDL) WDL   Respiratory Pattern Regular   Respiratory Depth Normal   Breath Sounds   Right Upper Lobe Clear   Right Middle Lobe Clear   Right Lower Lobe Clear   Left Upper Lobe Clear   Left Lower Lobe Clear   Incentive Spirometry Tx   Respiratory Effort Dyspnea with Exertion   Cardiac   Cardiac (WDL) WDL   Cardiac Regularity Regular   Cardiac Monitor   Telemetry Monitor On No   Telemetry Audible No   Telemetry Alarms Set No   Gastrointestinal   Abdominal (WDL) WDL   Abdomen Inspection Soft   Tenderness Nontender   Passing Flatus Y   RUQ Bowel Sounds Active   LUQ Bowel Sounds Active   RLQ Bowel Sounds Active   LLQ Bowel Sounds Active   Bowel Sounds (All Quadrants) Active   Peripheral Vascular   Peripheral Vascular (WDL) WDL   Edema None   Sensation RUE Full sensation   Sensation LUE Full sensation   Sensation RLE Numbness;Tingling   Sensation LLE Full

## 2018-09-12 LAB
GLUCOSE BLD-MCNC: 192 MG/DL (ref 74–106)
GLUCOSE BLD-MCNC: 211 MG/DL (ref 74–106)
GLUCOSE BLD-MCNC: 270 MG/DL (ref 74–106)
GLUCOSE BLD-MCNC: 76 MG/DL (ref 74–106)
PERFORMED ON: ABNORMAL
PERFORMED ON: NORMAL

## 2018-09-12 PROCEDURE — 6370000000 HC RX 637 (ALT 250 FOR IP): Performed by: PHYSICIAN ASSISTANT

## 2018-09-12 PROCEDURE — G8987 SELF CARE CURRENT STATUS: HCPCS

## 2018-09-12 PROCEDURE — 1200000002 HC SEMI PRIVATE SWING BED

## 2018-09-12 PROCEDURE — 6360000002 HC RX W HCPCS: Performed by: INTERNAL MEDICINE

## 2018-09-12 PROCEDURE — G8978 MOBILITY CURRENT STATUS: HCPCS

## 2018-09-12 PROCEDURE — G8979 MOBILITY GOAL STATUS: HCPCS

## 2018-09-12 PROCEDURE — 94760 N-INVAS EAR/PLS OXIMETRY 1: CPT

## 2018-09-12 PROCEDURE — 97530 THERAPEUTIC ACTIVITIES: CPT

## 2018-09-12 PROCEDURE — 6370000000 HC RX 637 (ALT 250 FOR IP): Performed by: INTERNAL MEDICINE

## 2018-09-12 PROCEDURE — G8988 SELF CARE GOAL STATUS: HCPCS

## 2018-09-12 PROCEDURE — 6370000000 HC RX 637 (ALT 250 FOR IP): Performed by: NURSE PRACTITIONER

## 2018-09-12 PROCEDURE — 97110 THERAPEUTIC EXERCISES: CPT

## 2018-09-12 RX ADMIN — CLOPIDOGREL BISULFATE 75 MG: 75 TABLET, FILM COATED ORAL at 08:17

## 2018-09-12 RX ADMIN — INSULIN GLARGINE 70 UNITS: 100 INJECTION, SOLUTION SUBCUTANEOUS at 21:19

## 2018-09-12 RX ADMIN — METFORMIN HYDROCHLORIDE 500 MG: 500 TABLET, FILM COATED ORAL at 17:12

## 2018-09-12 RX ADMIN — METHOCARBAMOL 1000 MG: 500 TABLET ORAL at 21:18

## 2018-09-12 RX ADMIN — CARVEDILOL 12.5 MG: 12.5 TABLET, FILM COATED ORAL at 17:12

## 2018-09-12 RX ADMIN — INSULIN LISPRO 4 UNITS: 100 INJECTION, SOLUTION INTRAVENOUS; SUBCUTANEOUS at 17:13

## 2018-09-12 RX ADMIN — ROPINIROLE HYDROCHLORIDE 0.5 MG: 0.25 TABLET, FILM COATED ORAL at 21:18

## 2018-09-12 RX ADMIN — AMLODIPINE BESYLATE 10 MG: 5 TABLET ORAL at 08:16

## 2018-09-12 RX ADMIN — INSULIN LISPRO 6 UNITS: 100 INJECTION, SOLUTION INTRAVENOUS; SUBCUTANEOUS at 08:19

## 2018-09-12 RX ADMIN — DOXAZOSIN 4 MG: 4 TABLET ORAL at 21:17

## 2018-09-12 RX ADMIN — TIZANIDINE 2 MG: 4 TABLET ORAL at 11:50

## 2018-09-12 RX ADMIN — HYDROCHLOROTHIAZIDE 25 MG: 25 TABLET ORAL at 08:16

## 2018-09-12 RX ADMIN — METFORMIN HYDROCHLORIDE 500 MG: 500 TABLET, FILM COATED ORAL at 08:16

## 2018-09-12 RX ADMIN — Medication 15 UNITS: at 08:19

## 2018-09-12 RX ADMIN — METHOCARBAMOL 1000 MG: 500 TABLET ORAL at 17:12

## 2018-09-12 RX ADMIN — LINAGLIPTIN 5 MG: 5 TABLET, FILM COATED ORAL at 08:16

## 2018-09-12 RX ADMIN — ZOLPIDEM TARTRATE 5 MG: 5 TABLET ORAL at 21:17

## 2018-09-12 RX ADMIN — GABAPENTIN 100 MG: 100 CAPSULE ORAL at 08:16

## 2018-09-12 RX ADMIN — PANTOPRAZOLE SODIUM 40 MG: 40 TABLET, DELAYED RELEASE ORAL at 05:14

## 2018-09-12 RX ADMIN — ASPIRIN 325 MG: 325 TABLET, COATED ORAL at 08:17

## 2018-09-12 RX ADMIN — ATORVASTATIN CALCIUM 80 MG: 80 TABLET, FILM COATED ORAL at 21:18

## 2018-09-12 RX ADMIN — METHOCARBAMOL 1000 MG: 500 TABLET ORAL at 08:17

## 2018-09-12 RX ADMIN — INSULIN LISPRO 1 UNITS: 100 INJECTION, SOLUTION INTRAVENOUS; SUBCUTANEOUS at 21:18

## 2018-09-12 RX ADMIN — Medication 15 UNITS: at 17:13

## 2018-09-12 RX ADMIN — GABAPENTIN 100 MG: 100 CAPSULE ORAL at 21:18

## 2018-09-12 RX ADMIN — DOXAZOSIN 4 MG: 4 TABLET ORAL at 08:17

## 2018-09-12 RX ADMIN — CARVEDILOL 12.5 MG: 12.5 TABLET, FILM COATED ORAL at 08:17

## 2018-09-12 RX ADMIN — ENOXAPARIN SODIUM 40 MG: 40 INJECTION, SOLUTION INTRAVENOUS; SUBCUTANEOUS at 08:17

## 2018-09-12 NOTE — PROGRESS NOTES
limited in wrist motion including extension/flexion/pronation/supination. Pt with limited finger extension. Pt has been educated to complete self ROM and is able to complete independently. Pt able to dress self with MOD I. Pt able to retrieve items from floor level with SUP using RW with stability. He demos good safety awareness with all mobility and self care tasks. Pt will need a RW, BSC, and shower chair upon discharge. Pt has been provided with LHS for LB bathing and educated on AE such as walker tray/basket to improve independence with carrying objects to and from kitchen. Activity Tolerance  Activity Tolerance: Patient Tolerated treatment well          Plan   Plan  Times per week: 3-5  Times per day: Daily  Plan weeks: 2  Current Treatment Recommendations: ROM, Functional Mobility Training, Safety Education & Training, Self-Care / ADL, Endurance Training, Neuromuscular Re-education, Balance Training, Strengthening  G-Code  OT G-codes  Functional Limitation: Self care  Self Care Current Status (): At least 1 percent but less than 20 percent impaired, limited or restricted  Self Care Goal Status (): 0 percent impaired, limited or restricted       Goals  Short term goals  Time Frame for Short term goals: 1 week  Short term goal 1: Pt to complete LB dressing with CGA and UB with SBA. GOAL MET  Short term goal 2: Pt to complete toileting with CGA. GOAL MET  Short term goal 3: Pt to complete bathing with CGA. GOAL MET  Short term goal 4: Pt to complete bathing transfer with SBA. GOAL MET  Short term goal 5: Pt to improve AROM in RUE to assist with functional activity. GOAL MET  Long term goals  Time Frame for Long term goals : 2 weeks. Long term goal 1: Pt to complete dynamic/standing tolerance activity x10 minutes to decrease risk of falls.  GOAL MET  Long term goal 2: Pt to complete dressing with MOD I. GOAL MET  Long term goal 3: Pt to complete toileting with MOD I. GOAL MET  Long term goal 4: Pt to complete bathing SUP. Long term goal 5: Pt to improve grasp on RUE to grasp cups and assist with ADL tasks. Continues to address this goal. Limited in finger extension and wrist ROM requires [place] assist at times. Therapy Time   Individual Concurrent Group Co-treatment   Time In 8708         Time Out 1148         Minutes 76              This note serves as a DC summary in the event of pt discharge.      Eli Matias, OTR/L

## 2018-09-12 NOTE — PLAN OF CARE
Problem: Falls - Risk of:  Goal: Absence of physical injury  Absence of physical injury   Outcome: Ongoing      Problem: Pain Control  Goal: Improvement in pain related behaviors BP/HR WNL  Outcome: Ongoing      Problem: Respiratory  Goal: No pulmonary complications  Outcome: Ongoing

## 2018-09-13 VITALS
SYSTOLIC BLOOD PRESSURE: 140 MMHG | HEART RATE: 80 BPM | WEIGHT: 221.56 LBS | RESPIRATION RATE: 22 BRPM | DIASTOLIC BLOOD PRESSURE: 85 MMHG | HEIGHT: 72 IN | TEMPERATURE: 98.2 F | BODY MASS INDEX: 30.01 KG/M2 | OXYGEN SATURATION: 98 %

## 2018-09-13 PROBLEM — G25.81 RESTLESS LEG SYNDROME: Status: ACTIVE | Noted: 2018-09-13

## 2018-09-13 LAB
GLUCOSE BLD-MCNC: 103 MG/DL (ref 74–106)
GLUCOSE BLD-MCNC: 145 MG/DL (ref 74–106)
GLUCOSE BLD-MCNC: 302 MG/DL (ref 74–106)
PERFORMED ON: ABNORMAL
PERFORMED ON: ABNORMAL
PERFORMED ON: NORMAL

## 2018-09-13 PROCEDURE — 6370000000 HC RX 637 (ALT 250 FOR IP): Performed by: NURSE PRACTITIONER

## 2018-09-13 PROCEDURE — 6360000002 HC RX W HCPCS: Performed by: INTERNAL MEDICINE

## 2018-09-13 PROCEDURE — 97530 THERAPEUTIC ACTIVITIES: CPT

## 2018-09-13 PROCEDURE — 99315 NF DSCHRG MGMT 30 MIN/LESS: CPT | Performed by: INTERNAL MEDICINE

## 2018-09-13 PROCEDURE — 97110 THERAPEUTIC EXERCISES: CPT

## 2018-09-13 PROCEDURE — 6370000000 HC RX 637 (ALT 250 FOR IP): Performed by: PHYSICIAN ASSISTANT

## 2018-09-13 PROCEDURE — 6370000000 HC RX 637 (ALT 250 FOR IP): Performed by: INTERNAL MEDICINE

## 2018-09-13 RX ORDER — GABAPENTIN 100 MG/1
100 CAPSULE ORAL 2 TIMES DAILY
Qty: 6 CAPSULE | Refills: 0 | Status: SHIPPED | OUTPATIENT
Start: 2018-09-13 | End: 2018-09-16

## 2018-09-13 RX ORDER — IBUPROFEN 200 MG
TABLET ORAL
Qty: 100 EACH | Refills: 0 | Status: SHIPPED | OUTPATIENT
Start: 2018-09-13

## 2018-09-13 RX ORDER — INSULIN GLARGINE 100 [IU]/ML
70 INJECTION, SOLUTION SUBCUTANEOUS NIGHTLY
Qty: 7 VIAL | Refills: 0 | Status: SHIPPED | OUTPATIENT
Start: 2018-09-13

## 2018-09-13 RX ORDER — ROPINIROLE 0.5 MG/1
0.5 TABLET, FILM COATED ORAL NIGHTLY
Qty: 30 TABLET | Refills: 0 | Status: SHIPPED | OUTPATIENT
Start: 2018-09-13

## 2018-09-13 RX ORDER — METHOCARBAMOL 500 MG/1
1000 TABLET, FILM COATED ORAL 3 TIMES DAILY
Qty: 60 TABLET | Refills: 0 | Status: SHIPPED | OUTPATIENT
Start: 2018-09-13 | End: 2018-09-23

## 2018-09-13 RX ORDER — LANCETS 30 GAUGE
EACH MISCELLANEOUS
Qty: 100 EACH | Refills: 0 | Status: SHIPPED | OUTPATIENT
Start: 2018-09-13

## 2018-09-13 RX ORDER — DOXAZOSIN MESYLATE 4 MG/1
TABLET ORAL
Qty: 60 TABLET | Refills: 0 | Status: SHIPPED | OUTPATIENT
Start: 2018-09-13

## 2018-09-13 RX ADMIN — Medication 15 UNITS: at 18:23

## 2018-09-13 RX ADMIN — CLOPIDOGREL BISULFATE 75 MG: 75 TABLET, FILM COATED ORAL at 09:00

## 2018-09-13 RX ADMIN — ASPIRIN 325 MG: 325 TABLET, COATED ORAL at 09:00

## 2018-09-13 RX ADMIN — CARVEDILOL 12.5 MG: 12.5 TABLET, FILM COATED ORAL at 09:00

## 2018-09-13 RX ADMIN — ENOXAPARIN SODIUM 40 MG: 40 INJECTION, SOLUTION INTRAVENOUS; SUBCUTANEOUS at 09:01

## 2018-09-13 RX ADMIN — HYDROCHLOROTHIAZIDE 25 MG: 25 TABLET ORAL at 09:00

## 2018-09-13 RX ADMIN — METFORMIN HYDROCHLORIDE 500 MG: 500 TABLET, FILM COATED ORAL at 09:00

## 2018-09-13 RX ADMIN — Medication 15 UNITS: at 09:00

## 2018-09-13 RX ADMIN — METHOCARBAMOL 1000 MG: 500 TABLET ORAL at 09:00

## 2018-09-13 RX ADMIN — GABAPENTIN 100 MG: 100 CAPSULE ORAL at 09:00

## 2018-09-13 RX ADMIN — METHOCARBAMOL 1000 MG: 500 TABLET ORAL at 13:40

## 2018-09-13 RX ADMIN — INSULIN LISPRO 8 UNITS: 100 INJECTION, SOLUTION INTRAVENOUS; SUBCUTANEOUS at 18:24

## 2018-09-13 RX ADMIN — PANTOPRAZOLE SODIUM 40 MG: 40 TABLET, DELAYED RELEASE ORAL at 05:17

## 2018-09-13 RX ADMIN — LINAGLIPTIN 5 MG: 5 TABLET, FILM COATED ORAL at 09:00

## 2018-09-13 RX ADMIN — CARVEDILOL 12.5 MG: 12.5 TABLET, FILM COATED ORAL at 17:17

## 2018-09-13 RX ADMIN — INSULIN LISPRO 2 UNITS: 100 INJECTION, SOLUTION INTRAVENOUS; SUBCUTANEOUS at 09:06

## 2018-09-13 RX ADMIN — AMLODIPINE BESYLATE 10 MG: 5 TABLET ORAL at 08:59

## 2018-09-13 RX ADMIN — DOXAZOSIN 4 MG: 4 TABLET ORAL at 09:00

## 2018-09-13 RX ADMIN — METFORMIN HYDROCHLORIDE 500 MG: 500 TABLET, FILM COATED ORAL at 18:27

## 2018-09-13 NOTE — PROGRESS NOTES
supine  Elbow Extension: x20 AAROM supine  Supination: x15 PROM   Pronation: x15 PROM  Wrist Flexion: x20 PROM  Wrist Extension: x20 PROM  Finger Flexion: X15 AROM Pt is demo improved finger flexion. Difficulty with index finger flexion. Finger Extension: x15 AROM continues to have limited finger extension. /PROM of prolonged static stretch  Grasp/Release: Grasp and release x20  Other: IR/ER in supine x10 PROM                    Assessment   Assessment: Facilitation of functional lateral pinch and grasp using R hand in order to improve independence with self care tasks. Completed simulated task of washing face with R hand using L hand to assist with motion. Pt completed activity reaching for food items and working on hand to mouth pattern. Pt requires assist to bring to mouth. Pt requires [place] assist to grasp cup with poor grasp and [place] assist to bring to mouth. Pt benefits from prolonged passive stretch to hands and digits to improve flexibility and ROM needed for functional reaching and grasping. Plan   Plan  Times per week: 3-5  Times per day: Daily  Plan weeks: 2  Current Treatment Recommendations: ROM, Functional Mobility Training, Safety Education & Training, Self-Care / ADL, Endurance Training, Neuromuscular Re-education, Balance Training, Strengthening       Goals  Short term goals  Time Frame for Short term goals: 1 week  Short term goal 1: Pt to complete LB dressing with CGA and UB with SBA. Short term goal 2: Pt to complete toileting with CGA. Short term goal 3: Pt to complete bathing with CGA. Short term goal 4: Pt to complete bathing transfer with SBA. Short term goal 5: Pt to improve AROM in RUE to assist with functional activity. Long term goals  Time Frame for Long term goals : 2 weeks. Long term goal 1: Pt to complete dynamic/standing tolerance activity x10 minutes to decrease risk of falls. Long term goal 2: Pt to complete dressing with MOD I.    Long term goal 3: Pt to complete toileting with MOD I. Long term goal 4: Pt to complete bathing SUP. Long term goal 5: Pt to improve grasp on RUE to grasp cups and assist with ADL tasks. Therapy Time   Individual Concurrent Group Co-treatment   Time In 0227         Time Out 0505         Minutes 57              This note serves as a DC summary in the event of pt discharge.      Eli Matias, OTR/L

## 2018-09-13 NOTE — PROGRESS NOTES
2: NuStep x 12 minutes (L-10), with UEs x 10 minutes  Other exercises 3: resisted hip abduction in x20, green t-band   Other exercises 4: sit to stand x 10   Other exercises 5: lower trunk rotation 2x20 B  Other exercises 7: heel raises x 20 (standing at walker)  Other exercises 8: side stepping with support 10 feet x 6 reps  Other exercises 9: step-ups L x 10 (6 in step) with SBA  Other exercises 10: Patient able to ascend/descend 4 steps with handrails with SBA                        Assessment   Body structures, Functions, Activity limitations: Decreased functional mobility ; Decreased strength;Decreased balance;Decreased endurance  Assessment: Patient continues to demonstrate gradual improvements in mobility. Patient is independent with bed mobility, and requires only SBA for transfers and ambulation with RW. Patient continues to exhibit R foot drop. He is ascending/descending stairs with handrails and SBA. Patient progressing well toward all goals. Treatment Diagnosis: Weakness, s/p CVA  Prognosis: Good  REQUIRES PT FOLLOW UP: Yes  Activity Tolerance  Activity Tolerance: Patient Tolerated treatment well     Goals  Short term goals  Time Frame for Short term goals: 2 weeks  Short term goal 1: Patient will perform all basic transfers independently with safety present.    (SBA)  Short term goal 2: Increase strength and endurance to tolerate 30 minutes of therex/ther. activity. MET  Short term goal 3: Increase strength to ambulate 400 feet with least restrictive A.D with safety present.    (ambulating 300 feet +)  Short term goal 4: Patient will ambulate with Good balance. (fair + with RW)  Short term goal 5: Patient will be independent with home exercise program, and perform therex independently.        MET    Plan    Plan  Times per week: 4-5x/week  Times per day: Daily  Plan weeks: 2 weeks  Current Treatment Recommendations: Strengthening, ROM, Balance Training, Functional Mobility Training, Transfer Training, Gait Training, Stair training, Endurance Training, Neuromuscular Re-education, Patient/Caregiver Education & Training, Safety Education & Training  Safety Devices  Type of devices: Call light within reach, Patient at risk for falls, Left in bed     Therapy Time   Individual Concurrent Group Co-treatment   Time In 1007         Time Out 1107         Minutes 60                 Ree Dress, PT     This note serves as D/C summary if patient is discharged prior to next visit.

## 2018-09-13 NOTE — FLOWSHEET NOTE
Discharge instructions reviewed with pt and family, understanding verbalized, no further needs or concerns at this time. Pt off of unit via wheelchair.

## 2018-09-26 ENCOUNTER — OFFICE VISIT (OUTPATIENT)
Dept: NEUROLOGY | Facility: CLINIC | Age: 53
End: 2018-09-26

## 2018-09-26 VITALS
HEART RATE: 70 BPM | WEIGHT: 208 LBS | DIASTOLIC BLOOD PRESSURE: 84 MMHG | OXYGEN SATURATION: 98 % | BODY MASS INDEX: 28.17 KG/M2 | HEIGHT: 72 IN | SYSTOLIC BLOOD PRESSURE: 132 MMHG

## 2018-09-26 DIAGNOSIS — I63.9 CEREBROVASCULAR ACCIDENT (CVA), UNSPECIFIED MECHANISM (HCC): Primary | ICD-10-CM

## 2018-09-26 PROCEDURE — 99214 OFFICE O/P EST MOD 30 MIN: CPT | Performed by: PSYCHIATRY & NEUROLOGY

## 2018-09-26 NOTE — PROGRESS NOTES
Subjective:    CC: Mele Rossi is seen today in consultation   for Stroke ([facility])       HPI:  53-year-old male accompanied by his wife with a history of poorly controlled hypertension, diabetes mellitus type 2 presents with a hospital follow-up for stroke.  As per patient he had an episode of right arm and leg weakness in early August along with slurring of speech.  He initially went to an outside facility but was sent back home after a CT head showed no abnormalities.  After that the symptoms worsened and he came back to our hospital where he had an MRI brain that showed an acute left pontine infarct.  His blood pressure was extremely high at the time ().  He had extensive testing including a CTA head and neck that did not show any intra-or extracranial stenosis,  a renal artery ultrasound that did not show any stenosis and an echocardiogram that showed an EF of over 70% with diastolic dysfunction but no PFO.  His telemetry recordings did not show any evidence of atrial fibrillation.  He had not been on any antiplatelet agent prior to this event and  was started on both aspirin 325 mg and Plavix 75 mg.  He was also started on Lipitor 80 mg.  Lipid panel was as follows-, , LDL 69, HDL 32.  His A1c was 10.6 and he was put on insulin in addition to his other medications.  He also has an upcoming appointment with endocrinology.  Patient was discharged to inpatient rehabilitation  for 4 weeks.  He is currently undergoing home health therapy but continues to have significant weakness in the right arm and leg.    The following portions of the patient's history were reviewed today and updated as of 09/26/2018  : allergies, current medications, past family history, past medical history, past social history, past surgical history and problem list  These document will be scanned to patient's chart.      Current Outpatient Prescriptions:   •  amLODIPine (NORVASC) 10 MG tablet, Take 10 mg by mouth Daily.,  Disp: , Rfl:   •  aspirin 325 MG tablet, Take 1 tablet by mouth Daily., Disp: , Rfl:   •  atorvastatin (LIPITOR) 80 MG tablet, Take 1 tablet by mouth Every Night., Disp: , Rfl:   •  carvedilol (COREG) 12.5 MG tablet, Take 1 tablet by mouth Every 12 (Twelve) Hours., Disp: , Rfl:   •  clopidogrel (PLAVIX) 75 MG tablet, Take 1 tablet by mouth Daily., Disp: 30 tablet, Rfl:   •  hydrochlorothiazide (HYDRODIURIL) 25 MG tablet, Take 1 tablet by mouth Daily., Disp: , Rfl:   •  insulin detemir (LEVEMIR) 100 UNIT/ML injection, Inject 30 Units under the skin into the appropriate area as directed Every Morning., Disp: , Rfl: 12  •  insulin lispro (humaLOG) 100 UNIT/ML injection, Inject 0-9 Units under the skin into the appropriate area as directed 4 (Four) Times a Day With Meals & at Bedtime., Disp: , Rfl: 12  •  insulin lispro (humaLOG) 100 UNIT/ML injection, Inject 12 Units under the skin into the appropriate area as directed 3 (Three) Times a Day With Meals., Disp: , Rfl: 12  •  metFORMIN (GLUCOPHAGE) 500 MG tablet, Take 500 mg by mouth Daily With Breakfast., Disp: , Rfl:   •  povidone-iodine (BETADINE) 10 % external solution, Apply  topically to the appropriate area as directed Daily., Disp: , Rfl:    Past Medical History:   Diagnosis Date   • Diabetes (CMS/HCC)    • Hypertension    • Stroke (cerebrum) (CMS/HCC)       History reviewed. No pertinent surgical history.   Family History   Problem Relation Age of Onset   • Diabetes Mother    • Mental illness Mother    • Hypertension Father       Social History     Social History   • Marital status:      Spouse name: N/A   • Number of children: N/A   • Years of education: N/A     Occupational History   • Not on file.     Social History Main Topics   • Smoking status: Current Some Day Smoker     Packs/day: 0.00     Types: Cigarettes   • Smokeless tobacco: Never Used      Comment: States Smokes 1 cigeratte per month   • Alcohol use No   • Drug use: No   • Sexual activity:  "Not on file     Other Topics Concern   • Not on file     Social History Narrative   • No narrative on file     Review of Systems   Constitutional: Positive for activity change and fatigue.   Cardiovascular: Positive for leg swelling.   Musculoskeletal: Positive for gait problem.   Neurological: Positive for speech difficulty, weakness and numbness.   Psychiatric/Behavioral: Positive for sleep disturbance.       Objective:    /84   Pulse 70   Ht 182.9 cm (72\")   Wt 94.3 kg (208 lb)   SpO2 98%   BMI 28.21 kg/m²     Neurology Exam:    General apperance: Overweight    Mental status: Alert, awake and oriented to time place and person.    Recent and Remote memory: Intact.    Attention span and Concentration: Normal.     Language and Speech: Intact- mild dysarthria.    Fluency, Naming , Repitition and Comprehension:  Intact    Cranial Nerves:   CN II: Visual fields are full. Intact. Fundi - Normal, No papillederma, Pupils - JAIDEN  CN III, IV and VI: Extraocular movements are intact. Normal saccades.   CN V: Facial sensation is intact.   CN VII: Muscles of facial expression reveal mild right facial droop  CN VIII: Hearing is intact. Whispered voice intact.   CN IX and X: Palate elevates symmetrically. Intact  CN XI: Shoulder shrug is intact.   CN XII: Tongue is midline without evidence of atrophy or fasciculation.     Motor:  Right UE muscle strength 4/5 proximally and 3/5 distally.  Increased tone (distal more than proximal)    Left UE muscle strength 5/5. Normal tone.      Right LE muscle strength 4/5. Normal tone.     Left LE muscle strength 5/5. Normal tone.      Sensory: Normal light touch, vibration and pinprick sensation bilaterally.    DTRs: 2+ bilaterally in upper and lower extremities.    Babinski: Negative bilaterally.    Co-ordination: Normal finger-to-nose, heel to shin B/L.    Rhomberg: Negative.    Gait: Circumduction gait    Cardiovascular: Regular rate and rhythm without murmur, gallop or " rub.    Assessment and Plan:  1. Cerebrovascular accident (CVA), unspecified mechanism (CMS/HCC)  Left pontine infarct in the setting of several vascular risk factors including poorly controlled blood pressure and diabetes.  Continue both aspirin and Plavix but I have asked him to reduce his dose of aspirin to 81 mg daily.  Also continue atorvastatin 80 mg for now for goal LDL less than 70  Goal blood pressure less than 130/90.  I have asked him to maintain a blood pressure log and call his PCP to adjust his medications if it runs consistently high.  Maintain A1c close to 6.5.  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  He should continue physical therapy    - Ambulatory Referral to Physical Medicine Rehab       Return in about 2 months (around 11/26/2018).         Kalyani Willis MD

## 2018-11-02 ENCOUNTER — TELEPHONE (OUTPATIENT)
Dept: NEUROLOGY | Facility: CLINIC | Age: 53
End: 2018-11-02

## 2018-11-02 NOTE — TELEPHONE ENCOUNTER
Veronika,  Is this by any chance the disability form  gave you this morning? Wanted to make sure before calling the patient.

## 2018-11-02 NOTE — TELEPHONE ENCOUNTER
----- Message from Spencer Luke sent at 11/2/2018 11:49 AM EDT -----  Contact: WIFE: ANGELA CONCEPCION:  CALLED TO CHECK THE STATUS OF THE LTD FORMS THAT WERE RECENTLY DROPPED OFF.   MOVING STD TO LTD WITH [employer].

## 2018-11-05 ENCOUNTER — TELEPHONE (OUTPATIENT)
Dept: NEUROLOGY | Facility: CLINIC | Age: 53
End: 2018-11-05

## 2018-11-05 NOTE — TELEPHONE ENCOUNTER
Enid Melendez called back stating they need these disability forms by 11/13. States she gave it to someone up front to give to you last week, but was not sure on the status?  Called on Friday as well, but I thought this may have been the same paperwork you gave Veronika. Maybe not.

## 2018-11-05 NOTE — TELEPHONE ENCOUNTER
Attempted to call Lexy back on both #'s listed. No answer or VM system, so could not leave a message.

## 2018-11-06 NOTE — TELEPHONE ENCOUNTER
Attempted to contact pt to inform paperwork was completed and faxed to [employer] on 11-2-18. (no answer no vm)

## 2018-11-06 NOTE — TELEPHONE ENCOUNTER
I asked Chris and she has already sent the disability paperwork in.  She will call the wife and let her know.  Thanks

## 2018-12-11 ENCOUNTER — OFFICE VISIT (OUTPATIENT)
Dept: NEUROLOGY | Facility: CLINIC | Age: 53
End: 2018-12-11

## 2018-12-11 VITALS
WEIGHT: 208 LBS | BODY MASS INDEX: 28.17 KG/M2 | OXYGEN SATURATION: 98 % | HEIGHT: 72 IN | HEART RATE: 75 BPM | DIASTOLIC BLOOD PRESSURE: 74 MMHG | SYSTOLIC BLOOD PRESSURE: 132 MMHG

## 2018-12-11 DIAGNOSIS — I63.9 CEREBROVASCULAR ACCIDENT (CVA), UNSPECIFIED MECHANISM (HCC): Primary | ICD-10-CM

## 2018-12-11 PROCEDURE — 99213 OFFICE O/P EST LOW 20 MIN: CPT | Performed by: PSYCHIATRY & NEUROLOGY

## 2018-12-11 RX ORDER — BACLOFEN 10 MG/1
TABLET ORAL
Refills: 0 | COMMUNITY
Start: 2018-11-28 | End: 2020-11-20 | Stop reason: SDUPTHER

## 2018-12-11 RX ORDER — ATORVASTATIN CALCIUM 40 MG/1
40 TABLET, FILM COATED ORAL DAILY
Qty: 30 TABLET | Refills: 11 | Status: SHIPPED | OUTPATIENT
Start: 2018-12-11 | End: 2019-12-05 | Stop reason: SDUPTHER

## 2018-12-11 NOTE — PROGRESS NOTES
Subjective:    CC: Mele Rossi is seen today for Cerebrovascular Accident       HPI:  Current visit-since his last visit he is doing about the same and has had no new strokelike symptoms.  He continues to have weakness in the right arm and leg.  He did see rehabilitation at [facility] for Botox injections for his spasticity however they are first trying to see if it will improve with baclofen.  He does have an improvement in his cramping with the baclofen however the stiffness in his arm has remained the same.  He also saw his endocrinologist who made changes to his medications.  He has not had a repeat A1c level yet, last level was 10.6.  His blood pressure now is much better controlled at home.  Has not smoked since his stroke.    Initial visit-  53-year-old male accompanied by his wife with a history of poorly controlled hypertension, diabetes mellitus type 2 presents with a hospital follow-up for stroke.  As per patient he had an episode of right arm and leg weakness in early August along with slurring of speech.  He initially went to an outside facility but was sent back home after a CT head showed no abnormalities.  After that the symptoms worsened and he came back to our hospital where he had an MRI brain that showed an acute left pontine infarct.  His blood pressure was extremely high at the time ().  He had extensive testing including a CTA head and neck that did not show any intra-or extracranial stenosis,  a renal artery ultrasound that did not show any stenosis and an echocardiogram that showed an EF of over 70% with diastolic dysfunction but no PFO.  His telemetry recordings did not show any evidence of atrial fibrillation.  He had not been on any antiplatelet agent prior to this event and  was started on both aspirin 325 mg and Plavix 75 mg.  He was also started on Lipitor 80 mg.  Lipid panel was as follows-, , LDL 69, HDL 32.  His A1c was 10.6 and he was put on insulin in addition  to his other medications.  He also has an upcoming appointment with endocrinology.  Patient was discharged to inpatient rehabilitation  for 4 weeks.  He is currently undergoing home health therapy but continues to have significant weakness in the right arm and leg.    The following portions of the patient's history were reviewed today and updated as of 12/11/2018  : allergies, current medications, past family history, past medical history, past social history, past surgical history and problem list  These document will be scanned to patient's chart.      Current Outpatient Medications:   •  amLODIPine (NORVASC) 10 MG tablet, Take 10 mg by mouth Daily., Disp: , Rfl:   •  aspirin 325 MG tablet, Take 1 tablet by mouth Daily., Disp: , Rfl:   •  baclofen (LIORESAL) 10 MG tablet, TAKE 1/2 TAB BY MOUTH 3 TIMES A DAY 5 DAYS, THEN 1 TAB 3 TIMES A ...  (REFER TO PRESCRIPTION NOTES)., Disp: , Rfl: 0  •  carvedilol (COREG) 12.5 MG tablet, Take 1 tablet by mouth Every 12 (Twelve) Hours., Disp: , Rfl:   •  hydrochlorothiazide (HYDRODIURIL) 25 MG tablet, Take 1 tablet by mouth Daily., Disp: , Rfl:   •  insulin detemir (LEVEMIR) 100 UNIT/ML injection, Inject 30 Units under the skin into the appropriate area as directed Every Morning., Disp: , Rfl: 12  •  insulin lispro (humaLOG) 100 UNIT/ML injection, Inject 0-9 Units under the skin into the appropriate area as directed 4 (Four) Times a Day With Meals & at Bedtime., Disp: , Rfl: 12  •  insulin lispro (humaLOG) 100 UNIT/ML injection, Inject 12 Units under the skin into the appropriate area as directed 3 (Three) Times a Day With Meals., Disp: , Rfl: 12  •  metFORMIN (GLUCOPHAGE) 500 MG tablet, Take 500 mg by mouth Daily With Breakfast., Disp: , Rfl:   •  povidone-iodine (BETADINE) 10 % external solution, Apply  topically to the appropriate area as directed Daily., Disp: , Rfl:   •  atorvastatin (LIPITOR) 40 MG tablet, Take 1 tablet by mouth Daily., Disp: 30 tablet, Rfl: 11   Past  "Medical History:   Diagnosis Date   • Diabetes (CMS/HCC)    • Hypertension    • Stroke (cerebrum) (CMS/HCC)       History reviewed. No pertinent surgical history.   Family History   Problem Relation Age of Onset   • Diabetes Mother    • Mental illness Mother    • Hypertension Father       Social History     Socioeconomic History   • Marital status:      Spouse name: Not on file   • Number of children: Not on file   • Years of education: Not on file   • Highest education level: Not on file   Social Needs   • Financial resource strain: Not on file   • Food insecurity - worry: Not on file   • Food insecurity - inability: Not on file   • Transportation needs - medical: Not on file   • Transportation needs - non-medical: Not on file   Occupational History   • Not on file   Tobacco Use   • Smoking status: Current Some Day Smoker     Packs/day: 0.00     Types: Cigarettes   • Smokeless tobacco: Never Used   • Tobacco comment: States Smokes 1 cigeratte per month   Substance and Sexual Activity   • Alcohol use: No   • Drug use: No   • Sexual activity: Not on file   Other Topics Concern   • Not on file   Social History Narrative   • Not on file     Review of Systems   Cardiovascular: Positive for leg swelling.   Musculoskeletal: Positive for gait problem.   Neurological: Positive for weakness and numbness.   All other systems reviewed and are negative.      Objective:    /74 (BP Location: Right arm, Patient Position: Sitting, Cuff Size: Adult)   Pulse 75   Ht 182.9 cm (72\")   Wt 94.3 kg (208 lb)   SpO2 98%   BMI 28.21 kg/m²     Neurology Exam:    General apperance: Overweight    Mental status: Alert, awake and oriented to time place and person.    Recent and Remote memory: Intact.    Attention span and Concentration: Normal.     Language and Speech: Intact- No dysarthria.    Fluency, Naming , Repitition and Comprehension:  Intact    Cranial Nerves:   CN II: Visual fields are full. Intact. Fundi - Normal, No " papillederma, Pupils - JAIDEN  CN III, IV and VI: Extraocular movements are intact. Normal saccades.   CN V: Facial sensation is intact.   CN VII: Muscles of facial expression reveal no asymmetry. Intact.   CN VIII: Hearing is intact. Whispered voice intact.   CN IX and X: Palate elevates symmetrically. Intact  CN XI: Shoulder shrug is intact.   CN XII: Tongue is midline without evidence of atrophy or fasciculation.     Ophthalmoscopic exam of optic disc-normal      Motor:  Right UE muscle strength 4/5 proximally and 3/5 distally.  Increased tone (distal more than proximal)    Left UE muscle strength 5/5. Normal tone.      Right LE muscle strength 4/5. Normal tone.     Left LE muscle strength 5/5. Normal tone.      Sensory: Normal light touch, vibration and pinprick sensation bilaterally.    DTRs: 2+ bilaterally in upper and lower extremities.    Babinski: Negative bilaterally.    Co-ordination: Normal finger-to-nose, heel to shin B/L.    Rhomberg: Negative.    Gait: Circumduction gait    Cardiovascular: Regular rate and rhythm without murmur, gallop or rub.    Assessment and Plan:  1. Cerebrovascular accident (CVA), unspecified mechanism (CMS/HCC)  Left pontine infarct in the setting of several vascular risk factors including poorly controlled blood pressure and diabetes.   I have asked him to stop Plavix and continue just aspirin 325 mg daily as he has already been on dual antiplatelets for 3 months  I have also told him to cut down his Lipitor to 40 mg daily as he is having cramping in his legs.  His endocrinologist will be getting blood work soon for repeat A1c level and I have also told him to get a fasting lipid panel.  Goal LDL less than 70  Maintain blood pressure less than 130/90 and A1c less than 6.5  He should continue with home physical therapy which she is getting now  I have also told him to speak to the physiatrist about getting Botox injections for his spasticity if it does not improve with  baclofen    Return in about 3 months (around 3/11/2019).     I spent over 15 minutes with the patient face to face out of which over 50% (7.5 minutes) was spent in management, instructions and education regarding secondary stroke prevention.     Kalyani Willis MD

## 2018-12-21 ENCOUNTER — HOSPITAL ENCOUNTER (OUTPATIENT)
Facility: HOSPITAL | Age: 53
Discharge: HOME | End: 2018-12-21
Payer: COMMERCIAL

## 2018-12-21 LAB
A/G RATIO: 1.7 (ref 0.8–2)
ALBUMIN SERPL-MCNC: 4.7 G/DL (ref 3.4–4.8)
ALP BLD-CCNC: 94 U/L (ref 25–100)
ALT SERPL-CCNC: 21 U/L (ref 4–36)
ANION GAP SERPL CALCULATED.3IONS-SCNC: 13 MMOL/L (ref 3–16)
AST SERPL-CCNC: 27 U/L (ref 8–33)
BASOPHILS ABSOLUTE: 0.1 K/UL (ref 0–0.1)
BASOPHILS RELATIVE PERCENT: 0.7 %
BILIRUB SERPL-MCNC: 1 MG/DL (ref 0.3–1.2)
BUN BLDV-MCNC: 21 MG/DL (ref 6–20)
CALCIUM SERPL-MCNC: 9.5 MG/DL (ref 8.5–10.5)
CHLORIDE BLD-SCNC: 103 MMOL/L (ref 98–107)
CHOLESTEROL, TOTAL: 102 MG/DL (ref 0–200)
CO2: 24 MMOL/L (ref 20–30)
CREAT SERPL-MCNC: 1.2 MG/DL (ref 0.4–1.2)
EOSINOPHILS ABSOLUTE: 0.2 K/UL (ref 0–0.4)
EOSINOPHILS RELATIVE PERCENT: 3.1 %
FOLATE: 15.74 NG/ML
GFR AFRICAN AMERICAN: >59
GFR NON-AFRICAN AMERICAN: >59
GLOBULIN: 2.8 G/DL
GLUCOSE BLD-MCNC: 238 MG/DL (ref 74–106)
HBA1C MFR BLD: 7.3 %
HCT VFR BLD CALC: 47.1 % (ref 40–54)
HDLC SERPL-MCNC: 43 MG/DL (ref 40–60)
HEMOGLOBIN: 15.9 G/DL (ref 13–18)
IMMATURE GRANULOCYTES #: 0 K/UL
IMMATURE GRANULOCYTES %: 0.3 % (ref 0–5)
LDL CHOLESTEROL CALCULATED: 20 MG/DL
LYMPHOCYTES ABSOLUTE: 1.5 K/UL (ref 1.5–4)
LYMPHOCYTES RELATIVE PERCENT: 19.9 %
MAGNESIUM: 2 MG/DL (ref 1.7–2.4)
MCH RBC QN AUTO: 30.5 PG (ref 27–32)
MCHC RBC AUTO-ENTMCNC: 33.8 G/DL (ref 31–35)
MCV RBC AUTO: 90.4 FL (ref 80–100)
MONOCYTES ABSOLUTE: 0.6 K/UL (ref 0.2–0.8)
MONOCYTES RELATIVE PERCENT: 7.5 %
NEUTROPHILS ABSOLUTE: 5.2 K/UL (ref 2–7.5)
NEUTROPHILS RELATIVE PERCENT: 68.5 %
PDW BLD-RTO: 12.2 % (ref 11–16)
PLATELET # BLD: 168 K/UL (ref 150–400)
PMV BLD AUTO: 11 FL (ref 6–10)
POTASSIUM SERPL-SCNC: 4.5 MMOL/L (ref 3.4–5.1)
PROSTATE SPECIFIC ANTIGEN: 4.46 NG/ML (ref 0–4)
RBC # BLD: 5.21 M/UL (ref 4.5–6)
SODIUM BLD-SCNC: 140 MMOL/L (ref 136–145)
TOTAL PROTEIN: 7.5 G/DL (ref 6.4–8.3)
TRIGL SERPL-MCNC: 197 MG/DL (ref 0–249)
TSH SERPL DL<=0.05 MIU/L-ACNC: 1.62 UIU/ML (ref 0.35–5.5)
VITAMIN B-12: 1130 PG/ML (ref 211–911)
VLDLC SERPL CALC-MCNC: 39 MG/DL
WBC # BLD: 7.5 K/UL (ref 4–11)

## 2018-12-21 PROCEDURE — 84443 ASSAY THYROID STIM HORMONE: CPT

## 2018-12-21 PROCEDURE — 82746 ASSAY OF FOLIC ACID SERUM: CPT

## 2018-12-21 PROCEDURE — 83036 HEMOGLOBIN GLYCOSYLATED A1C: CPT

## 2018-12-21 PROCEDURE — 84153 ASSAY OF PSA TOTAL: CPT

## 2018-12-21 PROCEDURE — 82607 VITAMIN B-12: CPT

## 2018-12-21 PROCEDURE — 85025 COMPLETE CBC W/AUTO DIFF WBC: CPT

## 2018-12-21 PROCEDURE — 80053 COMPREHEN METABOLIC PANEL: CPT

## 2018-12-21 PROCEDURE — 83735 ASSAY OF MAGNESIUM: CPT

## 2018-12-21 PROCEDURE — 36415 COLL VENOUS BLD VENIPUNCTURE: CPT

## 2018-12-21 PROCEDURE — 80061 LIPID PANEL: CPT

## 2019-03-11 ENCOUNTER — OFFICE VISIT (OUTPATIENT)
Dept: NEUROLOGY | Facility: CLINIC | Age: 54
End: 2019-03-11

## 2019-03-11 VITALS
WEIGHT: 208 LBS | HEIGHT: 72 IN | HEART RATE: 67 BPM | OXYGEN SATURATION: 97 % | BODY MASS INDEX: 28.17 KG/M2 | DIASTOLIC BLOOD PRESSURE: 76 MMHG | SYSTOLIC BLOOD PRESSURE: 130 MMHG

## 2019-03-11 DIAGNOSIS — I63.9 CEREBROVASCULAR ACCIDENT (CVA), UNSPECIFIED MECHANISM (HCC): Primary | ICD-10-CM

## 2019-03-11 PROCEDURE — 99214 OFFICE O/P EST MOD 30 MIN: CPT | Performed by: PSYCHIATRY & NEUROLOGY

## 2019-07-11 ENCOUNTER — OFFICE VISIT (OUTPATIENT)
Dept: NEUROLOGY | Facility: CLINIC | Age: 54
End: 2019-07-11

## 2019-07-11 VITALS
DIASTOLIC BLOOD PRESSURE: 76 MMHG | HEIGHT: 72 IN | SYSTOLIC BLOOD PRESSURE: 132 MMHG | OXYGEN SATURATION: 97 % | HEART RATE: 69 BPM | BODY MASS INDEX: 33.86 KG/M2 | WEIGHT: 250 LBS

## 2019-07-11 DIAGNOSIS — I69.30 SEQUELAE, POST-STROKE: Primary | ICD-10-CM

## 2019-07-11 PROCEDURE — 99214 OFFICE O/P EST MOD 30 MIN: CPT | Performed by: PSYCHIATRY & NEUROLOGY

## 2019-07-11 NOTE — PROGRESS NOTES
Subjective:    CC: Mele Rossi is seen today for Cerebrovascular Accident (Follow-up)       HPI:  Current visit-  patient has now finished physical therapy but continues to have the right-sided weakness as well as spasticity for which he is taking baclofen.  He has also been getting pain and numbness in his feet that is exacerbated by prolonged standing or walking.  Dr. Benitze has ordered an EMG/NCS for him as well as OT for his hand.  He continues to take aspirin 325 mg daily in addition to Lipitor 40 mg daily.  His blood pressure is well controlled however his blood glucose remains poorly controlled and his last A1c was 8.1.    Last visit-since his last visit he continues to have right arm and leg weakness.  He has stopped getting home PT and does the exercises himself.  He stopped Plavix as advised but continues to take aspirin 325 mg daily and Lipitor 40 mg daily.  His last lipid panel was as follows-, , LDL 20, HDL 43.  His A1c has also improved and was 7.3.  His blood pressure is well controlled at home now.     Last visit-since his last visit he is doing about the same and has had no new strokelike symptoms.  He continues to have weakness in the right arm and leg.  He did see rehabilitation at [facility] for Botox injections for his spasticity however they are first trying to see if it will improve with baclofen.  He does have an improvement in his cramping with the baclofen however the stiffness in his arm has remained the same.  He also saw his endocrinologist who made changes to his medications.  He has not had a repeat A1c level yet, last level was 10.6.  His blood pressure now is much better controlled at home.  Has not smoked since his stroke.     Initial visit-  53-year-old male accompanied by his wife with a history of poorly controlled hypertension, diabetes mellitus type 2 presents with a hospital follow-up for stroke.  As per patient he had an episode of right arm and leg weakness in  early August along with slurring of speech.  He initially went to an outside facility but was sent back home after a CT head showed no abnormalities.  After that the symptoms worsened and he came back to our hospital where he had an MRI brain that showed an acute left pontine infarct.  His blood pressure was extremely high at the time ().  He had extensive testing including a CTA head and neck that did not show any intra-or extracranial stenosis,  a renal artery ultrasound that did not show any stenosis and an echocardiogram that showed an EF of over 70% with diastolic dysfunction but no PFO.  His telemetry recordings did not show any evidence of atrial fibrillation.  He had not been on any antiplatelet agent prior to this event and  was started on both aspirin 325 mg and Plavix 75 mg.  He was also started on Lipitor 80 mg.  Lipid panel was as follows-, , LDL 69, HDL 32.  His A1c was 10.6 and he was put on insulin in addition to his other medications.  He also has an upcoming appointment with endocrinology.  Patient was discharged to inpatient rehabilitation  for 4 weeks.  He is currently undergoing home health therapy but continues to have significant weakness in the right arm and leg.      The following portions of the patient's history were reviewed today and updated as of 07/11/2019  : allergies, current medications, past family history, past medical history, past social history, past surgical history and problem list  These document will be scanned to patient's chart.      Current Outpatient Medications:   •  amLODIPine (NORVASC) 10 MG tablet, Take 10 mg by mouth Daily., Disp: , Rfl:   •  aspirin 325 MG tablet, Take 1 tablet by mouth Daily., Disp: , Rfl:   •  atorvastatin (LIPITOR) 40 MG tablet, Take 1 tablet by mouth Daily., Disp: 30 tablet, Rfl: 11  •  baclofen (LIORESAL) 10 MG tablet, TAKE 1/2 TAB BY MOUTH 3 TIMES A DAY 5 DAYS, THEN 1 TAB 3 TIMES A ...  (REFER TO PRESCRIPTION NOTES).,  Disp: , Rfl: 0  •  carvedilol (COREG) 12.5 MG tablet, Take 1 tablet by mouth Every 12 (Twelve) Hours., Disp: , Rfl:   •  hydrochlorothiazide (HYDRODIURIL) 25 MG tablet, Take 1 tablet by mouth Daily., Disp: , Rfl:   •  insulin detemir (LEVEMIR) 100 UNIT/ML injection, Inject 30 Units under the skin into the appropriate area as directed Every Morning., Disp: , Rfl: 12  •  insulin lispro (humaLOG) 100 UNIT/ML injection, Inject 0-9 Units under the skin into the appropriate area as directed 4 (Four) Times a Day With Meals & at Bedtime., Disp: , Rfl: 12  •  insulin lispro (humaLOG) 100 UNIT/ML injection, Inject 12 Units under the skin into the appropriate area as directed 3 (Three) Times a Day With Meals., Disp: , Rfl: 12  •  metFORMIN (GLUCOPHAGE) 500 MG tablet, Take 500 mg by mouth Daily With Breakfast., Disp: , Rfl:   •  povidone-iodine (BETADINE) 10 % external solution, Apply  topically to the appropriate area as directed Daily., Disp: , Rfl:    Past Medical History:   Diagnosis Date   • Diabetes (CMS/HCC)    • Hypertension    • Stroke (cerebrum) (CMS/HCC)       History reviewed. No pertinent surgical history.   Family History   Problem Relation Age of Onset   • Diabetes Mother    • Mental illness Mother    • Hypertension Father       Social History     Socioeconomic History   • Marital status:      Spouse name: Not on file   • Number of children: Not on file   • Years of education: Not on file   • Highest education level: Not on file   Tobacco Use   • Smoking status: Current Some Day Smoker     Packs/day: 0.00     Types: Cigarettes   • Smokeless tobacco: Never Used   • Tobacco comment: States Smokes 1 cigeratte per month   Substance and Sexual Activity   • Alcohol use: No   • Drug use: No     Review of Systems   Constitutional: Positive for activity change and fatigue.   HENT: Negative.    Eyes: Negative.    Respiratory: Negative.    Cardiovascular: Positive for leg swelling.   Gastrointestinal: Negative.   "  Endocrine: Negative.    Genitourinary: Negative.    Musculoskeletal: Positive for myalgias.   Skin: Negative.    Allergic/Immunologic: Negative.    Neurological: Positive for weakness and numbness.   Hematological: Negative.    Psychiatric/Behavioral: Negative.        Objective:    /76   Pulse 69   Ht 182.9 cm (72\")   Wt 113 kg (250 lb)   SpO2 97%   BMI 33.91 kg/m²     Neurology Exam:    General apperance: obese    Mental status: Alert, awake and oriented to time place and person.    Recent and Remote memory: Intact.    Attention span and Concentration: Normal.     Language and Speech: Intact- No dysarthria.    Fluency, Naming , Repitition and Comprehension:  Intact    Cranial Nerves:   CN II: Visual fields are full. Intact. Fundi - Normal, No papillederma, Pupils - JAIDEN  CN III, IV and VI: Extraocular movements are intact. Normal saccades.   CN V: Facial sensation is intact.   CN VII: Muscles of facial expression reveal mild right facial droop  CN VIII: Hearing is intact. Whispered voice intact.   CN IX and X: Palate elevates symmetrically. Intact  CN XI: Shoulder shrug is intact.   CN XII: Tongue is midline without evidence of atrophy or fasciculation.     Ophthalmoscopic exam of optic disc-normal    Motor:  Right UE muscle strength 4/5 proximally, 2/5 distally, poor handgrip.  Spasticity present    Left UE muscle strength 5/5. Normal tone.      Right LE muscle strength 4-/5.  Spasticity present    Left LE muscle strength 5/5. Normal tone.      Sensory: Normal light touch, vibration and pinprick sensation bilaterally.    DTRs: 2+ bilaterally in upper and lower extremities.    Babinski: Negative bilaterally.    Co-ordination: Normal finger-to-nose, heel to shin B/L.    Rhomberg: Negative.    Gait: Circumduction gait, walks with a cane    Cardiovascular: Regular rate and rhythm without murmur, gallop or rub.    Assessment and Plan:  1. Sequelae, post-stroke  Patient had a left pontine infarct secondary " to small vessel disease including hypertension, hyperlipidemia, diabetes  Continue aspirin 325 mg daily and Lipitor 40 mg daily for goal LDL of less than 70.  His LDL was 20  I have counseled him to start exercising regularly by getting a stationary bike  He should also speak to Dr. Benitez about getting Botox for his spasticity as it seems to have worsened since his last visit  Goal blood pressure less than 130/90.  His blood pressure is well controlled now  Maintain A1c close to 6.5.  I have told him to record his blood glucose daily and to speak to his endocrinologist about adjusting his medications if need be   We also filled out his disability paperwork today    Return in about 4 months (around 11/11/2019).         Kalyani Willis MD

## 2019-07-15 ENCOUNTER — HOSPITAL ENCOUNTER (OUTPATIENT)
Facility: HOSPITAL | Age: 54
Discharge: HOME | End: 2019-07-15
Payer: COMMERCIAL

## 2019-07-15 PROCEDURE — 82746 ASSAY OF FOLIC ACID SERUM: CPT

## 2019-07-15 PROCEDURE — 36415 COLL VENOUS BLD VENIPUNCTURE: CPT

## 2019-07-15 PROCEDURE — 84439 ASSAY OF FREE THYROXINE: CPT

## 2019-07-15 PROCEDURE — 84481 FREE ASSAY (FT-3): CPT

## 2019-07-15 PROCEDURE — 84443 ASSAY THYROID STIM HORMONE: CPT

## 2019-07-15 PROCEDURE — 82607 VITAMIN B-12: CPT

## 2019-07-16 LAB
FOLATE: 13.23 NG/ML
T3 FREE: 3.1 PG/ML (ref 2.3–4.2)
T4 FREE: 1.24 NG/DL (ref 0.89–1.76)
TSH SERPL DL<=0.05 MIU/L-ACNC: 2.38 UIU/ML (ref 0.35–5.5)
VITAMIN B-12: >2000 PG/ML (ref 211–911)

## 2019-12-05 RX ORDER — ATORVASTATIN CALCIUM 40 MG/1
TABLET, FILM COATED ORAL
Qty: 90 TABLET | Refills: 2 | Status: SHIPPED | OUTPATIENT
Start: 2019-12-05 | End: 2019-12-27 | Stop reason: SDUPTHER

## 2019-12-27 ENCOUNTER — OFFICE VISIT (OUTPATIENT)
Dept: NEUROLOGY | Facility: CLINIC | Age: 54
End: 2019-12-27

## 2019-12-27 VITALS
HEART RATE: 66 BPM | OXYGEN SATURATION: 98 % | SYSTOLIC BLOOD PRESSURE: 130 MMHG | BODY MASS INDEX: 34.27 KG/M2 | HEIGHT: 72 IN | WEIGHT: 253 LBS | DIASTOLIC BLOOD PRESSURE: 80 MMHG

## 2019-12-27 DIAGNOSIS — E11.65 TYPE 2 DIABETES MELLITUS WITH HYPERGLYCEMIA, UNSPECIFIED WHETHER LONG TERM INSULIN USE (HCC): ICD-10-CM

## 2019-12-27 DIAGNOSIS — I69.30 SEQUELAE, POST-STROKE: Primary | ICD-10-CM

## 2019-12-27 PROCEDURE — 99214 OFFICE O/P EST MOD 30 MIN: CPT | Performed by: PSYCHIATRY & NEUROLOGY

## 2019-12-27 RX ORDER — INSULIN GLARGINE 100 [IU]/ML
INJECTION, SOLUTION SUBCUTANEOUS DAILY
COMMUNITY
End: 2020-11-20 | Stop reason: SDUPTHER

## 2019-12-27 RX ORDER — ATORVASTATIN CALCIUM 40 MG/1
40 TABLET, FILM COATED ORAL DAILY
Qty: 90 TABLET | Refills: 2 | Status: SHIPPED | OUTPATIENT
Start: 2019-12-27 | End: 2020-09-08

## 2019-12-27 RX ORDER — GABAPENTIN 100 MG/1
100 CAPSULE ORAL 3 TIMES DAILY
COMMUNITY
End: 2021-02-09 | Stop reason: DRUGHIGH

## 2019-12-27 RX ORDER — ROPINIROLE 0.5 MG/1
0.5 TABLET, FILM COATED ORAL NIGHTLY
COMMUNITY
End: 2020-11-20 | Stop reason: SDUPTHER

## 2019-12-27 NOTE — PROGRESS NOTES
Subjective:    CC: Mele Rossi is seen today  for Stroke       HPI:  Current visit-  since her last visit patient had his first set of Botox injections for his right-sided spasticity 1 month ago.  It helped a little however he is unsure whether he will continue getting it as he is having trouble getting it approved by insurance.  Also continues to get occupational therapy and his strength has improved slightly.  With regards to his diabetes his last A1c was 7.9 but his endocrinologist has been adjusting his medications.  He lowered his dose of gabapentin to 100 mg at night, he says that a dose of 300 mg was helping with his neuropathy and the muscle cramps but made him extremely off balance when he woke up to go to the bathroom.  Continues to take aspirin 325 mg daily along with Lipitor 40 mg daily.  Had a lot of blood work at Dr. Lopez office recently.    Last visit-patient has now finished physical therapy but continues to have the right-sided weakness as well as spasticity for which he is taking baclofen.  He has also been getting pain and numbness in his feet that is exacerbated by prolonged standing or walking.  Dr. Benitez has ordered an EMG/NCS for him as well as OT for his hand.  He continues to take aspirin 325 mg daily in addition to Lipitor 40 mg daily.  His blood pressure is well controlled however his blood glucose remains poorly controlled and his last A1c was 8.1.    Last visit-since his last visit he continues to have right arm and leg weakness.  He has stopped getting home PT and does the exercises himself.  He stopped Plavix as advised but continues to take aspirin 325 mg daily and Lipitor 40 mg daily.  His last lipid panel was as follows-, , LDL 20, HDL 43.  His A1c has also improved and was 7.3.  His blood pressure is well controlled at home now.     Last visit-since his last visit he is doing about the same and has had no new strokelike symptoms.  He continues to have weakness in the  right arm and leg.  He did see rehabilitation at [facility] for Botox injections for his spasticity however they are first trying to see if it will improve with baclofen.  He does have an improvement in his cramping with the baclofen however the stiffness in his arm has remained the same.  He also saw his endocrinologist who made changes to his medications.  He has not had a repeat A1c level yet, last level was 10.6.  His blood pressure now is much better controlled at home.  Has not smoked since his stroke.     Initial visit-  53-year-old male accompanied by his wife with a history of poorly controlled hypertension, diabetes mellitus type 2 presents with a hospital follow-up for stroke.  As per patient he had an episode of right arm and leg weakness in early August along with slurring of speech.  He initially went to an outside facility but was sent back home after a CT head showed no abnormalities.  After that the symptoms worsened and he came back to our hospital where he had an MRI brain that showed an acute left pontine infarct.  His blood pressure was extremely high at the time ().  He had extensive testing including a CTA head and neck that did not show any intra-or extracranial stenosis,  a renal artery ultrasound that did not show any stenosis and an echocardiogram that showed an EF of over 70% with diastolic dysfunction but no PFO.  His telemetry recordings did not show any evidence of atrial fibrillation.  He had not been on any antiplatelet agent prior to this event and  was started on both aspirin 325 mg and Plavix 75 mg.  He was also started on Lipitor 80 mg.  Lipid panel was as follows-, , LDL 69, HDL 32.  His A1c was 10.6 and he was put on insulin in addition to his other medications.  He also has an upcoming appointment with endocrinology.  Patient was discharged to inpatient rehabilitation  for 4 weeks.  He is currently undergoing home health therapy but continues to have  significant weakness in the right arm and leg.    The following portions of the patient's history were reviewed today and updated as of 12/27/2019  : allergies, current medications, past family history, past medical history, past social history, past surgical history and problem list  These document will be scanned to patient's chart.      Current Outpatient Medications:   •  amLODIPine (NORVASC) 10 MG tablet, Take 10 mg by mouth Daily., Disp: , Rfl:   •  aspirin 325 MG tablet, Take 1 tablet by mouth Daily., Disp: , Rfl:   •  atorvastatin (LIPITOR) 40 MG tablet, Take 1 tablet by mouth Daily., Disp: 90 tablet, Rfl: 2  •  baclofen (LIORESAL) 10 MG tablet, TAKE 1/2 TAB BY MOUTH 3 TIMES A DAY 5 DAYS, THEN 1 TAB 3 TIMES A ...  (REFER TO PRESCRIPTION NOTES)., Disp: , Rfl: 0  •  carvedilol (COREG) 12.5 MG tablet, Take 1 tablet by mouth Every 12 (Twelve) Hours., Disp: , Rfl:   •  Empagliflozin (JARDIANCE) 25 MG tablet, Take  by mouth., Disp: , Rfl:   •  gabapentin (NEURONTIN) 100 MG capsule, Take 100 mg by mouth 3 (Three) Times a Day., Disp: , Rfl:   •  insulin glargine (LANTUS) 100 UNIT/ML injection, Inject  under the skin into the appropriate area as directed Daily., Disp: , Rfl:   •  linagliptin (TRADJENTA) 5 MG tablet tablet, Take 5 mg by mouth Daily., Disp: , Rfl:   •  metFORMIN (GLUCOPHAGE) 500 MG tablet, Take 500 mg by mouth Daily With Breakfast., Disp: , Rfl:   •  rOPINIRole (REQUIP) 0.5 MG tablet, Take 0.5 mg by mouth Every Night. Take 1 hour before bedtime., Disp: , Rfl:    Past Medical History:   Diagnosis Date   • Diabetes (CMS/HCC)    • Hypertension    • Stroke (cerebrum) (CMS/HCC)       History reviewed. No pertinent surgical history.   Family History   Problem Relation Age of Onset   • Diabetes Mother    • Mental illness Mother    • Hypertension Father       Social History     Socioeconomic History   • Marital status:      Spouse name: Not on file   • Number of children: Not on file   • Years of  "education: Not on file   • Highest education level: Not on file   Tobacco Use   • Smoking status: Current Some Day Smoker     Packs/day: 0.00     Types: Cigarettes   • Smokeless tobacco: Never Used   • Tobacco comment: States Smokes 1 cigeratte per month   Substance and Sexual Activity   • Alcohol use: No   • Drug use: No     Review of Systems   All other systems reviewed and are negative.      Objective:    /80   Pulse 66   Ht 182.9 cm (72\")   Wt 115 kg (253 lb)   SpO2 98%   BMI 34.31 kg/m²     Neurology Exam:    General apperance: Obese    Mental status: Alert, awake and oriented to time place and person.    Recent and Remote memory: Intact.    Attention span and Concentration: Normal.     Language and Speech: Intact- No dysarthria.    Fluency, Naming , Repitition and Comprehension:  Intact    Cranial Nerves:   CN II: Visual fields are full. Intact. Fundi - Normal, No papillederma, Pupils - JAIDEN  CN III, IV and VI: Extraocular movements are intact. Normal saccades.   CN V: Facial sensation is intact.   CN VII: Muscles of facial expression reveal no asymmetry. Intact.   CN VIII: Hearing is intact. Whispered voice intact.   CN IX and X: Palate elevates symmetrically. Intact  CN XI: Shoulder shrug is intact.   CN XII: Tongue is midline without evidence of atrophy or fasciculation.     Ophthalmoscopic exam of optic disc-normal    Motor:  Right UE muscle strength 4/5 proximally, 2/5 distally, wearing a splint today.  Spasticity present however improved since last visit    Left UE muscle strength 5/5. Normal tone.      Right LE muscle strength 4-/5.  Spasticity present but again mildly improved    Left LE muscle strength 5/5. Normal tone.      Sensory: Normal light touch, vibration and pinprick sensation bilaterally.    DTRs: 2+ bilaterally in upper and lower extremities.    Babinski: Negative bilaterally.    Co-ordination: Normal finger-to-nose, heel to shin B/L.    Rhomberg: Negative.    Gait: " Circumduction gait, walks with a cane    Cardiovascular: Regular rate and rhythm without murmur, gallop or rub.    Assessment and Plan:  1. Sequelae, post-stroke  Patient had a left pontine infarct most likely due to small vessel disease  I have asked him to continue taking aspirin 325 mg daily along with Lipitor 40 mg daily.  I will obtain the results of his recent blood work and adjust his dose if need be  Maintain blood pressure less than 130/90.  His blood pressure is well controlled now  Goal A1c of less than 7  I have asked him to continue PT/OT and getting Botox injections at least a few more times as there is improved efficacy over time  I have also counseled him to exercise at least 15 to 20 minutes each day      2. Type 2 diabetes mellitus with hyperglycemia, unspecified whether long term insulin use (CMS/[facility])  Goal A1c less than 7.  His A1c is still above that and he should continue  to improve his levels through dietary modifications and exercise in addition to medications  I have also told him to speak to his endocrinologist about reducing the dose of Jardiance because he is having side effects such as arthralgias and increased urinary frequency with it    Addendum- we received his blood work but did not get his lipid panel.  A1c was 7.9    Return in about 6 months (around 6/27/2020).         Kalyani Willis MD

## 2020-03-18 ENCOUNTER — TELEPHONE (OUTPATIENT)
Dept: NEUROLOGY | Facility: CLINIC | Age: 55
End: 2020-03-18

## 2020-03-18 NOTE — TELEPHONE ENCOUNTER
SHAILESH  [phone]    NEEDING GUARDIAN/DISABILITY MOST RECENT NOTE FORM OFFICE VISIT  ANY RESULTS FORM November TEST ALSO    FAX # [phone]  CLAIM # [id number]

## 2020-03-18 NOTE — TELEPHONE ENCOUNTER
Pt wife, Mayuri, calling in requsting that we send the last 3 office notes to Guardian Disability.    Attn: Poornima Weinstein  Case#:[id number]  Fax number: [phone]    Pt also will be dropping off some disability paperwork that needs to be completed. Pt states she will try to drop it off next week and if not, she will fax it over.

## 2020-04-13 ENCOUNTER — TELEPHONE (OUTPATIENT)
Dept: NEUROLOGY | Facility: CLINIC | Age: 55
End: 2020-04-13

## 2020-04-13 NOTE — TELEPHONE ENCOUNTER
ANGELA  [phone]    LONG TERM DISABILITY WAS SENT OVER TO OFFICE , THEY RECEIVED OFFICE NOTES BACK BUT NOT THE FAXED CLAIM FORM  ALSO SHE NEED TO SEE WHEN SHE CAN DROP ANOTHER SET FOR LIFE INSURANCE AND PAY THE FEE FOR THAT

## 2020-04-13 NOTE — TELEPHONE ENCOUNTER
Spoke to wife and records have been faxed again and she is going to drop off his other form that needs to be filled out

## 2020-06-05 ENCOUNTER — TELEPHONE (OUTPATIENT)
Dept: NEUROLOGY | Facility: CLINIC | Age: 55
End: 2020-06-05

## 2020-06-05 NOTE — TELEPHONE ENCOUNTER
Received call from patient wife Lexy who states she has been working on getting disability paperwork completed prior to covid and now she is trying to get it completed due to their being a deadline of 6/24/20.     Guardian is telling her that they are missing part of the Attending Physician stmt but do not state what part is missing.     Can someone please contact Lexy at [phone] and let her know if she needs to do anything on her side to get this completed?     Guardian Claim# [id number]   FAX: [phone]    Waiver Phone# [phone]      Thank you

## 2020-06-05 NOTE — TELEPHONE ENCOUNTER
Pt's wife states that we should have new paperwork. I advised that I don't see it and to request it to be sent again.

## 2020-06-11 NOTE — TELEPHONE ENCOUNTER
Pt's wife called back in  And states guardian said they sent the fax over again on Tuesday the 9th of June . To fax number [phone]  She would like a call back at [phone] she states there is a deadline and has been trying to get this done for a while .

## 2020-06-29 ENCOUNTER — LAB (OUTPATIENT)
Dept: LAB | Facility: HOSPITAL | Age: 55
End: 2020-06-29

## 2020-06-29 ENCOUNTER — OFFICE VISIT (OUTPATIENT)
Dept: NEUROLOGY | Facility: CLINIC | Age: 55
End: 2020-06-29

## 2020-06-29 VITALS
SYSTOLIC BLOOD PRESSURE: 148 MMHG | DIASTOLIC BLOOD PRESSURE: 96 MMHG | OXYGEN SATURATION: 96 % | WEIGHT: 250 LBS | HEART RATE: 73 BPM | HEIGHT: 72 IN | BODY MASS INDEX: 33.86 KG/M2

## 2020-06-29 DIAGNOSIS — I69.30 SEQUELAE, POST-STROKE: ICD-10-CM

## 2020-06-29 DIAGNOSIS — I69.30 SEQUELAE, POST-STROKE: Primary | ICD-10-CM

## 2020-06-29 LAB
CHOLEST SERPL-MCNC: 96 MG/DL (ref 0–200)
HDLC SERPL-MCNC: 33 MG/DL (ref 40–60)
LDLC SERPL CALC-MCNC: 28 MG/DL (ref 0–100)
LDLC/HDLC SERPL: 0.84 {RATIO}
TRIGL SERPL-MCNC: 177 MG/DL (ref 0–150)
VLDLC SERPL-MCNC: 35.4 MG/DL (ref 5–40)

## 2020-06-29 PROCEDURE — 99214 OFFICE O/P EST MOD 30 MIN: CPT | Performed by: PSYCHIATRY & NEUROLOGY

## 2020-06-29 PROCEDURE — 36415 COLL VENOUS BLD VENIPUNCTURE: CPT

## 2020-06-29 PROCEDURE — 80061 LIPID PANEL: CPT

## 2020-06-29 RX ORDER — SITAGLIPTIN 100 MG/1
100 TABLET, FILM COATED ORAL DAILY
COMMUNITY
Start: 2020-06-19 | End: 2022-01-27 | Stop reason: SDUPTHER

## 2020-06-29 RX ORDER — INSULIN GLARGINE 100 [IU]/ML
INJECTION, SOLUTION SUBCUTANEOUS
COMMUNITY
Start: 2020-06-11 | End: 2020-11-20 | Stop reason: SDUPTHER

## 2020-06-29 RX ORDER — IBUPROFEN 200 MG
TABLET ORAL
COMMUNITY
Start: 2018-09-13 | End: 2020-11-20 | Stop reason: SDUPTHER

## 2020-06-29 RX ORDER — LANCETS 30 GAUGE
EACH MISCELLANEOUS
COMMUNITY
Start: 2018-09-13 | End: 2022-01-27 | Stop reason: SDUPTHER

## 2020-06-29 RX ORDER — ASPIRIN 325 MG
325 TABLET, DELAYED RELEASE (ENTERIC COATED) ORAL DAILY
COMMUNITY
Start: 2020-05-21 | End: 2020-11-20 | Stop reason: SDUPTHER

## 2020-06-29 RX ORDER — GABAPENTIN 300 MG/1
300 CAPSULE ORAL DAILY
COMMUNITY
Start: 2020-06-26 | End: 2020-11-20 | Stop reason: SDUPTHER

## 2020-06-29 RX ORDER — PEN NEEDLE, DIABETIC 32GX 5/32"
NEEDLE, DISPOSABLE MISCELLANEOUS
COMMUNITY
Start: 2020-05-14 | End: 2020-11-20 | Stop reason: SDUPTHER

## 2020-06-29 RX ORDER — DOXAZOSIN 8 MG/1
8 TABLET ORAL DAILY
COMMUNITY
Start: 2020-04-01 | End: 2020-10-19 | Stop reason: SDUPTHER

## 2020-06-29 NOTE — PROGRESS NOTES
Subjective:    CC: Mele Rossi is seen today  for Stroke and Follow-up       HPI:  Current visit-  since the last visit patient got his second shot of Botox in March which has helped a little with spasticity.  He stopped getting physical therapy due to COVID but is planning to resume that soon.  He is also taking baclofen 20 mg twice a day.  He continues to take aspirin 325 mg daily and Lipitor 40 mg daily.  His blood pressure does fluctuate a lot and a lot of times it is extremely low in the mornings such as today (was in the 80s/60s).  His blood sugar also continues to run high and his last A1c was 8.3.  His endocrinologist did not make any medication changes but did tell him to make dietary modifications.  Patient states that he has gained 50 pounds since his stroke.     Last visit-since her last visit patient had his first set of Botox injections for his right-sided spasticity 1 month ago.  It helped a little however he is unsure whether he will continue getting it as he is having trouble getting it approved by insurance.  Also continues to get occupational therapy and his strength has improved slightly.  With regards to his diabetes his last A1c was 7.9 but his endocrinologist has been adjusting his medications.  He lowered his dose of gabapentin to 100 mg at night, he says that a dose of 300 mg was helping with his neuropathy and the muscle cramps but made him extremely off balance when he woke up to go to the bathroom.  Continues to take aspirin 325 mg daily along with Lipitor 40 mg daily.  Had a lot of blood work at Dr. Lopez office recently.    Last visit-patient has now finished physical therapy but continues to have the right-sided weakness as well as spasticity for which he is taking baclofen.  He has also been getting pain and numbness in his feet that is exacerbated by prolonged standing or walking.  Dr. Benitez has ordered an EMG/NCS for him as well as OT for his hand.  He continues to take aspirin 325  mg daily in addition to Lipitor 40 mg daily.  His blood pressure is well controlled however his blood glucose remains poorly controlled and his last A1c was 8.1.    Last visit-since his last visit he continues to have right arm and leg weakness.  He has stopped getting home PT and does the exercises himself.  He stopped Plavix as advised but continues to take aspirin 325 mg daily and Lipitor 40 mg daily.  His last lipid panel was as follows-, , LDL 20, HDL 43.  His A1c has also improved and was 7.3.  His blood pressure is well controlled at home now.     Last visit-since his last visit he is doing about the same and has had no new strokelike symptoms.  He continues to have weakness in the right arm and leg.  He did see rehabilitation at [facility] for Botox injections for his spasticity however they are first trying to see if it will improve with baclofen.  He does have an improvement in his cramping with the baclofen however the stiffness in his arm has remained the same.  He also saw his endocrinologist who made changes to his medications.  He has not had a repeat A1c level yet, last level was 10.6.  His blood pressure now is much better controlled at home.  Has not smoked since his stroke.     Initial visit-  53-year-old male accompanied by his wife with a history of poorly controlled hypertension, diabetes mellitus type 2 presents with a hospital follow-up for stroke.  As per patient he had an episode of right arm and leg weakness in early August along with slurring of speech.  He initially went to an outside facility but was sent back home after a CT head showed no abnormalities.  After that the symptoms worsened and he came back to our hospital where he had an MRI brain that showed an acute left pontine infarct.  His blood pressure was extremely high at the time ().  He had extensive testing including a CTA head and neck that did not show any intra-or extracranial stenosis,  a renal  artery ultrasound that did not show any stenosis and an echocardiogram that showed an EF of over 70% with diastolic dysfunction but no PFO.  His telemetry recordings did not show any evidence of atrial fibrillation.  He had not been on any antiplatelet agent prior to this event and  was started on both aspirin 325 mg and Plavix 75 mg.  He was also started on Lipitor 80 mg.  Lipid panel was as follows-, , LDL 69, HDL 32.  His A1c was 10.6 and he was put on insulin in addition to his other medications.  He also has an upcoming appointment with endocrinology.  Patient was discharged to inpatient rehabilitation  for 4 weeks.  He is currently undergoing home health therapy but continues to have significant weakness in the right arm and leg.    The following portions of the patient's history were reviewed today and updated as of 12/27/2019  : allergies, current medications, past family history, past medical history, past social history, past surgical history and problem list  These document will be scanned to patient's chart.      Current Outpatient Medications:   •  amLODIPine (NORVASC) 10 MG tablet, Take 10 mg by mouth Daily., Disp: , Rfl:   •  aspirin 325 MG tablet, Take 1 tablet by mouth Daily., Disp: , Rfl:   •  aspirin  MG tablet, Take 325 mg by mouth Daily., Disp: , Rfl:   •  atorvastatin (LIPITOR) 40 MG tablet, Take 1 tablet by mouth Daily., Disp: 90 tablet, Rfl: 2  •  baclofen (LIORESAL) 10 MG tablet, TAKE 1/2 TAB BY MOUTH 3 TIMES A DAY 5 DAYS, THEN 1 TAB 3 TIMES A ...  (REFER TO PRESCRIPTION NOTES)., Disp: , Rfl: 0  •  BD PEN NEEDLE MAKAYLA U/F 32G X 4 MM misc, USE FOR INJECTIONS DAILY AS DIRECTED, Disp: , Rfl:   •  carvedilol (COREG) 12.5 MG tablet, Take 1 tablet by mouth Every 12 (Twelve) Hours., Disp: , Rfl:   •  doxazosin (CARDURA) 8 MG tablet, Take 8 mg by mouth Daily., Disp: , Rfl:   •  Empagliflozin (JARDIANCE) 25 MG tablet, Take  by mouth., Disp: , Rfl:   •  gabapentin (NEURONTIN) 300 MG  "capsule, Take 300 mg by mouth Daily., Disp: , Rfl:   •  glucose blood test strip, Use as directed, Disp: , Rfl:   •  Insulin Glargine (BASAGLAR KWIKPEN) 100 UNIT/ML injection pen, INJECT 70 UNITS SUBCUTANEOUSLY AT BEDTIME, Disp: , Rfl:   •  Insulin Syringe 29G X 1/2\" 1 ML misc, Use as directed, Disp: , Rfl:   •  JANUVIA 100 MG tablet, Take 100 mg by mouth Daily., Disp: , Rfl:   •  Lancets misc, Use as directed, Disp: , Rfl:   •  metFORMIN (GLUCOPHAGE) 500 MG tablet, Take 500 mg by mouth Daily With Breakfast., Disp: , Rfl:   •  rOPINIRole (REQUIP) 0.5 MG tablet, Take 0.5 mg by mouth Every Night. Take 1 hour before bedtime., Disp: , Rfl:   •  gabapentin (NEURONTIN) 100 MG capsule, Take 100 mg by mouth 3 (Three) Times a Day., Disp: , Rfl:   •  insulin glargine (LANTUS) 100 UNIT/ML injection, Inject  under the skin into the appropriate area as directed Daily., Disp: , Rfl:   •  linagliptin (TRADJENTA) 5 MG tablet tablet, Take 5 mg by mouth Daily., Disp: , Rfl:    Past Medical History:   Diagnosis Date   • Diabetes (CMS/HCC)    • Hypertension    • Stroke (cerebrum) (CMS/HCC)       History reviewed. No pertinent surgical history.   Family History   Problem Relation Age of Onset   • Diabetes Mother    • Mental illness Mother    • Hypertension Father       Social History     Socioeconomic History   • Marital status:      Spouse name: Not on file   • Number of children: Not on file   • Years of education: Not on file   • Highest education level: Not on file   Tobacco Use   • Smoking status: Current Some Day Smoker     Packs/day: 0.00     Types: Cigarettes   • Smokeless tobacco: Never Used   • Tobacco comment: States Smokes 1 cigeratte per month   Substance and Sexual Activity   • Alcohol use: No   • Drug use: No   • Sexual activity: Defer     Review of Systems   All other systems reviewed and are negative.      Objective:    /96   Pulse 73   Ht 182.9 cm (72\")   Wt 113 kg (250 lb)   SpO2 96%   BMI 33.91 " kg/m²     Neurology Exam:    General apperance: Obese    Mental status: Alert, awake and oriented to time place and person.    Recent and Remote memory: Intact.    Attention span and Concentration: Normal.     Language and Speech: Intact- No dysarthria.    Fluency, Naming , Repitition and Comprehension:  Intact    Cranial Nerves:   CN II: Visual fields are full. Intact. Fundi - Normal, No papillederma, Pupils - JAIDEN  CN III, IV and VI: Extraocular movements are intact. Normal saccades.   CN V: Facial sensation is intact.   CN VII: Muscles of facial expression reveal no asymmetry. Intact.   CN VIII: Hearing is intact. Whispered voice intact.   CN IX and X: Palate elevates symmetrically. Intact  CN XI: Shoulder shrug is intact.   CN XII: Tongue is midline without evidence of atrophy or fasciculation.     Ophthalmoscopic exam of optic disc-normal    Motor:  Right UE muscle strength 4/5 proximally, 3/5 distally. Spasticity improved since last visit    Left UE muscle strength 5/5. Normal tone.      Right LE muscle strength 4-/5.  Spasticity present but again mildly improved    Left LE muscle strength 5/5. Normal tone.      Sensory: Normal light touch, vibration and pinprick sensation bilaterally.    DTRs: 2+ bilaterally in upper and lower extremities.    Babinski: Negative bilaterally.    Co-ordination: Normal finger-to-nose, heel to shin B/L.    Rhomberg: Negative.    Gait: Was lifting his leg up more since the last visit even without his cane    Cardiovascular: Regular rate and rhythm without murmur, gallop or rub.    Assessment and Plan:  1. Sequelae, post-stroke  Patient had a left pontine infarct most likely due to small vessel disease  I have asked him to continue taking aspirin 325 mg daily along with Lipitor 40 mg daily.  I will check a lipid panel today  Maintain blood pressure less than 130/90.  I have told him to monitor his blood pressure regularly and to keep himself well-hydrated/wear compression stockings  if it runs too low which could be due to orthostatic hypotension as a result of his diabetes  Goal A1c of less than 7.  His last A1c was 8.3.  I have told him to make dietary modifications.  I have asked him to continue getting Botox injections at least a few more times as there is improved efficacy over time.  He should also resume PT whenever he can          Return in about 6 months (around 12/29/2020).         Kalyani Willis MD

## 2020-06-30 NOTE — TELEPHONE ENCOUNTER
Called and LVM informing pt per Dr. Scott's recommendations to continue taking Lipitor 40 mg and to continue modified diet. Informed to please call office if have any questions or concerns. Thanks.

## 2020-07-02 ENCOUNTER — TELEPHONE (OUTPATIENT)
Dept: NEUROLOGY | Facility: CLINIC | Age: 55
End: 2020-07-02

## 2020-07-06 ENCOUNTER — TELEPHONE (OUTPATIENT)
Dept: NEUROLOGY | Facility: CLINIC | Age: 55
End: 2020-07-06

## 2020-07-06 NOTE — TELEPHONE ENCOUNTER
Form has been completed and faxed again. Left VM informing pt wife and advising to call back with any questions or concerns.

## 2020-09-08 RX ORDER — ATORVASTATIN CALCIUM 40 MG/1
TABLET, FILM COATED ORAL
Qty: 90 TABLET | Refills: 2 | Status: SHIPPED | OUTPATIENT
Start: 2020-09-08 | End: 2021-02-09 | Stop reason: SDUPTHER

## 2020-10-19 RX ORDER — DOXAZOSIN 8 MG/1
4 TABLET ORAL 2 TIMES DAILY
Qty: 60 TABLET | Refills: 5 | Status: SHIPPED | OUTPATIENT
Start: 2020-10-19 | End: 2021-02-09 | Stop reason: SDUPTHER

## 2020-10-19 NOTE — TELEPHONE ENCOUNTER
PATIENT IS NEEDING REFILLS ON HIS DOXAZOSIN MEDICATION. THE DOSAGE IS 4 MILLIGRAMS ONE TABLET TWO TIMES A DAY. PHARMACY IS [facility]  [facility].    [place]

## 2020-11-20 ENCOUNTER — OFFICE VISIT (OUTPATIENT)
Dept: ENDOCRINOLOGY | Facility: CLINIC | Age: 55
End: 2020-11-20

## 2020-11-20 VITALS
DIASTOLIC BLOOD PRESSURE: 76 MMHG | SYSTOLIC BLOOD PRESSURE: 130 MMHG | WEIGHT: 246.2 LBS | HEIGHT: 72 IN | BODY MASS INDEX: 33.35 KG/M2 | HEART RATE: 60 BPM

## 2020-11-20 DIAGNOSIS — E11.65 TYPE 2 DIABETES MELLITUS WITH HYPERGLYCEMIA, WITH LONG-TERM CURRENT USE OF INSULIN (HCC): ICD-10-CM

## 2020-11-20 DIAGNOSIS — Z79.4 TYPE 2 DIABETES MELLITUS WITH HYPERGLYCEMIA, WITH LONG-TERM CURRENT USE OF INSULIN (HCC): ICD-10-CM

## 2020-11-20 DIAGNOSIS — I10 HYPERTENSION, UNSPECIFIED TYPE: ICD-10-CM

## 2020-11-20 DIAGNOSIS — IMO0002 DIABETES MELLITUS TYPE 2, UNCONTROLLED, WITH COMPLICATIONS: Primary | ICD-10-CM

## 2020-11-20 LAB
EXPIRATION DATE: NORMAL
HBA1C MFR BLD: 7.3 %
Lab: NORMAL

## 2020-11-20 PROCEDURE — 83036 HEMOGLOBIN GLYCOSYLATED A1C: CPT | Performed by: INTERNAL MEDICINE

## 2020-11-20 PROCEDURE — 99213 OFFICE O/P EST LOW 20 MIN: CPT | Performed by: INTERNAL MEDICINE

## 2020-11-20 RX ORDER — BACLOFEN 10 MG/1
20 TABLET ORAL 2 TIMES DAILY
COMMUNITY
End: 2022-02-21 | Stop reason: SDUPTHER

## 2020-11-20 RX ORDER — PEN NEEDLE, DIABETIC 32GX 5/32"
NEEDLE, DISPOSABLE MISCELLANEOUS DAILY
COMMUNITY
End: 2021-04-16 | Stop reason: SDUPTHER

## 2020-11-20 RX ORDER — INSULIN GLARGINE 100 [IU]/ML
INJECTION, SOLUTION SUBCUTANEOUS
COMMUNITY
Start: 2020-10-21 | End: 2021-07-07 | Stop reason: SDUPTHER

## 2020-11-20 RX ORDER — EMPAGLIFLOZIN 25 MG/1
TABLET, FILM COATED ORAL DAILY
COMMUNITY
End: 2021-07-15

## 2020-11-20 NOTE — PROGRESS NOTES
"     Office Note      Date: 2020  Patient Name: Mele Rossi  MRN: [id number]  : 1965    Chief Complaint   Patient presents with   • Diabetes       History of Present Illness:   Mele Rossi is a 55 y.o. male who presents for Diabetes type 2 for 15 years.  bg checks-  Couple times per week  Usually under 200  No serious hypos.  Treated with dpp4, metformin, sglt2 and basal insulin.  Complications- due for eye exam, stroke, has neuropathy but not sores on hie feet.    Has no questions for me today   Last A1c:  Hemoglobin A1C   Date Value Ref Range Status   2018 7.3 (H) See comment % Final     Comment:     Comment:  Diagnosis of Diabetes: > or = 6.5%  Increased risk of diabetes (Prediabetes): 5.7-6.4%  Glycemic Control: Nonpregnant Adults: <7.0%                    Pregnant: <6.0%               Review of Systems:   Review of Systems   Constitutional: Positive for fatigue.   Eyes: Positive for pain.   Respiratory: Positive for cough and choking.    Cardiovascular: Negative.    Gastrointestinal: Negative.    Endocrine: Positive for polyphagia.   Genitourinary: Negative.    Musculoskeletal: Negative.    Skin: Negative.    Allergic/Immunologic: Negative.    Neurological: Positive for facial asymmetry.   Hematological: Negative.    Psychiatric/Behavioral: Negative.        The following portions of the patient's history were reviewed and updated as appropriate: allergies, current medications, past family history, past medical history, past social history, past surgical history and problem list.    Objective     Visit Vitals  /76 (BP Location: Left arm, Patient Position: Sitting, Cuff Size: Adult)   Pulse 60   Ht 182.9 cm (72\")   Wt 112 kg (246 lb 3.2 oz)   BMI 33.39 kg/m²       Labs:    CMP  Lab Results   Component Value Date    GLUCOSE 218 (H) 2018    BUN 19 2018    CREATININE 1.22 2018    EGFRIFNONA 62 2018    BCR 15.6 2018    K 4.1 2018    CO2 28.0 2018    " CALCIUM 9.0 08/12/2018    AST 22 08/09/2018    ALT 16 08/09/2018        CBC w/DIFF  Lab Results   Component Value Date    WBC 7.5 12/21/2018    RBC 5.21 12/21/2018    HGB 15.9 12/21/2018    HCT 47.1 12/21/2018    MCV 90.4 12/21/2018    MCH 30.5 12/21/2018    MCHC 33.8 12/21/2018    RDW 12.2 12/21/2018    RDWSD 40.4 08/09/2018    MPV 11.0 (H) 12/21/2018     12/21/2018    NEUTRORELPCT 68.5 12/21/2018    LYMPHORELPCT 19.9 12/21/2018    MONORELPCT 7.5 12/21/2018    EOSRELPCT 3.1 12/21/2018    BASORELPCT 0.7 12/21/2018    AUTOIGPER 0.3 12/21/2018    NEUTROABS 5.2 12/21/2018    LYMPHSABS 1.5 12/21/2018    MONOSABS 0.6 12/21/2018    EOSABS 0.2 12/21/2018    BASOSABS 0.1 12/21/2018    AUTOIGNUM 0.0 12/21/2018       Physical Exam:  Physical Exam  Vitals signs reviewed.   Constitutional:       Appearance: Normal appearance. He is obese.   HENT:      Head: Normocephalic and atraumatic.   Neck:      Musculoskeletal: Normal range of motion and neck supple.   Skin:     General: Skin is warm and dry.   Neurological:      General: No focal deficit present.      Mental Status: He is alert. Mental status is at baseline.   Psychiatric:         Mood and Affect: Mood normal.         Thought Content: Thought content normal.         Judgment: Judgment normal.          Assessment / Plan      Assessment & Plan:  Problem List Items Addressed This Visit     None      Visit Diagnoses     Diabetes mellitus type 2, uncontrolled, with complications (CMS/[facility])    -  Primary    Relevant Medications    Insulin Glargine (BASAGLAR KWIKPEN) 100 UNIT/ML injection pen    Empagliflozin (Jardiance) 25 MG tablet    metFORMIN (GLUCOPHAGE) 500 MG tablet    Other Relevant Orders    POC Glycosylated Hemoglobin (Hb A1C)           Zeke Lopez MD   11/20/2020

## 2021-01-11 ENCOUNTER — HOSPITAL ENCOUNTER (OUTPATIENT)
Facility: HOSPITAL | Age: 56
Discharge: HOME | End: 2021-01-11
Payer: COMMERCIAL

## 2021-01-11 ENCOUNTER — HOSPITAL ENCOUNTER (OUTPATIENT)
Dept: GENERAL RADIOLOGY | Facility: HOSPITAL | Age: 56
Discharge: HOME | End: 2021-01-11
Payer: COMMERCIAL

## 2021-01-11 DIAGNOSIS — R60.9 EDEMA, UNSPECIFIED TYPE: ICD-10-CM
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
## 2021-01-11 PROCEDURE — 73630 X-RAY EXAM OF FOOT: CPT

## 2021-01-11 PROCEDURE — 73610 X-RAY EXAM OF ANKLE: CPT

## 2021-02-09 ENCOUNTER — TELEPHONE (OUTPATIENT)
Dept: NEUROLOGY | Facility: CLINIC | Age: 56
End: 2021-02-09

## 2021-02-09 ENCOUNTER — TELEPHONE (OUTPATIENT)
Dept: ENDOCRINOLOGY | Facility: CLINIC | Age: 56
End: 2021-02-09

## 2021-02-09 DIAGNOSIS — E11.65 TYPE 2 DIABETES MELLITUS WITH HYPERGLYCEMIA, WITH LONG-TERM CURRENT USE OF INSULIN (HCC): Primary | ICD-10-CM

## 2021-02-09 DIAGNOSIS — Z79.4 TYPE 2 DIABETES MELLITUS WITH HYPERGLYCEMIA, WITH LONG-TERM CURRENT USE OF INSULIN (HCC): Primary | ICD-10-CM

## 2021-02-09 DIAGNOSIS — I69.30 SEQUELAE, POST-STROKE: Primary | ICD-10-CM

## 2021-02-09 RX ORDER — CARVEDILOL 12.5 MG/1
12.5 TABLET ORAL EVERY 12 HOURS SCHEDULED
Status: CANCELLED
Start: 2021-02-09

## 2021-02-09 RX ORDER — BACLOFEN 10 MG/1
TABLET ORAL
Status: CANCELLED | OUTPATIENT
Start: 2021-02-09

## 2021-02-09 RX ORDER — GABAPENTIN 300 MG/1
300 CAPSULE ORAL 3 TIMES DAILY
Qty: 270 CAPSULE | Refills: 1 | Status: SHIPPED | OUTPATIENT
Start: 2021-02-09 | End: 2021-07-07 | Stop reason: SDUPTHER

## 2021-02-09 RX ORDER — DOXAZOSIN MESYLATE 4 MG/1
4 TABLET ORAL 2 TIMES DAILY
Qty: 180 TABLET | Refills: 1 | Status: SHIPPED | OUTPATIENT
Start: 2021-02-09 | End: 2022-01-10 | Stop reason: SDUPTHER

## 2021-02-09 RX ORDER — GABAPENTIN 300 MG/1
300 CAPSULE ORAL 3 TIMES DAILY
COMMUNITY
Start: 2021-01-19 | End: 2021-02-09 | Stop reason: DRUGHIGH

## 2021-02-09 RX ORDER — AMLODIPINE BESYLATE 10 MG/1
10 TABLET ORAL DAILY
Status: CANCELLED | OUTPATIENT
Start: 2021-02-09

## 2021-02-09 NOTE — TELEPHONE ENCOUNTER
Please advise on the medication changes.  Per note would like Basaglar changed to lantus and jardiance and januvia changed to something cheaper.

## 2021-02-09 NOTE — TELEPHONE ENCOUNTER
Caller: SHAILESH    Relationship: WIFE    Best call back number:[phone]    Medication needed:   Requested Prescriptions     Pending Prescriptions Disp Refills   • atorvastatin (LIPITOR) 40 MG tablet 90 tablet 2     Sig: Take 1 tablet by mouth Daily.   • baclofen (LIORESAL) 10 MG tablet       Si tablets at bedtime   • carvedilol (COREG) 12.5 MG tablet       Sig: Take 1 tablet by mouth Every 12 (Twelve) Hours.   • amLODIPine (NORVASC) 10 MG tablet       Sig: Take 1 tablet by mouth Daily.       When do you need the refill by: MID MONTH AROUND THE      What details did the patient provide when requesting the medication: PTS WIFE CALLING IN REQUESTS RX'S TO BE SENT IN TO [email or web address] FOR 90 DAY SUPPLIES IF POSSIBLE       Does the patient have less than a 3 day supply:  [] Yes  [x] No    What is the patient's preferred pharmacy:      [facility]   LISTED

## 2021-02-09 NOTE — TELEPHONE ENCOUNTER
PATIENT'S SPOUSE STATES THAT THE PATIENT HAS HAD AN INSURANCE CHANGE AND WILL NEED A NEW RX FOR LANTUS AS THIS IS WHAT IS NOW COVERED. PATIENT ALSO NEEDS RX FOR GABAPENTIN AND METFORMIN. SHE ALSO WANTS TO KNOW IF THERE IS AN ALTERNATIVE TO JARDIANCE AND JANUVIA AS THESE RX ARE COSTLY. PLEASE SEND TO [facility]. INSURANCE INFO HAS BEEN UPDATED IN CHART.     [phone] FOR VERBAL AUTH      [phone] FAX [facility]    RX CARD BIN #[id number]  RX PCN: IS  RXGRP: [id number]  RX ID: [id number]    CALL BACK SPOUSE [phone]

## 2021-02-10 RX ORDER — INSULIN GLARGINE 100 [IU]/ML
70 INJECTION, SOLUTION SUBCUTANEOUS NIGHTLY
Qty: 63 ML | Refills: 1 | Status: SHIPPED | OUTPATIENT
Start: 2021-02-10 | End: 2021-07-07

## 2021-02-10 RX ORDER — ATORVASTATIN CALCIUM 40 MG/1
40 TABLET, FILM COATED ORAL DAILY
Qty: 90 TABLET | Refills: 3 | Status: SHIPPED | OUTPATIENT
Start: 2021-02-10 | End: 2021-03-18

## 2021-02-10 NOTE — TELEPHONE ENCOUNTER
PT CALLED STATING THAT ATORVASTIN MEDICATION REQUEST WAS RECEIVED BY THE [facility] BUT DID NOT MENTION ANY STATUS OF THE BACLOFEN (PA USUALLY NEEDED, SHE STATES),CARVEDILOL, AND AMLODIPINE MEDICATIONS. SHE IS WONDERING THESE RX'S JUST HAVEN'T BEEN PROCESSED ON THE PHARMACY'S END OR IF WE HAVE YET TO SENT THOSE REQUESTS OVER.    PT CAN BE REACHED AT [phone].    PLEASE REVIEW AND ADVISE.

## 2021-02-10 NOTE — TELEPHONE ENCOUNTER
Spoke with patient.  Gave verbal authorization to speak to wife.  Advised per previous message regarding no alternative for Jardiance or Januvia.

## 2021-02-10 NOTE — TELEPHONE ENCOUNTER
You do not prescribe anything other than the Atorvastatin, correct?     If you Atorvastatin is all I will let the patient and wife know to contact the PCP for the Baclofen and Amlodipine, and Carvedilol.

## 2021-02-10 NOTE — TELEPHONE ENCOUNTER
PATIENT'S WIFE CALLED STATING THAT THE PATIENT'S PHARMACY HAS NOT RECEIVED AN RX FOR LANTUS. SHE WOULD ALSO LIKE TO KNOW ABOUT ALTERNATIVES FOR JARDIANCE AND JANUVIA.

## 2021-02-11 NOTE — TELEPHONE ENCOUNTER
Spoke to patient's wife and informed her we could only refill Atorvastatin and the other requests would need to come from patient's PCP.

## 2021-03-18 ENCOUNTER — TELEPHONE (OUTPATIENT)
Dept: NEUROLOGY | Facility: CLINIC | Age: 56
End: 2021-03-18

## 2021-03-18 ENCOUNTER — OFFICE VISIT (OUTPATIENT)
Dept: NEUROLOGY | Facility: CLINIC | Age: 56
End: 2021-03-18

## 2021-03-18 VITALS
BODY MASS INDEX: 33.38 KG/M2 | TEMPERATURE: 96.3 F | SYSTOLIC BLOOD PRESSURE: 112 MMHG | DIASTOLIC BLOOD PRESSURE: 76 MMHG | HEART RATE: 65 BPM | HEIGHT: 72 IN | OXYGEN SATURATION: 96 %

## 2021-03-18 DIAGNOSIS — I69.30 SEQUELAE, POST-STROKE: Primary | ICD-10-CM

## 2021-03-18 PROCEDURE — 99214 OFFICE O/P EST MOD 30 MIN: CPT | Performed by: PSYCHIATRY & NEUROLOGY

## 2021-03-18 RX ORDER — ATORVASTATIN CALCIUM 20 MG/1
20 TABLET, FILM COATED ORAL DAILY
Qty: 30 TABLET | Refills: 11 | Status: SHIPPED | OUTPATIENT
Start: 2021-03-18 | End: 2021-03-18 | Stop reason: SDUPTHER

## 2021-03-18 RX ORDER — ATORVASTATIN CALCIUM 20 MG/1
20 TABLET, FILM COATED ORAL DAILY
Qty: 30 TABLET | Refills: 11 | Status: SHIPPED | OUTPATIENT
Start: 2021-03-18 | End: 2022-02-21 | Stop reason: SDUPTHER

## 2021-03-18 NOTE — PROGRESS NOTES
Subjective:    CC: Mele Rossi is seen today  for Stroke       HPI:  Current visit-patient states that he continues to have right-sided weakness/stiffness despite taking baclofen 20 mg twice a day.  He has stopped getting Botox because it was too expensive and it was not helping him.  Has also started physical therapy.  He continues to take aspirin 325 mg daily and Lipitor 40 mg daily.  His last lipid panel was as follows-TC 96, , LDL 28, HDL 33.  A1c was 7.3.    Last visit-  since the last visit patient got his second shot of Botox in March which has helped a little with spasticity.  He stopped getting physical therapy due to COVID but is planning to resume that soon.  He is also taking baclofen 20 mg twice a day.  He continues to take aspirin 325 mg daily and Lipitor 40 mg daily.  His blood pressure does fluctuate a lot and a lot of times it is extremely low in the mornings such as today (was in the 80s/60s).  His blood sugar also continues to run high and his last A1c was 8.3.  His endocrinologist did not make any medication changes but did tell him to make dietary modifications.  Patient states that he has gained 50 pounds since his stroke.     Last visit-since her last visit patient had his first set of Botox injections for his right-sided spasticity 1 month ago.  It helped a little however he is unsure whether he will continue getting it as he is having trouble getting it approved by insurance.  Also continues to get occupational therapy and his strength has improved slightly.  With regards to his diabetes his last A1c was 7.9 but his endocrinologist has been adjusting his medications.  He lowered his dose of gabapentin to 100 mg at night, he says that a dose of 300 mg was helping with his neuropathy and the muscle cramps but made him extremely off balance when he woke up to go to the bathroom.  Continues to take aspirin 325 mg daily along with Lipitor 40 mg daily.  Had a lot of blood work at Dr. Lopez  office recently.    Last visit-patient has now finished physical therapy but continues to have the right-sided weakness as well as spasticity for which he is taking baclofen.  He has also been getting pain and numbness in his feet that is exacerbated by prolonged standing or walking.  Dr. Benitez has ordered an EMG/NCS for him as well as OT for his hand.  He continues to take aspirin 325 mg daily in addition to Lipitor 40 mg daily.  His blood pressure is well controlled however his blood glucose remains poorly controlled and his last A1c was 8.1.    Last visit-since his last visit he continues to have right arm and leg weakness.  He has stopped getting home PT and does the exercises himself.  He stopped Plavix as advised but continues to take aspirin 325 mg daily and Lipitor 40 mg daily.  His last lipid panel was as follows-, , LDL 20, HDL 43.  His A1c has also improved and was 7.3.  His blood pressure is well controlled at home now.     Last visit-since his last visit he is doing about the same and has had no new strokelike symptoms.  He continues to have weakness in the right arm and leg.  He did see rehabilitation at [facility] for Botox injections for his spasticity however they are first trying to see if it will improve with baclofen.  He does have an improvement in his cramping with the baclofen however the stiffness in his arm has remained the same.  He also saw his endocrinologist who made changes to his medications.  He has not had a repeat A1c level yet, last level was 10.6.  His blood pressure now is much better controlled at home.  Has not smoked since his stroke.     Initial visit-  53-year-old male accompanied by his wife with a history of poorly controlled hypertension, diabetes mellitus type 2 presents with a hospital follow-up for stroke.  As per patient he had an episode of right arm and leg weakness in early August along with slurring of speech.  He initially went to an outside  facility but was sent back home after a CT head showed no abnormalities.  After that the symptoms worsened and he came back to our hospital where he had an MRI brain that showed an acute left pontine infarct.  His blood pressure was extremely high at the time ().  He had extensive testing including a CTA head and neck that did not show any intra-or extracranial stenosis,  a renal artery ultrasound that did not show any stenosis and an echocardiogram that showed an EF of over 70% with diastolic dysfunction but no PFO.  His telemetry recordings did not show any evidence of atrial fibrillation.  He had not been on any antiplatelet agent prior to this event and  was started on both aspirin 325 mg and Plavix 75 mg.  He was also started on Lipitor 80 mg.  Lipid panel was as follows-, , LDL 69, HDL 32.  His A1c was 10.6 and he was put on insulin in addition to his other medications.  He also has an upcoming appointment with endocrinology.  Patient was discharged to inpatient rehabilitation  for 4 weeks.  He is currently undergoing home health therapy but continues to have significant weakness in the right arm and leg.    The following portions of the patient's history were reviewed today and updated as of 12/27/2019  : allergies, current medications, past family history, past medical history, past social history, past surgical history and problem list  These document will be scanned to patient's chart.      Current Outpatient Medications:   •  amLODIPine (NORVASC) 10 MG tablet, Take 10 mg by mouth Daily., Disp: , Rfl:   •  aspirin 325 MG tablet, Take 1 tablet by mouth Daily., Disp: , Rfl:   •  baclofen (LIORESAL) 10 MG tablet, 2 tablets at bedtime, Disp: , Rfl:   •  carvedilol (COREG) 12.5 MG tablet, Take 1 tablet by mouth Every 12 (Twelve) Hours., Disp: , Rfl:   •  doxazosin (CARDURA) 4 MG tablet, Take 1 tablet by mouth 2 (two) times a day., Disp: 180 tablet, Rfl: 1  •  Empagliflozin (Jardiance) 25 MG  tablet, Daily., Disp: , Rfl:   •  gabapentin (NEURONTIN) 300 MG capsule, Take 1 capsule by mouth 3 (Three) Times a Day., Disp: 270 capsule, Rfl: 1  •  glucose blood test strip, OneTouch Ultra Blue Test Strip  for daily blood test, Disp: , Rfl:   •  Insulin Glargine (BASAGLAR KWIKPEN) 100 UNIT/ML injection pen, ADM 70 UNI SC D HS, Disp: , Rfl:   •  Insulin Glargine (Lantus SoloStar) 100 UNIT/ML injection pen, Inject 70 Units under the skin into the appropriate area as directed Every Night., Disp: 63 mL, Rfl: 1  •  Insulin Pen Needle (BD Pen Needle Claudia U/F) 32G X 4 MM misc, Daily., Disp: , Rfl:   •  JANUVIA 100 MG tablet, Take 100 mg by mouth Daily., Disp: , Rfl:   •  Lancets misc, Use as directed, Disp: , Rfl:   •  metFORMIN (GLUCOPHAGE) 500 MG tablet, Take 1 tablet by mouth Daily With Breakfast., Disp: 90 tablet, Rfl: 1  •  atorvastatin (Lipitor) 20 MG tablet, Take 1 tablet by mouth Daily., Disp: 30 tablet, Rfl: 11   Past Medical History:   Diagnosis Date   • Diabetes (CMS/HCC)    • Fatigue    • History of stroke    • Hypertension    • Obesity     body mass index 30+-obesity   • Stroke (cerebrum) (CMS/HCC)    • Type 2 diabetes mellitus, uncontrolled (CMS/HCC)       No past surgical history on file.   Family History   Problem Relation Age of Onset   • Diabetes Mother    • Mental illness Mother    • Hypertension Father       Social History     Socioeconomic History   • Marital status:      Spouse name: Not on file   • Number of children: Not on file   • Years of education: Not on file   • Highest education level: Not on file   Tobacco Use   • Smoking status: Former Smoker     Packs/day: 0.00     Types: Cigarettes     Quit date: 2018     Years since quittin.3   • Smokeless tobacco: Never Used   • Tobacco comment: States Smokes 1 cigeratte per month   Substance and Sexual Activity   • Alcohol use: No   • Drug use: No   • Sexual activity: Defer     Review of Systems   All other systems reviewed and are  "negative.      Objective:    /76   Pulse 65   Temp 96.3 °F (35.7 °C)   Ht 182.9 cm (72.01\")   SpO2 96%   BMI 33.38 kg/m²     Neurology Exam:    General apperance: Obese    Mental status: Alert, awake and oriented to time place and person.    Recent and Remote memory: Intact.    Attention span and Concentration: Normal.     Language and Speech: Intact- No dysarthria.    Fluency, Naming , Repitition and Comprehension:  Intact    Cranial Nerves:   CN II: Visual fields are full. Intact. Fundi - Normal, No papillederma, Pupils - JAIDEN  CN III, IV and VI: Extraocular movements are intact. Normal saccades.   CN V: Facial sensation is intact.   CN VII: Muscles of facial expression reveal no asymmetry. Intact.   CN VIII: Hearing is intact. Whispered voice intact.   CN IX and X: Palate elevates symmetrically. Intact  CN XI: Shoulder shrug is intact.   CN XII: Tongue is midline without evidence of atrophy or fasciculation.     Ophthalmoscopic exam of optic disc-normal    Motor:  Right UE muscle strength 4/5 proximally, 3/5 distally. Spasticity present    Left UE muscle strength 5/5. Normal tone.      Right LE muscle strength 4-/5, 2/5 PF/DF  Spastic    Left LE muscle strength 5/5. Normal tone.      Sensory: Normal light touch, vibration and pinprick sensation bilaterally.    DTRs: 2+ bilaterally in upper and lower extremities on left and 3+ on the right.    Babinski: Negative bilaterally.    Co-ordination: Normal finger-to-nose, heel to shin B/L.    Rhomberg: Negative.    Gait: Was lifting his leg up more since the last visit even without his cane    Cardiovascular: Regular rate and rhythm without murmur, gallop or rub.    Assessment and Plan:  1. Sequelae, post-stroke  Patient had a left pontine infarct most likely due to small vessel disease  I have asked him to continue taking aspirin 325 mg daily .  I will reduce his Lipitor to 20 mg daily for goal LDL of less than 70.  His LDL was 28.  Triglycerides remain " elevated and I told him to carry out dietary modifications  Maintain blood pressure less than 130/90.  I have told him to monitor his blood pressure regularly and to speak to his PCP about reducing his blood pressure medications if it runs consistently low as he also complains of some postural lightheadedness.    Goal A1c of less than 7.  His last A1c has reduced to 7.3   I have also told him to start physical therapy again and to wear his right AFO  He should continue taking baclofen 20 mg twice a day for his spasticity.  Does not want to continue with Botox anymore      Return in about 6 months (around 9/18/2021).         Kalyani Willis MD

## 2021-03-18 NOTE — TELEPHONE ENCOUNTER
Provider: JAMEY  Caller: ANGELA (PTS WIFE)  Reason for Call: PTS WIFE STATES THAT PT WAS IN TODAY FOR AN APPT WITH DR. CONCEPCION AND THAT SHE HAD MENTIONED PT GOING TO PHYSICAL THERAPY. PTS WIFE WOULD LIKE TO KNOW IF THE ORDER CAN BE SENT TO [facility] @ [address] [place], [place] [id number] PHONE NUMER [phone] AS THE PATIENT LIVES IN [facility]. PLEASE REVIEW AND ADVISE.

## 2021-04-16 RX ORDER — PEN NEEDLE, DIABETIC 32GX 5/32"
NEEDLE, DISPOSABLE MISCELLANEOUS
Qty: 100 EACH | Refills: 2 | Status: SHIPPED | OUTPATIENT
Start: 2021-04-16 | End: 2021-07-07 | Stop reason: SDUPTHER

## 2021-04-22 ENCOUNTER — TELEPHONE (OUTPATIENT)
Dept: NEUROLOGY | Facility: CLINIC | Age: 56
End: 2021-04-22

## 2021-04-22 NOTE — TELEPHONE ENCOUNTER
Caller: Lexy Rossi    Relationship: Emergency Contact    Best call back number: [phone]    What form or medical record are you requesting: DISABILITY [employer]    Who is requesting this form or medical record from you: GU[employer]IAN [employer]      Timeframe paperwork needed: NA    Additional notes: PATIENTS WIFE STATES SHE IS NEEDING DISABILITY FORMS FILLED OUT. I STATED THE BEST WAY FOR THAT TO BE DONE WOULD BE TO FAX OR BRING IN TO OFFICE. SHE STATES SHE WILL TRY TO GET FORMS TO OFFICE BUT IS NOT SURE WHEN DUE TO BUSY WORK SCHEDULE. SHE MENTIONED EMAIL BUT I LET HER KNOW WE DON'T NORMALLY TAKE CARE OF FORMS THROUGH EMAIL. PLEASE ADVISE.

## 2021-04-29 ENCOUNTER — TELEPHONE (OUTPATIENT)
Dept: NEUROLOGY | Facility: CLINIC | Age: 56
End: 2021-04-29

## 2021-04-29 NOTE — TELEPHONE ENCOUNTER
Spoke to patient's wife and informed her the Guardian Disability paperwork has been faxed to [employer].

## 2021-07-07 ENCOUNTER — OFFICE VISIT (OUTPATIENT)
Dept: ENDOCRINOLOGY | Facility: CLINIC | Age: 56
End: 2021-07-07

## 2021-07-07 VITALS
HEIGHT: 72 IN | OXYGEN SATURATION: 97 % | BODY MASS INDEX: 33.86 KG/M2 | SYSTOLIC BLOOD PRESSURE: 130 MMHG | DIASTOLIC BLOOD PRESSURE: 72 MMHG | HEART RATE: 60 BPM | WEIGHT: 250 LBS

## 2021-07-07 DIAGNOSIS — Z79.4 TYPE 2 DIABETES MELLITUS WITH HYPERGLYCEMIA, WITH LONG-TERM CURRENT USE OF INSULIN (HCC): Primary | ICD-10-CM

## 2021-07-07 DIAGNOSIS — E11.65 TYPE 2 DIABETES MELLITUS WITH HYPERGLYCEMIA, WITH LONG-TERM CURRENT USE OF INSULIN (HCC): Primary | ICD-10-CM

## 2021-07-07 PROBLEM — IMO0002 DIABETES MELLITUS TYPE 2, UNCONTROLLED, WITH COMPLICATIONS: Status: RESOLVED | Noted: 2020-11-20 | Resolved: 2021-07-07

## 2021-07-07 LAB
EXPIRATION DATE: ABNORMAL
EXPIRATION DATE: NORMAL
GLUCOSE BLDC GLUCOMTR-MCNC: 154 MG/DL (ref 70–130)
HBA1C MFR BLD: 7.5 %
Lab: ABNORMAL
Lab: NORMAL

## 2021-07-07 PROCEDURE — 99213 OFFICE O/P EST LOW 20 MIN: CPT | Performed by: INTERNAL MEDICINE

## 2021-07-07 PROCEDURE — 83036 HEMOGLOBIN GLYCOSYLATED A1C: CPT | Performed by: INTERNAL MEDICINE

## 2021-07-07 PROCEDURE — 3051F HG A1C>EQUAL 7.0%<8.0%: CPT | Performed by: INTERNAL MEDICINE

## 2021-07-07 PROCEDURE — 82947 ASSAY GLUCOSE BLOOD QUANT: CPT | Performed by: INTERNAL MEDICINE

## 2021-07-07 RX ORDER — PEN NEEDLE, DIABETIC 32GX 5/32"
NEEDLE, DISPOSABLE MISCELLANEOUS
Qty: 100 EACH | Refills: 2 | Status: SHIPPED | OUTPATIENT
Start: 2021-07-07 | End: 2022-01-27 | Stop reason: SDUPTHER

## 2021-07-07 RX ORDER — GABAPENTIN 300 MG/1
300 CAPSULE ORAL 3 TIMES DAILY
Qty: 270 CAPSULE | Refills: 1 | Status: SHIPPED | OUTPATIENT
Start: 2021-07-07

## 2021-07-07 RX ORDER — INSULIN GLARGINE 100 [IU]/ML
INJECTION, SOLUTION SUBCUTANEOUS
Qty: 21 ML | Refills: 11 | Status: SHIPPED | OUTPATIENT
Start: 2021-07-07 | End: 2021-07-08 | Stop reason: CLARIF

## 2021-07-07 NOTE — ASSESSMENT & PLAN NOTE
a1c  Not ideal but acceptable.  The biggest issue is the cost of his medication   We will try for patient assistance for jardiance and januvia

## 2021-07-07 NOTE — PROGRESS NOTES
"     Office Note      Date: 2021  Patient Name: eMle Rossi  MRN: [id number]  : 1965    Chief Complaint   Patient presents with   • Follow-up   • Diabetes     type2   • Diabetic Eye Exam     couple years ago        History of Present Illness:   Mele Rossi is a 56 y.o. male who presents for Diabetes-     Last A1c:  Hemoglobin A1C   Date Value Ref Range Status   2021 7.5 % Final   2018 7.3 (H) See comment % Final     Comment:     Comment:  Diagnosis of Diabetes: > or = 6.5%  Increased risk of diabetes (Prediabetes): 5.7-6.4%  Glycemic Control: Nonpregnant Adults: <7.0%                    Pregnant: <6.0%         Subjective          Review of Systems:   Review of Systems   Constitutional: Negative.    HENT: Negative.    Eyes: Negative.    Respiratory: Negative.        The following portions of the patient's history were reviewed and updated as appropriate: allergies, current medications, past family history, past medical history, past social history, past surgical history and problem list.    Objective     Visit Vitals  /72   Pulse 60   Ht 182.9 cm (72\")   Wt 113 kg (250 lb)   SpO2 97%   BMI 33.91 kg/m²       Labs:    CMP  Lab Results   Component Value Date    GLUCOSE 218 (H) 2018    BUN 19 2018    CREATININE 1.22 2018    EGFRIFNONA 62 2018    BCR 15.6 2018    K 4.1 2018    CO2 28.0 2018    CALCIUM 9.0 2018    AST 22 2018    ALT 16 2018        CBC w/DIFF  Lab Results   Component Value Date    WBC 7.5 2018    RBC 5.21 2018    HGB 15.9 2018    HCT 47.1 2018    MCV 90.4 2018    MCH 30.5 2018    MCHC 33.8 2018    RDW 12.2 2018    RDWSD 40.4 2018    MPV 11.0 (H) 2018     2018    NEUTRORELPCT 68.5 2018    LYMPHORELPCT 19.9 2018    MONORELPCT 7.5 2018    EOSRELPCT 3.1 2018    BASORELPCT 0.7 2018    AUTOIGPER 0.3 2018    NEUTROABS " 5.2 12/21/2018    LYMPHSABS 1.5 12/21/2018    MONOSABS 0.6 12/21/2018    EOSABS 0.2 12/21/2018    BASOSABS 0.1 12/21/2018    AUTOIGNUM 0.0 12/21/2018       Physical Exam:  Physical Exam  Vitals reviewed.   Constitutional:       Appearance: Normal appearance.   Cardiovascular:      Pulses:           Dorsalis pedis pulses are 1+ on the right side and 1+ on the left side.        Posterior tibial pulses are 1+ on the left side.   Musculoskeletal:      Right foot: Foot drop present.      Left foot: Normal range of motion. No deformity, bunion, Charcot foot, foot drop or prominent metatarsal heads.   Feet:      Right foot:      Protective Sensation: 10 sites tested. 0 sites sensed.      Skin integrity: Erythema present.      Toenail Condition: Right toenails are abnormally thick.      Left foot:      Protective Sensation: 10 sites tested. 0 sites sensed.      Skin integrity: Erythema present.      Toenail Condition: Left toenails are abnormally thick.      Comments: Diabetic Foot Exam Performed and Monofilament Test Performed  Neurological:      Mental Status: He is alert.   Psychiatric:         Mood and Affect: Mood normal.         Thought Content: Thought content normal.         Judgment: Judgment normal.          Assessment / Plan      Assessment & Plan:  Problem List Items Addressed This Visit        Other    Type 2 diabetes mellitus with hyperglycemia (CMS/[facility]) - Primary    Current Assessment & Plan     a1c  Not ideal but acceptable.  The biggest issue is the cost of his medication   We will try for patient assistance for jardiance and januvia          Relevant Medications    JANUVIA 100 MG tablet    Insulin Glargine (BASAGLAR KWIKPEN) 100 UNIT/ML injection pen    Empagliflozin (Jardiance) 25 MG tablet    gabapentin (NEURONTIN) 300 MG capsule    metFORMIN (GLUCOPHAGE) 500 MG tablet    Other Relevant Orders    POC Glucose, Blood (Completed)    POC Glycosylated Hemoglobin (Hb A1C) (Completed)           Zeke Moody  MD Jessica   07/07/2021

## 2021-07-08 RX ORDER — INSULIN GLARGINE 100 [IU]/ML
INJECTION, SOLUTION SUBCUTANEOUS
Qty: 21 ML | Refills: 11 | Status: SHIPPED | OUTPATIENT
Start: 2021-07-08 | End: 2022-01-27 | Stop reason: SDUPTHER

## 2021-07-08 NOTE — TELEPHONE ENCOUNTER
PLEASE CALL IN FOR PT HIS INSURANCE WILL PAY FOR LANTUS PENS  PLEASE CALL TO [facility] IN [place]    PT NUMBER  [phone]

## 2021-07-15 RX ORDER — EMPAGLIFLOZIN 25 MG/1
TABLET, FILM COATED ORAL
Qty: 30 TABLET | Refills: 5 | Status: SHIPPED | OUTPATIENT
Start: 2021-07-15 | End: 2022-01-27 | Stop reason: SDUPTHER

## 2022-01-10 RX ORDER — DOXAZOSIN MESYLATE 4 MG/1
4 TABLET ORAL NIGHTLY
Qty: 180 TABLET | Refills: 1 | Status: SHIPPED | OUTPATIENT
Start: 2022-01-10 | End: 2022-01-11 | Stop reason: SDUPTHER

## 2022-01-11 ENCOUNTER — TELEPHONE (OUTPATIENT)
Dept: ENDOCRINOLOGY | Facility: CLINIC | Age: 57
End: 2022-01-11

## 2022-01-11 RX ORDER — DOXAZOSIN MESYLATE 4 MG/1
4 TABLET ORAL NIGHTLY
Qty: 180 TABLET | Refills: 0 | Status: SHIPPED | OUTPATIENT
Start: 2022-01-11 | End: 2022-01-27

## 2022-01-12 ENCOUNTER — TELEPHONE (OUTPATIENT)
Dept: NEUROLOGY | Facility: CLINIC | Age: 57
End: 2022-01-12

## 2022-01-12 ENCOUNTER — TELEPHONE (OUTPATIENT)
Dept: ENDOCRINOLOGY | Facility: CLINIC | Age: 57
End: 2022-01-12

## 2022-01-12 RX ORDER — CARVEDILOL 12.5 MG/1
12.5 TABLET ORAL EVERY 12 HOURS SCHEDULED
Qty: 90 TABLET | Refills: 3 | Status: SHIPPED | OUTPATIENT
Start: 2022-01-12 | End: 2022-08-08

## 2022-01-12 RX ORDER — AMLODIPINE BESYLATE 10 MG/1
10 TABLET ORAL DAILY
Qty: 30 TABLET | Refills: 0 | Status: SHIPPED | OUTPATIENT
Start: 2022-01-12

## 2022-01-12 RX ORDER — CARVEDILOL 12.5 MG/1
12.5 TABLET ORAL EVERY 12 HOURS SCHEDULED
Start: 2022-01-12 | End: 2022-01-12 | Stop reason: SDUPTHER

## 2022-01-12 NOTE — TELEPHONE ENCOUNTER
Caller: Lexy Rossi    Relationship: Emergency Contact    Best call back number: [phone]    Requested Prescriptions:   Requested Prescriptions     Pending Prescriptions Disp Refills   • amLODIPine (NORVASC) 10 MG tablet       Sig: Take 1 tablet by mouth Daily.   • carvedilol (COREG) 12.5 MG tablet       Sig: Take 1 tablet by mouth Every 12 (Twelve) Hours.        Pharmacy where request should be sent: [facility] - [phone]  - [phone]     Additional details provided by patient: PATIENTS WIFE STATES JOHN IS OUT OF BOTH MEDICATIONS LISTED. PLEASE ADVISE.     Does the patient have less than a 3 day supply:  [x] Yes  [] No    Spencer Hardy Rep   01/12/22 11:02 EST

## 2022-01-27 ENCOUNTER — SPECIALTY PHARMACY (OUTPATIENT)
Dept: PHARMACY | Facility: HOSPITAL | Age: 57
End: 2022-01-27

## 2022-01-27 ENCOUNTER — OFFICE VISIT (OUTPATIENT)
Dept: ENDOCRINOLOGY | Facility: CLINIC | Age: 57
End: 2022-01-27

## 2022-01-27 VITALS
WEIGHT: 254 LBS | OXYGEN SATURATION: 97 % | TEMPERATURE: 97 F | DIASTOLIC BLOOD PRESSURE: 91 MMHG | HEART RATE: 77 BPM | SYSTOLIC BLOOD PRESSURE: 141 MMHG | BODY MASS INDEX: 34.4 KG/M2 | HEIGHT: 72 IN

## 2022-01-27 DIAGNOSIS — I10 HYPERTENSION, UNSPECIFIED TYPE: ICD-10-CM

## 2022-01-27 DIAGNOSIS — E11.65 TYPE 2 DIABETES MELLITUS WITH HYPERGLYCEMIA, WITH LONG-TERM CURRENT USE OF INSULIN: Primary | ICD-10-CM

## 2022-01-27 DIAGNOSIS — Z79.4 TYPE 2 DIABETES MELLITUS WITH HYPERGLYCEMIA, WITH LONG-TERM CURRENT USE OF INSULIN: Primary | ICD-10-CM

## 2022-01-27 LAB
EXPIRATION DATE: ABNORMAL
HBA1C MFR BLD: 7 %
Lab: ABNORMAL

## 2022-01-27 PROCEDURE — 83036 HEMOGLOBIN GLYCOSYLATED A1C: CPT | Performed by: NURSE PRACTITIONER

## 2022-01-27 PROCEDURE — 3051F HG A1C>EQUAL 7.0%<8.0%: CPT | Performed by: NURSE PRACTITIONER

## 2022-01-27 PROCEDURE — 99213 OFFICE O/P EST LOW 20 MIN: CPT | Performed by: NURSE PRACTITIONER

## 2022-01-27 RX ORDER — INSULIN GLARGINE 100 [IU]/ML
INJECTION, SOLUTION SUBCUTANEOUS
Qty: 15 ML | Refills: 6 | Status: SHIPPED | OUTPATIENT
Start: 2022-01-27 | End: 2022-07-14 | Stop reason: SDUPTHER

## 2022-01-27 RX ORDER — LANCETS 30 GAUGE
1 EACH MISCELLANEOUS DAILY
Qty: 100 EACH | Refills: 1 | Status: SHIPPED | OUTPATIENT
Start: 2022-01-27 | End: 2022-11-14 | Stop reason: SDUPTHER

## 2022-01-27 RX ORDER — PEN NEEDLE, DIABETIC 32GX 5/32"
NEEDLE, DISPOSABLE MISCELLANEOUS
Qty: 100 EACH | Refills: 2 | Status: SHIPPED | OUTPATIENT
Start: 2022-01-27 | End: 2022-11-14 | Stop reason: SDUPTHER

## 2022-01-27 RX ORDER — SITAGLIPTIN 100 MG/1
100 TABLET, FILM COATED ORAL DAILY
Qty: 30 TABLET | Refills: 5 | Status: SHIPPED | OUTPATIENT
Start: 2022-01-27 | End: 2022-07-14 | Stop reason: SDUPTHER

## 2022-01-27 RX ORDER — DOXAZOSIN 8 MG/1
8 TABLET ORAL 2 TIMES DAILY
COMMUNITY
End: 2022-02-28 | Stop reason: SDUPTHER

## 2022-01-27 NOTE — PROGRESS NOTES
Specialty Pharmacy Patient Management Program  Endocrinology Initial Assessment       Mele Rossi is a 56 y.o. male with Type 2 Diabetes seen by an Endocrinology provider and enrolled in the Endocrinology Patient Management program offered by [facility].  An initial outreach was conducted, including assessment of therapy appropriateness and specialty medication education for Lantus, Jardiance, Januvia, metformin. The patient was introduced to services offered by [facility], including: regular assessments, refill coordination, curbside pick-up or mail order delivery options, prior authorization maintenance, and financial assistance programs as applicable. The patient was also provided with contact information for the pharmacy team.     Patient reports doing well overall with no hypoglycemic episodes. No tolerability issues with current regimen- only cost issues. He pays >$108 every 3 weeks for insulin therapy.     Insurance Coverage & Financial Support  Seeking support     Relevant Past Medical History and Comorbidities  Relevant medical history and concomitant health conditions were discussed with the patient. The patient's chart has been reviewed for relevant past medical history and comorbid health conditions and updated as necessary.   Past Medical History:   Diagnosis Date   • Diabetes (HCC)    • Fatigue    • History of stroke    • Hypertension    • Obesity     body mass index 30+-obesity   • Stroke (cerebrum) ([facility])    • Type 2 diabetes mellitus, uncontrolled ([facility])      Social History     Socioeconomic History   • Marital status:    Tobacco Use   • Smoking status: Former Smoker     Packs/day: 0.00     Types: Cigarettes     Quit date: 11/20/2018     Years since quitting: 3.1   • Smokeless tobacco: Never Used   • Tobacco comment: States Smokes 1 cigeratte per month   Vaping Use   • Vaping Use: Never used   Substance and Sexual Activity   • Alcohol use: No    • Drug use: No   • Sexual activity: Defer       Allergies  Known allergies and reactions were discussed with the patient. The patient's chart has been reviewed for  allergy information and updated as necessary.   Lisinopril    Current Medication List  This medication list has been reviewed with the patient and evaluated for any interactions or necessary modifications/recommendations, and updated to include all prescription medications, OTC medications, and supplements the patient is currently taking.  This list reflects what is contained in the patient's profile, which has also been marked as reviewed to communicate to other providers it is the most up to date version of the patient's current medication therapy.     Current Outpatient Medications:   •  amLODIPine (NORVASC) 10 MG tablet, Take 1 tablet by mouth Daily., Disp: 30 tablet, Rfl: 0  •  aspirin 325 MG tablet, Take 1 tablet by mouth Daily., Disp: , Rfl:   •  atorvastatin (Lipitor) 20 MG tablet, Take 1 tablet by mouth Daily., Disp: 30 tablet, Rfl: 11  •  baclofen (LIORESAL) 10 MG tablet, Take 20 mg by mouth 2 (Two) Times a Day. 2 tablets at bedtime, Disp: , Rfl:   •  carvedilol (COREG) 12.5 MG tablet, Take 1 tablet by mouth Every 12 (Twelve) Hours., Disp: 90 tablet, Rfl: 3  •  doxazosin (CARDURA) 8 MG tablet, Take 8 mg by mouth 2 (Two) Times a Day., Disp: , Rfl:   •  gabapentin (NEURONTIN) 300 MG capsule, Take 1 capsule by mouth 3 (Three) Times a Day., Disp: 270 capsule, Rfl: 1  •  glucose blood test strip, OneTouch Ultra Blue Test Strip  for daily blood test, Disp: , Rfl:   •  Insulin Glargine (Lantus SoloStar) 100 UNIT/ML injection pen, 70 units daily, Disp: 21 mL, Rfl: 11  •  Insulin Pen Needle (BD Pen Needle Claudia U/F) 32G X 4 MM misc, Use for injections daily as directed., Disp: 100 each, Rfl: 2  •  JANUVIA 100 MG tablet, Take 100 mg by mouth Daily., Disp: , Rfl:   •  Jardiance 25 MG tablet tablet, TAKE 1 TABLET BY MOUTH EVERY DAY, Disp: 30 tablet, Rfl:  5  •  Lancets misc, Use as directed, Disp: , Rfl:   •  metFORMIN (GLUCOPHAGE) 500 MG tablet, TAKE 1 TABLET BY MOUTH DAILY, Disp: 30 tablet, Rfl: 5    Drug Interactions  None       Recommended Medications Assessment  • Aspirin - Currently Taking  • Statin - Currently Taking  • ACEi/ARB - Indicated, not currently taking    Current 10-Year ASCVD Risk: unable to calculate    Relevant Laboratory Values  A1C Last 3 Results    HGBA1C Last 3 Results 7/7/21 1/27/22   Hemoglobin A1C 7.5 7.0           Lab Results   Component Value Date    HGBA1C 7.0 01/27/2022     Lab Results   Component Value Date    GLUCOSE 218 (H) 08/12/2018    CALCIUM 9.0 08/12/2018     08/12/2018    K 4.1 08/12/2018    CO2 28.0 08/12/2018     08/12/2018    BUN 19 08/12/2018    CREATININE 1.22 08/12/2018    EGFRIFNONA 62 08/12/2018    BCR 15.6 08/12/2018    ANIONGAP 5.0 08/12/2018     Lab Results   Component Value Date    CHOL 96 06/29/2020    CHLPL 102 12/21/2018    TRIG 177 (H) 06/29/2020    HDL 33 (L) 06/29/2020    LDL 28 06/29/2020         Adherence and Self-Administration  • Barriers to Patient Adherence and/or Self-Administration: Cost   • Methods for Supporting Patient Adherence and/or Self-Administration: None required    Vaccination Status:   COVID 19: UTD  Influenza: not interested  Pneumococcal: not interested  Hep B: unsure      Goals of Therapy  • Patient Goals of Therapy: Take medication every day as prescribed   • Clinical Goals or Therapeutic Targets, If Applicable: Keep A1C at goal       Reassessment Plan & Follow-Up  1. No other pharmacologic recommendations at this time. Patient A1C is 7.0% today.   2. Pharmacist to perform regular reassessments no more than (6) months from the previous assessment.  3. Welcome information and patient satisfaction survey to be sent by retail team with patient's initial fill.  4. Care Coordinator to set up future refill outreaches, coordinate prescription delivery, and escalate clinical  questions to pharmacist.     Additional Plans, Therapy Recommendations or Therapy Problems to Be Addressed: None. Patient is not interested in receiving vaccinations.    Recent Provider Changes:  None    Attestation  I attest that the initiated specialty medication(s) are appropriate for the patient based on my assessment.  If the prescribed therapy is at any point deemed not appropriate based on the current or future assessments, a consultation will be initiated with the patient's specialty care provider to determine the best course of action. The revised plan of therapy will be documented along with any additional patient education provided.       Amelia Osullivan, PharmD  1/27/2022  14:39 EST

## 2022-01-27 NOTE — ASSESSMENT & PLAN NOTE
BP slightly elevated today.  He was advised to take medication as prescribed, not missing any doses.  Will recheck at next appt.

## 2022-01-27 NOTE — ASSESSMENT & PLAN NOTE
-Diabetes is improving with A1C down from 7.5 to 7.0.  -Importance of yearly eye exams discussed.  Patient is to schedule a diabetic eye exam.  If he has not had one at next visit will try to get a retinal photo in office.  -Continue Januvia 100 mg.  -Continue Jardiance 25 mg.  -Continue Lantus 70 units qhs.  -Continue Metformin 500 mg QD.  -Importance of checking BG more often discussed.   -S/S hypoglycemia reviewed with Rule of 15's advised.  -Follow-up in 6 months.

## 2022-01-27 NOTE — PROGRESS NOTES
"Chief Complaint   Patient presents with   • Diabetes     follow up          HPI   Mele Rossi is a 56 y.o. male had concerns including Diabetes (follow up).    T2DM    He is checking blood sugars weekly.  Current medications for diabetes include Januvia 100 mg, Lantus 70 units qhs, Jardiance 25 mg, Metformin 500 mg QD.  Most recent optho exam: 2 years  Hypos: none    He has issues with cost of medication.  He would like to know if there are any patient assistance available.    The following portions of the patient's history were reviewed and updated as appropriate: allergies, current medications, past family history, past medical history, past social history, past surgical history and problem list.      Review of Systems   Constitutional: Negative for fatigue.   Endocrine: Negative for polydipsia, polyphagia and polyuria.   All other systems reviewed and are negative.       Physical Exam  Vitals reviewed.   Constitutional:       Appearance: Normal appearance.   Eyes:      Extraocular Movements: Extraocular movements intact.   Pulmonary:      Effort: Pulmonary effort is normal.   Neurological:      Mental Status: He is alert and oriented to person, place, and time.   Psychiatric:         Mood and Affect: Mood normal.         Behavior: Behavior normal.         Thought Content: Thought content normal.         Judgment: Judgment normal.        /91 (BP Location: Left arm, Patient Position: Sitting, Cuff Size: Adult)   Pulse 77   Temp 97 °F (36.1 °C) (Oral)   Ht 182.9 cm (72\")   Wt 115 kg (254 lb)   SpO2 97%   BMI 34.45 kg/m²      Labs and imaging    CMP:  Lab Results   Component Value Date    BUN 19 08/12/2018    CREATININE 1.22 08/12/2018    EGFRIFNONA 62 08/12/2018    BCR 15.6 08/12/2018     08/12/2018    K 4.1 08/12/2018    CO2 28.0 08/12/2018    CALCIUM 9.0 08/12/2018    ALBUMIN 3.98 08/09/2018    BILITOT 1.1 08/09/2018    ALKPHOS 82 08/09/2018    AST 22 08/09/2018    ALT 16 08/09/2018     Lipid " Panel:  Lab Results   Component Value Date    CHOL 96 06/29/2020    TRIG 177 (H) 06/29/2020    HDL 33 (L) 06/29/2020    VLDL 35.4 06/29/2020    LDL 28 06/29/2020     HbA1c:  Lab Results   Component Value Date    HGBA1C 7.0 01/27/2022    HGBA1C 7.5 07/07/2021     Glucose:    Lab Results   Component Value Date    POCGLU 154 (A) 07/07/2021     Microalbumin:  No results found for: LALO  TSH:  Lab Results   Component Value Date    TSH 2.38 07/15/2019       Assessment and plan  Diagnoses and all orders for this visit:    1. Type 2 diabetes mellitus with hyperglycemia, with long-term current use of insulin (HCC) (Primary)  Assessment & Plan:  -Diabetes is improving with A1C down from 7.5 to 7.0.  -Importance of yearly eye exams discussed.  Patient is to schedule a diabetic eye exam.  If he has not had one at next visit will try to get a retinal photo in office.  -Continue Januvia 100 mg.  -Continue Jardiance 25 mg.  -Continue Lantus 70 units qhs.  -Continue Metformin 500 mg QD.  -Importance of checking BG more often discussed.   -S/S hypoglycemia reviewed with Rule of 15's advised.  -Follow-up in 6 months.    Orders:  -     POC Glycosylated Hemoglobin (Hb A1C)    2. Hypertension, unspecified type  Assessment & Plan:  BP slightly elevated today.  He was advised to take medication as prescribed, not missing any doses.  Will recheck at next appt.         Return in about 6 months (around 7/27/2022) for Follow-up appointment, A1C. The patient was instructed to contact the clinic with any interval questions or concerns.    CRYS Gruber   Endocrinology

## 2022-02-02 ENCOUNTER — SPECIALTY PHARMACY (OUTPATIENT)
Dept: PHARMACY | Facility: HOSPITAL | Age: 57
End: 2022-02-02

## 2022-02-09 ENCOUNTER — SPECIALTY PHARMACY (OUTPATIENT)
Dept: PHARMACY | Facility: HOSPITAL | Age: 57
End: 2022-02-09

## 2022-02-21 ENCOUNTER — OFFICE VISIT (OUTPATIENT)
Dept: NEUROLOGY | Facility: CLINIC | Age: 57
End: 2022-02-21

## 2022-02-21 VITALS
HEART RATE: 73 BPM | HEIGHT: 72 IN | WEIGHT: 254 LBS | BODY MASS INDEX: 34.4 KG/M2 | DIASTOLIC BLOOD PRESSURE: 76 MMHG | OXYGEN SATURATION: 97 % | SYSTOLIC BLOOD PRESSURE: 128 MMHG

## 2022-02-21 DIAGNOSIS — I69.30 SEQUELAE, POST-STROKE: Primary | ICD-10-CM

## 2022-02-21 PROCEDURE — 99213 OFFICE O/P EST LOW 20 MIN: CPT | Performed by: PSYCHIATRY & NEUROLOGY

## 2022-02-21 RX ORDER — ATORVASTATIN CALCIUM 20 MG/1
20 TABLET, FILM COATED ORAL DAILY
Qty: 30 TABLET | Refills: 11 | Status: SHIPPED | OUTPATIENT
Start: 2022-02-21 | End: 2023-02-21 | Stop reason: SDUPTHER

## 2022-02-21 RX ORDER — BACLOFEN 10 MG/1
20 TABLET ORAL 2 TIMES DAILY
Qty: 120 TABLET | Refills: 11 | Status: SHIPPED | OUTPATIENT
Start: 2022-02-21 | End: 2022-03-22 | Stop reason: SDUPTHER

## 2022-02-21 RX ORDER — ATORVASTATIN CALCIUM 20 MG/1
20 TABLET, FILM COATED ORAL DAILY
Qty: 30 TABLET | Refills: 11 | Status: SHIPPED | OUTPATIENT
Start: 2022-02-21 | End: 2022-02-21 | Stop reason: SDUPTHER

## 2022-02-21 RX ORDER — BACLOFEN 10 MG/1
20 TABLET ORAL 2 TIMES DAILY
Qty: 120 TABLET | Refills: 11 | Status: SHIPPED | OUTPATIENT
Start: 2022-02-21 | End: 2022-02-21 | Stop reason: SDUPTHER

## 2022-02-21 NOTE — PROGRESS NOTES
Subjective:    CC: Mele Rossi is seen today  for Sequelae, post-stroke       HPI:  Current visit-patient denies having any new strokelike symptoms.  He continues to have weakness and spasticity on the right side.  He states that he did not go for PT as there was no one to take him there.  He continues to take aspirin 325 mg daily and Lipitor 20 mg daily as well as baclofen 20 mg twice a day.  His last A1c was down to 7.  He has been taking gabapentin 300 mg nightly instead of 3 times a day.  He does have numbness and occasional pain in his feet as well as a feeling of being cold.  Denies any pain in the legs on walking or any color changes.  He has not been wearing his AFO for his foot drop as he was scared of getting a skin tear given the swelling in his feet.    Last visit-patient states that he continues to have right-sided weakness/stiffness despite taking baclofen 20 mg twice a day.  He has stopped getting Botox because it was too expensive and it was not helping him.  Has also started physical therapy.  He continues to take aspirin 325 mg daily and Lipitor 40 mg daily.  His last lipid panel was as follows-TC 96, , LDL 28, HDL 33.  A1c was 7.3.    Last visit-  since the last visit patient got his second shot of Botox in March which has helped a little with spasticity.  He stopped getting physical therapy due to COVID but is planning to resume that soon.  He is also taking baclofen 20 mg twice a day.  He continues to take aspirin 325 mg daily and Lipitor 40 mg daily.  His blood pressure does fluctuate a lot and a lot of times it is extremely low in the mornings such as today (was in the 80s/60s).  His blood sugar also continues to run high and his last A1c was 8.3.  His endocrinologist did not make any medication changes but did tell him to make dietary modifications.  Patient states that he has gained 50 pounds since his stroke.     Last visit-since her last visit patient had his first set of Botox  injections for his right-sided spasticity 1 month ago.  It helped a little however he is unsure whether he will continue getting it as he is having trouble getting it approved by insurance.  Also continues to get occupational therapy and his strength has improved slightly.  With regards to his diabetes his last A1c was 7.9 but his endocrinologist has been adjusting his medications.  He lowered his dose of gabapentin to 100 mg at night, he says that a dose of 300 mg was helping with his neuropathy and the muscle cramps but made him extremely off balance when he woke up to go to the bathroom.  Continues to take aspirin 325 mg daily along with Lipitor 40 mg daily.  Had a lot of blood work at Dr. Lopez office recently.    Last visit-patient has now finished physical therapy but continues to have the right-sided weakness as well as spasticity for which he is taking baclofen.  He has also been getting pain and numbness in his feet that is exacerbated by prolonged standing or walking.  Dr. Benitez has ordered an EMG/NCS for him as well as OT for his hand.  He continues to take aspirin 325 mg daily in addition to Lipitor 40 mg daily.  His blood pressure is well controlled however his blood glucose remains poorly controlled and his last A1c was 8.1.    Last visit-since his last visit he continues to have right arm and leg weakness.  He has stopped getting home PT and does the exercises himself.  He stopped Plavix as advised but continues to take aspirin 325 mg daily and Lipitor 40 mg daily.  His last lipid panel was as follows-, , LDL 20, HDL 43.  His A1c has also improved and was 7.3.  His blood pressure is well controlled at home now.     Last visit-since his last visit he is doing about the same and has had no new strokelike symptoms.  He continues to have weakness in the right arm and leg.  He did see rehabilitation at [facility] for Botox injections for his spasticity however they are first trying to see if  it will improve with baclofen.  He does have an improvement in his cramping with the baclofen however the stiffness in his arm has remained the same.  He also saw his endocrinologist who made changes to his medications.  He has not had a repeat A1c level yet, last level was 10.6.  His blood pressure now is much better controlled at home.  Has not smoked since his stroke.     Initial visit-  53-year-old male accompanied by his wife with a history of poorly controlled hypertension, diabetes mellitus type 2 presents with a hospital follow-up for stroke.  As per patient he had an episode of right arm and leg weakness in early August along with slurring of speech.  He initially went to an outside facility but was sent back home after a CT head showed no abnormalities.  After that the symptoms worsened and he came back to our hospital where he had an MRI brain that showed an acute left pontine infarct.  His blood pressure was extremely high at the time ().  He had extensive testing including a CTA head and neck that did not show any intra-or extracranial stenosis,  a renal artery ultrasound that did not show any stenosis and an echocardiogram that showed an EF of over 70% with diastolic dysfunction but no PFO.  His telemetry recordings did not show any evidence of atrial fibrillation.  He had not been on any antiplatelet agent prior to this event and  was started on both aspirin 325 mg and Plavix 75 mg.  He was also started on Lipitor 80 mg.  Lipid panel was as follows-, , LDL 69, HDL 32.  His A1c was 10.6 and he was put on insulin in addition to his other medications.  He also has an upcoming appointment with endocrinology.  Patient was discharged to inpatient rehabilitation  for 4 weeks.  He is currently undergoing home health therapy but continues to have significant weakness in the right arm and leg.    The following portions of the patient's history were reviewed today and updated as of 12/27/2019   : allergies, current medications, past family history, past medical history, past social history, past surgical history and problem list  These document will be scanned to patient's chart.      Current Outpatient Medications:   •  amLODIPine (NORVASC) 10 MG tablet, Take 1 tablet by mouth Daily., Disp: 30 tablet, Rfl: 0  •  aspirin 325 MG tablet, Take 1 tablet by mouth Daily., Disp: , Rfl:   •  atorvastatin (Lipitor) 20 MG tablet, Take 1 tablet by mouth Daily., Disp: 30 tablet, Rfl: 11  •  baclofen (LIORESAL) 10 MG tablet, Take 2 tablets by mouth 2 (Two) Times a Day., Disp: 120 tablet, Rfl: 11  •  carvedilol (COREG) 12.5 MG tablet, Take 1 tablet by mouth Every 12 (Twelve) Hours., Disp: 90 tablet, Rfl: 3  •  doxazosin (CARDURA) 8 MG tablet, Take 8 mg by mouth 2 (Two) Times a Day., Disp: , Rfl:   •  empagliflozin (Jardiance) 25 MG tablet tablet, Take 1 tablet by mouth Daily., Disp: 30 tablet, Rfl: 5  •  gabapentin (NEURONTIN) 300 MG capsule, Take 1 capsule by mouth 3 (Three) Times a Day., Disp: 270 capsule, Rfl: 1  •  glucose blood test strip, Use as instructed to check blood sugar daily, Disp: 100 each, Rfl: 1  •  Insulin Glargine (Lantus SoloStar) 100 UNIT/ML injection pen, Inject 70 units under the skin daily as directed, Disp: 15 mL, Rfl: 6  •  Insulin Pen Needle (BD Pen Needle Claudia U/F) 32G X 4 MM misc, Use for injections daily as directed., Disp: 100 each, Rfl: 2  •  Januvia 100 MG tablet, Take 1 tablet by mouth Daily., Disp: 30 tablet, Rfl: 5  •  Lancets misc, Use as directed to check blood glucose Daily., Disp: 100 each, Rfl: 1  •  metFORMIN (GLUCOPHAGE) 500 MG tablet, Take 1 tablet by mouth Daily., Disp: 30 tablet, Rfl: 5   Past Medical History:   Diagnosis Date   • Diabetes (HCC)    • Fatigue    • History of stroke    • Hypertension    • Obesity     body mass index 30+-obesity   • Stroke (cerebrum) (HCC)    • Type 2 diabetes mellitus, uncontrolled (HCC)       History reviewed. No pertinent surgical history.  "  Family History   Problem Relation Age of Onset   • Diabetes Mother    • Mental illness Mother    • Hypertension Father       Social History     Socioeconomic History   • Marital status:    Tobacco Use   • Smoking status: Former Smoker     Packs/day: 0.00     Types: Cigarettes     Quit date: 11/20/2018     Years since quitting: 3.2   • Smokeless tobacco: Never Used   • Tobacco comment: States Smokes 1 cigeratte per month   Vaping Use   • Vaping Use: Never used   Substance and Sexual Activity   • Alcohol use: No   • Drug use: No   • Sexual activity: Defer     Review of Systems   All other systems reviewed and are negative.      Objective:    /76   Pulse 73   Ht 182.9 cm (72\")   Wt 115 kg (254 lb)   SpO2 97%   BMI 34.45 kg/m²     Neurology Exam:    General apperance: Obese    Mental status: Alert, awake and oriented to time place and person.    Recent and Remote memory: Intact.    Attention span and Concentration: Normal.     Language and Speech: Intact- No dysarthria.    Fluency, Naming , Repitition and Comprehension:  Intact    Cranial Nerves:   CN II: Visual fields are full. Intact. Fundi - Normal, No papillederma, Pupils - JAIDEN  CN III, IV and VI: Extraocular movements are intact. Normal saccades.   CN V: Facial sensation is intact.   CN VII: Muscles of facial expression reveal no asymmetry. Intact.   CN VIII: Hearing is intact. Whispered voice intact.   CN IX and X: Palate elevates symmetrically. Intact  CN XI: Shoulder shrug is intact.   CN XII: Tongue is midline without evidence of atrophy or fasciculation.     Ophthalmoscopic exam of optic disc-normal    Motor:  Right UE muscle strength 4/5 proximally, 3/5 distally. Spasticity present    Left UE muscle strength 5/5. Normal tone.      Right LE muscle strength 4-/5, 2/5 PF/DF  Spastic    Left LE muscle strength 5/5. Normal tone.      Sensory: Normal light touch, vibration and pinprick sensation bilaterally.    DTRs: 2+ bilaterally in upper " and lower extremities on left and 3+ on the right.  Pitting edema on both sides    Babinski: Negative bilaterally.    Co-ordination: Normal finger-to-nose, heel to shin B/L.    Rhomberg: Negative.    Gait: Steppage gait on the right.  Uses his cane    Cardiovascular: Regular rate and rhythm without murmur, gallop or rub.    Assessment and Plan:  1. Sequelae, post-stroke  Patient had a left pontine infarct most likely due to small vessel disease  I have asked him to continue taking aspirin 325 mg daily and Lipitor 20 mg daily for goal LDL of less than 70.  His LDL was 28.  Triglycerides remain elevated.  Maintain blood pressure less than 130/90.  His blood pressure is within normal limits now  Goal A1c of less than 7.  His last A1c has reduced to 7.0  I have also told him to do regular exercises for his right-sided weakness at home and to wear his AFO  He should continue taking baclofen 20 mg twice a day for his spasticity.  Stop Botox as he did not think it was helping  For his dependent edema he should elevate his legs while sitting down    2.  Diabetic neuropathy  He is currently taking gabapentin 300 mg at night.  He could take an extra dose of 300 mg if needed      Return in about 1 year (around 2/21/2023).         Kalyani Willis MD

## 2022-02-23 ENCOUNTER — SPECIALTY PHARMACY (OUTPATIENT)
Dept: PHARMACY | Facility: HOSPITAL | Age: 57
End: 2022-02-23

## 2022-02-23 NOTE — PROGRESS NOTES
Specialty Pharmacy Refill Coordination Note      Name:  Mele Rossi  :  1965  Date:  2022         Past Medical History:   Diagnosis Date   • Diabetes (HCC)    • Fatigue    • History of stroke    • Hypertension    • Obesity     body mass index 30+-obesity   • Stroke (cerebrum) (HCC)    • Type 2 diabetes mellitus, uncontrolled (HCC)        No past surgical history on file.    Social History     Socioeconomic History   • Marital status:    Tobacco Use   • Smoking status: Former Smoker     Packs/day: 0.00     Types: Cigarettes     Quit date: 2018     Years since quitting: 3.2   • Smokeless tobacco: Never Used   • Tobacco comment: States Smokes 1 cigeratte per month   Vaping Use   • Vaping Use: Never used   Substance and Sexual Activity   • Alcohol use: No   • Drug use: No   • Sexual activity: Defer       Family History   Problem Relation Age of Onset   • Diabetes Mother    • Mental illness Mother    • Hypertension Father        Allergies   Allergen Reactions   • Lisinopril Other (See Comments)     syncope       Current Outpatient Medications   Medication Sig Dispense Refill   • amLODIPine (NORVASC) 10 MG tablet Take 1 tablet by mouth Daily. 30 tablet 0   • aspirin 325 MG tablet Take 1 tablet by mouth Daily.     • atorvastatin (Lipitor) 20 MG tablet Take 1 tablet by mouth Daily. 30 tablet 11   • baclofen (LIORESAL) 10 MG tablet Take 2 tablets by mouth 2 (Two) Times a Day. 120 tablet 11   • carvedilol (COREG) 12.5 MG tablet Take 1 tablet by mouth Every 12 (Twelve) Hours. 90 tablet 3   • doxazosin (CARDURA) 8 MG tablet Take 8 mg by mouth 2 (Two) Times a Day.     • empagliflozin (Jardiance) 25 MG tablet tablet Take 1 tablet by mouth Daily. 30 tablet 5   • gabapentin (NEURONTIN) 300 MG capsule Take 1 capsule by mouth 3 (Three) Times a Day. 270 capsule 1   • glucose blood test strip Use as instructed to check blood sugar daily 100 each 1   • Insulin Glargine (Lantus SoloStar) 100 UNIT/ML injection  pen Inject 70 units under the skin daily as directed 15 mL 6   • Insulin Pen Needle (BD Pen Needle Claudia U/F) 32G X 4 MM misc Use for injections daily as directed. 100 each 2   • Januvia 100 MG tablet Take 1 tablet by mouth Daily. 30 tablet 5   • Lancets misc Use as directed to check blood glucose Daily. 100 each 1   • metFORMIN (GLUCOPHAGE) 500 MG tablet Take 1 tablet by mouth Daily. 30 tablet 5     No current facility-administered medications for this visit.         ASSESSMENT/PLAN:      Mele is a 56 y.o. male contacted today regarding refills of  Januvia 100 mg tablet, Jardiance 25 mg tablet, Metformin 500 mg tablet, Lantus Solostar 100 unit/ml injection pen specialty medication(s).    Reviewed and verified with patient:       Specialty medication(s) and dose(s) confirmed: yes    Refill Questions      Most Recent Value   Changes to allergies? No   Changes to medications? No   New conditions since last clinic visit No   Unplanned office visit, urgent care, ED, or hospital admission in the last 4 weeks  No   How does patient/caregiver feel medication is working? Very good   Financial problems or insurance changes  No   If yes, describe changes in insurance or financial issues. None   How many doses of your specialty medications were missed in the last 4 weeks? 0   Why were doses missed? NA   Does this patient require a clinical escalation to a pharmacist? No                     Follow-up: 30 day(s)     Gwendolyn Ramirez, Pharmacy Technician  Specialty Pharmacy Technician

## 2022-02-24 ENCOUNTER — TELEPHONE (OUTPATIENT)
Dept: ENDOCRINOLOGY | Facility: CLINIC | Age: 57
End: 2022-02-24

## 2022-02-24 NOTE — TELEPHONE ENCOUNTER
Patient wife called wanting to switch Doxazosin to apothocary here at [facility]. She said he got his last refill from [facility] and it's probably not time to fill it, but it had 0 refills left and she wanted to switch here with all his other meds.

## 2022-02-28 RX ORDER — DOXAZOSIN 8 MG/1
8 TABLET ORAL 2 TIMES DAILY
Qty: 60 TABLET | Refills: 5 | Status: SHIPPED | OUTPATIENT
Start: 2022-02-28 | End: 2022-07-27

## 2022-03-01 ENCOUNTER — HOSPITAL ENCOUNTER (OUTPATIENT)
Facility: HOSPITAL | Age: 57
Discharge: HOME | End: 2022-03-01
Payer: MEDICARE

## 2022-03-01 LAB
A/G RATIO: 1.5 (ref 0.8–2)
ALBUMIN SERPL-MCNC: 4.6 G/DL (ref 3.4–4.8)
ALP BLD-CCNC: 116 U/L (ref 25–100)
ALT SERPL-CCNC: 16 U/L (ref 4–36)
ANION GAP SERPL CALCULATED.3IONS-SCNC: 16 MMOL/L (ref 3–16)
AST SERPL-CCNC: 21 U/L (ref 8–33)
BASOPHILS ABSOLUTE: 0.1 K/UL (ref 0–0.1)
BASOPHILS RELATIVE PERCENT: 0.8 %
BILIRUB SERPL-MCNC: 0.6 MG/DL (ref 0.3–1.2)
BUN BLDV-MCNC: 20 MG/DL (ref 6–20)
CALCIUM SERPL-MCNC: 9.2 MG/DL (ref 8.5–10.5)
CHLORIDE BLD-SCNC: 103 MMOL/L (ref 98–107)
CHOLESTEROL, TOTAL: 102 MG/DL (ref 0–200)
CO2: 23 MMOL/L (ref 20–30)
CREAT SERPL-MCNC: 1.2 MG/DL (ref 0.4–1.2)
EOSINOPHILS ABSOLUTE: 0.2 K/UL (ref 0–0.4)
EOSINOPHILS RELATIVE PERCENT: 2.2 %
FOLATE: 13.64 NG/ML
GFR AFRICAN AMERICAN: >59
GFR NON-AFRICAN AMERICAN: >59
GLOBULIN: 3 G/DL
GLUCOSE BLD-MCNC: 106 MG/DL (ref 74–106)
HBA1C MFR BLD: 7.8 %
HCT VFR BLD CALC: 47.9 % (ref 40–54)
HDLC SERPL-MCNC: 38 MG/DL (ref 40–60)
HEMOGLOBIN: 15.9 G/DL (ref 13–18)
IMMATURE GRANULOCYTES #: 0 K/UL
IMMATURE GRANULOCYTES %: 0.5 % (ref 0–5)
LDL CHOLESTEROL CALCULATED: 39 MG/DL
LYMPHOCYTES ABSOLUTE: 1.4 K/UL (ref 1.5–4)
LYMPHOCYTES RELATIVE PERCENT: 18.2 %
MCH RBC QN AUTO: 29 PG (ref 27–32)
MCHC RBC AUTO-ENTMCNC: 33.2 G/DL (ref 31–35)
MCV RBC AUTO: 87.4 FL (ref 80–100)
MONOCYTES ABSOLUTE: 0.6 K/UL (ref 0.2–0.8)
MONOCYTES RELATIVE PERCENT: 8.6 %
NEUTROPHILS ABSOLUTE: 5.2 K/UL (ref 2–7.5)
NEUTROPHILS RELATIVE PERCENT: 69.7 %
PDW BLD-RTO: 13.9 % (ref 11–16)
PLATELET # BLD: 24 K/UL (ref 150–400)
PMV BLD AUTO: 12.3 FL (ref 6–10)
POTASSIUM SERPL-SCNC: 3.9 MMOL/L (ref 3.4–5.1)
PROSTATE SPECIFIC ANTIGEN: 6.62 NG/ML (ref 0–4)
RBC # BLD: 5.48 M/UL (ref 4.5–6)
SODIUM BLD-SCNC: 142 MMOL/L (ref 136–145)
TOTAL PROTEIN: 7.6 G/DL (ref 6.4–8.3)
TRIGL SERPL-MCNC: 124 MG/DL (ref 0–249)
TSH SERPL DL<=0.05 MIU/L-ACNC: 2.44 UIU/ML (ref 0.27–4.2)
VITAMIN B-12: >2000 PG/ML (ref 211–911)
VLDLC SERPL CALC-MCNC: 25 MG/DL
WBC # BLD: 7.4 K/UL (ref 4–11)

## 2022-03-01 PROCEDURE — 80061 LIPID PANEL: CPT

## 2022-03-01 PROCEDURE — 84153 ASSAY OF PSA TOTAL: CPT

## 2022-03-01 PROCEDURE — 36415 COLL VENOUS BLD VENIPUNCTURE: CPT

## 2022-03-01 PROCEDURE — 83036 HEMOGLOBIN GLYCOSYLATED A1C: CPT

## 2022-03-01 PROCEDURE — 82607 VITAMIN B-12: CPT

## 2022-03-01 PROCEDURE — 82746 ASSAY OF FOLIC ACID SERUM: CPT

## 2022-03-01 PROCEDURE — 84443 ASSAY THYROID STIM HORMONE: CPT

## 2022-03-01 PROCEDURE — 80053 COMPREHEN METABOLIC PANEL: CPT

## 2022-03-01 PROCEDURE — 85025 COMPLETE CBC W/AUTO DIFF WBC: CPT

## 2022-03-02 ENCOUNTER — HOSPITAL ENCOUNTER (EMERGENCY)
Facility: HOSPITAL | Age: 57
Discharge: HOME | End: 2022-03-02
Attending: EMERGENCY MEDICINE | Admitting: FAMILY MEDICINE

## 2022-03-02 ENCOUNTER — APPOINTMENT (OUTPATIENT)
Dept: ULTRASOUND IMAGING | Facility: HOSPITAL | Age: 57
End: 2022-03-02

## 2022-03-02 ENCOUNTER — APPOINTMENT (OUTPATIENT)
Dept: GENERAL RADIOLOGY | Facility: HOSPITAL | Age: 57
End: 2022-03-02

## 2022-03-02 VITALS
RESPIRATION RATE: 18 BRPM | TEMPERATURE: 100.3 F | OXYGEN SATURATION: 97 % | HEART RATE: 75 BPM | WEIGHT: 257 LBS | HEIGHT: 72 IN | SYSTOLIC BLOOD PRESSURE: 120 MMHG | BODY MASS INDEX: 34.81 KG/M2 | DIASTOLIC BLOOD PRESSURE: 95 MMHG

## 2022-03-02 DIAGNOSIS — D69.6 THROMBOCYTOPENIA: Primary | ICD-10-CM

## 2022-03-02 DIAGNOSIS — I82.811 VENOUS EMBOLISM AND THROMBOSIS OF SUPERFICIAL VESSELS OF RIGHT LOWER EXTREMITY: ICD-10-CM

## 2022-03-02 LAB
ALBUMIN SERPL-MCNC: 4.1 G/DL (ref 3.5–5.2)
ALBUMIN/GLOB SERPL: 1.2 G/DL
ALP SERPL-CCNC: 110 U/L (ref 39–117)
ALT SERPL W P-5'-P-CCNC: 13 U/L (ref 1–41)
ANION GAP SERPL CALCULATED.3IONS-SCNC: 13.2 MMOL/L (ref 5–15)
APTT PPP: 32.2 SECONDS (ref 24.5–37.2)
AST SERPL-CCNC: 14 U/L (ref 1–40)
BASOPHILS # BLD AUTO: 0.05 10*3/MM3 (ref 0–0.2)
BASOPHILS NFR BLD AUTO: 0.5 % (ref 0–1.5)
BILIRUB SERPL-MCNC: 1.2 MG/DL (ref 0–1.2)
BILIRUB UR QL STRIP: NEGATIVE
BUN SERPL-MCNC: 19 MG/DL (ref 6–20)
BUN/CREAT SERPL: 15.3 (ref 7–25)
CALCIUM SPEC-SCNC: 9 MG/DL (ref 8.6–10.5)
CHLORIDE SERPL-SCNC: 100 MMOL/L (ref 98–107)
CLARITY UR: CLEAR
CO2 SERPL-SCNC: 21.8 MMOL/L (ref 22–29)
COLOR UR: YELLOW
CREAT SERPL-MCNC: 1.24 MG/DL (ref 0.76–1.27)
DEPRECATED RDW RBC AUTO: 42.4 FL (ref 37–54)
EGFRCR SERPLBLD CKD-EPI 2021: 67 ML/MIN/1.73
EOSINOPHIL # BLD AUTO: 0.1 10*3/MM3 (ref 0–0.4)
EOSINOPHIL NFR BLD AUTO: 1.1 % (ref 0.3–6.2)
ERYTHROCYTE [DISTWIDTH] IN BLOOD BY AUTOMATED COUNT: 13.7 % (ref 12.3–15.4)
FIBRINOGEN PPP-MCNC: 421 MG/DL (ref 209–518)
GLOBULIN UR ELPH-MCNC: 3.4 GM/DL
GLUCOSE SERPL-MCNC: 130 MG/DL (ref 65–99)
GLUCOSE UR STRIP-MCNC: ABNORMAL MG/DL
HCT VFR BLD AUTO: 43.6 % (ref 37.5–51)
HGB BLD-MCNC: 15.1 G/DL (ref 13–17.7)
HGB UR QL STRIP.AUTO: NEGATIVE
HOLD SPECIMEN: NORMAL
HOLD SPECIMEN: NORMAL
IMM GRANULOCYTES # BLD AUTO: 0.04 10*3/MM3 (ref 0–0.05)
IMM GRANULOCYTES NFR BLD AUTO: 0.4 % (ref 0–0.5)
INR PPP: 1.05 (ref 0.9–1.1)
KETONES UR QL STRIP: ABNORMAL
LDH SERPL-CCNC: 164 U/L (ref 135–225)
LEUKOCYTE ESTERASE UR QL STRIP.AUTO: NEGATIVE
LIPASE SERPL-CCNC: 21 U/L (ref 13–60)
LYMPHOCYTES # BLD AUTO: 0.74 10*3/MM3 (ref 0.7–3.1)
LYMPHOCYTES NFR BLD AUTO: 7.8 % (ref 19.6–45.3)
MCH RBC QN AUTO: 29.3 PG (ref 26.6–33)
MCHC RBC AUTO-ENTMCNC: 34.6 G/DL (ref 31.5–35.7)
MCV RBC AUTO: 84.5 FL (ref 79–97)
MONOCYTES # BLD AUTO: 0.87 10*3/MM3 (ref 0.1–0.9)
MONOCYTES NFR BLD AUTO: 9.1 % (ref 5–12)
NEUTROPHILS NFR BLD AUTO: 7.72 10*3/MM3 (ref 1.7–7)
NEUTROPHILS NFR BLD AUTO: 81.1 % (ref 42.7–76)
NITRITE UR QL STRIP: NEGATIVE
NRBC BLD AUTO-RTO: 0 /100 WBC (ref 0–0.2)
PH UR STRIP.AUTO: 5.5 [PH] (ref 5–8)
PLATELET # BLD AUTO: 16 10*3/MM3 (ref 140–450)
PMV BLD AUTO: 12.4 FL (ref 6–12)
POTASSIUM SERPL-SCNC: 3.9 MMOL/L (ref 3.5–5.2)
PROCALCITONIN SERPL-MCNC: 0.1 NG/ML (ref 0–0.25)
PROT SERPL-MCNC: 7.5 G/DL (ref 6–8.5)
PROT UR QL STRIP: ABNORMAL
PROTHROMBIN TIME: 14.2 SECONDS (ref 12–15.1)
RBC # BLD AUTO: 5.16 10*6/MM3 (ref 4.14–5.8)
RBC MORPH BLD: NORMAL
SMALL PLATELETS BLD QL SMEAR: NORMAL
SODIUM SERPL-SCNC: 135 MMOL/L (ref 136–145)
SP GR UR STRIP: >=1.03 (ref 1–1.03)
TROPONIN T SERPL-MCNC: <0.01 NG/ML (ref 0–0.03)
UROBILINOGEN UR QL STRIP: ABNORMAL
WBC MORPH BLD: NORMAL
WBC NRBC COR # BLD: 9.52 10*3/MM3 (ref 3.4–10.8)
WHOLE BLOOD HOLD SPECIMEN: NORMAL
WHOLE BLOOD HOLD SPECIMEN: NORMAL

## 2022-03-02 PROCEDURE — 83615 LACTATE (LD) (LDH) ENZYME: CPT | Performed by: PHYSICIAN ASSISTANT

## 2022-03-02 PROCEDURE — 99283 EMERGENCY DEPT VISIT LOW MDM: CPT

## 2022-03-02 PROCEDURE — 80053 COMPREHEN METABOLIC PANEL: CPT | Performed by: PHYSICIAN ASSISTANT

## 2022-03-02 PROCEDURE — 84145 PROCALCITONIN (PCT): CPT | Performed by: PHYSICIAN ASSISTANT

## 2022-03-02 PROCEDURE — 93005 ELECTROCARDIOGRAM TRACING: CPT | Performed by: PHYSICIAN ASSISTANT

## 2022-03-02 PROCEDURE — 93005 ELECTROCARDIOGRAM TRACING: CPT | Performed by: EMERGENCY MEDICINE

## 2022-03-02 PROCEDURE — 85384 FIBRINOGEN ACTIVITY: CPT | Performed by: PHYSICIAN ASSISTANT

## 2022-03-02 PROCEDURE — 84484 ASSAY OF TROPONIN QUANT: CPT | Performed by: PHYSICIAN ASSISTANT

## 2022-03-02 PROCEDURE — 83690 ASSAY OF LIPASE: CPT | Performed by: PHYSICIAN ASSISTANT

## 2022-03-02 PROCEDURE — 85007 BL SMEAR W/DIFF WBC COUNT: CPT | Performed by: PHYSICIAN ASSISTANT

## 2022-03-02 PROCEDURE — 85025 COMPLETE CBC W/AUTO DIFF WBC: CPT | Performed by: PHYSICIAN ASSISTANT

## 2022-03-02 PROCEDURE — 85060 BLOOD SMEAR INTERPRETATION: CPT | Performed by: PHYSICIAN ASSISTANT

## 2022-03-02 PROCEDURE — 81003 URINALYSIS AUTO W/O SCOPE: CPT | Performed by: PHYSICIAN ASSISTANT

## 2022-03-02 PROCEDURE — 71045 X-RAY EXAM CHEST 1 VIEW: CPT

## 2022-03-02 PROCEDURE — 85610 PROTHROMBIN TIME: CPT | Performed by: PHYSICIAN ASSISTANT

## 2022-03-02 PROCEDURE — 93971 EXTREMITY STUDY: CPT

## 2022-03-02 PROCEDURE — 85730 THROMBOPLASTIN TIME PARTIAL: CPT | Performed by: PHYSICIAN ASSISTANT

## 2022-03-02 PROCEDURE — 63710000001 PREDNISONE PER 1 MG: Performed by: PHYSICIAN ASSISTANT

## 2022-03-02 RX ORDER — PREDNISONE 20 MG/1
20 TABLET ORAL ONCE
Status: COMPLETED | OUTPATIENT
Start: 2022-03-02 | End: 2022-03-02

## 2022-03-02 RX ORDER — SODIUM CHLORIDE 0.9 % (FLUSH) 0.9 %
10 SYRINGE (ML) INJECTION AS NEEDED
Status: DISCONTINUED | OUTPATIENT
Start: 2022-03-02 | End: 2022-03-03 | Stop reason: HOSPADM

## 2022-03-02 RX ORDER — PREDNISONE 20 MG/1
20 TABLET ORAL DAILY
Qty: 2 TABLET | Refills: 0 | Status: SHIPPED | OUTPATIENT
Start: 2022-03-03 | End: 2022-03-05

## 2022-03-02 RX ADMIN — SODIUM CHLORIDE 500 ML: 9 INJECTION, SOLUTION INTRAVENOUS at 19:28

## 2022-03-02 RX ADMIN — PREDNISONE 20 MG: 20 TABLET ORAL at 22:03

## 2022-03-03 ENCOUNTER — TELEPHONE (OUTPATIENT)
Dept: ONCOLOGY | Facility: CLINIC | Age: 57
End: 2022-03-03

## 2022-03-03 NOTE — TELEPHONE ENCOUNTER
Caller: ANGELA  Relationship to Patient: WIFE    Reason for Call: ANGELA IS CALLING TO GET AN APPT SCHEDULED FOR JOHN.    HE WAS IN THE ER YESTERDAY.    ER DISCHARGE SUMMARY STATED TO  FOLLOW UP WITH ONCOLOGY.    SHE WILL BE CONTACTING REFERRING PHYS FOR REFERRAL.    SHE WANTS TO SCHEDULE ASAP.

## 2022-03-04 ENCOUNTER — CONSULT (OUTPATIENT)
Dept: ONCOLOGY | Facility: CLINIC | Age: 57
End: 2022-03-04

## 2022-03-04 ENCOUNTER — LAB (OUTPATIENT)
Dept: LAB | Facility: HOSPITAL | Age: 57
End: 2022-03-04

## 2022-03-04 VITALS
RESPIRATION RATE: 12 BRPM | HEART RATE: 70 BPM | HEIGHT: 72 IN | TEMPERATURE: 97.3 F | WEIGHT: 255 LBS | SYSTOLIC BLOOD PRESSURE: 169 MMHG | DIASTOLIC BLOOD PRESSURE: 92 MMHG | BODY MASS INDEX: 34.54 KG/M2 | OXYGEN SATURATION: 99 %

## 2022-03-04 DIAGNOSIS — D69.3 ACUTE ITP: ICD-10-CM

## 2022-03-04 DIAGNOSIS — D69.3 ACUTE ITP: Primary | ICD-10-CM

## 2022-03-04 LAB
BASOPHILS # BLD AUTO: 0.03 10*3/MM3 (ref 0–0.2)
BASOPHILS NFR BLD AUTO: 0.3 % (ref 0–1.5)
DEPRECATED RDW RBC AUTO: 42.9 FL (ref 37–54)
EOSINOPHIL # BLD AUTO: 0.04 10*3/MM3 (ref 0–0.4)
EOSINOPHIL NFR BLD AUTO: 0.4 % (ref 0.3–6.2)
ERYTHROCYTE [DISTWIDTH] IN BLOOD BY AUTOMATED COUNT: 13.8 % (ref 12.3–15.4)
HCT VFR BLD AUTO: 43.4 % (ref 37.5–51)
HGB BLD-MCNC: 14.7 G/DL (ref 13–17.7)
IMM GRANULOCYTES # BLD AUTO: 0.08 10*3/MM3 (ref 0–0.05)
IMM GRANULOCYTES NFR BLD AUTO: 0.8 % (ref 0–0.5)
LYMPHOCYTES # BLD AUTO: 0.76 10*3/MM3 (ref 0.7–3.1)
LYMPHOCYTES NFR BLD AUTO: 7.3 % (ref 19.6–45.3)
MCH RBC QN AUTO: 29.1 PG (ref 26.6–33)
MCHC RBC AUTO-ENTMCNC: 33.9 G/DL (ref 31.5–35.7)
MCV RBC AUTO: 85.8 FL (ref 79–97)
MONOCYTES # BLD AUTO: 0.51 10*3/MM3 (ref 0.1–0.9)
MONOCYTES NFR BLD AUTO: 4.9 % (ref 5–12)
NEUTROPHILS NFR BLD AUTO: 8.95 10*3/MM3 (ref 1.7–7)
NEUTROPHILS NFR BLD AUTO: 86.3 % (ref 42.7–76)
NRBC BLD AUTO-RTO: 0 /100 WBC (ref 0–0.2)
PLATELET # BLD AUTO: 27 10*3/MM3 (ref 140–450)
PMV BLD AUTO: 13.6 FL (ref 6–12)
RBC # BLD AUTO: 5.06 10*6/MM3 (ref 4.14–5.8)
RBC MORPH BLD: NORMAL
SMALL PLATELETS BLD QL SMEAR: NORMAL
WBC MORPH BLD: NORMAL
WBC NRBC COR # BLD: 10.37 10*3/MM3 (ref 3.4–10.8)

## 2022-03-04 PROCEDURE — 99204 OFFICE O/P NEW MOD 45 MIN: CPT | Performed by: INTERNAL MEDICINE

## 2022-03-04 PROCEDURE — 85007 BL SMEAR W/DIFF WBC COUNT: CPT

## 2022-03-04 PROCEDURE — 36415 COLL VENOUS BLD VENIPUNCTURE: CPT

## 2022-03-04 PROCEDURE — 85025 COMPLETE CBC W/AUTO DIFF WBC: CPT

## 2022-03-04 RX ORDER — PREDNISONE 20 MG/1
60 TABLET ORAL DAILY
Qty: 60 TABLET | Refills: 1 | Status: SHIPPED | OUTPATIENT
Start: 2022-03-04 | End: 2022-04-29

## 2022-03-04 NOTE — PROGRESS NOTES
ID: 57 y.o. year old male from [place] [address]    PCP: Kymberly Ibrahim APRN    REFERRING PHYSICIAN: Ashley Mukherjee DO    Reason for Consultation: Thrombocytopenia    Dear MsRoxi Alexandria and Dr. Mukherjee    It is a pleasure to meet Mr. Rossi today.  He is a very pleasant 57-year-old gentleman who presents today for consultation for severe thrombocytopenia.  He recently underwent a Covid booster.  At that time blood was checked.  His platelet count was twenty-four.  He reports that he has not been feeling well over the last several months.  Denies any issues with pain.  Just severe fatigue and weakness that is been occurring.  Subsequently was told to go to the ER where he was started on low-dose steroids and asked to recheck his platelet count.  His platelet count had dropped to 16.  He presents today to discuss treatment.  Denies any rash.  No history of autoimmune disorders.      Past Medical History:   Diagnosis Date   • Diabetes (HCC)    • Fatigue    • History of stroke    • Hypertension    • Obesity     body mass index 30+-obesity   • Stroke (cerebrum) (HCC)    • Type 2 diabetes mellitus, uncontrolled (HCC)        History reviewed. No pertinent surgical history.    Social History     Socioeconomic History   • Marital status:    Tobacco Use   • Smoking status: Former Smoker     Packs/day: 1.00     Years: 20.00     Pack years: 20.00     Types: Cigarettes     Quit date: 11/20/2018     Years since quitting: 3.2   • Smokeless tobacco: Never Used   • Tobacco comment: States Smokes 1 cigeratte per month   Vaping Use   • Vaping Use: Never used   Substance and Sexual Activity   • Alcohol use: No   • Drug use: No   • Sexual activity: Defer       Family History   Problem Relation Age of Onset   • Diabetes Mother    • Mental illness Mother    • Heart disease Mother    • Hypertension Father    • Heart disease Father        Review of Systems:    16 point review of systems was performed and reviewed and scanned into  the EMR    Review of Systems - Oncology      Current Outpatient Medications:   •  amLODIPine (NORVASC) 10 MG tablet, Take 1 tablet by mouth Daily., Disp: 30 tablet, Rfl: 0  •  aspirin 325 MG tablet, Take 1 tablet by mouth Daily., Disp: , Rfl:   •  atorvastatin (Lipitor) 20 MG tablet, Take 1 tablet by mouth Daily., Disp: 30 tablet, Rfl: 11  •  baclofen (LIORESAL) 10 MG tablet, Take 2 tablets by mouth 2 (Two) Times a Day., Disp: 120 tablet, Rfl: 11  •  carvedilol (COREG) 12.5 MG tablet, Take 1 tablet by mouth Every 12 (Twelve) Hours., Disp: 90 tablet, Rfl: 3  •  doxazosin (CARDURA) 8 MG tablet, Take 1 tablet by mouth 2 (Two) Times a Day. (Patient taking differently: Take 4 mg by mouth 2 (Two) Times a Day.), Disp: 60 tablet, Rfl: 5  •  empagliflozin (Jardiance) 25 MG tablet tablet, Take 1 tablet by mouth Daily., Disp: 30 tablet, Rfl: 5  •  gabapentin (NEURONTIN) 300 MG capsule, Take 1 capsule by mouth 3 (Three) Times a Day., Disp: 270 capsule, Rfl: 1  •  glucose blood test strip, Use as instructed to check blood sugar daily, Disp: 100 each, Rfl: 1  •  Insulin Glargine (Lantus SoloStar) 100 UNIT/ML injection pen, Inject 70 units under the skin daily as directed, Disp: 15 mL, Rfl: 6  •  Insulin Pen Needle (BD Pen Needle Claudia U/F) 32G X 4 MM misc, Use for injections daily as directed., Disp: 100 each, Rfl: 2  •  Januvia 100 MG tablet, Take 1 tablet by mouth Daily., Disp: 30 tablet, Rfl: 5  •  Lancets misc, Use as directed to check blood glucose Daily., Disp: 100 each, Rfl: 1  •  metFORMIN (GLUCOPHAGE) 500 MG tablet, Take 1 tablet by mouth Daily., Disp: 30 tablet, Rfl: 5  •  predniSONE (DELTASONE) 20 MG tablet, Take 1 tablet by mouth Daily for 2 doses., Disp: 2 tablet, Rfl: 0  •  predniSONE (DELTASONE) 20 MG tablet, Take 3 tablets by mouth Daily., Disp: 60 tablet, Rfl: 1    Pain Medications             aspirin 325 MG tablet Take 1 tablet by mouth Daily.    baclofen (LIORESAL) 10 MG tablet Take 2 tablets by mouth 2 (Two)  Times a Day.    gabapentin (NEURONTIN) 300 MG capsule Take 1 capsule by mouth 3 (Three) Times a Day.    predniSONE (DELTASONE) 20 MG tablet Take 1 tablet by mouth Daily for 2 doses.    predniSONE (DELTASONE) 20 MG tablet Take 3 tablets by mouth Daily.           Allergies   Allergen Reactions   • Lisinopril Dizziness     syncope                     Objective     Vitals:    03/04/22 1334   BP: 169/92   Pulse: 70   Resp: 12   Temp: 97.3 °F (36.3 °C)   SpO2: 99%     Body mass index is 34.58 kg/m².  Body surface area is 2.37 meters squared.        03/04/22  1334   Weight: 116 kg (255 lb)     Pain Score    03/04/22 1334   PainSc: 0-No pain          Physical Exam    General: well appearing, in no acute distress  HEENT: sclera anicteric, oropharynx clear, neck is supple  Lymphatics: no cervical, supraclavicular, or axillary adenopathy  Cardiovascular: regular rate and rhythm, no murmurs, rubs or gallops  Lungs: clear to auscultation bilaterally  Abdomen: soft, nontender, nondistended.  No palpable organomegaly  Extremities: no lower extremity edema  Skin: no rashes, lesions, bruising, or petechiae  Msk: Right-sided weakness due to history of stroke  Psych: Mood is stable        Lab Results   Component Value Date    GLUCOSE 130 (H) 03/02/2022    BUN 19 03/02/2022    CREATININE 1.24 03/02/2022     (L) 03/02/2022    K 3.9 03/02/2022     03/02/2022    CO2 21.8 (L) 03/02/2022    CALCIUM 9.0 03/02/2022    PROTEINTOT 7.5 03/02/2022    ALBUMIN 4.10 03/02/2022    BILITOT 1.2 03/02/2022    ALKPHOS 110 03/02/2022    AST 14 03/02/2022    ALT 13 03/02/2022       Lab Results   Component Value Date    HGB 15.1 03/02/2022    HCT 43.6 03/02/2022    MCV 84.5 03/02/2022    PLT 16 (C) 03/02/2022    WBC 9.52 03/02/2022    NEUTROABS 7.72 (H) 03/02/2022    LYMPHSABS 0.74 03/02/2022    MONOSABS 0.87 03/02/2022    EOSABS 0.10 03/02/2022    BASOSABS 0.05 03/02/2022       Lab Results   Component Value Date    PLT 16 (C) 03/02/2022    PLT  24 (C) 03/01/2022     12/21/2018     (L) 09/04/2018     (L) 08/31/2018         XR Chest 1 View    Result Date: 3/2/2022  Unremarkable. Authenticated by Bryan Garcia M.D. on 03/02/2022 08:58:40 PM    US Venous Doppler Lower Extremity Right (duplex)    Result Date: 3/2/2022  No evidence of DVT. Authenticated by Bryan Garcia M.D. on 03/02/2022 08:18:47 PM        Assessment/Plan      1.  ITP.  This is a acute ITP likely worsened over the last several months.  I don't think the booster had anything to do with that since his platelet count at the time of the booster was twenty-four.  I'm going to place him on high-dose steroids at 60 mg/day.  I'm going to check his platelets twice a week to make sure it is improving.  I will start tapering him by 20 mg a week if his platelet counts respond.  If it doesn't then he likely will need IVIG and/or rituximab.  If this is related to the vaccine then it can be notoriously difficult to make his platelet counts higher without addition of IVIG or rituximab.        Thank you for allowing me to participate in the care of this patient.    Yours sincerely,    Amadou Weller MD  [facility]  Hematology and Oncology    Return on: 04/01/22  Return in (Approximately): 1 month    Orders Placed This Encounter   Procedures   • CBC & Differential     Standing Status:   Future     Standing Expiration Date:   3/4/2023     Order Specific Question:   Manual Differential     Answer:   No

## 2022-03-07 ENCOUNTER — TELEPHONE (OUTPATIENT)
Dept: ONCOLOGY | Facility: CLINIC | Age: 57
End: 2022-03-07

## 2022-03-07 ENCOUNTER — HOSPITAL ENCOUNTER (OUTPATIENT)
Facility: HOSPITAL | Age: 57
Discharge: HOME | End: 2022-03-07
Payer: COMMERCIAL

## 2022-03-07 LAB — PLATELET # BLD: 66 K/UL (ref 150–400)

## 2022-03-07 PROCEDURE — 36415 COLL VENOUS BLD VENIPUNCTURE: CPT

## 2022-03-07 PROCEDURE — 85049 AUTOMATED PLATELET COUNT: CPT

## 2022-03-07 NOTE — TELEPHONE ENCOUNTER
Returned call to patient's wife. Informing her by  that it's hard to get any home health at this time. Informing her that a lot of  agencies don't have enough nurses informing her that we will have to stick with the plan to have patient get labs drawn at his PCP office or [facility].

## 2022-03-07 NOTE — TELEPHONE ENCOUNTER
Provider: DR PRISCILLA Gibson: ANGELA  Relationship to Patient: WIFE    Reason for Call: ANGELA STATES THAT WITH HER JOB AND JOHN IS UNABLE TO DRIVE,SHE WAS WANTING TO KNOW IF THEY CAN GET HOME HEALTH TO COME TO HIS HOME TO DRAW HIS LABS.  SHE STATES THAT HE IS TO GET LABS DRAWN TWICE A WEEK.     PLEASE A CALL TO DISCUSS AND ADVISE

## 2022-03-08 NOTE — TELEPHONE ENCOUNTER
Returned call to patient's wife discussing with her that [employer] doesn't consider a lab drawn a specialty skill. Informing her that unless he had a picc for dressing change. Patient's wife reporting that it's so hard to get patient out to get labs drawn. However patient's wife stating she understood issue.

## 2022-03-08 NOTE — TELEPHONE ENCOUNTER
Caller: Angela Rossi    Relationship: Emergency Contact    Best call back number: [phone]    What was the call regarding: ANGELA CALLED AGAIN REGARDING THIS. SHE IS WANTING A CALL BACK TO DISCUSS OTHER OPTIONS FOR POSSIBLE HOME HEALTH COMING OUT TWICE A WEEK FOR HIS LABS.    Do you require a callback: YES

## 2022-03-09 ENCOUNTER — TELEPHONE (OUTPATIENT)
Dept: ONCOLOGY | Facility: CLINIC | Age: 57
End: 2022-03-09

## 2022-03-09 LAB
CYTOLOGIST CVX/VAG CYTO: NORMAL
PATH INTERP BLD-IMP: NORMAL

## 2022-03-09 NOTE — TELEPHONE ENCOUNTER
Called and spoke to patient's wife. Discussing with her that patient labs show that patient plt is 66 and he may go down to prednisone 40 mg daily and may check  Recheck labs on Monday.

## 2022-03-10 RX ORDER — DOXAZOSIN MESYLATE 4 MG/1
TABLET ORAL
Qty: 60 TABLET | Refills: 1 | OUTPATIENT
Start: 2022-03-10

## 2022-03-14 ENCOUNTER — TELEPHONE (OUTPATIENT)
Dept: NEUROLOGY | Facility: CLINIC | Age: 57
End: 2022-03-14

## 2022-03-14 ENCOUNTER — TELEPHONE (OUTPATIENT)
Dept: ONCOLOGY | Facility: CLINIC | Age: 57
End: 2022-03-14

## 2022-03-14 ENCOUNTER — HOSPITAL ENCOUNTER (OUTPATIENT)
Facility: HOSPITAL | Age: 57
Discharge: HOME | End: 2022-03-14
Payer: MEDICARE

## 2022-03-14 LAB — PLATELET # BLD: 38 K/UL (ref 150–400)

## 2022-03-14 PROCEDURE — 85049 AUTOMATED PLATELET COUNT: CPT

## 2022-03-14 PROCEDURE — 36415 COLL VENOUS BLD VENIPUNCTURE: CPT

## 2022-03-14 RX ORDER — BACLOFEN 10 MG/1
20 TABLET ORAL 2 TIMES DAILY
Qty: 120 TABLET | Refills: 11 | OUTPATIENT
Start: 2022-03-14

## 2022-03-14 NOTE — TELEPHONE ENCOUNTER
Caller: EnidLexy    Relationship: Emergency Contact    Best call back number:[phone]    Requested Prescriptions:   Requested Prescriptions     Pending Prescriptions Disp Refills   • baclofen (LIORESAL) 10 MG tablet 120 tablet 11     Sig: Take 2 tablets by mouth 2 (Two) Times a Day.        Pharmacy where request should be sent:  [email or web address]     Additional details provided by patient:  WIFE CALLING IN STATES PT GOT A LETTER ADVISING MEDICATION DENIED NOT INCLUDED ON LIST  OR    DUE TO TAKING MORE THAN LIMITED   STATES REFILL NOT DUE PT IS TAKING MORE THAN  NORMAL  DUE TO STROKE   WOULD LIKE A CALL BACK TO DISCUSS GETTING THIS RE ORDERED     Does the patient have less than a 3 day supply:  [x] Yes  [x] No    Spencer Walker Rep   03/14/22 11:47 EDT

## 2022-03-14 NOTE — TELEPHONE ENCOUNTER
Patients wife states they weren't actually requesting refill, they need  to call insurance to state why patient takes the amount of baclofen that he does. Please advise.

## 2022-03-14 NOTE — TELEPHONE ENCOUNTER
Hub staff attempted to follow warm transfer process and was unsuccessful     Caller: ALESIA    Relationship to patient:     Best call back number: [phone]    Patient is needing: TO GIVE A CRITICAL PLATELET  COUNT  38

## 2022-03-17 NOTE — TELEPHONE ENCOUNTER
PATIENTS SPOUSE CALLED, STATED THEY HAVENT HEARD ANYTHING THIS WEEK ABOUT HIS LABS AND WHAT DOSE HE NEEDED TO TAKE

## 2022-03-21 ENCOUNTER — HOSPITAL ENCOUNTER (OUTPATIENT)
Facility: HOSPITAL | Age: 57
Discharge: HOME | End: 2022-03-21
Payer: COMMERCIAL

## 2022-03-21 LAB — PLATELET # BLD: 26 K/UL (ref 150–400)

## 2022-03-21 PROCEDURE — 85049 AUTOMATED PLATELET COUNT: CPT

## 2022-03-21 PROCEDURE — 36415 COLL VENOUS BLD VENIPUNCTURE: CPT

## 2022-03-22 RX ORDER — BACLOFEN 10 MG/1
20 TABLET ORAL 2 TIMES DAILY
Qty: 120 TABLET | Refills: 11 | Status: SHIPPED | OUTPATIENT
Start: 2022-03-22 | End: 2023-02-21 | Stop reason: SDUPTHER

## 2022-03-22 NOTE — TELEPHONE ENCOUNTER
Pt's wife called in asking for an update and if its approved can we get this refilled pt is almost out of medication?      Please advise       [phone]

## 2022-03-24 ENCOUNTER — TELEPHONE (OUTPATIENT)
Dept: ONCOLOGY | Facility: CLINIC | Age: 57
End: 2022-03-24

## 2022-03-24 ENCOUNTER — SPECIALTY PHARMACY (OUTPATIENT)
Dept: PHARMACY | Facility: HOSPITAL | Age: 57
End: 2022-03-24

## 2022-03-24 NOTE — TELEPHONE ENCOUNTER
Caller: SHAILESH GÓMEZ    Relationship to patient: WIFE    Best call back number: [phone]    Patient is needing: TO KNOW WHAT IS GOING ON WITH 'S PLATELET COUNT AND LAB APPTS. SHE STATES SHE DID NOT HEAR ANYTHING LAST WEEK. SHE WOULD LIKE A CALL BACK TODAY, IF POSSIBLE.

## 2022-03-24 NOTE — TELEPHONE ENCOUNTER
I returned Mayuri's phone call after reviewing patients chart and confirmed with her that last check on 3/9 was last time they went down on his prednisone.  She reports they didn't know if anything needed to change with his most recent labs on 3/21/22.  I discussed with Dr. Ramírez that he has been on 40mg daily since 3/9 and he advised to have patient go back up to 60mg daily until he sees Dr. Guillaume next week and to make sure he rechecks his labs again next week.  Mayuri advised they usually do it on Mondays. I advised to watch for bruising/bleeding and would forward message to Dr. Guillaume's nurse to f/u on next week as well.

## 2022-03-29 ENCOUNTER — HOSPITAL ENCOUNTER (OUTPATIENT)
Facility: HOSPITAL | Age: 57
Discharge: HOME | End: 2022-03-29
Payer: MEDICARE

## 2022-03-29 LAB — PLATELET # BLD: 42 K/UL (ref 150–400)

## 2022-03-29 PROCEDURE — 85049 AUTOMATED PLATELET COUNT: CPT

## 2022-03-29 PROCEDURE — 36415 COLL VENOUS BLD VENIPUNCTURE: CPT

## 2022-04-01 ENCOUNTER — LAB (OUTPATIENT)
Dept: LAB | Facility: HOSPITAL | Age: 57
End: 2022-04-01

## 2022-04-01 ENCOUNTER — SPECIALTY PHARMACY (OUTPATIENT)
Dept: ONCOLOGY | Facility: HOSPITAL | Age: 57
End: 2022-04-01

## 2022-04-01 ENCOUNTER — OFFICE VISIT (OUTPATIENT)
Dept: ONCOLOGY | Facility: CLINIC | Age: 57
End: 2022-04-01

## 2022-04-01 ENCOUNTER — TELEPHONE (OUTPATIENT)
Dept: ONCOLOGY | Facility: CLINIC | Age: 57
End: 2022-04-01

## 2022-04-01 VITALS
HEIGHT: 72 IN | DIASTOLIC BLOOD PRESSURE: 89 MMHG | OXYGEN SATURATION: 95 % | HEART RATE: 72 BPM | RESPIRATION RATE: 16 BRPM | WEIGHT: 243 LBS | TEMPERATURE: 97.3 F | BODY MASS INDEX: 32.91 KG/M2 | SYSTOLIC BLOOD PRESSURE: 143 MMHG

## 2022-04-01 DIAGNOSIS — D69.3 ACUTE ITP: ICD-10-CM

## 2022-04-01 DIAGNOSIS — D69.3 ACUTE ITP: Primary | ICD-10-CM

## 2022-04-01 LAB
BASOPHILS # BLD AUTO: 0.05 10*3/MM3 (ref 0–0.2)
BASOPHILS NFR BLD AUTO: 0.5 % (ref 0–1.5)
DEPRECATED RDW RBC AUTO: 45.1 FL (ref 37–54)
EOSINOPHIL # BLD AUTO: 0.02 10*3/MM3 (ref 0–0.4)
EOSINOPHIL NFR BLD AUTO: 0.2 % (ref 0.3–6.2)
ERYTHROCYTE [DISTWIDTH] IN BLOOD BY AUTOMATED COUNT: 14.3 % (ref 12.3–15.4)
HCT VFR BLD AUTO: 49.5 % (ref 37.5–51)
HGB BLD-MCNC: 16.7 G/DL (ref 13–17.7)
IMM GRANULOCYTES # BLD AUTO: 0.33 10*3/MM3 (ref 0–0.05)
IMM GRANULOCYTES NFR BLD AUTO: 3.3 % (ref 0–0.5)
LYMPHOCYTES # BLD AUTO: 0.43 10*3/MM3 (ref 0.7–3.1)
LYMPHOCYTES NFR BLD AUTO: 4.3 % (ref 19.6–45.3)
MCH RBC QN AUTO: 29.5 PG (ref 26.6–33)
MCHC RBC AUTO-ENTMCNC: 33.7 G/DL (ref 31.5–35.7)
MCV RBC AUTO: 87.3 FL (ref 79–97)
MONOCYTES # BLD AUTO: 0.31 10*3/MM3 (ref 0.1–0.9)
MONOCYTES NFR BLD AUTO: 3.1 % (ref 5–12)
NEUTROPHILS NFR BLD AUTO: 8.76 10*3/MM3 (ref 1.7–7)
NEUTROPHILS NFR BLD AUTO: 88.6 % (ref 42.7–76)
NRBC BLD AUTO-RTO: 0 /100 WBC (ref 0–0.2)
PLATELET # BLD AUTO: 46 10*3/MM3 (ref 140–450)
PMV BLD AUTO: 11.2 FL (ref 6–12)
RBC # BLD AUTO: 5.67 10*6/MM3 (ref 4.14–5.8)
RBC MORPH BLD: NORMAL
SMALL PLATELETS BLD QL SMEAR: NORMAL
WBC MORPH BLD: NORMAL
WBC NRBC COR # BLD: 9.9 10*3/MM3 (ref 3.4–10.8)

## 2022-04-01 PROCEDURE — 85007 BL SMEAR W/DIFF WBC COUNT: CPT

## 2022-04-01 PROCEDURE — 36415 COLL VENOUS BLD VENIPUNCTURE: CPT

## 2022-04-01 PROCEDURE — 85025 COMPLETE CBC W/AUTO DIFF WBC: CPT

## 2022-04-01 PROCEDURE — 99214 OFFICE O/P EST MOD 30 MIN: CPT | Performed by: INTERNAL MEDICINE

## 2022-04-01 NOTE — TELEPHONE ENCOUNTER
----- Message from Renetta Austin RN sent at 4/1/2022  3:20 PM EDT -----  Regarding: CRITICAL LAB      Critical Test Results      MD: Amadou Weller MD    Date: 4-1-22     Critical test result: Platelets 46k    Time results received: 3:08 PM

## 2022-04-01 NOTE — TELEPHONE ENCOUNTER
Name of Physician notified: Janee    Time Physician Notified: 3:22      [x]  Orders received   Patient will be starting promacta  []  Protocol/Standing orders followed    []  No new orders

## 2022-04-01 NOTE — PROGRESS NOTES
Oral Chemotherapy - New Referral    Received a referral from Dr. Weller    Treatment Plan: Promacta (eltrombopag)  Start date of treatment planned for: As soon as oral specialty medication is available.  Indication: acute ITP, refractory to steroids  Relevant past treatments: prednisone  Is the therapy appropriate based on treatment guidelines and FDA labeling?: Yes  Therapeutic Goals: Continue treatment until progression or intolerable toxicity  Patient can self-administer oral medications: Yes    • Drug-Drug Interactions: The current medication list was reviewed and there are no relevant drug-drug interactions.  • Medication Allergies: The patient has no relevant allergies as it relates to their oral specialty medication  • Review of Labs/Dose Adjustments: The patient's most recent labs were reviewed and all are WNL to start treatment at this dose.     A prescription was released to [facility] specialty pharmacy for   Drug: Promacta (eltrombopag)  Strength: 50 mg  Directions: Take 1 tablet by mouth daily  Quantity: 30  Refills: 5  $0 copay (via copay card)    Pharmacy education is is not yet scheduled. and CCA will be signed at that time.    Meliza Ferguson, PharmD, [facility]  Oncology Clinical Pharmacist  4/1/2022  14:26 EDT

## 2022-04-01 NOTE — PROGRESS NOTES
Drug: eltombopag  Strength: 50 mg tablet  Directions: Take 1 tablet PO daily  QTY: 30  RF:5    Released to pharmacy: [facility]    Completed independent double check on medication order/RX.

## 2022-04-01 NOTE — PROGRESS NOTES
REASON FOR VISIT: ITP    HISTORY OF PRESENT ILLNESS:   57 y.o.  male presents today for follow-up of his ITP.  I had placed him on steroids at the last visit and slowly weaned him down.  Unfortunately his platelet count declined significantly.  He again has been placed on 60 mg of prednisone daily.  Unfortunately he is having a lot of issues with his blood sugars due to his diabetes.  He is having a hard time weaning off his steroids since it drops his platelets.    Past medical history, social history and family history was reviewed 04/01/22 and unchanged from prior visit.    Review of Systems:    Review of Systems - Oncology         Medications:        Current Outpatient Medications:   •  amLODIPine (NORVASC) 10 MG tablet, Take 1 tablet by mouth Daily., Disp: 30 tablet, Rfl: 0  •  aspirin 325 MG tablet, Take 1 tablet by mouth Daily., Disp: , Rfl:   •  atorvastatin (Lipitor) 20 MG tablet, Take 1 tablet by mouth Daily., Disp: 30 tablet, Rfl: 11  •  baclofen (LIORESAL) 10 MG tablet, Take 2 tablets by mouth 2 (Two) Times a Day., Disp: 120 tablet, Rfl: 11  •  carvedilol (COREG) 12.5 MG tablet, Take 1 tablet by mouth Every 12 (Twelve) Hours., Disp: 90 tablet, Rfl: 3  •  doxazosin (CARDURA) 8 MG tablet, Take 1 tablet by mouth 2 (Two) Times a Day. (Patient taking differently: Take 4 mg by mouth 2 (Two) Times a Day.), Disp: 60 tablet, Rfl: 5  •  eltrombopag (PROMACTA) 50 MG tablet, Take 1 tablet by mouth Daily., Disp: 30 tablet, Rfl: 5  •  empagliflozin (Jardiance) 25 MG tablet tablet, Take 1 tablet by mouth Daily., Disp: 30 tablet, Rfl: 5  •  gabapentin (NEURONTIN) 300 MG capsule, Take 1 capsule by mouth 3 (Three) Times a Day., Disp: 270 capsule, Rfl: 1  •  glucose blood test strip, Use as instructed to check blood sugar daily, Disp: 100 each, Rfl: 1  •  Insulin Glargine (Lantus SoloStar) 100 UNIT/ML injection pen, Inject 70 units under the skin daily as directed, Disp: 15 mL, Rfl: 6  •  Insulin Pen Needle (BD Pen  "Needle Claudia U/F) 32G X 4 MM misc, Use for injections daily as directed., Disp: 100 each, Rfl: 2  •  Januvia 100 MG tablet, Take 1 tablet by mouth Daily., Disp: 30 tablet, Rfl: 5  •  Lancets misc, Use as directed to check blood glucose Daily., Disp: 100 each, Rfl: 1  •  metFORMIN (GLUCOPHAGE) 500 MG tablet, Take 1 tablet by mouth Daily., Disp: 30 tablet, Rfl: 5  •  predniSONE (DELTASONE) 20 MG tablet, Take 3 tablets by mouth Daily., Disp: 60 tablet, Rfl: 1    Pain Medications             aspirin 325 MG tablet Take 1 tablet by mouth Daily.    baclofen (LIORESAL) 10 MG tablet Take 2 tablets by mouth 2 (Two) Times a Day.    gabapentin (NEURONTIN) 300 MG capsule Take 1 capsule by mouth 3 (Three) Times a Day.    predniSONE (DELTASONE) 20 MG tablet Take 3 tablets by mouth Daily.             ALLERGIES:    Allergies   Allergen Reactions   • Lisinopril Dizziness     syncope         Physical Exam    VITAL SIGNS:  /89   Pulse 72   Temp 97.3 °F (36.3 °C) (Temporal)   Resp 16   Ht 182.9 cm (72\")   Wt 110 kg (243 lb)   SpO2 95%   BMI 32.96 kg/m²                 Wt Readings from Last 3 Encounters:   04/01/22 110 kg (243 lb)   03/04/22 116 kg (255 lb)   03/02/22 117 kg (257 lb)       Body mass index is 32.96 kg/m². Body surface area is 2.31 meters squared.    Pain Score    04/01/22 1344   PainSc: 0-No pain           Performance Status: 0    General: well appearing, in no acute distress  HEENT: sclera anicteric, neck is supple  Lymphatics: no cervical, supraclavicular, or axillary adenopathy  Cardiovascular: regular rate and rhythm, no murmurs, rubs or gallops  Lungs: clear to auscultation bilaterally  Abdomen: soft, nontender, nondistended.  No palpable organomegaly  Extremities: no lower extremity edema  Skin: no rashes, lesions, bruising, or petechiae  Msk:  Shows weakness of the large muscle groups  Psych: Mood is stable        RECENT LABS:    Lab Results   Component Value Date    HGB 14.7 03/04/2022    HCT 43.4 " 03/04/2022    MCV 85.8 03/04/2022    PLT 42 (C) 03/29/2022    WBC 10.37 03/04/2022    NEUTROABS 8.95 (H) 03/04/2022    LYMPHSABS 0.76 03/04/2022    MONOSABS 0.51 03/04/2022    EOSABS 0.04 03/04/2022    BASOSABS 0.03 03/04/2022       Lab Results   Component Value Date    GLUCOSE 130 (H) 03/02/2022    BUN 19 03/02/2022    CREATININE 1.24 03/02/2022     (L) 03/02/2022    K 3.9 03/02/2022     03/02/2022    CO2 21.8 (L) 03/02/2022    CALCIUM 9.0 03/02/2022    PROTEINTOT 7.5 03/02/2022    ALBUMIN 4.10 03/02/2022    BILITOT 1.2 03/02/2022    ALKPHOS 110 03/02/2022    AST 14 03/02/2022    ALT 13 03/02/2022       Lab Results   Component Value Date    PLT 42 (C) 03/29/2022    PLT 26 (C) 03/21/2022    PLT 38 (C) 03/14/2022    PLT 66 (L) 03/07/2022    PLT 27 (C) 03/04/2022           Assessment/Plan    1.  Acute ITP.  Patient seems to be responding to steroids though he is not able to come off of it.  He is also having a lot of side effects to it including high blood sugars.  I recommended switching him to Promacta to see if we can keep his platelet count at a reasonable level.  We discussed the reasoning behind it.  He is agreeable to get started and will plan to start next week.        Total time of patient care on day of service including time prior to, face to face with patient, and following visit spent in reviewing records, lab results,  discussion with patient, and documentation/charting was > 31 minutes.     Amadou Weller MD  [facility] Hematology and Oncology    Return on: 04/29/22  Return in (Approximately): 1 month    Orders Placed This Encounter   Procedures   • CBC & Differential   • CBC & Differential       4/1/2022

## 2022-04-06 RX ORDER — DOXAZOSIN MESYLATE 4 MG/1
TABLET ORAL
Qty: 60 TABLET | OUTPATIENT
Start: 2022-04-06

## 2022-04-07 ENCOUNTER — OFFICE VISIT (OUTPATIENT)
Dept: UROLOGY | Facility: CLINIC | Age: 57
End: 2022-04-07

## 2022-04-07 VITALS
SYSTOLIC BLOOD PRESSURE: 128 MMHG | WEIGHT: 243 LBS | OXYGEN SATURATION: 98 % | HEART RATE: 64 BPM | HEIGHT: 72 IN | BODY MASS INDEX: 32.91 KG/M2 | DIASTOLIC BLOOD PRESSURE: 74 MMHG

## 2022-04-07 DIAGNOSIS — R97.20 ELEVATED PROSTATE SPECIFIC ANTIGEN (PSA): Primary | ICD-10-CM

## 2022-04-07 LAB
BILIRUB BLD-MCNC: NEGATIVE MG/DL
CLARITY, POC: ABNORMAL
COLOR UR: ABNORMAL
EXPIRATION DATE: ABNORMAL
GLUCOSE UR STRIP-MCNC: ABNORMAL MG/DL
KETONES UR QL: NEGATIVE
LEUKOCYTE EST, POC: NEGATIVE
Lab: ABNORMAL
NITRITE UR-MCNC: NEGATIVE MG/ML
PH UR: 6 [PH] (ref 5–8)
PROT UR STRIP-MCNC: NEGATIVE MG/DL
RBC # UR STRIP: NEGATIVE /UL
SP GR UR: 1.02 (ref 1–1.03)
UROBILINOGEN UR QL: NORMAL

## 2022-04-07 PROCEDURE — 81003 URINALYSIS AUTO W/O SCOPE: CPT | Performed by: PHYSICIAN ASSISTANT

## 2022-04-07 PROCEDURE — 99203 OFFICE O/P NEW LOW 30 MIN: CPT | Performed by: PHYSICIAN ASSISTANT

## 2022-04-07 NOTE — PROGRESS NOTES
Chief Complaint  Elevated PSA    Referring Provider  Bridgett Mckeon APRN HPI  Mr. Rossi is a 57 y.o.  male who presents with an elevated PSA to 6.62 on March 9th.  PSA    PSA 3/1/22   PSA 6.62 (A)   (A) Abnormal value            Patient complains of nocturia, weak stream, frequency, intermittency.  His IPSS score is 5.     Patient denies fevers, chills, nausea, vomiting, constipation, or flank pain.  Past  history is negative for BPH, frequent UTIs, gross hematuria, or dysuria.   Had a stroke 4 years ago, started doxazosin. Has never had a prostate exam, only ever saw urology for solitary kidney stone 15 years ago (did not need surgery).     He denies shortness of breath, leg swelling, calf pain or bone pain.    IPSS Questionnaire (AUA-7):  Over the past month…    1)  Incomplete Emptying  How often have you had a sensation of not emptying your bladder?  0 - Not at all   2)  Frequency  How often have you had to urinate less than every two hours? 1 - Less than 1 time in 5   3)  Intermittency  How often have you found you stopped and started again several times when you urinated?  1 - Less than 1 time in 5   4) Urgency  How often have you found it difficult to postpone urination?  0 - Not at all   5) Weak Stream  How often have you had a weak urinary stream?  1 - Less than 1 time in 5   6) Straining  How often have you had to push or strain to begin urination?  0 - Not at all   7) Nocturia  How many times did you typically get up at night to urinate?  2 - 2 times   Total Score:  5       Quality of life due to urinary symptoms:  If you were to spend the rest of your life with your urinary condition the way it is now, how would you feel about that? 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 No pertinent surgical history.    Medications    Current Outpatient Medications:   •  amLODIPine (NORVASC) 10 MG tablet, Take 1 tablet by mouth Daily., Disp: 30 tablet, Rfl: 0  •  atorvastatin (Lipitor) 20 MG tablet, Take 1 tablet by mouth Daily., Disp: 30 tablet, Rfl: 11  •  baclofen (LIORESAL) 10 MG tablet, Take 2 tablets by mouth 2 (Two) Times a Day., Disp: 120 tablet, Rfl: 11  •  carvedilol (COREG) 12.5 MG tablet, Take 1 tablet by mouth Every 12 (Twelve) Hours., Disp: 90 tablet, Rfl: 3  •  doxazosin (CARDURA) 8 MG tablet, Take 1 tablet by mouth 2 (Two) Times a Day. (Patient taking differently: Take 4 mg by mouth 2 (Two) Times a Day.), Disp: 60 tablet, Rfl: 5  •  eltrombopag (PROMACTA) 50 MG tablet, Take 1 tablet by mouth Daily., Disp: 30 tablet, Rfl: 5  •  empagliflozin (Jardiance) 25 MG tablet tablet, Take 1 tablet by mouth Daily., Disp: 30 tablet, Rfl: 5  •  gabapentin (NEURONTIN) 300 MG capsule, Take 1 capsule by mouth 3 (Three) Times a Day., Disp: 270 capsule, Rfl: 1  •  glucose blood test strip, Use as instructed to check blood sugar daily, Disp: 100 each, Rfl: 1  •  Insulin Glargine (Lantus SoloStar) 100 UNIT/ML injection pen, Inject 70 units under the skin daily as directed (Patient taking differently: Inject 70 units under the skin daily as directed), Disp: 15 mL, Rfl: 6  •  Insulin Pen Needle (BD Pen Needle Claudia U/F) 32G X 4 MM misc, Use for injections daily as directed., Disp: 100 each, Rfl: 2  •  Januvia 100 MG tablet, Take 1 tablet by mouth Daily., Disp: 30 tablet, Rfl: 5  •  Lancets misc, Use as directed to check blood glucose Daily., Disp: 100 each, Rfl: 1  •  metFORMIN (GLUCOPHAGE) 500 MG tablet, Take 1 tablet by mouth Daily., Disp: 30 tablet, Rfl: 5  •  predniSONE (DELTASONE) 20 MG tablet, Take 3 tablets by mouth Daily., Disp: 60 tablet, Rfl: 1  •  aspirin 325  "MG tablet, Take 1 tablet by mouth Daily., Disp: , Rfl:     Allergies  Allergies   Allergen Reactions   • Lisinopril Dizziness     syncope       Social History  Social History     Tobacco Use   • Smoking status: Former Smoker     Packs/day: 1.00     Years: 20.00     Pack years: 20.00     Types: Cigarettes     Quit date: 11/20/2018     Years since quitting: 3.3   • Smokeless tobacco: Never Used   • Tobacco comment: States Smokes 1 cigeratte per month   Vaping Use   • Vaping Use: Never used   Substance Use Topics   • Alcohol use: No   • Drug use: No       Family History  Family History   Problem Relation Age of Onset   • Diabetes Mother    • Mental illness Mother    • Heart disease Mother    • Hypertension Father    • Heart disease Father       He has no family history of prostate cancer.      Physical Exam  Visit Vitals  /74   Pulse 64   Ht 182.9 cm (72\")   Wt 110 kg (243 lb)   SpO2 98%   BMI 32.96 kg/m²     Rectal:  Normal tone, no masses.  Prostate: Symmetric, non-tender, anodular and no induration.      Labs  Brief Urine Lab Results  (Last result in the past 365 days)      Color   Clarity   Blood   Leuk Est   Nitrite   Protein   CREAT   Urine HCG        03/02/22 2102 Yellow   Clear   Negative   Negative   Negative   Trace                 Lab Results   Component Value Date    PSA 6.62 (H) 03/01/2022    PSA 4.46 (H) 12/21/2018         Assessment  Mr. Rossi is a 57 y.o.  male who presents with elevated PSA.  This is a new diagnosis of uncertain clinical prognosis.    I explained to the patient that an elevated PSA is a marker of risk of prostate cancer and a prostate biopsy would be the next step in diagnosis. I explained that sampling error can occur with any biopsy and there is a risk of potentially missing a cancer that may be present. I discussed the risks, benefits, and alternatives to prostate biopsy, including hematuria, hematochezia, and hematospermia.  I also discussed the risk of diagnosing a " clinically-insignificant prostate cancer.  I discussed the risks of sepsis, which can be minimized by prophylactic antibiotics.     The patient is currently undergoing care with hematology due to ITP.  Unfortunately, he has failed a round of steroids and will be undergoing treatment with Promacta.  His last platelet count was 46 and his steroids are currently being weaned down to assess for response with hematology.  Discussed the patient's risk of serious bleeding during procedure with Dr. Carson.  Dr. Carson recommends that the patient's platelet count be at least 50 prior to prostate biopsy.       Plan  1. I have recommended TRUS biopsy of prostate, however the patient's thrombocytopenia remains too profound to proceed  2.  Obtain platelet count and total to free PSA today  3.  Plan to follow-up in 1 month after further treatment with hematology

## 2022-04-08 ENCOUNTER — HOSPITAL ENCOUNTER (OUTPATIENT)
Dept: ONCOLOGY | Facility: HOSPITAL | Age: 57
Discharge: HOME | End: 2022-04-01
Admitting: INTERNAL MEDICINE

## 2022-04-08 ENCOUNTER — SPECIALTY PHARMACY (OUTPATIENT)
Dept: ONCOLOGY | Facility: HOSPITAL | Age: 57
End: 2022-04-08

## 2022-04-08 DIAGNOSIS — D69.3 ACUTE ITP: Primary | ICD-10-CM

## 2022-04-08 LAB
PLATELET # BLD AUTO: 26 10*3/MM3 (ref 140–450)
PSA FREE MFR SERPL: 17.9 %
PSA FREE SERPL-MCNC: 1 NG/ML
PSA SERPL-MCNC: 5.6 NG/ML (ref 0–4)

## 2022-04-08 PROCEDURE — G0463 HOSPITAL OUTPT CLINIC VISIT: HCPCS

## 2022-04-08 RX ORDER — ONDANSETRON HYDROCHLORIDE 8 MG/1
8 TABLET, FILM COATED ORAL 3 TIMES DAILY PRN
Qty: 30 TABLET | Refills: 5 | Status: SHIPPED | OUTPATIENT
Start: 2022-04-08

## 2022-04-08 NOTE — PROGRESS NOTES
Specialty Pharmacy Patient Management Program  Oncology Initial Assessment       Mele Rossi is a 57 y.o. male with acute ITP seen by an Oncology provider and enrolled in the Oncology Patient Management program offered by [facility].  An initial outreach was conducted, including assessment of therapy appropriateness and specialty medication education for Promacta (eltrombopag). The patient was introduced to services offered by [facility], including: regular assessments, refill coordination, curbside pick-up or mail order delivery options, prior authorization maintenance, and financial assistance programs as applicable. The patient was also provided with contact information for the pharmacy team.     Regimen: Promacta (eltrombopag) 50 mg PO daily    Start date of oral specialty medication: 4/9/22    Relevant Past Medical History, Comorbidities, and Vaccines  Relevant medical history and concomitant health conditions were discussed with the patient. The patient's chart has been reviewed for relevant past medical history and comorbid health conditions and updated as necessary.  Vaccines are coordinated by the patient's oncologist and primary care provider.  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   • Diabetes (HCC)    • Fatigue    • History of stroke    • Hypertension    • Obesity     body mass index 30+-obesity   • Stroke (cerebrum) (HCC)    • Type 2 diabetes mellitus, uncontrolled      Social History     Socioeconomic History   • Marital status:    Tobacco Use   • Smoking status: Former Smoker     Packs/day: 1.00     Years: 20.00     Pack years: 20.00     Types: Cigarettes     Quit date: 11/20/2018     Years since quitting: 3.3   • Smokeless tobacco: Never Used   • Tobacco comment: States Smokes 1 cigeratte per month   Vaping Use   • Vaping Use: Never used   Substance and Sexual Activity   • Alcohol use: No   • Drug use: No   • Sexual  activity: Defer       Allergies  Known allergies and reactions were discussed with the patient. The patient's chart has been reviewed for allergy information and updated as necessary.   Lisinopril    Current Medication List  This medication list has been reviewed with the patient and evaluated for any interactions or necessary modifications/recommendations, and updated to include all prescription medications, OTC medications, and supplements the patient is currently taking.  This list reflects what is contained in the patient's profile, which has also been marked as reviewed to communicate to other providers it is the most up to date version of the patient's current medication therapy.   Prior to Admission medications    Medication Sig Start Date End Date Taking? Authorizing Provider   amLODIPine (NORVASC) 10 MG tablet Take 1 tablet by mouth Daily. 1/12/22   Kalyani Willis MD   aspirin 325 MG tablet Take 1 tablet by mouth Daily. 8/16/18   Karla Mann APRN   atorvastatin (Lipitor) 20 MG tablet Take 1 tablet by mouth Daily. 2/21/22 2/21/23  Kalyani Willis MD   baclofen (LIORESAL) 10 MG tablet Take 2 tablets by mouth 2 (Two) Times a Day. 3/22/22   Kalyani Willis MD   carvedilol (COREG) 12.5 MG tablet Take 1 tablet by mouth Every 12 (Twelve) Hours. 1/12/22   Kalyani Willis MD   doxazosin (CARDURA) 8 MG tablet Take 1 tablet by mouth 2 (Two) Times a Day.  Patient taking differently: Take 4 mg by mouth 2 (Two) Times a Day. 2/28/22   Junie Tadeo APRN   eltrombopag (PROMACTA) 50 MG tablet Take 1 tablet by mouth Daily. 4/1/22   Amadou Weller MD   empagliflozin (Jardiance) 25 MG tablet tablet Take 1 tablet by mouth Daily. 1/27/22   Junie Tadeo APRN   gabapentin (NEURONTIN) 300 MG capsule Take 1 capsule by mouth 3 (Three) Times a Day. 7/7/21   Zeke Lopez MD   glucose blood test strip Use as instructed to check blood sugar daily 1/27/22   Junie Tadeo APRN   Insulin Glargine  (Lantus SoloStar) 100 UNIT/ML injection pen Inject 70 units under the skin daily as directed  Patient taking differently: Inject 70 units under the skin daily as directed 1/27/22   Junie Tadeo APRN   Insulin Pen Needle (BD Pen Needle Claudia U/F) 32G X 4 MM misc Use for injections daily as directed. 1/27/22   Junie Tadeo APRN   Januvia 100 MG tablet Take 1 tablet by mouth Daily. 1/27/22   Junie Tadeo APRN   Lancets misc Use as directed to check blood glucose Daily. 1/27/22   Junie Tadeo APRN   metFORMIN (GLUCOPHAGE) 500 MG tablet Take 1 tablet by mouth Daily. 1/27/22   Junie Tadeo APRN   predniSONE (DELTASONE) 20 MG tablet Take 3 tablets by mouth Daily. 3/4/22   Amadou Weller MD   baclofen (LIORESAL) 10 MG tablet Take 2 tablets by mouth 2 (Two) Times a Day. 2/21/22 3/22/22  Kalyani Willis MD       Drug Interactions  • Reviewed concomitant medications, allergies, labs, comorbidities/medical history, quality of life, and immunization history.   • Drug-drug interactions noted and discussed during education: no significant drug interactions noted. . Reminded the patient to let us know before making any changes or starting any new prescription or OTC medications so we can first assess drug interactions.  • Drug-food interactions: Calcium containing products (see below for more details).    Recommended Medications Assessment  • Bone Health (such as calcium/vitamin D, bisphosphonate, RANKL inhibitor) - Not Indicated   • VTE prophylaxis - Not Indicated   • Prophylactic antimicrobials - Not Indicated   •   Relevant Laboratory Values  Lab Results   Component Value Date    GLUCOSE 130 (H) 03/02/2022    CALCIUM 9.0 03/02/2022     (L) 03/02/2022    K 3.9 03/02/2022    CO2 21.8 (L) 03/02/2022     03/02/2022    BUN 19 03/02/2022    CREATININE 1.24 03/02/2022    EGFRIFNONA 62 08/12/2018    BCR 15.3 03/02/2022    ANIONGAP 13.2 03/02/2022     Lab Results   Component Value Date     WBC 9.90 04/01/2022    RBC 5.67 04/01/2022    HGB 16.7 04/01/2022    HCT 49.5 04/01/2022    MCV 87.3 04/01/2022    MCH 29.5 04/01/2022    MCHC 33.7 04/01/2022    RDW 14.3 04/01/2022    RDWSD 45.1 04/01/2022    MPV 11.2 04/01/2022    PLT 46 (C) 04/01/2022    NEUTRORELPCT 88.6 (H) 04/01/2022    LYMPHORELPCT 4.3 (L) 04/01/2022    MONORELPCT 3.1 (L) 04/01/2022    EOSRELPCT 0.2 (L) 04/01/2022    BASORELPCT 0.5 04/01/2022    AUTOIGPER 3.3 (H) 04/01/2022    NEUTROABS 8.76 (H) 04/01/2022    LYMPHSABS 0.43 (L) 04/01/2022    MONOSABS 0.31 04/01/2022    EOSABS 0.02 04/01/2022    BASOSABS 0.05 04/01/2022    AUTOIGNUM 0.33 (H) 04/01/2022    NRBC 0.0 04/01/2022       Initial Education Provided for Specialty Medication  The patient has been provided with the following education. All questions and concerns have been addressed prior to the patient receiving the medication, and the patient has verbalized understanding of the education and any materials provided.  Additional patient education shall be provided and documented upon request by the patient, provider or payer.      Provided patient with:   Chemo calendar to help improve adherence., Education sheets about the medication, 24-hour clinic phone number and my contact information and instructions to call should additional questions arise.     Medication Education Sheets Provided: (select all that apply)  • Oral Specialty Medication: Promacta (eltrombopag)    Other Education Sheets Provided: (select all that apply)  Adherence and CINV    TOPICS COMMENTS   Storage and Handling of Oral Specialty Medication Store in the original container, in a dry location out of direct sunlight, and out of reach of children or pets. and Store at room temperature.  Discussed safe handling and what to do with any unused medication.   Administration of Oral Specialty Medication Do not crush or chew tablets. and take without a meal or a meal low in calcium (<50 mg).  Discussed taking it first thing in  the morning when he wakes up. Making sure it's at least 2 hours before eating/drinking any calcium-containing foods.   Adherence to Oral Specialty Regimen and Handling Missed Doses Patient is likely to have good treatment adherence; reinforced the importance of adherence. Reviewed how to address missed doses and to let us know of any missed doses.   Nausea/Vomiting: cause, use of antiemetics, dietary changes, when to call MD • Emetic risk: Minimal  • At home meds: Ondansetron  • [facility] [place], [place]    Instructed the patient to take a dose of the PRN medication at the first onset of nausea and if it's not working to call us for additional medications.  Also provided non-drug measures to mitigate nausea.   Infertility and Sexuality:  causes, fertility preservation options, sexuality changes, ways to manage, importance of birth control Discussed safe sex practices.   Organ Toxicities: cause, s/s, need for diagnostic tests, labs, when to notify MD Discussed potential effects on organ systems, monitoring, diagnostic tests, labs, and when to notify their MD. Discussed the signs/symptoms of the following: hepatotoxicity   Home Care: how to manage bodily fluids Counseled on management of soiled linens and proper flush technique.  Discussed how to manage all the side effects at home and advised when to contact the MD office   Miscellaneous • Financial Issues: None, copay $0 at [facility]  • Lab Draws: Weekly for the first cycle, then only on day 1 of all subsequent cycles    Discussed the rare, but serious AE of blood clots as well as signs/symptoms and the need to seek urgent medical attention.     Adherence and Self-Administration  • Barriers to Patient Adherence and/or Self-Administration: None  • Methods for Supporting Patient Adherence and/or Self-Administration: dosing calendar and his wife helps him with his medications  • Expected duration of therapy: Until disease progression or  intolerable toxicity    Goals of Therapy  • Patient Goals of Therapy:   o Consistently take medications as prescribed  o Patient will adhere to medication regimen  o Patient will report any medication side effects to healthcare provider  • Clinical Goals:   o Support patient understanding of medication regimen  o Ensure patient knows the pharmacy contact information  o Schedule regular follow-up to monitor the treatment serious adverse events  o Schedule regular follow-up to confirm medication adherence  o Schedule regular follow-up to monitor disease progression or stabilty    Quality of Life Assessment   The patient's current (pre-therapy) quality of life was discussed with the patient. The QOL segment of this outreach has been reviewed and updated.   • Quality of Life Score: 7/10    Reassessment Plan & Follow-Up  1. Pharmacist to perform regular reassessments no more than (6) months from the previous assessment.  2. Welcome information and patient satisfaction survey to be sent by retail team with patient's initial fill.  3. Care Coordinator to set up future refill outreaches, coordinate prescription delivery, and escalate clinical questions to pharmacist.     Additional Plans, Therapy Recommendations or Therapy Problems to Be Addressed: None, patient was taken to the pharmacy after education to  the Promacta.     Attestation  I attest that the initiated specialty medication is appropriate for the patient based on my assessment.  If the prescribed therapy is at any point deemed not appropriate based on the current or future assessments, a consultation will be initiated with the patient's specialty care provider to determine the best course of action. The revised plan of therapy will be documented along with any additional patient education provided.     Meliza Ferguson, PharmD, [facility]  Oncology Clinical Pharmacist    Date and Time: 4/8/22 13:44 EDT

## 2022-04-13 ENCOUNTER — TELEPHONE (OUTPATIENT)
Dept: ONCOLOGY | Facility: CLINIC | Age: 57
End: 2022-04-13

## 2022-04-13 DIAGNOSIS — D69.3 ACUTE ITP: Primary | ICD-10-CM

## 2022-04-13 NOTE — TELEPHONE ENCOUNTER
Caller: Lexy Rossi    Relationship: Emergency Contact    Best call back number: [phone]    What is the best time to reach you: ANY    Who are you requesting to speak with (clinical staff, provider,  specific staff member): CLINICAL      What was the call regarding: PATIENTS SPOUSE CALLED WANTED TO SEE IF HE CAN HAVE HIS LABS DONE AT [place], THEY WOULD NEED AN ORDER    Do you require a callback: YES ASAP           
Called patient's wife informing her labs may be drawn at [facility]. Informing her lab orders have been entered.   
No IV in place.

## 2022-04-15 ENCOUNTER — SPECIALTY PHARMACY (OUTPATIENT)
Dept: PHARMACY | Facility: HOSPITAL | Age: 57
End: 2022-04-15

## 2022-04-15 NOTE — PROGRESS NOTES
Interaction check showed no drug interactions with diabetes medications and Promacta - will notify provider of med addition.

## 2022-04-19 ENCOUNTER — HOSPITAL ENCOUNTER (OUTPATIENT)
Facility: HOSPITAL | Age: 57
Discharge: HOME | End: 2022-04-19
Payer: MEDICARE

## 2022-04-19 LAB — PLATELET # BLD: 80 K/UL (ref 150–400)

## 2022-04-19 PROCEDURE — 85049 AUTOMATED PLATELET COUNT: CPT

## 2022-04-19 PROCEDURE — 36415 COLL VENOUS BLD VENIPUNCTURE: CPT

## 2022-04-26 ENCOUNTER — SPECIALTY PHARMACY (OUTPATIENT)
Dept: PHARMACY | Facility: HOSPITAL | Age: 57
End: 2022-04-26

## 2022-04-26 ENCOUNTER — HOSPITAL ENCOUNTER (OUTPATIENT)
Facility: HOSPITAL | Age: 57
Discharge: HOME | End: 2022-04-26
Payer: MEDICARE

## 2022-04-26 LAB — PLATELET # BLD: 240 K/UL (ref 150–400)

## 2022-04-26 PROCEDURE — 36415 COLL VENOUS BLD VENIPUNCTURE: CPT

## 2022-04-26 PROCEDURE — 85049 AUTOMATED PLATELET COUNT: CPT

## 2022-04-26 NOTE — PROGRESS NOTES
His wife reports that he is doing better. He is no longer on steroids, just Promacta. No issues with diabetes treatment at this time. Encouraged her to call clinic with any questions.

## 2022-04-29 ENCOUNTER — OFFICE VISIT (OUTPATIENT)
Dept: ONCOLOGY | Facility: CLINIC | Age: 57
End: 2022-04-29

## 2022-04-29 VITALS
DIASTOLIC BLOOD PRESSURE: 75 MMHG | BODY MASS INDEX: 32.51 KG/M2 | OXYGEN SATURATION: 94 % | WEIGHT: 240 LBS | HEART RATE: 72 BPM | HEIGHT: 72 IN | TEMPERATURE: 97.1 F | SYSTOLIC BLOOD PRESSURE: 117 MMHG | RESPIRATION RATE: 16 BRPM

## 2022-04-29 DIAGNOSIS — D69.3 ACUTE ITP: Primary | ICD-10-CM

## 2022-04-29 PROCEDURE — 99214 OFFICE O/P EST MOD 30 MIN: CPT | Performed by: INTERNAL MEDICINE

## 2022-04-29 NOTE — PROGRESS NOTES
REASON FOR VISIT: ITP    HISTORY OF PRESENT ILLNESS:   57 y.o.  male presents today for follow-up of his ITP.  He is doing reasonably well currently.  He is on Promacta.  His platelets up to 240.  No bruising or bleeding.  He did have an injury to his right leg.  He has a history of stroke with right-sided weakness.        Past medical history, social history and family history was reviewed 04/29/22 and unchanged from prior visit.    Review of Systems:    Review of Systems   Constitutional: Negative.    HENT:  Negative.    Eyes: Negative.    Respiratory: Negative.    Cardiovascular: Negative.    Gastrointestinal: Negative.    Endocrine: Negative.    Genitourinary: Negative.     Skin: Negative.    Neurological: Positive for extremity weakness.   Hematological: Negative.    Psychiatric/Behavioral: Negative.             Medications:        Current Outpatient Medications:   •  amLODIPine (NORVASC) 10 MG tablet, Take 1 tablet by mouth Daily., Disp: 30 tablet, Rfl: 0  •  aspirin 325 MG tablet, Take 1 tablet by mouth Daily., Disp: , Rfl:   •  atorvastatin (Lipitor) 20 MG tablet, Take 1 tablet by mouth Daily., Disp: 30 tablet, Rfl: 11  •  baclofen (LIORESAL) 10 MG tablet, Take 2 tablets by mouth 2 (Two) Times a Day., Disp: 120 tablet, Rfl: 11  •  carvedilol (COREG) 12.5 MG tablet, Take 1 tablet by mouth Every 12 (Twelve) Hours., Disp: 90 tablet, Rfl: 3  •  doxazosin (CARDURA) 8 MG tablet, Take 1 tablet by mouth 2 (Two) Times a Day. (Patient taking differently: Take 4 mg by mouth 2 (Two) Times a Day.), Disp: 60 tablet, Rfl: 5  •  eltrombopag (PROMACTA) 50 MG tablet, Take 1 tablet by mouth Daily., Disp: 30 tablet, Rfl: 5  •  empagliflozin (Jardiance) 25 MG tablet tablet, Take 1 tablet by mouth Daily., Disp: 30 tablet, Rfl: 5  •  gabapentin (NEURONTIN) 300 MG capsule, Take 1 capsule by mouth 3 (Three) Times a Day., Disp: 270 capsule, Rfl: 1  •  glucose blood test strip, Use as instructed to check blood sugar daily, Disp: 100  "each, Rfl: 1  •  Insulin Glargine (Lantus SoloStar) 100 UNIT/ML injection pen, Inject 70 units under the skin daily as directed (Patient taking differently: Inject 70 units under the skin daily as directed), Disp: 15 mL, Rfl: 6  •  Insulin Pen Needle (BD Pen Needle Claudia U/F) 32G X 4 MM misc, Use for injections daily as directed., Disp: 100 each, Rfl: 2  •  Januvia 100 MG tablet, Take 1 tablet by mouth Daily., Disp: 30 tablet, Rfl: 5  •  Lancets misc, Use as directed to check blood glucose Daily., Disp: 100 each, Rfl: 1  •  metFORMIN (GLUCOPHAGE) 500 MG tablet, Take 1 tablet by mouth Daily., Disp: 30 tablet, Rfl: 5  •  ondansetron (ZOFRAN) 8 MG tablet, Take 1 tablet by mouth 3 (Three) Times a Day As Needed for Nausea or Vomiting., Disp: 30 tablet, Rfl: 5    Pain Medications             aspirin 325 MG tablet Take 1 tablet by mouth Daily.    baclofen (LIORESAL) 10 MG tablet Take 2 tablets by mouth 2 (Two) Times a Day.    gabapentin (NEURONTIN) 300 MG capsule Take 1 capsule by mouth 3 (Three) Times a Day.             ALLERGIES:    Allergies   Allergen Reactions   • Lisinopril Dizziness     syncope         Physical Exam    VITAL SIGNS:  /75   Pulse 72   Temp 97.1 °F (36.2 °C) (Temporal)   Resp 16   Ht 182.9 cm (72\")   Wt 109 kg (240 lb)   SpO2 94%   BMI 32.55 kg/m²                 Wt Readings from Last 3 Encounters:   04/29/22 109 kg (240 lb)   04/07/22 110 kg (243 lb)   04/01/22 110 kg (243 lb)       Body mass index is 32.55 kg/m². Body surface area is 2.3 meters squared.    Pain Score    04/29/22 1500   PainSc: 0-No pain           Performance Status: 0    General: well appearing, in no acute distress  HEENT: sclera anicteric, neck is supple  Lymphatics: no cervical, supraclavicular, or axillary adenopathy  Cardiovascular: regular rate and rhythm, no murmurs, rubs or gallops  Lungs: clear to auscultation bilaterally  Abdomen: soft, nontender, nondistended.  No palpable organomegaly  Extremities: no lower " extremity edema  Skin: no rashes, lesions, bruising, or petechiae  Msk:  Shows weakness on the right side  Psych: Mood is stable        RECENT LABS:    Lab Results   Component Value Date    HGB 16.7 04/01/2022    HCT 49.5 04/01/2022    MCV 87.3 04/01/2022     04/26/2022    WBC 9.90 04/01/2022    NEUTROABS 8.76 (H) 04/01/2022    LYMPHSABS 0.43 (L) 04/01/2022    MONOSABS 0.31 04/01/2022    EOSABS 0.02 04/01/2022    BASOSABS 0.05 04/01/2022       Lab Results   Component Value Date    GLUCOSE 130 (H) 03/02/2022    BUN 19 03/02/2022    CREATININE 1.24 03/02/2022     (L) 03/02/2022    K 3.9 03/02/2022     03/02/2022    CO2 21.8 (L) 03/02/2022    CALCIUM 9.0 03/02/2022    PROTEINTOT 7.5 03/02/2022    ALBUMIN 4.10 03/02/2022    BILITOT 1.2 03/02/2022    ALKPHOS 110 03/02/2022    AST 14 03/02/2022    ALT 13 03/02/2022       Lab Results   Component Value Date     04/26/2022    PLT 80 (L) 04/19/2022    PLT 26 (C) 04/07/2022    PLT 46 (C) 04/01/2022    PLT 42 (C) 03/29/2022           Assessment/Plan    1.  Acute ITP.  Platelets much better on Promacta at 50 mg/day.  I am going to reduce the dose to 50 mg every other day to see if I can maintain his platelets at a reasonable level.  I am going to try and slowly wean him off to see if we can get him off this drug.    2.  Elevated PSA.  Planning for biopsy of the prostate.  It has been deferred due to his thrombocytopenia.  However we can always bring his platelets up with Promacta.    3.  History of stroke.  He can resume his aspirin since his platelets are over 50,000.    Total time of patient care on day of service including time prior to, face to face with patient, and following visit spent in reviewing records, lab results,  discussion with patient, and documentation/charting was > 32 minutes.     Amadou Weller MD  [facility] Hematology and On[facility]    Return on: 06/03/22  Return in (Approximately): 1 month    No orders of the defined  types were placed in this encounter.      4/29/2022

## 2022-05-02 ENCOUNTER — SPECIALTY PHARMACY (OUTPATIENT)
Dept: ONCOLOGY | Facility: HOSPITAL | Age: 57
End: 2022-05-02

## 2022-05-02 NOTE — PROGRESS NOTES
Specialty Pharmacy Refill Coordination Note     Mele is a 57 y.o. male contacted today regarding refills of  eltrombopag 50mg PO QD specialty medication(s).      Specialty medication(s) and dose(s) confirmed: yes    Refill Questions    Flowsheet Row Most Recent Value   Changes to allergies? No   Changes to medications? No   New conditions since last clinic visit No   Unplanned office visit, urgent care, ED, or hospital admission in the last 4 weeks  No   How does patient/caregiver feel medication is working? Very good   Financial problems or insurance changes  No   If yes, describe changes in insurance or financial issues. n/a   How many doses of your specialty medications were missed in the last 4 weeks? 0   Why were doses missed? n/a   Does this patient require a clinical escalation to a pharmacist? No          Delivery Questions    Flowsheet Row Most Recent Value   Delivery method FedEx   Delivery address correct? Yes   Delivery phone number [phone]   Preferred delivery time? Anytime   Number of medications in delivery 1   Medication being filled and delivered eltrombopag   Doses left of specialty medications 4 days left   Is there any medication that is due not being filled? No   Supplies needed? No supplies needed   Cooler needed? No   Do any medications need mixed or dated? No   Copay form of payment Credit card on file   Additional comments $[phone]   Questions or concerns for the pharmacist? No   Explain any questions or concerns for the pharmacist n/a   Are any medications first time fills? No                 Follow-up: 28 day(s)     Bridgett Weems, Pharmacy Technician  Specialty Pharmacy Technician

## 2022-05-09 RX ORDER — DOXAZOSIN MESYLATE 4 MG/1
TABLET ORAL
Qty: 60 TABLET | OUTPATIENT
Start: 2022-05-09

## 2022-05-10 ENCOUNTER — OFFICE VISIT (OUTPATIENT)
Dept: UROLOGY | Facility: CLINIC | Age: 57
End: 2022-05-10

## 2022-05-10 VITALS
BODY MASS INDEX: 32.51 KG/M2 | HEIGHT: 72 IN | HEART RATE: 72 BPM | DIASTOLIC BLOOD PRESSURE: 74 MMHG | SYSTOLIC BLOOD PRESSURE: 128 MMHG | OXYGEN SATURATION: 97 % | WEIGHT: 240 LBS | TEMPERATURE: 97.3 F

## 2022-05-10 DIAGNOSIS — R97.20 ELEVATED PROSTATE SPECIFIC ANTIGEN (PSA): Primary | ICD-10-CM

## 2022-05-10 PROCEDURE — 99213 OFFICE O/P EST LOW 20 MIN: CPT | Performed by: PHYSICIAN ASSISTANT

## 2022-05-10 NOTE — PROGRESS NOTES
Chief Complaint   Patient presents with   • Follow-up     1 month fu w/ psa results        HPI  Mr. Rossi is a 57 y.o. male with history of elevated PSA and ITP who presents for follow up.     At this visit, his ITP has responded very well to Promacta. He was recently dropped from 50mg daily to 50mg every other day.  Hematology supports moving forward with prostate biopsy, as appropriate.      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   • Diabetes (HCC)    • Fatigue    • History of stroke    • Hypertension    • Obesity     body mass index 30+-obesity   • Stroke (cerebrum) (HCC)    • Type 2 diabetes mellitus, uncontrolled        History reviewed. No pertinent surgical history.      Current Outpatient Medications:   •  amLODIPine (NORVASC) 10 MG tablet, Take 1 tablet by mouth Daily., Disp: 30 tablet, Rfl: 0  •  aspirin 325 MG tablet, Take 1 tablet by mouth Daily., Disp: , Rfl:   •  atorvastatin (Lipitor) 20 MG tablet, Take 1 tablet by mouth Daily., Disp: 30 tablet, Rfl: 11  •  baclofen (LIORESAL) 10 MG tablet, Take 2 tablets by mouth 2 (Two) Times a Day., Disp: 120 tablet, Rfl: 11  •  carvedilol (COREG) 12.5 MG tablet, Take 1 tablet by mouth Every 12 (Twelve) Hours., Disp: 90 tablet, Rfl: 3  •  doxazosin (CARDURA) 8 MG tablet, Take 1 tablet by mouth 2 (Two) Times a Day. (Patient taking differently: Take 4 mg by mouth 2 (Two) Times a Day.), Disp: 60 tablet, Rfl: 5  •  eltrombopag (PROMACTA) 50 MG tablet, Take 1 tablet by mouth Daily., Disp: 30 tablet, Rfl: 5  •  empagliflozin (Jardiance) 25 MG tablet tablet, Take 1 tablet by mouth Daily., Disp: 30 tablet, Rfl: 5  •  gabapentin (NEURONTIN) 300 MG capsule, Take 1 capsule by mouth 3 (Three) Times a Day., Disp: 270 capsule, Rfl: 1  •  glucose blood test strip, Use as instructed to check blood sugar daily, Disp: 100 each, Rfl: 1  •  Insulin Glargine (Lantus SoloStar) 100 UNIT/ML injection pen, Inject 70 units under the skin daily as directed  "(Patient taking differently: Inject 70 units under the skin daily as directed), Disp: 15 mL, Rfl: 6  •  Insulin Pen Needle (BD Pen Needle Claudia U/F) 32G X 4 MM misc, Use for injections daily as directed., Disp: 100 each, Rfl: 2  •  Januvia 100 MG tablet, Take 1 tablet by mouth Daily., Disp: 30 tablet, Rfl: 5  •  Lancets misc, Use as directed to check blood glucose Daily., Disp: 100 each, Rfl: 1  •  metFORMIN (GLUCOPHAGE) 500 MG tablet, Take 1 tablet by mouth Daily., Disp: 30 tablet, Rfl: 5  •  ondansetron (ZOFRAN) 8 MG tablet, Take 1 tablet by mouth 3 (Three) Times a Day As Needed for Nausea or Vomiting., Disp: 30 tablet, Rfl: 5     Physical Exam  Visit Vitals  /74 (BP Location: Right arm, Patient Position: Sitting, Cuff Size: Adult)   Pulse 72   Temp 97.3 °F (36.3 °C) (Temporal)   Ht 182.9 cm (72\")   Wt 109 kg (240 lb)   SpO2 97%   BMI 32.55 kg/m²       Labs  Brief Urine Lab Results  (Last result in the past 365 days)      Color   Clarity   Blood   Leuk Est   Nitrite   Protein   CREAT   Urine HCG        04/07/22 1330 Straw   Cloudy   Negative   Negative   Negative   Negative                 Lab Results   Component Value Date    GLUCOSE 130 (H) 03/02/2022    CALCIUM 9.0 03/02/2022     (L) 03/02/2022    K 3.9 03/02/2022    CO2 21.8 (L) 03/02/2022     03/02/2022    BUN 19 03/02/2022    CREATININE 1.24 03/02/2022    EGFRIFNONA 62 08/12/2018    BCR 15.3 03/02/2022    ANIONGAP 13.2 03/02/2022       Lab Results   Component Value Date    WBC 9.90 04/01/2022    HGB 16.7 04/01/2022    HCT 49.5 04/01/2022    MCV 87.3 04/01/2022     04/26/2022            Lab Results   Component Value Date    PSA 5.6 (H) 04/07/2022    PSA 6.62 (H) 03/01/2022    PSA 4.46 (H) 12/21/2018       No results found for: TESTOSTERONE         Assessment  57 y.o. male with history of elevated PSA and ITP.  Upon most recent recheck of his PSA, it was still elevated at 5.6.  His platelets have responded phenomenally to Promacta and " were most recently over 200.  He has a great deal of anxiety regarding prostate biopsy, specifically pain.  We discussed that a prostate biopsy involves numbing of the area around the prostate, and then no further pain is felt.  Prostate biopsies typically take 5 minutes and are usually tolerated very well.  I have additionally offered further testing such as prostate MRI to evaluate for the presence of suspicious lesion.  At this time, he prefers to have ongoing surveillance with serial PSA and YOBANY.  He understands that not proceeding with prostate biopsy could mean missing the diagnosis of cancer.    Plan  1.  Follow-up in 3 months for repeat PSA and repeat discussion

## 2022-05-13 ENCOUNTER — HOSPITAL ENCOUNTER (OUTPATIENT)
Facility: HOSPITAL | Age: 57
Discharge: HOME | End: 2022-05-13
Payer: MEDICARE

## 2022-05-13 LAB — PLATELET # BLD: 98 K/UL (ref 150–400)

## 2022-05-13 PROCEDURE — 85049 AUTOMATED PLATELET COUNT: CPT

## 2022-05-13 PROCEDURE — 36415 COLL VENOUS BLD VENIPUNCTURE: CPT

## 2022-05-19 ENCOUNTER — SPECIALTY PHARMACY (OUTPATIENT)
Dept: PHARMACY | Facility: HOSPITAL | Age: 57
End: 2022-05-19

## 2022-05-24 ENCOUNTER — HOSPITAL ENCOUNTER (OUTPATIENT)
Facility: HOSPITAL | Age: 57
Discharge: HOME | End: 2022-05-24
Payer: MEDICARE

## 2022-05-24 LAB — PLATELET # BLD: 125 K/UL (ref 150–400)

## 2022-05-24 PROCEDURE — 85049 AUTOMATED PLATELET COUNT: CPT

## 2022-05-24 PROCEDURE — 36415 COLL VENOUS BLD VENIPUNCTURE: CPT

## 2022-06-01 ENCOUNTER — HOSPITAL ENCOUNTER (OUTPATIENT)
Facility: HOSPITAL | Age: 57
Discharge: HOME | End: 2022-06-01
Payer: MEDICARE

## 2022-06-01 LAB — PLATELET # BLD: 100 K/UL (ref 150–400)

## 2022-06-01 PROCEDURE — 85049 AUTOMATED PLATELET COUNT: CPT

## 2022-06-01 PROCEDURE — 36415 COLL VENOUS BLD VENIPUNCTURE: CPT

## 2022-06-02 ENCOUNTER — SPECIALTY PHARMACY (OUTPATIENT)
Dept: PHARMACY | Facility: HOSPITAL | Age: 57
End: 2022-06-02

## 2022-06-03 ENCOUNTER — OFFICE VISIT (OUTPATIENT)
Dept: ONCOLOGY | Facility: CLINIC | Age: 57
End: 2022-06-03

## 2022-06-03 VITALS
TEMPERATURE: 98.1 F | DIASTOLIC BLOOD PRESSURE: 72 MMHG | SYSTOLIC BLOOD PRESSURE: 123 MMHG | BODY MASS INDEX: 33.05 KG/M2 | HEIGHT: 72 IN | OXYGEN SATURATION: 98 % | HEART RATE: 58 BPM | WEIGHT: 244 LBS

## 2022-06-03 DIAGNOSIS — D69.3 ACUTE ITP: Primary | ICD-10-CM

## 2022-06-03 PROCEDURE — 99214 OFFICE O/P EST MOD 30 MIN: CPT | Performed by: NURSE PRACTITIONER

## 2022-06-03 NOTE — PROGRESS NOTES
REASON FOR VISIT: ITP    HISTORY OF PRESENT ILLNESS:   57 y.o.  male presents today for follow-up of his ITP.  He is doing reasonably well currently.  He is on Promacta.  No bruising or bleeding.   He has a history of stroke with right-sided weakness. He has been worrying over his platelets. He has been going almost wkly to every 9 days.         Past medical history, social history and family history was reviewed 06/03/22 and unchanged from prior visit.    Review of Systems:    Review of Systems   Constitutional: Negative.    HENT:  Negative.    Eyes: Negative.    Respiratory: Negative.    Cardiovascular: Negative.    Gastrointestinal: Negative.    Endocrine: Negative.    Genitourinary: Negative.     Skin: Negative.    Neurological: Positive for extremity weakness.   Hematological: Negative.    Psychiatric/Behavioral: Negative.             Medications:        Current Outpatient Medications:   •  amLODIPine (NORVASC) 10 MG tablet, Take 1 tablet by mouth Daily., Disp: 30 tablet, Rfl: 0  •  aspirin 325 MG tablet, Take 1 tablet by mouth Daily., Disp: , Rfl:   •  atorvastatin (Lipitor) 20 MG tablet, Take 1 tablet by mouth Daily., Disp: 30 tablet, Rfl: 11  •  baclofen (LIORESAL) 10 MG tablet, Take 2 tablets by mouth 2 (Two) Times a Day., Disp: 120 tablet, Rfl: 11  •  carvedilol (COREG) 12.5 MG tablet, Take 1 tablet by mouth Every 12 (Twelve) Hours., Disp: 90 tablet, Rfl: 3  •  doxazosin (CARDURA) 8 MG tablet, Take 1 tablet by mouth 2 (Two) Times a Day. (Patient taking differently: Take 4 mg by mouth 2 (Two) Times a Day.), Disp: 60 tablet, Rfl: 5  •  eltrombopag (PROMACTA) 50 MG tablet, Take 1 tablet by mouth Daily., Disp: 30 tablet, Rfl: 5  •  empagliflozin (Jardiance) 25 MG tablet tablet, Take 1 tablet by mouth Daily., Disp: 30 tablet, Rfl: 5  •  gabapentin (NEURONTIN) 300 MG capsule, Take 1 capsule by mouth 3 (Three) Times a Day., Disp: 270 capsule, Rfl: 1  •  glucose blood test strip, Use as instructed to check blood  "sugar daily, Disp: 100 each, Rfl: 1  •  Insulin Glargine (Lantus SoloStar) 100 UNIT/ML injection pen, Inject 70 units under the skin daily as directed (Patient taking differently: Inject 70 units under the skin daily as directed), Disp: 15 mL, Rfl: 6  •  Insulin Pen Needle (BD Pen Needle Claudia U/F) 32G X 4 MM misc, Use for injections daily as directed., Disp: 100 each, Rfl: 2  •  Januvia 100 MG tablet, Take 1 tablet by mouth Daily., Disp: 30 tablet, Rfl: 5  •  Lancets misc, Use as directed to check blood glucose Daily., Disp: 100 each, Rfl: 1  •  metFORMIN (GLUCOPHAGE) 500 MG tablet, Take 1 tablet by mouth Daily., Disp: 30 tablet, Rfl: 5  •  ondansetron (ZOFRAN) 8 MG tablet, Take 1 tablet by mouth 3 (Three) Times a Day As Needed for Nausea or Vomiting., Disp: 30 tablet, Rfl: 5    Pain Medications             aspirin 325 MG tablet Take 1 tablet by mouth Daily.    baclofen (LIORESAL) 10 MG tablet Take 2 tablets by mouth 2 (Two) Times a Day.    gabapentin (NEURONTIN) 300 MG capsule Take 1 capsule by mouth 3 (Three) Times a Day.             ALLERGIES:    Allergies   Allergen Reactions   • Lisinopril Dizziness     syncope         Physical Exam    VITAL SIGNS:  /72   Pulse 58   Temp 98.1 °F (36.7 °C)   Ht 182.9 cm (72\")   Wt 111 kg (244 lb)   SpO2 98%   BMI 33.09 kg/m²                 Wt Readings from Last 3 Encounters:   06/03/22 111 kg (244 lb)   05/10/22 109 kg (240 lb)   04/29/22 109 kg (240 lb)       Body mass index is 33.09 kg/m². Body surface area is 2.32 meters squared.    Pain Score    06/03/22 1454   PainSc: 0-No pain           Performance Status: 1    General: well appearing male in no acute distress  Neuro: alert and oriented  HEENT: sclera anicteric, oropharynx clear  Lymphatics: no cervical, supraclavicular, or axillary adenopathy  Cardiovascular: regular rate and rhythm, no murmurs  Lungs: clear to auscultation bilaterally  Abdomen: soft, nontender, nondistended.  No palpable " organomegaly  Extremeties: no lower extremity edema  Skin: no rashes, lesions, bruising, or petechiae  Psych: mood and affect appropriate        RECENT LABS:    Lab Results   Component Value Date    HGB 16.7 04/01/2022    HCT 49.5 04/01/2022    MCV 87.3 04/01/2022     (L) 06/01/2022    WBC 9.90 04/01/2022    NEUTROABS 8.76 (H) 04/01/2022    LYMPHSABS 0.43 (L) 04/01/2022    MONOSABS 0.31 04/01/2022    EOSABS 0.02 04/01/2022    BASOSABS 0.05 04/01/2022       Lab Results   Component Value Date    GLUCOSE 130 (H) 03/02/2022    BUN 19 03/02/2022    CREATININE 1.24 03/02/2022     (L) 03/02/2022    K 3.9 03/02/2022     03/02/2022    CO2 21.8 (L) 03/02/2022    CALCIUM 9.0 03/02/2022    PROTEINTOT 7.5 03/02/2022    ALBUMIN 4.10 03/02/2022    BILITOT 1.2 03/02/2022    ALKPHOS 110 03/02/2022    AST 14 03/02/2022    ALT 13 03/02/2022       Lab Results   Component Value Date     (L) 06/01/2022     (L) 05/24/2022    PLT 98 (L) 05/13/2022     04/26/2022    PLT 80 (L) 04/19/2022           Assessment/Plan    1.  Acute ITP.  Platelets much better on Promacta at 50 mg/day.  I am going to continue reduced  dose at 50 mg every other day to see if I can maintain his platelets at a reasonable level. We will continue every 2 week platelet check for now. We will plan to disucss in a few months and possibly try to wean at that time.     2.  Elevated PSA.  Planning for biopsy of the prostate if levels remain elevated.  It was  deferred due to his thrombocytopenia.  However we can always bring his platelets up with Promacta.    3.  History of stroke.  Continue aspirin since his platelets remain over 50,000.    Total time of patient care including time prior to, face to face with patient, and following visit spent in reviewing records, lab results, imaging studies, discussion with patient, and documentation/charting was > 30 minutes    CRYS Muniz  [facility] Hematology and On[facility]    Return  in (Approximately): 2 months    Orders Placed This Encounter   Procedures   • CBC & Differential       6/3/2022

## 2022-06-13 ENCOUNTER — SPECIALTY PHARMACY (OUTPATIENT)
Dept: PHARMACY | Facility: HOSPITAL | Age: 57
End: 2022-06-13

## 2022-06-14 ENCOUNTER — HOSPITAL ENCOUNTER (OUTPATIENT)
Facility: HOSPITAL | Age: 57
Discharge: HOME | End: 2022-06-14
Payer: MEDICARE

## 2022-06-14 LAB
BASOPHILS ABSOLUTE: 0.1 K/UL (ref 0–0.1)
BASOPHILS RELATIVE PERCENT: 0.7 %
EOSINOPHILS ABSOLUTE: 0.2 K/UL (ref 0–0.4)
EOSINOPHILS RELATIVE PERCENT: 2.7 %
HCT VFR BLD CALC: 46.7 % (ref 40–54)
HEMOGLOBIN: 15.3 G/DL (ref 13–18)
IMMATURE GRANULOCYTES #: 0.1 K/UL
IMMATURE GRANULOCYTES %: 0.6 % (ref 0–5)
LYMPHOCYTES ABSOLUTE: 1.6 K/UL (ref 1.5–4)
LYMPHOCYTES RELATIVE PERCENT: 19.3 %
MCH RBC QN AUTO: 29.7 PG (ref 27–32)
MCHC RBC AUTO-ENTMCNC: 32.8 G/DL (ref 31–35)
MCV RBC AUTO: 90.5 FL (ref 80–100)
MONOCYTES ABSOLUTE: 0.8 K/UL (ref 0.2–0.8)
MONOCYTES RELATIVE PERCENT: 9.8 %
NEUTROPHILS ABSOLUTE: 5.6 K/UL (ref 2–7.5)
NEUTROPHILS RELATIVE PERCENT: 66.9 %
PDW BLD-RTO: 14.1 % (ref 11–16)
PLATELET # BLD: 118 K/UL (ref 150–400)
PMV BLD AUTO: 12 FL (ref 6–10)
RBC # BLD: 5.16 M/UL (ref 4.5–6)
WBC # BLD: 8.4 K/UL (ref 4–11)

## 2022-06-14 PROCEDURE — 36415 COLL VENOUS BLD VENIPUNCTURE: CPT

## 2022-06-14 PROCEDURE — 85025 COMPLETE CBC W/AUTO DIFF WBC: CPT

## 2022-06-28 ENCOUNTER — HOSPITAL ENCOUNTER (OUTPATIENT)
Facility: HOSPITAL | Age: 57
Discharge: HOME | End: 2022-06-28
Payer: MEDICARE

## 2022-06-28 LAB — PLATELET # BLD: 96 K/UL (ref 150–400)

## 2022-06-28 PROCEDURE — 36415 COLL VENOUS BLD VENIPUNCTURE: CPT

## 2022-06-28 PROCEDURE — 85049 AUTOMATED PLATELET COUNT: CPT

## 2022-07-06 ENCOUNTER — SPECIALTY PHARMACY (OUTPATIENT)
Dept: ONCOLOGY | Facility: HOSPITAL | Age: 57
End: 2022-07-06

## 2022-07-06 DIAGNOSIS — D69.3 ACUTE ITP: ICD-10-CM

## 2022-07-06 NOTE — PROGRESS NOTES
Specialty Pharmacy Refill Coordination Note     Mele is a 57 y.o. male contacted today regarding refills of  eltrombopag 50mg PO QD specialty medication(s).    When I spoke with patient on 6/6/22 he told me he was advised to take 1 tablet every other day so he had enough medication to last until this week. He has 6 tablets left as of todays refill call.     Specialty medication(s) and dose(s) confirmed: yes    Refill Questions    Flowsheet Row Most Recent Value   Changes to allergies? No   Changes to medications? No   New conditions since last clinic visit No   Unplanned office visit, urgent care, ED, or hospital admission in the last 4 weeks  No   How does patient/caregiver feel medication is working? Fair   Financial problems or insurance changes  No   Since the previous refill, were any specialty medication doses or scheduled injections missed or delayed?  No   Does this patient require a clinical escalation to a pharmacist? Yes          Delivery Questions    Flowsheet Row Most Recent Value   Delivery method FedEx   Delivery address correct? Yes   Delivery phone number [phone]   Preferred delivery time? Anytime   Number of medications in delivery 1   Medication being filled and delivered eltrombopag   Doses left of specialty medications 6 tablets left   Is there any medication that is due not being filled? No   Supplies needed? No supplies needed   Cooler needed? No   Do any medications need mixed or dated? No   Additional comments no copay   Questions or concerns for the pharmacist? Yes   Explain any questions or concerns for the pharmacist Patient states that they are taking 1 tablet every other day. Instead of 1 tablet daily   Are any medications first time fills? No                 Follow-up: 28 day(s)     Bridgett Weems, Pharmacy Technician  Specialty Pharmacy Technician

## 2022-07-06 NOTE — PROGRESS NOTES
Promacta (eltrombopag)    On 6/3/22 CRYS Mays wrote in her note that she was decreasing the patient to Promacta 50 mg PO every other day with the goal to wean him off this medication.    His next appointment with Dr. Guillaume is 8/19/22.  Until then, he will be on the 50 mg PO every other day.  I just sent that prescription to [facility] and will follow-up with Dr. Guillaume on 8/19 about the treatment plan.    Promacta (eltromobpag)  50 mg tablet  Take 1 tablet PO every other day  #15  5 refills    Meliza Ferguson, PharmD, BCOP - Oncology Clinical Pharmacist [phone]    7/6/22  10:48 EDT

## 2022-07-12 ENCOUNTER — TELEPHONE (OUTPATIENT)
Dept: ONCOLOGY | Facility: CLINIC | Age: 57
End: 2022-07-12

## 2022-07-12 ENCOUNTER — HOSPITAL ENCOUNTER (OUTPATIENT)
Facility: HOSPITAL | Age: 57
Discharge: HOME | End: 2022-07-12
Payer: MEDICARE

## 2022-07-12 LAB — PLATELET # BLD: 107 K/UL (ref 150–400)

## 2022-07-12 PROCEDURE — 36415 COLL VENOUS BLD VENIPUNCTURE: CPT

## 2022-07-12 PROCEDURE — 85049 AUTOMATED PLATELET COUNT: CPT

## 2022-07-12 NOTE — TELEPHONE ENCOUNTER
Caller: ALESIA    Relationship: KATIE    Best call back number: [phone]    Who are you requesting to speak with (clinical staff, provider,  specific staff member): CLINICAL    What was the call regarding: CLARIFY LAB ORDERS THAT WERE DRAWN TODAY    Do you require a callback: YES

## 2022-07-13 DIAGNOSIS — D69.3 ACUTE ITP: Primary | ICD-10-CM

## 2022-07-14 ENCOUNTER — SPECIALTY PHARMACY (OUTPATIENT)
Dept: PHARMACY | Facility: HOSPITAL | Age: 57
End: 2022-07-14

## 2022-07-14 DIAGNOSIS — E11.65 TYPE 2 DIABETES MELLITUS WITH HYPERGLYCEMIA, WITH LONG-TERM CURRENT USE OF INSULIN: Primary | ICD-10-CM

## 2022-07-14 DIAGNOSIS — Z79.4 TYPE 2 DIABETES MELLITUS WITH HYPERGLYCEMIA, WITH LONG-TERM CURRENT USE OF INSULIN: Primary | ICD-10-CM

## 2022-07-14 RX ORDER — SITAGLIPTIN 100 MG/1
100 TABLET, FILM COATED ORAL DAILY
Qty: 30 TABLET | Refills: 5 | Status: SHIPPED | OUTPATIENT
Start: 2022-07-14 | End: 2022-11-14 | Stop reason: SDUPTHER

## 2022-07-14 RX ORDER — INSULIN GLARGINE 100 [IU]/ML
INJECTION, SOLUTION SUBCUTANEOUS
Qty: 15 ML | Refills: 6 | Status: CANCELLED | OUTPATIENT
Start: 2022-07-14

## 2022-07-14 RX ORDER — INSULIN GLARGINE 100 [IU]/ML
INJECTION, SOLUTION SUBCUTANEOUS
Qty: 15 ML | Refills: 5 | Status: SHIPPED | OUTPATIENT
Start: 2022-07-14 | End: 2022-11-14 | Stop reason: SDUPTHER

## 2022-07-14 NOTE — PROGRESS NOTES
Specialty Pharmacy Patient Management Program  Endocrinology Reassessment     Mele Rossi is a 57 y.o. male with Type 2 Diabetes enrolled in the Endocrinology Patient Management program offered by [facility].  A follow-up was conducted, including assessment of continued therapy appropriateness, medication adherence, and side effect incidence and management for Jardiance, Metformin, Januvia, and Lantus.    Patient needs refills on all diabetes medications today. His wife reports that he is doing well with current medications and reports no side effects. He has an office visit scheduled on 7/27/22 for reassessment.     Changes to Insurance Coverage or Financial Support  None     Relevant Past Medical History and Comorbidities  Relevant medical history and concomitant health conditions were discussed with the patient. The patient's chart has been reviewed for relevant past medical history and comorbid health conditions and updated as necessary.   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   • Diabetes (HCC)    • Fatigue    • History of stroke    • Hypertension    • Obesity     body mass index 30+-obesity   • Stroke (cerebrum) (HCC)    • Type 2 diabetes mellitus, uncontrolled      Social History     Socioeconomic History   • Marital status:    Tobacco Use   • Smoking status: Former Smoker     Packs/day: 1.00     Years: 20.00     Pack years: 20.00     Types: Cigarettes     Quit date: 11/20/2018     Years since quitting: 3.6   • Smokeless tobacco: Never Used   • Tobacco comment: States Smokes 1 cigeratte per month   Vaping Use   • Vaping Use: Never used   Substance and Sexual Activity   • Alcohol use: No   • Drug use: No   • Sexual activity: Defer            Allergies  Known allergies and reactions were discussed with the patient. The patient's chart has been reviewed for allergy information and updated as necessary.   Lisinopril    Allergies reviewed by Caesar  ERIK Fernandes on 7/14/2022 at  9:25 AM    Relevant Laboratory Values  A1C Last 3 Results    HGBA1C Last 3 Results 1/27/22 3/1/22   Hemoglobin A1C 7.0 7.8 (A)   (A) Abnormal value       Comments are available for some flowsheets but are not being displayed.           Lab Results   Component Value Date    HGBA1C 7.8 (H) 03/01/2022     Lab Results   Component Value Date    GLUCOSE 130 (H) 03/02/2022    CALCIUM 9.0 03/02/2022     (L) 03/02/2022    K 3.9 03/02/2022    CO2 21.8 (L) 03/02/2022     03/02/2022    BUN 19 03/02/2022    CREATININE 1.24 03/02/2022    EGFRIFNONA 62 08/12/2018    BCR 15.3 03/02/2022    ANIONGAP 13.2 03/02/2022     Lab Results   Component Value Date    CHOL 96 06/29/2020    CHLPL 102 03/01/2022    TRIG 124 03/01/2022    HDL 38 (L) 03/01/2022    LDL 39 03/01/2022         Current Medication List  This medication list has been reviewed with the patient and evaluated for any interactions or necessary modifications/recommendations, and updated to include all prescription medications, OTC medications, and supplements the patient is currently taking.  This list reflects what is contained in the patient's profile, which has also been marked as reviewed to communicate to other providers it is the most up to date version of the patient's current medication therapy.     Current Outpatient Medications:   •  empagliflozin (Jardiance) 25 MG tablet tablet, Take 1 tablet by mouth Daily., Disp: 30 tablet, Rfl: 5  •  Insulin Glargine (Lantus SoloStar) 100 UNIT/ML injection pen, Inject 70 units under the skin daily as directed, Disp: 15 mL, Rfl: 5  •  Januvia 100 MG tablet, Take 1 tablet by mouth Daily., Disp: 30 tablet, Rfl: 5  •  metFORMIN (GLUCOPHAGE) 500 MG tablet, Take 1 tablet by mouth Daily., Disp: 30 tablet, Rfl: 5  •  amLODIPine (NORVASC) 10 MG tablet, Take 1 tablet by mouth Daily., Disp: 30 tablet, Rfl: 0  •  aspirin 325 MG tablet, Take 1 tablet by mouth Daily., Disp: , Rfl:   •  atorvastatin  (Lipitor) 20 MG tablet, Take 1 tablet by mouth Daily., Disp: 30 tablet, Rfl: 11  •  baclofen (LIORESAL) 10 MG tablet, Take 2 tablets by mouth 2 (Two) Times a Day., Disp: 120 tablet, Rfl: 11  •  carvedilol (COREG) 12.5 MG tablet, Take 1 tablet by mouth Every 12 (Twelve) Hours., Disp: 90 tablet, Rfl: 3  •  doxazosin (CARDURA) 8 MG tablet, Take 1 tablet by mouth 2 (Two) Times a Day. (Patient taking differently: Take 4 mg by mouth 2 (Two) Times a Day.), Disp: 60 tablet, Rfl: 5  •  eltrombopag (PROMACTA) 50 MG tablet, Take 1 tablet by mouth Every Other Day., Disp: 15 tablet, Rfl: 5  •  gabapentin (NEURONTIN) 300 MG capsule, Take 1 capsule by mouth 3 (Three) Times a Day., Disp: 270 capsule, Rfl: 1  •  glucose blood test strip, Use as instructed to check blood sugar daily, Disp: 100 each, Rfl: 1  •  Insulin Pen Needle (BD Pen Needle Claudia U/F) 32G X 4 MM misc, Use for injections daily as directed., Disp: 100 each, Rfl: 2  •  Lancets misc, Use as directed to check blood glucose Daily., Disp: 100 each, Rfl: 1  •  ondansetron (ZOFRAN) 8 MG tablet, Take 1 tablet by mouth 3 (Three) Times a Day As Needed for Nausea or Vomiting., Disp: 30 tablet, Rfl: 5    Medicines reviewed by Dena Maynard, [facility] on 7/14/2022 at  9:25 AM    Drug Interactions  The hypoglycemic effect of Insulin may be increased or prolonged by Beta-Adrenergic Blockers. Minor cardiovascular abnormalities may occur during hypoglycemic episodes.     Adverse Drug Reactions  • Adverse Reactions Experienced: None  • Plan for ADR Management: Not Required     Hospitalizations and Urgent Care Since Last Assessment  • Hospitalizations or Admissions: None  • ED Visits: None   • Urgent Office Visits: None    Adherence and Self-Administration  Adherence related patient's specialty therapy was discussed with the patient. The Adherence segment of this outreach has been reviewed and updated.     • Ongoing or New Barriers to Patient Adherence and/or Self-Administration:  None  • Methods for Supporting Patient Adherence and/or Self-Administration: None Required    Goals of Therapy  Goals related to the patient's specialty therapy was discussed with the patient. The Patient Goals segment of this outreach has been reviewed and updated.    Goals     • HEMOGLOBIN A1C < 7     • Specialty Pharmacy General Goal      Patient-identified goal of therapy: Patient will adhere to medication regimen by %  Clinical goal/therapeutic target: Patient will adhere to medication regimen by %               Quality of Life Assessment   Quality of Life related to the patient's specialty therapy was discussed with the patient. The QOL segment of this outreach has been reviewed and updated.      No change    Reassessment Plan & Follow-Up  1. Medication Therapy Changes: None. Patient needs refills today on all diabetes medications. New RX sent to pharmacy. Will mail out.  2. Additional Plans, Therapy Recommendations, or Therapy Problems to Be Addressed: None  3. Pharmacist to perform regular reassessments no more than (6) months from the previous assessment.  4. Care Coordinator to set up future refill outreaches, coordinate prescription delivery, and escalate clinical questions to pharmacist.     Attestation  I attest that the specialty medication(s) addressed above are appropriate for the patient based on my reassessment.  If the prescribed therapy is at any point deemed not appropriate based on the current or future assessments, a consultation will be initiated with the patient's specialty care provider to determine the best course of action. The revised plan of therapy will be documented along with any additional patient education provided.     Dena Maynard, PharmD   7/14/2022  09:29 EDT

## 2022-07-26 ENCOUNTER — HOSPITAL ENCOUNTER (OUTPATIENT)
Facility: HOSPITAL | Age: 57
Discharge: HOME | End: 2022-07-26
Payer: MEDICARE

## 2022-07-26 LAB — PLATELET # BLD: 98 K/UL (ref 150–400)

## 2022-07-26 PROCEDURE — 36415 COLL VENOUS BLD VENIPUNCTURE: CPT

## 2022-07-26 PROCEDURE — 85049 AUTOMATED PLATELET COUNT: CPT

## 2022-07-27 ENCOUNTER — OFFICE VISIT (OUTPATIENT)
Dept: ENDOCRINOLOGY | Facility: CLINIC | Age: 57
End: 2022-07-27

## 2022-07-27 ENCOUNTER — SPECIALTY PHARMACY (OUTPATIENT)
Dept: PHARMACY | Facility: HOSPITAL | Age: 57
End: 2022-07-27

## 2022-07-27 VITALS
HEIGHT: 72 IN | DIASTOLIC BLOOD PRESSURE: 80 MMHG | WEIGHT: 250 LBS | SYSTOLIC BLOOD PRESSURE: 122 MMHG | BODY MASS INDEX: 33.86 KG/M2 | OXYGEN SATURATION: 94 % | HEART RATE: 58 BPM

## 2022-07-27 DIAGNOSIS — E11.65 TYPE 2 DIABETES MELLITUS WITH HYPERGLYCEMIA, WITH LONG-TERM CURRENT USE OF INSULIN: Primary | ICD-10-CM

## 2022-07-27 DIAGNOSIS — Z79.4 TYPE 2 DIABETES MELLITUS WITH HYPERGLYCEMIA, WITH LONG-TERM CURRENT USE OF INSULIN: Primary | ICD-10-CM

## 2022-07-27 LAB
EXPIRATION DATE: NORMAL
GLUCOSE BLDC GLUCOMTR-MCNC: 147 MG/DL (ref 70–130)
HBA1C MFR BLD: 7.2 %
Lab: NORMAL

## 2022-07-27 PROCEDURE — 99213 OFFICE O/P EST LOW 20 MIN: CPT | Performed by: NURSE PRACTITIONER

## 2022-07-27 PROCEDURE — 83036 HEMOGLOBIN GLYCOSYLATED A1C: CPT | Performed by: NURSE PRACTITIONER

## 2022-07-27 PROCEDURE — 3051F HG A1C>EQUAL 7.0%<8.0%: CPT | Performed by: NURSE PRACTITIONER

## 2022-07-27 PROCEDURE — 82962 GLUCOSE BLOOD TEST: CPT | Performed by: NURSE PRACTITIONER

## 2022-07-27 RX ORDER — DOXAZOSIN MESYLATE 4 MG/1
4 TABLET ORAL 2 TIMES DAILY
Qty: 60 TABLET | Refills: 0 | Status: SHIPPED | OUTPATIENT
Start: 2022-07-27 | End: 2022-08-29 | Stop reason: SDUPTHER

## 2022-07-27 RX ORDER — DOXAZOSIN MESYLATE 4 MG/1
4 TABLET ORAL 2 TIMES DAILY
Qty: 30 TABLET | Refills: 5 | Status: SHIPPED | OUTPATIENT
Start: 2022-07-27 | End: 2022-07-27

## 2022-07-27 NOTE — PROGRESS NOTES
Specialty Pharmacy Patient Management Program  Endocrinology Reassessment     Mele Rossi is a 57 y.o. male with Type 2 Diabetes seen by an endocrinology provider and enrolled in the Endocrinology Patient Management program offered by [facility].  A follow-up was conducted, including assessment of continued therapy appropriateness, medication adherence, and side effect incidence and management for Jardiance, Metformin, Januvia, and Lantus.    Patient is currently taking Jardiance 25 mg daily, Januvia 100 mg daily, Metformin 500 mg daily, and Lantus 70 units at night. He reports tolerating these medications well with no side effects. He has trouble checking his BG due to injury to his hand. He would like to try FreeStyle Berhane again. He used in the past but his insurance stopped covering.     He is still taking Promacta for low platelets. He is requesting a prescription to be sent in for Doxazosin 4 mg tablets. His dose has changed to 4 mg twice daily and he is having trouble breaking the 8 mg tablets he has at home in half.     Changes to Insurance Coverage or Financial Support  None     Relevant Past Medical History and Comorbidities  Relevant medical history and concomitant health conditions were discussed with the patient. The patient's chart has been reviewed for relevant past medical history and comorbid health conditions and updated as necessary.   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   • Diabetes (HCC)    • Fatigue    • History of stroke    • Hypertension    • Obesity     body mass index 30+-obesity   • Stroke (cerebrum) (HCC)    • Type 2 diabetes mellitus, uncontrolled      Social History     Socioeconomic History   • Marital status:    Tobacco Use   • Smoking status: Former Smoker     Packs/day: 1.00     Years: 20.00     Pack years: 20.00     Types: Cigarettes     Quit date: 11/20/2018     Years since quitting: 3.6   • Smokeless tobacco: Never  Used   • Tobacco comment: States Smokes 1 cigeratte per month   Vaping Use   • Vaping Use: Never used   Substance and Sexual Activity   • Alcohol use: No   • Drug use: No   • Sexual activity: Defer          Allergies  Known allergies and reactions were discussed with the patient. The patient's chart has been reviewed for allergy information and updated as necessary.   Lisinopril    Allergies reviewed by Dena Maynard RPH on 7/27/2022 at  2:50 PM    Relevant Laboratory Values  A1C Last 3 Results    HGBA1C Last 3 Results 1/27/22 3/1/22 7/27/22   Hemoglobin A1C 7.0 7.8 (A) 7.2   (A) Abnormal value       Comments are available for some flowsheets but are not being displayed.           Lab Results   Component Value Date    HGBA1C 7.2 07/27/2022     Lab Results   Component Value Date    GLUCOSE 130 (H) 03/02/2022    CALCIUM 9.0 03/02/2022     (L) 03/02/2022    K 3.9 03/02/2022    CO2 21.8 (L) 03/02/2022     03/02/2022    BUN 19 03/02/2022    CREATININE 1.24 03/02/2022    EGFRIFNONA 62 08/12/2018    BCR 15.3 03/02/2022    ANIONGAP 13.2 03/02/2022     Lab Results   Component Value Date    CHOL 96 06/29/2020    CHLPL 102 03/01/2022    TRIG 124 03/01/2022    HDL 38 (L) 03/01/2022    LDL 39 03/01/2022         Current Medication List  This medication list has been reviewed with the patient and evaluated for any interactions or necessary modifications/recommendations, and updated to include all prescription medications, OTC medications, and supplements the patient is currently taking.  This list reflects what is contained in the patient's profile, which has also been marked as reviewed to communicate to other providers it is the most up to date version of the patient's current medication therapy.     Current Outpatient Medications:   •  amLODIPine (NORVASC) 10 MG tablet, Take 1 tablet by mouth Daily., Disp: 30 tablet, Rfl: 0  •  aspirin 325 MG tablet, Take 1 tablet by mouth Daily., Disp: , Rfl:   •  atorvastatin  (Lipitor) 20 MG tablet, Take 1 tablet by mouth Daily., Disp: 30 tablet, Rfl: 11  •  baclofen (LIORESAL) 10 MG tablet, Take 2 tablets by mouth 2 (Two) Times a Day., Disp: 120 tablet, Rfl: 11  •  carvedilol (COREG) 12.5 MG tablet, Take 1 tablet by mouth Every 12 (Twelve) Hours., Disp: 90 tablet, Rfl: 3  •  eltrombopag (PROMACTA) 50 MG tablet, Take 1 tablet by mouth Every Other Day., Disp: 15 tablet, Rfl: 5  •  empagliflozin (Jardiance) 25 MG tablet tablet, Take 1 tablet by mouth Daily., Disp: 30 tablet, Rfl: 5  •  gabapentin (NEURONTIN) 300 MG capsule, Take 1 capsule by mouth 3 (Three) Times a Day., Disp: 270 capsule, Rfl: 1  •  glucose blood test strip, Use as instructed to check blood sugar daily, Disp: 100 each, Rfl: 1  •  Insulin Glargine (Lantus SoloStar) 100 UNIT/ML injection pen, Inject 70 units under the skin daily as directed, Disp: 15 mL, Rfl: 5  •  Insulin Pen Needle (BD Pen Needle Claudia U/F) 32G X 4 MM misc, Use for injections daily as directed., Disp: 100 each, Rfl: 2  •  Januvia 100 MG tablet, Take 1 tablet by mouth Daily., Disp: 30 tablet, Rfl: 5  •  Lancets misc, Use as directed to check blood glucose Daily., Disp: 100 each, Rfl: 1  •  metFORMIN (GLUCOPHAGE) 500 MG tablet, Take 1 tablet by mouth Daily., Disp: 30 tablet, Rfl: 5  •  ondansetron (ZOFRAN) 8 MG tablet, Take 1 tablet by mouth 3 (Three) Times a Day As Needed for Nausea or Vomiting., Disp: 30 tablet, Rfl: 5  •  Continuous Blood Gluc Sensor (FreeStyle Berhane 2 Sensor) misc, Use 1 sensor Every 14 (Fourteen) Days., Disp: 2 each, Rfl: 5  •  doxazosin (CARDURA) 4 MG tablet, Take 1 tablet by mouth 2 (Two) Times a Day., Disp: 60 tablet, Rfl: 0    Medicines reviewed by Dena Maynard, formerly [facility] on 7/27/2022 at  2:55 PM    Drug Interactions  The hypoglycemic effect of Insulin may be increased or prolonged by Beta-Adrenergic Blockers. Minor cardiovascular abnormalities may occur during hypoglycemic episodes.     Adverse Drug Reactions  • Adverse Reactions  Experienced: None  • Plan for ADR Management: Not Required     Hospitalizations and Urgent Care Since Last Assessment  • Hospitalizations or Admissions: None  • ED Visits: None   • Urgent Office Visits: None    Adherence and Self-Administration  Adherence related patient's specialty therapy was discussed with the patient. The Adherence segment of this outreach has been reviewed and updated.     • Ongoing or New Barriers to Patient Adherence and/or Self-Administration: None  • Methods for Supporting Patient Adherence and/or Self-Administration: None Required    Goals of Therapy  Goals related to the patient's specialty therapy was discussed with the patient. The Patient Goals segment of this outreach has been reviewed and updated.    Goals     • HEMOGLOBIN A1C < 7     • Specialty Pharmacy General Goal      Patient-identified goal of therapy: Patient will adhere to medication regimen by %  Clinical goal/therapeutic target: Patient will adhere to medication regimen by %             Quality of Life Assessment   Quality of Life related to the patient's specialty therapy was discussed with the patient. The QOL segment of this outreach has been reviewed and updated.     Quality of Life Assessment  Quality of Life Improvement Scale: No change  Comments on Quality of Life: NoneNo change    Reassessment Plan & Follow-Up  1. Medication Therapy Changes: None discussed with patient   2. Additional Plans, Therapy Recommendations, or Therapy Problems to Be Addressed: Patient's diabetes is improving with treatment. His A1C is down to 7.2% from 7.8%. No pharmacologic recommendations at this time.   · If further glycemic control is needed in the future, patient has room to increase dose of Metformin.   · He would like to start back on FreeStyle Berhane. His insurance now covers with $0 co-pay. Will send RX to pharmacy for patient to pick-up today.   3. Pharmacist to perform regular reassessments no more than (6) months from  the previous assessment.  4. Care Coordinator to set up future refill outreaches, coordinate prescription delivery, and escalate clinical questions to pharmacist.     Attestation  I attest that the specialty medication(s) addressed above are appropriate for the patient based on my reassessment.  If the prescribed therapy is at any point deemed not appropriate based on the current or future assessments, a consultation will be initiated with the patient's specialty care provider to determine the best course of action. The revised plan of therapy will be documented along with any additional patient education provided.     Dena Maynard, PharmD   7/27/2022  15:36 EDT

## 2022-07-27 NOTE — PROGRESS NOTES
Chief Complaint   Patient presents with   • Diabetes     Follow up        Mele Rossi is a 57 y.o. male had concerns including Diabetes (Follow up).    T2DM    Since his last visit, he has been having issues with low platelets.  He is now on treatment for this and is feeling some better.  He is checking blood sugars weekly.  Current medications for diabetes include Januvia 100 mg, Lantus 70 units qhs, Jardiance 25 mg, Metformin 500 mg QD.  Most recent optho exam: due  Hypos: none    He is having issues with checking his BG due to having limited mobility of his right hand.  He had a stroke in the past.  He is about to have prostate biopsy if his PSA is stays elevated.    The following portions of the patient's history were reviewed and updated as appropriate: allergies, current medications, past family history, past medical history, past social history, past surgical history and problem list.    Review of Systems   Constitutional: Negative for fatigue.   Endocrine: Negative for polydipsia, polyphagia and polyuria.   Skin: Negative.    Psychiatric/Behavioral: Negative.    All other systems reviewed and are negative.     Physical Exam  Vitals reviewed.   Constitutional:       Appearance: Normal appearance.   Eyes:      Extraocular Movements: Extraocular movements intact.   Cardiovascular:      Pulses:           Dorsalis pedis pulses are 2+ on the right side and 2+ on the left side.        Posterior tibial pulses are 2+ on the right side and 2+ on the left side.   Pulmonary:      Effort: Pulmonary effort is normal.   Feet:      Right foot:      Protective Sensation: 10 sites tested. 9 sites sensed.      Skin integrity: Skin integrity normal.      Toenail Condition: Right toenails are normal.      Left foot:      Protective Sensation: 10 sites tested. 9 sites sensed.      Skin integrity: Skin integrity normal.      Toenail Condition: Left toenails are normal.      Comments: Diabetic Foot Exam Performed and Monofilament  "Test Performed  Neurological:      Mental Status: He is alert and oriented to person, place, and time.   Psychiatric:         Mood and Affect: Mood normal.         Behavior: Behavior normal.         Thought Content: Thought content normal.         Judgment: Judgment normal.        /80 (BP Location: Left arm, Patient Position: Sitting, Cuff Size: Adult)   Pulse 58   Ht 182.9 cm (72\")   Wt 113 kg (250 lb)   SpO2 94%   BMI 33.91 kg/m²      Labs and imaging    CMP:  Lab Results   Component Value Date    BUN 19 03/02/2022    CREATININE 1.24 03/02/2022    EGFRIFNONA 62 08/12/2018    BCR 15.3 03/02/2022     (L) 03/02/2022    K 3.9 03/02/2022    CO2 21.8 (L) 03/02/2022    CALCIUM 9.0 03/02/2022    ALBUMIN 4.10 03/02/2022    BILITOT 1.2 03/02/2022    ALKPHOS 110 03/02/2022    AST 14 03/02/2022    ALT 13 03/02/2022     Lipid Panel:  Lab Results   Component Value Date    CHOL 96 06/29/2020    TRIG 124 03/01/2022    HDL 38 (L) 03/01/2022    VLDL 25 03/01/2022    LDL 39 03/01/2022     HbA1c:  Lab Results   Component Value Date    HGBA1C 7.2 07/27/2022    HGBA1C 7.8 (H) 03/01/2022     Glucose:    Lab Results   Component Value Date    POCGLU 147 (A) 07/27/2022     Microalbumin:  No results found for: JFRECAROLINAO  TSH:  Lab Results   Component Value Date    TSH 2.44 03/01/2022       Assessment and plan  Diagnoses and all orders for this visit:    1. Type 2 diabetes mellitus with hyperglycemia, with long-term current use of insulin (HCC) (Primary)  Assessment & Plan:  -Diabetes is improving with A1C down from 7.8 to 7.2.  -Importance of yearly eye exams discussed.  Patient is to schedule a diabetic eye exam.    -Continue Januvia 100 mg.  -Continue Jardiance 25 mg.  -Continue Lantus 70 units qhs.  -Continue Metformin 500 mg QD.  -Importance of checking BG more often discussed.  He would like to try a CGM.    -S/S hypoglycemia reviewed with Rule of 15's advised.  -Follow-up in 6 months.    Orders:  -     POC Glucose " Fingerstick  -     POC Glycosylated Hemoglobin (Hb A1C)       Return in about 6 months (around 1/27/2023) for Follow-up appointment, A1C. The patient was instructed to contact the clinic with any interval questions or concerns.    This document has been electronically signed by CRYS Gruber  July 28, 2022 08:44 EDT  Endocrinology

## 2022-07-28 NOTE — ASSESSMENT & PLAN NOTE
-Diabetes is improving with A1C down from 7.8 to 7.2.  -Importance of yearly eye exams discussed.  Patient is to schedule a diabetic eye exam.    -Continue Januvia 100 mg.  -Continue Jardiance 25 mg.  -Continue Lantus 70 units qhs.  -Continue Metformin 500 mg QD.  -Importance of checking BG more often discussed.  He would like to try a CGM.    -S/S hypoglycemia reviewed with Rule of 15's advised.  -Follow-up in 6 months.

## 2022-08-08 RX ORDER — CARVEDILOL 12.5 MG/1
TABLET ORAL
Qty: 150 TABLET | Refills: 2 | Status: SHIPPED | OUTPATIENT
Start: 2022-08-08 | End: 2023-02-03

## 2022-08-09 ENCOUNTER — HOSPITAL ENCOUNTER (OUTPATIENT)
Facility: HOSPITAL | Age: 57
Discharge: HOME | End: 2022-08-09
Payer: MEDICARE

## 2022-08-09 LAB — PLATELET # BLD: 122 K/UL (ref 150–400)

## 2022-08-09 PROCEDURE — 85049 AUTOMATED PLATELET COUNT: CPT

## 2022-08-09 PROCEDURE — 36415 COLL VENOUS BLD VENIPUNCTURE: CPT

## 2022-08-11 ENCOUNTER — OFFICE VISIT (OUTPATIENT)
Dept: UROLOGY | Facility: CLINIC | Age: 57
End: 2022-08-11

## 2022-08-11 VITALS
TEMPERATURE: 97.2 F | HEART RATE: 97 BPM | SYSTOLIC BLOOD PRESSURE: 124 MMHG | OXYGEN SATURATION: 99 % | HEIGHT: 72 IN | DIASTOLIC BLOOD PRESSURE: 60 MMHG | WEIGHT: 250 LBS | BODY MASS INDEX: 33.86 KG/M2

## 2022-08-11 DIAGNOSIS — R97.20 ELEVATED PROSTATE SPECIFIC ANTIGEN (PSA): Primary | ICD-10-CM

## 2022-08-11 LAB
BILIRUB BLD-MCNC: NEGATIVE MG/DL
CLARITY, POC: CLEAR
COLOR UR: ABNORMAL
EXPIRATION DATE: ABNORMAL
GLUCOSE UR STRIP-MCNC: ABNORMAL MG/DL
KETONES UR QL: NEGATIVE
LEUKOCYTE EST, POC: NEGATIVE
Lab: ABNORMAL
NITRITE UR-MCNC: NEGATIVE MG/ML
PH UR: 5.5 [PH] (ref 5–8)
PROT UR STRIP-MCNC: NEGATIVE MG/DL
RBC # UR STRIP: NEGATIVE /UL
SP GR UR: 1.01 (ref 1–1.03)
UROBILINOGEN UR QL: NORMAL

## 2022-08-11 PROCEDURE — 81003 URINALYSIS AUTO W/O SCOPE: CPT | Performed by: PHYSICIAN ASSISTANT

## 2022-08-11 PROCEDURE — 99213 OFFICE O/P EST LOW 20 MIN: CPT | Performed by: PHYSICIAN ASSISTANT

## 2022-08-11 NOTE — PROGRESS NOTES
Chief Complaint   Patient presents with   • Elevated PSA     3 month follow up.         HPI  Mr. Rossi is a 57 y.o. male with history of elevated PSA and ITP who presents for follow up.     At this visit, has continued to have excellent response from Promacta. Repeat platelets 122.     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   • Diabetes (HCC)    • Fatigue    • History of stroke    • Hypertension    • Obesity     body mass index 30+-obesity   • Stroke (cerebrum) (HCC)    • Type 2 diabetes mellitus, uncontrolled        No past surgical history on file.      Current Outpatient Medications:   •  amLODIPine (NORVASC) 10 MG tablet, Take 1 tablet by mouth Daily., Disp: 30 tablet, Rfl: 0  •  aspirin 325 MG tablet, Take 1 tablet by mouth Daily., Disp: , Rfl:   •  atorvastatin (Lipitor) 20 MG tablet, Take 1 tablet by mouth Daily., Disp: 30 tablet, Rfl: 11  •  baclofen (LIORESAL) 10 MG tablet, Take 2 tablets by mouth 2 (Two) Times a Day., Disp: 120 tablet, Rfl: 11  •  carvedilol (COREG) 12.5 MG tablet, TAKE 1 TABLET BY MOUTH EVERY 12 HOURS, Disp: 150 tablet, Rfl: 2  •  Continuous Blood Gluc Sensor (FreeStyle Berhane 2 Sensor) misc, Use 1 sensor Every 14 (Fourteen) Days., Disp: 2 each, Rfl: 5  •  doxazosin (CARDURA) 4 MG tablet, Take 1 tablet by mouth 2 (Two) Times a Day., Disp: 60 tablet, Rfl: 0  •  eltrombopag (PROMACTA) 50 MG tablet, Take 1 tablet by mouth Every Other Day., Disp: 15 tablet, Rfl: 5  •  empagliflozin (Jardiance) 25 MG tablet tablet, Take 1 tablet by mouth Daily., Disp: 30 tablet, Rfl: 5  •  gabapentin (NEURONTIN) 300 MG capsule, Take 1 capsule by mouth 3 (Three) Times a Day., Disp: 270 capsule, Rfl: 1  •  glucose blood test strip, Use as instructed to check blood sugar daily, Disp: 100 each, Rfl: 1  •  Insulin Glargine (Lantus SoloStar) 100 UNIT/ML injection pen, Inject 70 units under the skin daily as directed, Disp: 15 mL, Rfl: 5  •  Insulin Pen Needle (BD Pen Needle Claudia U/F) 32G X  "4 MM misc, Use for injections daily as directed., Disp: 100 each, Rfl: 2  •  Januvia 100 MG tablet, Take 1 tablet by mouth Daily., Disp: 30 tablet, Rfl: 5  •  Lancets misc, Use as directed to check blood glucose Daily., Disp: 100 each, Rfl: 1  •  metFORMIN (GLUCOPHAGE) 500 MG tablet, Take 1 tablet by mouth Daily., Disp: 30 tablet, Rfl: 5  •  ondansetron (ZOFRAN) 8 MG tablet, Take 1 tablet by mouth 3 (Three) Times a Day As Needed for Nausea or Vomiting., Disp: 30 tablet, Rfl: 5     Physical Exam  Visit Vitals  Ht 182.9 cm (72\")   Wt 113 kg (250 lb)   BMI 33.91 kg/m²       Labs  Brief Urine Lab Results  (Last result in the past 365 days)      Color   Clarity   Blood   Leuk Est   Nitrite   Protein   CREAT   Urine HCG        04/07/22 1330 Straw   Cloudy   Negative   Negative   Negative   Negative                 Lab Results   Component Value Date    GLUCOSE 130 (H) 03/02/2022    CALCIUM 9.0 03/02/2022     (L) 03/02/2022    K 3.9 03/02/2022    CO2 21.8 (L) 03/02/2022     03/02/2022    BUN 19 03/02/2022    CREATININE 1.24 03/02/2022    EGFRIFNONA 62 08/12/2018    BCR 15.3 03/02/2022    ANIONGAP 13.2 03/02/2022       Lab Results   Component Value Date    WBC 8.4 06/14/2022    HGB 15.3 06/14/2022    HCT 46.7 06/14/2022    MCV 90.5 06/14/2022     (L) 08/09/2022            Lab Results   Component Value Date    PSA 5.6 (H) 04/07/2022    PSA 6.62 (H) 03/01/2022    PSA 4.46 (H) 12/21/2018         Assessment  57 y.o. male with history of elevated PSA and ITP.  He presents for repeat discussion of prostate biopsy preparation and recommendation if his PSA remains elevated.  We will obtain repeat PSA today.  He understands that if it remains above 4, our recommendation is going to be to proceed with prostate biopsy.    Plan  1.  Obtain PSA today; I will call the patient with results     "

## 2022-08-12 LAB — PSA SERPL-MCNC: 6.29 NG/ML (ref 0–4)

## 2022-08-12 NOTE — PROGRESS NOTES
Called the patient to discuss his persistently elevated PSA.  As we had previously discussed, our recommendation for pursuing this is going to be prostate biopsy.  I would like to discuss this with the patient and obtain full consent.  I called him twice, and on second call left a voicemail asking him for call back.

## 2022-08-12 NOTE — PROGRESS NOTES
Spoke with the patient's wife, Mayuri Rossi and relayed that the patient's persistently elevated PSA warrants prostate biopsy.  I reiterated that he should eat and drink and take all medications as normal prior to this procedure and that the procedure is 5 to 10 minutes.    She stated that she would speak with her  about it and get back to us.

## 2022-08-15 ENCOUNTER — SPECIALTY PHARMACY (OUTPATIENT)
Dept: ONCOLOGY | Facility: HOSPITAL | Age: 57
End: 2022-08-15

## 2022-08-15 NOTE — PROGRESS NOTES
Specialty Pharmacy Refill Coordination Note     Mele is a 57 y.o. male contacted today regarding refills of  Promacta 50mg PO every other day  specialty medication(s).    Specialty medication(s) and dose(s) confirmed: yes    Refill Questions    Flowsheet Row Most Recent Value   Changes to allergies? No   Changes to medications? No   New conditions since last clinic visit No   Unplanned office visit, urgent care, ED, or hospital admission in the last 4 weeks  No   How does patient/caregiver feel medication is working? Good   Financial problems or insurance changes  No   Since the previous refill, were any specialty medication doses or scheduled injections missed or delayed?  No   Does this patient require a clinical escalation to a pharmacist? No          Delivery Questions    Flowsheet Row Most Recent Value   Delivery method [employer]   Delivery address correct? Yes   Delivery phone number [phone]   Preferred delivery time? Anytime   Number of medications in delivery 1   Medication being filled and delivered promacta   Doses left of specialty medications 7 days left   Is there any medication that is due not being filled? No   Supplies needed? No supplies needed   Cooler needed? No   Do any medications need mixed or dated? No   Additional comments no copay   Questions or concerns for the pharmacist? No   Explain any questions or concerns for the pharmacist n/a   Are any medications first time fills? No            Medication Adherence    Adherence tools used: patient uses a pill box to manage medications  Support network for adherence: family member          Follow-up: 28 day(s)     Bridgett Weems, Pharmacy Technician  Specialty Pharmacy Technician

## 2022-08-16 ENCOUNTER — SPECIALTY PHARMACY (OUTPATIENT)
Dept: PHARMACY | Facility: HOSPITAL | Age: 57
End: 2022-08-16

## 2022-08-29 RX ORDER — DOXAZOSIN MESYLATE 4 MG/1
4 TABLET ORAL 2 TIMES DAILY
Qty: 60 TABLET | Refills: 0 | Status: SHIPPED | OUTPATIENT
Start: 2022-08-29 | End: 2022-09-13 | Stop reason: SDUPTHER

## 2022-08-31 ENCOUNTER — HOSPITAL ENCOUNTER (OUTPATIENT)
Facility: HOSPITAL | Age: 57
Discharge: HOME | End: 2022-08-31
Payer: MEDICARE

## 2022-08-31 LAB — PLATELET # BLD: 102 K/UL (ref 150–400)

## 2022-08-31 PROCEDURE — 85049 AUTOMATED PLATELET COUNT: CPT

## 2022-08-31 PROCEDURE — 36415 COLL VENOUS BLD VENIPUNCTURE: CPT

## 2022-09-08 ENCOUNTER — SPECIALTY PHARMACY (OUTPATIENT)
Dept: ONCOLOGY | Facility: HOSPITAL | Age: 57
End: 2022-09-08

## 2022-09-08 NOTE — PROGRESS NOTES
Specialty Pharmacy Refill Coordination Note     Mele is a 57 y.o. male contacted today regarding refills of  Eltrombopag 50mg PO every other day specialty medication(s).    Specialty medication(s) and dose(s) confirmed: yes    Refill Questions    Flowsheet Row Most Recent Value   Changes to allergies? No   Changes to medications? No   New conditions since last clinic visit No   Unplanned office visit, urgent care, ED, or hospital admission in the last 4 weeks  No   How does patient/caregiver feel medication is working? Good   Financial problems or insurance changes  No   Since the previous refill, were any specialty medication doses or scheduled injections missed or delayed?  No   Does this patient require a clinical escalation to a pharmacist? No          Delivery Questions    Flowsheet Row Most Recent Value   Delivery method [employer]   Delivery address correct? Yes   Delivery phone number [phone]   Preferred delivery time? Anytime   Number of medications in delivery 1   Medication being filled and delivered promacta   Doses left of specialty medications 6 days left   Is there any medication that is due not being filled? No   Supplies needed? No supplies needed   Cooler needed? No   Do any medications need mixed or dated? No   Additional comments no copay   Questions or concerns for the pharmacist? No   Explain any questions or concerns for the pharmacist n/a   Are any medications first time fills? No            Medication Adherence    Adherence tools used: patient uses a pill box to manage medications  Support network for adherence: family member          Follow-up: 28 day(s)     Bridgett Weems, Pharmacy Technician  Specialty Pharmacy Technician

## 2022-09-12 ENCOUNTER — HOSPITAL ENCOUNTER (OUTPATIENT)
Facility: HOSPITAL | Age: 57
Discharge: HOME | End: 2022-09-12
Payer: MEDICARE

## 2022-09-12 ENCOUNTER — TELEPHONE (OUTPATIENT)
Dept: ENDOCRINOLOGY | Facility: CLINIC | Age: 57
End: 2022-09-12

## 2022-09-12 LAB — PLATELET # BLD: 93 K/UL (ref 150–400)

## 2022-09-12 PROCEDURE — 36415 COLL VENOUS BLD VENIPUNCTURE: CPT

## 2022-09-12 PROCEDURE — 85049 AUTOMATED PLATELET COUNT: CPT

## 2022-09-12 NOTE — TELEPHONE ENCOUNTER
Pts wife called to make us aware that pt did have a open wound on his foot. He is currently seeing a podiatrist in [place] who is doing wound care. Made her aware that if she has any concerns to call the office and we will take address them.

## 2022-09-13 ENCOUNTER — SPECIALTY PHARMACY (OUTPATIENT)
Dept: PHARMACY | Facility: HOSPITAL | Age: 57
End: 2022-09-13

## 2022-09-13 RX ORDER — DOXAZOSIN MESYLATE 4 MG/1
4 TABLET ORAL 2 TIMES DAILY
Qty: 60 TABLET | Refills: 5 | Status: SHIPPED | OUTPATIENT
Start: 2022-09-13 | End: 2022-11-14 | Stop reason: SDUPTHER

## 2022-09-13 NOTE — PROGRESS NOTES
Specialty Pharmacy Patient Management Program  Endocrinology Reassessment     Mele Rossi is a 57 y.o. male with Type 2 Diabetes enrolled in the Endocrinology Patient Management program offered by [facility].  A follow-up was conducted, including assessment of continued therapy appropriateness, medication adherence, and side effect incidence and management for Jardiance, Metformin, Januvia, and Lantus.    Patient reports tolerating medications well with no side effects. He is using FreeStyle Berhane to monitor BG.     Patient's wife stated that he has an ulcer on his foot. Patient has been to both primary care and podiatry for treatment. Discussed with provider. She spoke with patient's wife and gave her further instructions.    He is requesting refills on all his diabetes medications and Doxazosin 4 mg tablets.    Changes to Insurance Coverage or Financial Support  None     Relevant Past Medical History and Comorbidities  Relevant medical history and concomitant health conditions were discussed with the patient. The patient's chart has been reviewed for relevant past medical history and comorbid health conditions and updated as necessary.   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   • Diabetes (HCC)    • Fatigue    • History of stroke    • Hypertension    • Obesity     body mass index 30+-obesity   • Stroke (cerebrum) (HCC)    • Type 2 diabetes mellitus, uncontrolled      Social History     Socioeconomic History   • Marital status:    Tobacco Use   • Smoking status: Former Smoker     Packs/day: 1.00     Years: 20.00     Pack years: 20.00     Types: Cigarettes     Quit date: 11/20/2018     Years since quitting: 3.8   • Smokeless tobacco: Never Used   • Tobacco comment: States Smokes 1 cigeratte per month   Vaping Use   • Vaping Use: Never used   Substance and Sexual Activity   • Alcohol use: No   • Drug use: No   • Sexual activity: Defer           Allergies  Known allergies and reactions were discussed with the patient. The patient's chart has been reviewed for allergy information and updated as necessary.   Lisinopril         Relevant Laboratory Values  A1C Last 3 Results    HGBA1C Last 3 Results 1/27/22 3/1/22 7/27/22   Hemoglobin A1C 7.0 7.8 (A) 7.2   (A) Abnormal value       Comments are available for some flowsheets but are not being displayed.           Lab Results   Component Value Date    HGBA1C 7.2 07/27/2022     Lab Results   Component Value Date    GLUCOSE 130 (H) 03/02/2022    CALCIUM 9.0 03/02/2022     (L) 03/02/2022    K 3.9 03/02/2022    CO2 21.8 (L) 03/02/2022     03/02/2022    BUN 19 03/02/2022    CREATININE 1.24 03/02/2022    EGFRIFNONA 62 08/12/2018    BCR 15.3 03/02/2022    ANIONGAP 13.2 03/02/2022     Lab Results   Component Value Date    CHOL 96 06/29/2020    CHLPL 102 03/01/2022    TRIG 124 03/01/2022    HDL 38 (L) 03/01/2022    LDL 39 03/01/2022         Current Medication List  This medication list has been reviewed with the patient and evaluated for any interactions or necessary modifications/recommendations, and updated to include all prescription medications, OTC medications, and supplements the patient is currently taking.  This list reflects what is contained in the patient's profile, which has also been marked as reviewed to communicate to other providers it is the most up to date version of the patient's current medication therapy.     Current Outpatient Medications:   •  amLODIPine (NORVASC) 10 MG tablet, Take 1 tablet by mouth Daily., Disp: 30 tablet, Rfl: 0  •  aspirin 325 MG tablet, Take 1 tablet by mouth Daily., Disp: , Rfl:   •  atorvastatin (Lipitor) 20 MG tablet, Take 1 tablet by mouth Daily., Disp: 30 tablet, Rfl: 11  •  baclofen (LIORESAL) 10 MG tablet, Take 2 tablets by mouth 2 (Two) Times a Day., Disp: 120 tablet, Rfl: 11  •  carvedilol (COREG) 12.5 MG tablet, TAKE 1 TABLET BY MOUTH EVERY 12 HOURS,  Disp: 150 tablet, Rfl: 2  •  Continuous Blood Gluc Sensor (FreeStyle Berhane 2 Sensor) misc, Use 1 sensor Every 14 (Fourteen) Days., Disp: 2 each, Rfl: 5  •  doxazosin (CARDURA) 4 MG tablet, Take 1 tablet by mouth 2 (Two) Times a Day., Disp: 60 tablet, Rfl: 0  •  eltrombopag (PROMACTA) 50 MG tablet, Take 1 tablet by mouth Every Other Day., Disp: 15 tablet, Rfl: 5  •  empagliflozin (Jardiance) 25 MG tablet tablet, Take 1 tablet by mouth Daily., Disp: 30 tablet, Rfl: 5  •  gabapentin (NEURONTIN) 300 MG capsule, Take 1 capsule by mouth 3 (Three) Times a Day., Disp: 270 capsule, Rfl: 1  •  glucose blood test strip, Use as instructed to check blood sugar daily, Disp: 100 each, Rfl: 1  •  Insulin Glargine (Lantus SoloStar) 100 UNIT/ML injection pen, Inject 70 units under the skin daily as directed, Disp: 15 mL, Rfl: 5  •  Insulin Pen Needle (BD Pen Needle Claudia U/F) 32G X 4 MM misc, Use for injections daily as directed., Disp: 100 each, Rfl: 2  •  Januvia 100 MG tablet, Take 1 tablet by mouth Daily., Disp: 30 tablet, Rfl: 5  •  Lancets misc, Use as directed to check blood glucose Daily., Disp: 100 each, Rfl: 1  •  metFORMIN (GLUCOPHAGE) 500 MG tablet, Take 1 tablet by mouth Daily., Disp: 30 tablet, Rfl: 5  •  ondansetron (ZOFRAN) 8 MG tablet, Take 1 tablet by mouth 3 (Three) Times a Day As Needed for Nausea or Vomiting., Disp: 30 tablet, Rfl: 5         Drug Interactions  The hypoglycemic effect of Insulin may be increased or prolonged by Beta-Adrenergic Blockers. Minor cardiovascular abnormalities may occur during hypoglycemic episodes.     Adverse Drug Reactions  • Adverse Reactions Experienced: None  • Plan for ADR Management: Not Required     Hospitalizations and Urgent Care Since Last Assessment  • Hospitalizations or Admissions: None  • ED Visits: None   • Urgent Office Visits: None    Adherence and Self-Administration  Adherence related patient's specialty therapy was discussed with the patient. The Adherence segment  of this outreach has been reviewed and updated.     • Ongoing or New Barriers to Patient Adherence and/or Self-Administration: None  • Methods for Supporting Patient Adherence and/or Self-Administration: None Required    Goals of Therapy  Goals related to the patient's specialty therapy was discussed with the patient. The Patient Goals segment of this outreach has been reviewed and updated.    Goals     • HEMOGLOBIN A1C < 7     • Specialty Pharmacy General Goal      Patient-identified goal of therapy: Patient will adhere to medication regimen by %  Clinical goal/therapeutic target: Patient will adhere to medication regimen by %             Quality of Life Assessment   Quality of Life related to the patient's specialty therapy was discussed with the patient. The QOL segment of this outreach has been reviewed and updated.      No change    Reassessment Plan & Follow-Up  1. Medication Therapy Changes: None. Patient needs refills on all diabetes medications and Doxazosin. Will send to pharmacy and mail to patient.   2. Additional Plans, Therapy Recommendations, or Therapy Problems to Be Addressed: None   3. Pharmacist to perform regular reassessments no more than (6) months from the previous assessment.  4. Care Coordinator to set up future refill outreaches, coordinate prescription delivery, and escalate clinical questions to pharmacist.     Attestation  I attest that the specialty medication(s) addressed above are appropriate for the patient based on my reassessment.  If the prescribed therapy is at any point deemed not appropriate based on the current or future assessments, a consultation will be initiated with the patient's specialty care provider to determine the best course of action. The revised plan of therapy will be documented along with any additional patient education provided.     Dena Maynard, Peter   9/13/2022  11:09 EDT

## 2022-09-15 ENCOUNTER — TELEPHONE (OUTPATIENT)
Dept: UROLOGY | Facility: CLINIC | Age: 57
End: 2022-09-15

## 2022-09-15 DIAGNOSIS — R97.20 ELEVATED PROSTATE SPECIFIC ANTIGEN (PSA): Primary | ICD-10-CM

## 2022-09-15 RX ORDER — MAGNESIUM HYDROXIDE 1200 MG/15ML
1 LIQUID ORAL ONCE
Qty: 1 ENEMA | Refills: 0 | Status: SHIPPED | OUTPATIENT
Start: 2022-09-15 | End: 2022-09-15

## 2022-09-15 RX ORDER — CEPHALEXIN 500 MG/1
500 CAPSULE ORAL 2 TIMES DAILY
Qty: 6 CAPSULE | Refills: 0 | Status: SHIPPED | OUTPATIENT
Start: 2022-09-15 | End: 2022-09-18

## 2022-09-15 RX ORDER — CIPROFLOXACIN 500 MG/1
500 TABLET, FILM COATED ORAL 2 TIMES DAILY
Qty: 6 TABLET | Refills: 0 | Status: SHIPPED | OUTPATIENT
Start: 2022-09-15 | End: 2022-11-14

## 2022-09-27 ENCOUNTER — HOSPITAL ENCOUNTER (OUTPATIENT)
Facility: HOSPITAL | Age: 57
Discharge: HOME | End: 2022-09-27
Payer: MEDICARE

## 2022-09-27 LAB — PLATELET # BLD: 108 K/UL (ref 150–400)

## 2022-09-27 PROCEDURE — 85049 AUTOMATED PLATELET COUNT: CPT

## 2022-09-27 PROCEDURE — 36415 COLL VENOUS BLD VENIPUNCTURE: CPT

## 2022-09-28 ENCOUNTER — SPECIALTY PHARMACY (OUTPATIENT)
Dept: ONCOLOGY | Facility: HOSPITAL | Age: 57
End: 2022-09-28

## 2022-09-28 NOTE — PROGRESS NOTES
Specialty Pharmacy Patient Management Program  Oncology 6-Month Clinical Assessment       Mele Rossi is a 57 y.o. male with ITP seen today to assess adherence and side effects.    Regimen: Promacta (eltrombopag) 50 mg PO every other day    Reason for Outreach: Routine medication check-in .       Problem list reviewed by Virginie Ferguson [facility] on 9/28/2022 at 11:37 AM  Medicines reviewed by Virginie Ferguson [facility] on 9/28/2022 at 11:36 AM  Medicines reviewed by Virginie Ferguson [facility] on 9/28/2022 at 11:37 AM  Allergies reviewed by Virginie Ferguson [facility] on 9/28/2022 at 11:35 AM    Quality of Life Assessment  Quality of Life Improvement Scale: Moderately better     Goals     • HEMOGLOBIN A1C < 7     • Specialty Pharmacy General Goal      Patient-identified goal of therapy: Patient will adhere to medication regimen by %  Clinical goal/therapeutic target: Patient will adhere to medication regimen by %            Adherence Questions  Medication(s) assessed: Promacta  On average, how many doses/injections does the patient miss per month?: 0  What are the identified reasons for non-adherence or missed doses? : no problems identfied  What is the estimated medication adherence level?: %  Based on the patient/caregiver response and refill history, does this patient require an MTP to track adherence improvements?: no    Assessments:  • Medication Assessment  o Medication Tolerability: Patient denies side effects. .  o Therapeutically Appropriate: Yes  o Administration: Patient is taking every day at the same time , as prescribed  and he knows to avoid calcium containing food/drinks within 2 hours before and 4 hours after taking promacta.   o Patient can self administer medications: Yes  o Medication Follow-Up Plan: refill outreach  • Drug-drug interactions  o Completed medication reconciliation today to assess for drug interactions. Patient denies starting or stopping any medications.    o Assessed  medication list for interactions, no significant drug interactions noted.   o Advised patient to call the clinic if any new medications are started so we can assess for drug-drug interactions.  • Vaccines are coordinated by the patient's oncologist and primary care provider.  • Quality of Life: 8/10  • Adherence:  o Missed doses: Patient reports missing 0 doses in the last 30 days..  o Reasons for missed doses: N/A  • Medication availability/affordability: Patient has had no issues obtaining medication from pharmacy.  • Questions/concerns about medications: None    All questions addressed and patient had no additional concerns. Patient has pharmacy contact information    Meliza Ferguson, PharmD, [facility]  Oncology Clinical Pharmacist   [phone]

## 2022-09-30 ENCOUNTER — OFFICE VISIT (OUTPATIENT)
Dept: ONCOLOGY | Facility: CLINIC | Age: 57
End: 2022-09-30

## 2022-09-30 ENCOUNTER — SPECIALTY PHARMACY (OUTPATIENT)
Dept: ONCOLOGY | Facility: HOSPITAL | Age: 57
End: 2022-09-30

## 2022-09-30 VITALS
OXYGEN SATURATION: 96 % | HEART RATE: 66 BPM | WEIGHT: 250 LBS | BODY MASS INDEX: 33.86 KG/M2 | SYSTOLIC BLOOD PRESSURE: 114 MMHG | DIASTOLIC BLOOD PRESSURE: 76 MMHG | TEMPERATURE: 98.7 F | HEIGHT: 72 IN

## 2022-09-30 DIAGNOSIS — D69.3 ACUTE ITP: Primary | ICD-10-CM

## 2022-09-30 PROCEDURE — 99213 OFFICE O/P EST LOW 20 MIN: CPT | Performed by: NURSE PRACTITIONER

## 2022-09-30 NOTE — PROGRESS NOTES
REASON FOR VISIT: ITP    HISTORY OF PRESENT ILLNESS:   57 y.o.  male presents today for follow-up of his ITP.  He is doing reasonably well currently.  He is on Promacta.  No bruising or bleeding.   He has a history of stroke with right-sided weakness. He has been worrying over his platelets. He has been going almost wkly to every 9 days.         Past medical history, social history and family history was reviewed 09/30/22 and unchanged from prior visit.    Review of Systems:    Review of Systems   Constitutional: Negative.    HENT:  Negative.    Eyes: Negative.    Respiratory: Negative.    Cardiovascular: Negative.    Gastrointestinal: Negative.    Endocrine: Negative.    Genitourinary: Negative.     Skin: Negative.    Neurological: Positive for extremity weakness.   Hematological: Negative.    Psychiatric/Behavioral: Negative.             Medications:        Current Outpatient Medications:   •  amLODIPine (NORVASC) 10 MG tablet, Take 1 tablet by mouth Daily., Disp: 30 tablet, Rfl: 0  •  aspirin 325 MG tablet, Take 1 tablet by mouth Daily., Disp: , Rfl:   •  atorvastatin (Lipitor) 20 MG tablet, Take 1 tablet by mouth Daily., Disp: 30 tablet, Rfl: 11  •  baclofen (LIORESAL) 10 MG tablet, Take 2 tablets by mouth 2 (Two) Times a Day., Disp: 120 tablet, Rfl: 11  •  carvedilol (COREG) 12.5 MG tablet, TAKE 1 TABLET BY MOUTH EVERY 12 HOURS, Disp: 150 tablet, Rfl: 2  •  ciprofloxacin (Cipro) 500 MG tablet, Take 1 tablet by mouth 2 (Two) Times a Day. Start taking the day prior to biopsy, Disp: 6 tablet, Rfl: 0  •  Continuous Blood Gluc Sensor (FreeStyle Berhane 2 Sensor) misc, Use 1 sensor Every 14 (Fourteen) Days., Disp: 2 each, Rfl: 5  •  doxazosin (CARDURA) 4 MG tablet, Take 1 tablet by mouth 2 (Two) Times a Day., Disp: 60 tablet, Rfl: 5  •  eltrombopag (PROMACTA) 50 MG tablet, Take 1 tablet by mouth Every Other Day., Disp: 15 tablet, Rfl: 5  •  empagliflozin (Jardiance) 25 MG tablet tablet, Take 1 tablet by mouth Daily.,  "Disp: 30 tablet, Rfl: 5  •  gabapentin (NEURONTIN) 300 MG capsule, Take 1 capsule by mouth 3 (Three) Times a Day., Disp: 270 capsule, Rfl: 1  •  Insulin Glargine (Lantus SoloStar) 100 UNIT/ML injection pen, Inject 70 units under the skin daily as directed, Disp: 15 mL, Rfl: 5  •  Insulin Pen Needle (BD Pen Needle Claudia U/F) 32G X 4 MM misc, Use for injections daily as directed., Disp: 100 each, Rfl: 2  •  Januvia 100 MG tablet, Take 1 tablet by mouth Daily., Disp: 30 tablet, Rfl: 5  •  metFORMIN (GLUCOPHAGE) 500 MG tablet, Take 1 tablet by mouth Daily., Disp: 30 tablet, Rfl: 5  •  ondansetron (ZOFRAN) 8 MG tablet, Take 1 tablet by mouth 3 (Three) Times a Day As Needed for Nausea or Vomiting., Disp: 30 tablet, Rfl: 5  •  glucose blood test strip, Use as instructed to check blood sugar daily, Disp: 100 each, Rfl: 1  •  Lancets misc, Use as directed to check blood glucose Daily., Disp: 100 each, Rfl: 1    Pain Medications             aspirin 325 MG tablet Take 1 tablet by mouth Daily.    baclofen (LIORESAL) 10 MG tablet Take 2 tablets by mouth 2 (Two) Times a Day.    gabapentin (NEURONTIN) 300 MG capsule Take 1 capsule by mouth 3 (Three) Times a Day.             ALLERGIES:    Allergies   Allergen Reactions   • Lisinopril Dizziness     syncope         Physical Exam    VITAL SIGNS:  /76   Pulse 66   Temp 98.7 °F (37.1 °C)   Ht 182.9 cm (72\")   Wt 113 kg (250 lb)   SpO2 96%   BMI 33.91 kg/m²                 Wt Readings from Last 3 Encounters:   09/30/22 113 kg (250 lb)   08/11/22 113 kg (250 lb)   07/27/22 113 kg (250 lb)       Body mass index is 33.91 kg/m². Body surface area is 2.34 meters squared.    Pain Score    09/30/22 1347   PainSc: 0-No pain           Performance Status: 1    General: well appearing male in no acute distress  Neuro: alert and oriented  HEENT: sclera anicteric, oropharynx clear  Lymphatics: no cervical, supraclavicular, or axillary adenopathy  Cardiovascular: regular rate and rhythm, no " murmurs  Lungs: clear to auscultation bilaterally  Abdomen: soft, nontender, nondistended.  No palpable organomegaly  Extremeties: no lower extremity edema  Skin: no rashes, lesions, bruising, or petechiae  Psych: mood and affect appropriate        RECENT LABS:    Lab Results   Component Value Date    HGB 15.3 06/14/2022    HCT 46.7 06/14/2022    MCV 90.5 06/14/2022     (L) 09/27/2022    WBC 8.4 06/14/2022    NEUTROABS 5.6 06/14/2022    LYMPHSABS 1.6 06/14/2022    MONOSABS 0.8 06/14/2022    EOSABS 0.2 06/14/2022    BASOSABS 0.1 06/14/2022       Lab Results   Component Value Date    GLUCOSE 130 (H) 03/02/2022    BUN 19 03/02/2022    CREATININE 1.24 03/02/2022     (L) 03/02/2022    K 3.9 03/02/2022     03/02/2022    CO2 21.8 (L) 03/02/2022    CALCIUM 9.0 03/02/2022    PROTEINTOT 7.5 03/02/2022    ALBUMIN 4.10 03/02/2022    BILITOT 1.2 03/02/2022    ALKPHOS 110 03/02/2022    AST 14 03/02/2022    ALT 13 03/02/2022       Lab Results   Component Value Date     (L) 09/27/2022    PLT 93 (L) 09/12/2022     (L) 08/31/2022     (L) 08/09/2022    PLT 98 (L) 07/26/2022           Assessment/Plan    1.  Acute ITP.  Platelets are stable on Promacta at 50 mg/ every other day.  We will continue reduced dose at 50 mg every other day at this point.  We will change his CBC checks to once a month.  At this time we will hold off on any further weaning since platelets have been stable around 100,000.    2.  Elevated PSA.  As of now they are planning for biopsy of the prostate if levels remain elevated.  It was  deferred due to his thrombocytopenia.  We reviewed we can always bring his platelets up with Promacta.    3.  History of stroke.  Continue aspirin since his platelets remain over 50,000.      Madisyn R Law, APRN  [facility]    Return in (Approximately): 4 months    Orders Placed This Encounter   Procedures   • CBC & Differential       9/30/2022

## 2022-10-03 ENCOUNTER — SPECIALTY PHARMACY (OUTPATIENT)
Dept: ONCOLOGY | Facility: HOSPITAL | Age: 57
End: 2022-10-03

## 2022-10-03 NOTE — PROGRESS NOTES
Specialty Pharmacy Refill Coordination Note     Mele is a 57 y.o. male contacted today regarding refills of Promacta 50mg PO every other day specialty medication(s).    Specialty medication(s) and dose(s) confirmed: yes    Refill Questions    Flowsheet Row Most Recent Value   Changes to allergies? No   Changes to medications? No   New conditions since last clinic visit No   Unplanned office visit, urgent care, ED, or hospital admission in the last 4 weeks  No   How does patient/caregiver feel medication is working? Good   Financial problems or insurance changes  No   Since the previous refill, were any specialty medication doses or scheduled injections missed or delayed?  No   Does this patient require a clinical escalation to a pharmacist? No          Delivery Questions    Flowsheet Row Most Recent Value   Delivery method FedEx   Delivery address correct? Yes   Delivery phone number [phone]   Preferred delivery time? Anytime   Number of medications in delivery 1   Medication being filled and delivered promacta   Doses left of specialty medications 6 days left   Is there any medication that is due not being filled? No   Supplies needed? No supplies needed   Cooler needed? No   Do any medications need mixed or dated? No   Additional comments using promacta card for $174.49   Questions or concerns for the pharmacist? No   Explain any questions or concerns for the pharmacist n/a   Are any medications first time fills? No            Medication Adherence    Adherence tools used: patient uses a pill box to manage medications  Support network for adherence: family member          Follow-up: 28 day(s)     Bridgett Weems, Pharmacy Technician  Specialty Pharmacy Technician

## 2022-10-24 ENCOUNTER — TELEPHONE (OUTPATIENT)
Dept: ENDOCRINOLOGY | Facility: CLINIC | Age: 57
End: 2022-10-24

## 2022-10-24 ENCOUNTER — SPECIALTY PHARMACY (OUTPATIENT)
Dept: PHARMACY | Facility: HOSPITAL | Age: 57
End: 2022-10-24

## 2022-10-24 NOTE — TELEPHONE ENCOUNTER
Pts wife called to let us know that pt had to have a callus trimmed on his foot last week and it has gotten infected. Pt is being treated by the podiatrist for this infection and sees them this afternoon. She does report that pt is being woken up between 5-7 am with low blood sugar warnings from his CGM (between 45-70). S/w Junie about this and she is decreasing his Lantus to 65 units nightly for 3 days and if still low then to 60 units. Pts wife aware of changes.

## 2022-10-27 ENCOUNTER — HOSPITAL ENCOUNTER (OUTPATIENT)
Facility: HOSPITAL | Age: 57
Discharge: HOME | End: 2022-10-27
Payer: MEDICARE

## 2022-10-27 LAB — PLATELET # BLD: 133 K/UL (ref 150–400)

## 2022-10-27 PROCEDURE — 85049 AUTOMATED PLATELET COUNT: CPT

## 2022-10-27 PROCEDURE — 36415 COLL VENOUS BLD VENIPUNCTURE: CPT

## 2022-10-31 ENCOUNTER — SPECIALTY PHARMACY (OUTPATIENT)
Dept: ONCOLOGY | Facility: HOSPITAL | Age: 57
End: 2022-10-31

## 2022-10-31 NOTE — PROGRESS NOTES
Specialty Pharmacy Refill Coordination Note     Mele is a 57 y.o. male contacted today regarding refills of  Promacta 50mg PO every other day specialty medication(s).      Specialty medication(s) and dose(s) confirmed: yes    Refill Questions    Flowsheet Row Most Recent Value   Changes to allergies? No   Changes to medications? No   New conditions since last clinic visit No   Unplanned office visit, urgent care, ED, or hospital admission in the last 4 weeks  No   How does patient/caregiver feel medication is working? Good   Financial problems or insurance changes  No   Since the previous refill, were any specialty medication doses or scheduled injections missed or delayed?  No   Does this patient require a clinical escalation to a pharmacist? No          Delivery Questions    Flowsheet Row Most Recent Value   Delivery method FedEx   Delivery address correct? Yes   Delivery phone number [phone]   Preferred delivery time? Anytime   Number of medications in delivery 1   Medication being filled and delivered promacta   Doses left of specialty medications 5 days left   Is there any medication that is due not being filled? No   Supplies needed? No supplies needed   Cooler needed? No   Do any medications need mixed or dated? No   Additional comments no copay   Questions or concerns for the pharmacist? No   Explain any questions or concerns for the pharmacist n/a   Are any medications first time fills? No            Medication Adherence    Adherence tools used: patient uses a pill box to manage medications  Support network for adherence: family member          Follow-up: 28 day(s)     Bridgett Weems, Pharmacy Technician  Specialty Pharmacy Technician

## 2022-11-14 ENCOUNTER — SPECIALTY PHARMACY (OUTPATIENT)
Dept: PHARMACY | Facility: HOSPITAL | Age: 57
End: 2022-11-14

## 2022-11-14 RX ORDER — DOXAZOSIN MESYLATE 4 MG/1
4 TABLET ORAL 2 TIMES DAILY
Qty: 180 TABLET | Refills: 1 | Status: SHIPPED | OUTPATIENT
Start: 2022-11-14

## 2022-11-14 RX ORDER — PEN NEEDLE, DIABETIC 32GX 5/32"
NEEDLE, DISPOSABLE MISCELLANEOUS
Qty: 100 EACH | Refills: 1 | Status: SHIPPED | OUTPATIENT
Start: 2022-11-14

## 2022-11-14 RX ORDER — INSULIN GLARGINE 100 [IU]/ML
INJECTION, SOLUTION SUBCUTANEOUS
Qty: 15 ML | Refills: 5 | Status: CANCELLED | OUTPATIENT
Start: 2022-11-14

## 2022-11-14 RX ORDER — LANCETS 30 GAUGE
1 EACH MISCELLANEOUS DAILY
Qty: 100 EACH | Refills: 1 | Status: SHIPPED | OUTPATIENT
Start: 2022-11-14

## 2022-11-14 RX ORDER — CEPHALEXIN 500 MG/1
500 CAPSULE ORAL AS NEEDED
COMMUNITY
End: 2023-02-21

## 2022-11-14 RX ORDER — SITAGLIPTIN 100 MG/1
100 TABLET, FILM COATED ORAL DAILY
Qty: 90 TABLET | Refills: 1 | Status: SHIPPED | OUTPATIENT
Start: 2022-11-14

## 2022-11-14 RX ORDER — INSULIN GLARGINE 100 [IU]/ML
70 INJECTION, SOLUTION SUBCUTANEOUS NIGHTLY
Qty: 60 ML | Refills: 1 | Status: SHIPPED | OUTPATIENT
Start: 2022-11-14

## 2022-11-14 NOTE — PROGRESS NOTES
Specialty Pharmacy Patient Management Program  Endocrinology Reassessment     Mele Rossi is a 57 y.o. male with Type 2 Diabetes enrolled in the Endocrinology Patient Management program offered by [facility].  A follow-up was conducted, including assessment of continued therapy appropriateness, medication adherence, and side effect incidence and management for Jardiance, Metformin, Januvia, and Lantus.    Patient reports being adherent to and tolerating medications well with no side effects. He is using FreeStyle Berhane to monitor BG. He reports having occasional low BG < 70 in the mornings when waking up, but has adjusted his Lantus to 65 units at night with no further episodes. He is requesting refills on all his diabetes medications and Doxazosin 4 mg tablets.    Changes to Insurance Coverage or Financial Support  None     Relevant Past Medical History and Comorbidities  Relevant medical history and concomitant health conditions were discussed with the patient. The patient's chart has been reviewed for relevant past medical history and comorbid health conditions and updated as necessary.   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   • Diabetes (HCC)    • Fatigue    • History of stroke    • Hypertension    • Obesity     body mass index 30+-obesity   • Stroke (cerebrum) (HCC)    • Type 2 diabetes mellitus, uncontrolled      Social History     Socioeconomic History   • Marital status:    Tobacco Use   • Smoking status: Former     Packs/day: 1.00     Years: 20.00     Pack years: 20.00     Types: Cigarettes     Quit date: 11/20/2018     Years since quitting: 3.9   • Smokeless tobacco: Never   • Tobacco comments:     States Smokes 1 cigeratte per month   Vaping Use   • Vaping Use: Never used   Substance and Sexual Activity   • Alcohol use: No   • Drug use: No   • Sexual activity: Defer       Problem list reviewed by Dena Maynard RPH on 11/14/2022 at 11:18  AM  Allergies  Known allergies and reactions were discussed with the patient. The patient's chart has been reviewed for allergy information and updated as necessary.   Lisinopril    Allergies reviewed by Dena Maynard RP on 11/14/2022 at 11:18 AM    Relevant Laboratory Values  A1C Last 3 Results    HGBA1C Last 3 Results 1/27/22 3/1/22 7/27/22   Hemoglobin A1C 7.0 7.8 (A) 7.2   (A) Abnormal value       Comments are available for some flowsheets but are not being displayed.           Lab Results   Component Value Date    HGBA1C 7.2 07/27/2022     Lab Results   Component Value Date    GLUCOSE 130 (H) 03/02/2022    CALCIUM 9.0 03/02/2022     (L) 03/02/2022    K 3.9 03/02/2022    CO2 21.8 (L) 03/02/2022     03/02/2022    BUN 19 03/02/2022    CREATININE 1.24 03/02/2022    EGFRIFNONA 62 08/12/2018    BCR 15.3 03/02/2022    ANIONGAP 13.2 03/02/2022     Lab Results   Component Value Date    CHOL 96 06/29/2020    CHLPL 102 03/01/2022    TRIG 124 03/01/2022    HDL 38 (L) 03/01/2022    LDL 39 03/01/2022         Current Medication List  This medication list has been reviewed with the patient and evaluated for any interactions or necessary modifications/recommendations, and updated to include all prescription medications, OTC medications, and supplements the patient is currently taking.  This list reflects what is contained in the patient's profile, which has also been marked as reviewed to communicate to other providers it is the most up to date version of the patient's current medication therapy.     Current Outpatient Medications:   •  amLODIPine (NORVASC) 10 MG tablet, Take 1 tablet by mouth Daily., Disp: 30 tablet, Rfl: 0  •  aspirin 325 MG tablet, Take 1 tablet by mouth Daily., Disp: , Rfl:   •  atorvastatin (Lipitor) 20 MG tablet, Take 1 tablet by mouth Daily., Disp: 30 tablet, Rfl: 11  •  baclofen (LIORESAL) 10 MG tablet, Take 2 tablets by mouth 2 (Two) Times a Day., Disp: 120 tablet, Rfl: 11  •  carvedilol  (COREG) 12.5 MG tablet, TAKE 1 TABLET BY MOUTH EVERY 12 HOURS, Disp: 150 tablet, Rfl: 2  •  cephalexin (KEFLEX) 500 MG capsule, Take 1 capsule by mouth 3 (Three) Times a Day. For 1 week, Disp: , Rfl:   •  Continuous Blood Gluc Sensor (FreeStyle Berhane 2 Sensor) misc, Use 1 sensor Every 14 (Fourteen) Days., Disp: 2 each, Rfl: 5  •  doxazosin (CARDURA) 4 MG tablet, Take 1 tablet by mouth 2 (Two) Times a Day., Disp: 180 tablet, Rfl: 1  •  eltrombopag (PROMACTA) 50 MG tablet, Take 1 tablet by mouth Every Other Day., Disp: 15 tablet, Rfl: 5  •  empagliflozin (Jardiance) 25 MG tablet tablet, Take 1 tablet by mouth Daily., Disp: 90 tablet, Rfl: 1  •  gabapentin (NEURONTIN) 300 MG capsule, Take 1 capsule by mouth 3 (Three) Times a Day., Disp: 270 capsule, Rfl: 1  •  glucose blood test strip, Use as instructed to check blood sugar daily, Disp: 100 each, Rfl: 1  •  Insulin Glargine (Lantus SoloStar) 100 UNIT/ML injection pen, Inject 70 Units under the skin into the appropriate area as directed Every Night. Inject 70 units under the skin daily as directed, Disp: 60 mL, Rfl: 1  •  Insulin Pen Needle (BD Pen Needle Claudia U/F) 32G X 4 MM misc, Use for injections daily as directed., Disp: 100 each, Rfl: 1  •  Januvia 100 MG tablet, Take 1 tablet by mouth Daily., Disp: 90 tablet, Rfl: 1  •  Lancets misc, Use as directed to check blood glucose Daily., Disp: 100 each, Rfl: 1  •  metFORMIN (GLUCOPHAGE) 500 MG tablet, Take 1 tablet by mouth Daily., Disp: 90 tablet, Rfl: 1  •  ondansetron (ZOFRAN) 8 MG tablet, Take 1 tablet by mouth 3 (Three) Times a Day As Needed for Nausea or Vomiting., Disp: 30 tablet, Rfl: 5    Medicines reviewed by Dena Maynard [facility] on 11/14/2022 at 11:20 AM    Drug Interactions  The hypoglycemic effect of Insulin may be increased or prolonged by Carvedilol. Minor cardiovascular abnormalities may occur during hypoglycemic episodes.     Adverse Drug Reactions  • Adverse Reactions Experienced: None  • Plan for ADR  Management: Not Required     Hospitalizations and Urgent Care Since Last Assessment  • Hospitalizations or Admissions: None  • ED Visits: None   • Urgent Office Visits: None    Adherence and Self-Administration  Adherence related patient's specialty therapy was discussed with the patient. The Adherence segment of this outreach has been reviewed and updated.     • Ongoing or New Barriers to Patient Adherence and/or Self-Administration: None  • Methods for Supporting Patient Adherence and/or Self-Administration: None Required    Goals of Therapy  Goals related to the patient's specialty therapy was discussed with the patient. The Patient Goals segment of this outreach has been reviewed and updated.    Goals     • HEMOGLOBIN A1C < 7     • Specialty Pharmacy General Goal      Patient-identified goal of therapy: Patient will adhere to medication regimen by %  Clinical goal/therapeutic target: Patient will adhere to medication regimen by %             Quality of Life Assessment   Quality of Life related to the patient's specialty therapy was discussed with the patient. The QOL segment of this outreach has been reviewed and updated.      No change    Reassessment Plan & Follow-Up  1. Medication Therapy Changes: None. Patient needs refills on all diabetes medications and Doxazosin. Will send to pharmacy and mail to patient.   2. Additional Plans, Therapy Recommendations, or Therapy Problems to Be Addressed: None   3. Pharmacist to perform regular reassessments no more than (6) months from the previous assessment.  4. Care Coordinator to set up future refill outreaches, coordinate prescription delivery, and escalate clinical questions to pharmacist.     Attestation  I attest that the specialty medication(s) addressed above are appropriate for the patient based on my reassessment.  If the prescribed therapy is at any point deemed not appropriate based on the current or future assessments, a consultation will be  initiated with the patient's specialty care provider to determine the best course of action. The revised plan of therapy will be documented along with any additional patient education provided.     Dena Maynard, PharmCHLOÉ   11/14/2022  11:35 EST

## 2022-11-28 ENCOUNTER — HOSPITAL ENCOUNTER (OUTPATIENT)
Facility: HOSPITAL | Age: 57
Discharge: HOME | End: 2022-11-28
Payer: MEDICARE

## 2022-11-28 LAB — PLATELET # BLD: 115 K/UL (ref 150–400)

## 2022-11-28 PROCEDURE — 85049 AUTOMATED PLATELET COUNT: CPT

## 2022-11-28 PROCEDURE — 36415 COLL VENOUS BLD VENIPUNCTURE: CPT

## 2022-12-05 ENCOUNTER — SPECIALTY PHARMACY (OUTPATIENT)
Dept: ONCOLOGY | Facility: HOSPITAL | Age: 57
End: 2022-12-05

## 2022-12-05 NOTE — PROGRESS NOTES
Specialty Pharmacy Refill Coordination Note     Mele is a 57 y.o. male contacted today regarding refills of  Promacta 50mg PO every other day specialty medication(s).      Specialty medication(s) and dose(s) confirmed: yes    Refill Questions    Flowsheet Row Most Recent Value   Changes to allergies? No   Changes to medications? No   New conditions since last clinic visit No   Unplanned office visit, urgent care, ED, or hospital admission in the last 4 weeks  No   How does patient/caregiver feel medication is working? Good   Financial problems or insurance changes  No   Since the previous refill, were any specialty medication doses or scheduled injections missed or delayed?  No   Does this patient require a clinical escalation to a pharmacist? No          Delivery Questions    Flowsheet Row Most Recent Value   Delivery method FedEx   Delivery address correct? Yes   Delivery phone number [phone]   Preferred delivery time? Anytime   Number of medications in delivery 1   Medication being filled and delivered eltrombopag   Doses left of specialty medications 5 days left   Is there any medication that is due not being filled? No   Supplies needed? No supplies needed   Cooler needed? No   Do any medications need mixed or dated? No   Additional comments no copay using promacta card   Questions or concerns for the pharmacist? No   Explain any questions or concerns for the pharmacist n/a   Are any medications first time fills? No            Medication Adherence    Adherence tools used: patient uses a pill box to manage medications  Support network for adherence: family member          Follow-up: 28 day(s)     Bridgett Weems, Pharmacy Technician  Specialty Pharmacy Technician

## 2022-12-07 ENCOUNTER — SPECIALTY PHARMACY (OUTPATIENT)
Dept: PHARMACY | Facility: HOSPITAL | Age: 57
End: 2022-12-07

## 2022-12-07 ENCOUNTER — TELEPHONE (OUTPATIENT)
Dept: NEUROLOGY | Facility: CLINIC | Age: 57
End: 2022-12-07

## 2022-12-07 NOTE — TELEPHONE ENCOUNTER
SISTER IN LAW BROUGHT IN HIS FMLA FORMS FROM [employer]. SHE HAS PAID THE $25 FEE AND WE ARE TO FAX OVER IN THE NEXT 60 DAYS. PLACED THE FORMS IN DR CONCEPCION'S BASKET UP FRONT.

## 2022-12-07 NOTE — TELEPHONE ENCOUNTER
"Caller: ANGELA    Relationship: WIFE    Best call back number: [phone]    What form or medical record are you requesting: DISABILITY \"BENEFIT\" FORMS    Who is requesting this form or medical record from you: [employer]     How would you like to receive the form or medical records (pick-up, mail, fax): SHE STATES HER SISTER IN LAW WILL BE DROPPING PPW OFF TO CLINIC BETWEEN 1PM AND 3PM TODAY AND RETURN FAX IS WITH THE PAPERWORK.     Timeframe paperwork needed: DUE 90 DAYS FROM 11-17-22    SHE STATES SHE DOES NEED TO KNOW WHAT THE \"FEE\" WOULD BE SO SHE CAN PAY THAT AS WELL.         "

## 2022-12-09 NOTE — TELEPHONE ENCOUNTER
Talked to Lexy and mention to her that he PW for FMLA has been faxed. Lexy appreciated the call and had verbal understanding.

## 2022-12-28 ENCOUNTER — SPECIALTY PHARMACY (OUTPATIENT)
Dept: ONCOLOGY | Facility: HOSPITAL | Age: 57
End: 2022-12-28

## 2022-12-28 DIAGNOSIS — D69.3 ACUTE ITP: ICD-10-CM

## 2022-12-28 NOTE — PROGRESS NOTES
Re: Refills of Oral Specialty Medication - Promacta (eltrombopag)    • Drug-Drug Interactions: The current medication list was reviewed and there are no relevant drug-drug interactions.  • Medication Allergies: The patient has no relevant allergies as it relates to their oral specialty medication  • Review of Labs/Dose Adjustments: NO DOSE CHANGE - I reviewed the most recent note and labs and the patient will continue without any dose changes.  I sent refills as described below.    Drug: Promacta (eltrombopag)  Strength: 50 mg  Directions: Take 1 tablet by mouth every other day  Quantity: 15  Refills: 11  Pharmacy prescription sent to: [facility]    Meliza Ferguson, PharmD, [facility]  Oncology Clinical Pharmacist  12/28/2022  08:57 EST

## 2023-01-03 ENCOUNTER — HOSPITAL ENCOUNTER (OUTPATIENT)
Facility: HOSPITAL | Age: 58
Discharge: HOME | End: 2023-01-03
Payer: MEDICARE

## 2023-01-03 LAB — PLATELET # BLD: 131 K/UL (ref 150–400)

## 2023-01-03 PROCEDURE — 85049 AUTOMATED PLATELET COUNT: CPT

## 2023-01-04 ENCOUNTER — SPECIALTY PHARMACY (OUTPATIENT)
Dept: ONCOLOGY | Facility: HOSPITAL | Age: 58
End: 2023-01-04
Payer: MEDICARE

## 2023-01-04 NOTE — PROGRESS NOTES
Specialty Pharmacy Refill Coordination Note     Mele is a 57 y.o. male contacted today regarding refills of  Promacta 50mg PO every other day specialty medication(s).    Specialty medication(s) and dose(s) confirmed: yes    Refill Questions    Flowsheet Row Most Recent Value   Changes to allergies? No   Changes to medications? No   New conditions since last clinic visit No   Unplanned office visit, urgent care, ED, or hospital admission in the last 4 weeks  No   How does patient/caregiver feel medication is working? Good   Financial problems or insurance changes  No   Since the previous refill, were any specialty medication doses or scheduled injections missed or delayed?  No   Does this patient require a clinical escalation to a pharmacist? No          Delivery Questions    Flowsheet Row Most Recent Value   Delivery method FedEx   Delivery address correct? Yes   Delivery phone number [phone]   Preferred delivery time? Anytime   Number of medications in delivery 1   Medication being filled and delivered promacta   Doses left of specialty medications 3 doses left   Is there any medication that is due not being filled? No   Supplies needed? No supplies needed   Cooler needed? No   Do any medications need mixed or dated? No   Additional comments no copay   Questions or concerns for the pharmacist? No   Explain any questions or concerns for the pharmacist n/a   Are any medications first time fills? No            Medication Adherence    Adherence tools used: patient uses a pill box to manage medications  Support network for adherence: family member          Follow-up: 28 day(s)     Bridgett Weems, Pharmacy Technician  Specialty Pharmacy Technician

## 2023-01-30 ENCOUNTER — SPECIALTY PHARMACY (OUTPATIENT)
Dept: ONCOLOGY | Facility: HOSPITAL | Age: 58
End: 2023-01-30
Payer: MEDICARE

## 2023-01-30 NOTE — PROGRESS NOTES
Specialty Pharmacy Refill Coordination Note     Mele is a 57 y.o. male contacted today regarding refills of  Promacta 50mg PO every other day specialty medication(s).      Specialty medication(s) and dose(s) confirmed: yes    Refill Questions    Flowsheet Row Most Recent Value   Changes to allergies? No   Changes to medications? No   New conditions since last clinic visit No   Unplanned office visit, urgent care, ED, or hospital admission in the last 4 weeks  No   How does patient/caregiver feel medication is working? Good   Financial problems or insurance changes  No   Since the previous refill, were any specialty medication doses or scheduled injections missed or delayed?  No   Does this patient require a clinical escalation to a pharmacist? No          Delivery Questions    Flowsheet Row Most Recent Value   Delivery method FedEx   Delivery address correct? Yes   Delivery phone number [phone]   Preferred delivery time? Anytime   Number of medications in delivery 1   Medication being filled and delivered promacta   Doses left of specialty medications 6 days left   Is there any medication that is due not being filled? No   Supplies needed? No supplies needed   Cooler needed? No   Do any medications need mixed or dated? No   Additional comments no copay   Questions or concerns for the pharmacist? No   Explain any questions or concerns for the pharmacist n/a   Are any medications first time fills? No            Medication Adherence    Adherence tools used: patient uses a pill box to manage medications  Support network for adherence: family member          Follow-up: 28 day(s)     Bridgett Weems, Pharmacy Technician  Specialty Pharmacy Technician

## 2023-01-31 ENCOUNTER — SPECIALTY PHARMACY (OUTPATIENT)
Dept: PHARMACY | Facility: HOSPITAL | Age: 58
End: 2023-01-31
Payer: MEDICARE

## 2023-02-03 ENCOUNTER — OFFICE VISIT (OUTPATIENT)
Dept: ONCOLOGY | Facility: CLINIC | Age: 58
End: 2023-02-03
Payer: MEDICARE

## 2023-02-03 ENCOUNTER — LAB (OUTPATIENT)
Dept: LAB | Facility: HOSPITAL | Age: 58
End: 2023-02-03
Payer: MEDICARE

## 2023-02-03 VITALS
OXYGEN SATURATION: 95 % | SYSTOLIC BLOOD PRESSURE: 136 MMHG | HEART RATE: 71 BPM | DIASTOLIC BLOOD PRESSURE: 81 MMHG | HEIGHT: 72 IN | BODY MASS INDEX: 34.13 KG/M2 | WEIGHT: 252 LBS | TEMPERATURE: 98.2 F

## 2023-02-03 DIAGNOSIS — D69.3 ACUTE ITP: Primary | ICD-10-CM

## 2023-02-03 DIAGNOSIS — D69.3 ACUTE ITP: ICD-10-CM

## 2023-02-03 LAB
ALBUMIN SERPL-MCNC: 4.1 G/DL (ref 3.5–5.2)
ALBUMIN/GLOB SERPL: 1.2 G/DL
ALP SERPL-CCNC: 111 U/L (ref 39–117)
ALT SERPL W P-5'-P-CCNC: 14 U/L (ref 1–41)
ANION GAP SERPL CALCULATED.3IONS-SCNC: 10.5 MMOL/L (ref 5–15)
AST SERPL-CCNC: 17 U/L (ref 1–40)
BASOPHILS # BLD AUTO: 0.05 10*3/MM3 (ref 0–0.2)
BASOPHILS NFR BLD AUTO: 0.7 % (ref 0–1.5)
BILIRUB SERPL-MCNC: 1 MG/DL (ref 0–1.2)
BUN SERPL-MCNC: 18 MG/DL (ref 6–20)
BUN/CREAT SERPL: 14.6 (ref 7–25)
CALCIUM SPEC-SCNC: 9.5 MG/DL (ref 8.6–10.5)
CHLORIDE SERPL-SCNC: 101 MMOL/L (ref 98–107)
CO2 SERPL-SCNC: 25.5 MMOL/L (ref 22–29)
CREAT SERPL-MCNC: 1.23 MG/DL (ref 0.76–1.27)
DEPRECATED RDW RBC AUTO: 43.4 FL (ref 37–54)
EGFRCR SERPLBLD CKD-EPI 2021: 67.6 ML/MIN/1.73
EOSINOPHIL # BLD AUTO: 0.15 10*3/MM3 (ref 0–0.4)
EOSINOPHIL NFR BLD AUTO: 2 % (ref 0.3–6.2)
ERYTHROCYTE [DISTWIDTH] IN BLOOD BY AUTOMATED COUNT: 13.9 % (ref 12.3–15.4)
GLOBULIN UR ELPH-MCNC: 3.3 GM/DL
GLUCOSE SERPL-MCNC: 230 MG/DL (ref 65–99)
HCT VFR BLD AUTO: 46.5 % (ref 37.5–51)
HGB BLD-MCNC: 15.7 G/DL (ref 13–17.7)
IMM GRANULOCYTES # BLD AUTO: 0.05 10*3/MM3 (ref 0–0.05)
IMM GRANULOCYTES NFR BLD AUTO: 0.7 % (ref 0–0.5)
LYMPHOCYTES # BLD AUTO: 1.31 10*3/MM3 (ref 0.7–3.1)
LYMPHOCYTES NFR BLD AUTO: 17.5 % (ref 19.6–45.3)
MCH RBC QN AUTO: 29.3 PG (ref 26.6–33)
MCHC RBC AUTO-ENTMCNC: 33.8 G/DL (ref 31.5–35.7)
MCV RBC AUTO: 86.8 FL (ref 79–97)
MONOCYTES # BLD AUTO: 0.69 10*3/MM3 (ref 0.1–0.9)
MONOCYTES NFR BLD AUTO: 9.2 % (ref 5–12)
NEUTROPHILS NFR BLD AUTO: 5.25 10*3/MM3 (ref 1.7–7)
NEUTROPHILS NFR BLD AUTO: 69.9 % (ref 42.7–76)
NRBC BLD AUTO-RTO: 0 /100 WBC (ref 0–0.2)
PLATELET # BLD AUTO: 119 10*3/MM3 (ref 140–450)
PMV BLD AUTO: 11.9 FL (ref 6–12)
POTASSIUM SERPL-SCNC: 4.3 MMOL/L (ref 3.5–5.2)
PROT SERPL-MCNC: 7.4 G/DL (ref 6–8.5)
RBC # BLD AUTO: 5.36 10*6/MM3 (ref 4.14–5.8)
SODIUM SERPL-SCNC: 137 MMOL/L (ref 136–145)
WBC NRBC COR # BLD: 7.5 10*3/MM3 (ref 3.4–10.8)

## 2023-02-03 PROCEDURE — 80053 COMPREHEN METABOLIC PANEL: CPT

## 2023-02-03 PROCEDURE — 99214 OFFICE O/P EST MOD 30 MIN: CPT | Performed by: INTERNAL MEDICINE

## 2023-02-03 PROCEDURE — 36415 COLL VENOUS BLD VENIPUNCTURE: CPT

## 2023-02-03 PROCEDURE — 85025 COMPLETE CBC W/AUTO DIFF WBC: CPT

## 2023-02-03 RX ORDER — CARVEDILOL 12.5 MG/1
TABLET ORAL
Qty: 180 TABLET | Refills: 2 | Status: SHIPPED | OUTPATIENT
Start: 2023-02-03

## 2023-02-03 NOTE — TELEPHONE ENCOUNTER
Rx Refill Note  Requested Prescriptions     Pending Prescriptions Disp Refills   • carvedilol (COREG) 12.5 MG tablet [Pharmacy Med Name: CARVEDILOL 12.5 MG TABLET] 180 tablet 2     Sig: TAKE ONE TABLET BY MOUTH EVERY 12 HOURS      Last filled: 8/8/22 with 2 refills sent in  Last office visit with prescribing clinician: 2/21/2022      Next office visit with prescribing clinician: 2/21/2023     Connie Zavala MA  02/03/23, 09:01 EST

## 2023-02-04 NOTE — PROGRESS NOTES
REASON FOR VISIT: ITP    HISTORY OF PRESENT ILLNESS:   57 y.o.  male presents today for follow-up of his ITP.  He is doing reasonably well currently.  He is on Promacta.  No bruising or bleeding.   He has a history of stroke with right-sided weakness.  His platelets have remained stable around 100.  No major changes.  Tolerating Promacta reasonably well.        Past medical history, social history and family history was reviewed 02/04/23 and unchanged from prior visit.    Review of Systems:    Review of Systems   Constitutional: Negative.    HENT:  Negative.    Eyes: Negative.    Respiratory: Negative.    Cardiovascular: Negative.    Gastrointestinal: Negative.    Endocrine: Negative.    Genitourinary: Negative.     Skin: Negative.    Neurological: Positive for extremity weakness.   Hematological: Negative.    Psychiatric/Behavioral: Negative.             Medications:        Current Outpatient Medications:   •  amLODIPine (NORVASC) 10 MG tablet, Take 1 tablet by mouth Daily., Disp: 30 tablet, Rfl: 0  •  aspirin 325 MG tablet, Take 1 tablet by mouth Daily., Disp: , Rfl:   •  atorvastatin (Lipitor) 20 MG tablet, Take 1 tablet by mouth Daily., Disp: 30 tablet, Rfl: 11  •  baclofen (LIORESAL) 10 MG tablet, Take 2 tablets by mouth 2 (Two) Times a Day., Disp: 120 tablet, Rfl: 11  •  carvedilol (COREG) 12.5 MG tablet, TAKE ONE TABLET BY MOUTH EVERY 12 HOURS, Disp: 180 tablet, Rfl: 2  •  cephalexin (KEFLEX) 500 MG capsule, Take 500 mg by mouth As Needed. For 1 week, Disp: , Rfl:   •  Continuous Blood Gluc Sensor (FreeStyle Berhane 2 Sensor) misc, Use 1 sensor Every 14 (Fourteen) Days., Disp: 6 each, Rfl: 1  •  doxazosin (CARDURA) 4 MG tablet, Take 1 tablet by mouth 2 (Two) Times a Day., Disp: 180 tablet, Rfl: 1  •  eltrombopag (PROMACTA) 50 MG tablet, Take 1 tablet by mouth Every Other Day., Disp: 15 tablet, Rfl: 11  •  empagliflozin (Jardiance) 25 MG tablet tablet, Take 1 tablet by mouth Daily., Disp: 90 tablet, Rfl: 1  •   "gabapentin (NEURONTIN) 300 MG capsule, Take 1 capsule by mouth 3 (Three) Times a Day., Disp: 270 capsule, Rfl: 1  •  Insulin Glargine (Lantus SoloStar) 100 UNIT/ML injection pen, Inject 70 Units under the skin into the appropriate area as directed Every Night., Disp: 60 mL, Rfl: 1  •  Insulin Pen Needle (BD Pen Needle Claudia U/F) 32G X 4 MM misc, Use for injections daily as directed., Disp: 100 each, Rfl: 1  •  Januvia 100 MG tablet, Take 1 tablet by mouth Daily., Disp: 90 tablet, Rfl: 1  •  metFORMIN (GLUCOPHAGE) 500 MG tablet, Take 1 tablet by mouth Daily., Disp: 90 tablet, Rfl: 1  •  ondansetron (ZOFRAN) 8 MG tablet, Take 1 tablet by mouth 3 (Three) Times a Day As Needed for Nausea or Vomiting., Disp: 30 tablet, Rfl: 5  •  glucose blood test strip, Use as instructed to check blood sugar daily, Disp: 100 each, Rfl: 1  •  Lancets misc, Use as directed to check blood glucose Daily., Disp: 100 each, Rfl: 1    Pain Medications             aspirin 325 MG tablet Take 1 tablet by mouth Daily.    baclofen (LIORESAL) 10 MG tablet Take 2 tablets by mouth 2 (Two) Times a Day.    gabapentin (NEURONTIN) 300 MG capsule Take 1 capsule by mouth 3 (Three) Times a Day.             ALLERGIES:    Allergies   Allergen Reactions   • Lisinopril Dizziness     syncope         Physical Exam    VITAL SIGNS:  /81   Pulse 71   Temp 98.2 °F (36.8 °C)   Ht 182.9 cm (72\")   Wt 114 kg (252 lb)   SpO2 95%   BMI 34.18 kg/m²                 Wt Readings from Last 3 Encounters:   02/03/23 114 kg (252 lb)   09/30/22 113 kg (250 lb)   08/11/22 113 kg (250 lb)       Body mass index is 34.18 kg/m². Body surface area is 2.35 meters squared.    Pain Score    02/03/23 1434   PainSc: 0-No pain           Performance Status: 1    General: well appearing male in no acute distress  Neuro: alert and oriented  HEENT: sclera anicteric, oropharynx clear  Lymphatics: no cervical, supraclavicular, or axillary adenopathy  Cardiovascular: regular rate and " rhythm, no murmurs  Lungs: clear to auscultation bilaterally  Abdomen: soft, nontender, nondistended.  No palpable organomegaly  Extremeties: no lower extremity edema  Skin: no rashes, lesions, bruising, or petechiae  Psych: mood and affect appropriate        RECENT LABS:    Lab Results   Component Value Date    HGB 15.7 02/03/2023    HCT 46.5 02/03/2023    MCV 86.8 02/03/2023     (L) 02/03/2023    WBC 7.50 02/03/2023    NEUTROABS 5.25 02/03/2023    LYMPHSABS 1.31 02/03/2023    MONOSABS 0.69 02/03/2023    EOSABS 0.15 02/03/2023    BASOSABS 0.05 02/03/2023       Lab Results   Component Value Date    GLUCOSE 230 (H) 02/03/2023    BUN 18 02/03/2023    CREATININE 1.23 02/03/2023     02/03/2023    K 4.3 02/03/2023     02/03/2023    CO2 25.5 02/03/2023    CALCIUM 9.5 02/03/2023    PROTEINTOT 7.4 02/03/2023    ALBUMIN 4.1 02/03/2023    BILITOT 1.0 02/03/2023    ALKPHOS 111 02/03/2023    AST 17 02/03/2023    ALT 14 02/03/2023       Lab Results   Component Value Date     (L) 02/03/2023     (L) 01/03/2023     (L) 11/28/2022     (L) 10/27/2022     (L) 09/27/2022       Lab Results   Component Value Date    PSA 6.290 (H) 08/11/2022    PSA 5.6 (H) 04/07/2022    PSA 6.62 (H) 03/01/2022    PSA 4.46 (H) 12/21/2018         Assessment/Plan    1.  Acute ITP.  Platelets are stable on Promacta at 50 mg/ every other day.  Continue Promacta on the current dosing.  He does not seem to have any significant issues with toxicity so far.  Continue for now with no changes.    2.  Elevated PSA.  Followed by urology.    3.  History of stroke.  Continue aspirin since his platelets remain over 50,000.      Amadou Weller MD  [facility]    Return in (Approximately): 6 months    Orders Placed This Encounter   Procedures   • Comprehensive Metabolic Panel   • Platelet Count   • CBC & Differential       2/4/2023

## 2023-02-09 ENCOUNTER — HOSPITAL ENCOUNTER (OUTPATIENT)
Dept: ULTRASOUND IMAGING | Facility: HOSPITAL | Age: 58
Discharge: HOME | End: 2023-02-09
Payer: MEDICARE

## 2023-02-09 DIAGNOSIS — L03.115 CELLULITIS OF RIGHT LOWER LIMB: ICD-10-CM

## 2023-02-09 PROCEDURE — 93971 EXTREMITY STUDY: CPT

## 2023-02-20 ENCOUNTER — HOSPITAL ENCOUNTER (OUTPATIENT)
Facility: HOSPITAL | Age: 58
Discharge: HOME | End: 2023-02-20

## 2023-02-21 ENCOUNTER — OFFICE VISIT (OUTPATIENT)
Dept: NEUROLOGY | Facility: CLINIC | Age: 58
End: 2023-02-21
Payer: MEDICARE

## 2023-02-21 VITALS
SYSTOLIC BLOOD PRESSURE: 130 MMHG | OXYGEN SATURATION: 97 % | HEIGHT: 72 IN | DIASTOLIC BLOOD PRESSURE: 78 MMHG | HEART RATE: 57 BPM | BODY MASS INDEX: 34.13 KG/M2 | WEIGHT: 252 LBS

## 2023-02-21 DIAGNOSIS — R60.0 LEG EDEMA, RIGHT: ICD-10-CM

## 2023-02-21 DIAGNOSIS — I69.30 SEQUELAE, POST-STROKE: Primary | ICD-10-CM

## 2023-02-21 PROCEDURE — 99214 OFFICE O/P EST MOD 30 MIN: CPT | Performed by: PSYCHIATRY & NEUROLOGY

## 2023-02-21 RX ORDER — ATORVASTATIN CALCIUM 20 MG/1
20 TABLET, FILM COATED ORAL DAILY
Qty: 30 TABLET | Refills: 11 | Status: SHIPPED | OUTPATIENT
Start: 2023-02-21 | End: 2024-02-21

## 2023-02-21 RX ORDER — BACLOFEN 10 MG/1
20 TABLET ORAL 3 TIMES DAILY
Qty: 180 TABLET | Refills: 11 | Status: SHIPPED | OUTPATIENT
Start: 2023-02-21

## 2023-02-21 NOTE — PROGRESS NOTES
Subjective:    CC: Mele Rossi is seen today  for Sequelae       HPI:  Current visit- since his last visit 1 year ago patient has not had any new strokelike symptoms.  He continues to have right-sided spasticity with swelling of the right leg.  He had to get a brace for the right foot.  Has been taking baclofen 20 mg twice a day but thinks he needs a higher dose.  Also increased his gabapentin 300 mg to 3 times daily for the pain in his feet which is well controlled.  His last A1c was 7.2.  He has had thrombocytopenia for which she was started on Promacta.  His last platelet count was 116.    Last visit-at patient denies having any new strokelike symptoms.  He continues to have weakness and spasticity on the right side.  He states that he did not go for PT as there was no one to take him there.  He continues to take aspirin 325 mg daily and Lipitor 20 mg daily as well as baclofen 20 mg twice a day.  His last A1c was down to 7.  He has been taking gabapentin 300 mg nightly instead of 3 times a day.  He does have numbness and occasional pain in his feet as well as a feeling of being cold.  Denies any pain in the legs on walking or any color changes.  He has not been wearing his AFO for his foot drop as he was scared of getting a skin tear given the swelling in his feet.    Last visit-patient states that he continues to have right-sided weakness/stiffness despite taking baclofen 20 mg twice a day.  He has stopped getting Botox because it was too expensive and it was not helping him.  Has also started physical therapy.  He continues to take aspirin 325 mg daily and Lipitor 40 mg daily.  His last lipid panel was as follows-TC 96, , LDL 28, HDL 33.  A1c was 7.3.    Last visit-  since the last visit patient got his second shot of Botox in March which has helped a little with spasticity.  He stopped getting physical therapy due to COVID but is planning to resume that soon.  He is also taking baclofen 20 mg twice a day.   He continues to take aspirin 325 mg daily and Lipitor 40 mg daily.  His blood pressure does fluctuate a lot and a lot of times it is extremely low in the mornings such as today (was in the 80s/60s).  His blood sugar also continues to run high and his last A1c was 8.3.  His endocrinologist did not make any medication changes but did tell him to make dietary modifications.  Patient states that he has gained 50 pounds since his stroke.     Last visit-since her last visit patient had his first set of Botox injections for his right-sided spasticity 1 month ago.  It helped a little however he is unsure whether he will continue getting it as he is having trouble getting it approved by insurance.  Also continues to get occupational therapy and his strength has improved slightly.  With regards to his diabetes his last A1c was 7.9 but his endocrinologist has been adjusting his medications.  He lowered his dose of gabapentin to 100 mg at night, he says that a dose of 300 mg was helping with his neuropathy and the muscle cramps but made him extremely off balance when he woke up to go to the bathroom.  Continues to take aspirin 325 mg daily along with Lipitor 40 mg daily.  Had a lot of blood work at Dr. Lopez office recently.    Last visit-patient has now finished physical therapy but continues to have the right-sided weakness as well as spasticity for which he is taking baclofen.  He has also been getting pain and numbness in his feet that is exacerbated by prolonged standing or walking.  Dr. Benitez has ordered an EMG/NCS for him as well as OT for his hand.  He continues to take aspirin 325 mg daily in addition to Lipitor 40 mg daily.  His blood pressure is well controlled however his blood glucose remains poorly controlled and his last A1c was 8.1.    Last visit-since his last visit he continues to have right arm and leg weakness.  He has stopped getting home PT and does the exercises himself.  He stopped Plavix as advised but  continues to take aspirin 325 mg daily and Lipitor 40 mg daily.  His last lipid panel was as follows-, , LDL 20, HDL 43.  His A1c has also improved and was 7.3.  His blood pressure is well controlled at home now.     Last visit-since his last visit he is doing about the same and has had no new strokelike symptoms.  He continues to have weakness in the right arm and leg.  He did see rehabilitation at [facility] for Botox injections for his spasticity however they are first trying to see if it will improve with baclofen.  He does have an improvement in his cramping with the baclofen however the stiffness in his arm has remained the same.  He also saw his endocrinologist who made changes to his medications.  He has not had a repeat A1c level yet, last level was 10.6.  His blood pressure now is much better controlled at home.  Has not smoked since his stroke.     Initial visit-  53-year-old male accompanied by his wife with a history of poorly controlled hypertension, diabetes mellitus type 2 presents with a hospital follow-up for stroke.  As per patient he had an episode of right arm and leg weakness in early August along with slurring of speech.  He initially went to an outside facility but was sent back home after a CT head showed no abnormalities.  After that the symptoms worsened and he came back to our hospital where he had an MRI brain that showed an acute left pontine infarct.  His blood pressure was extremely high at the time ().  He had extensive testing including a CTA head and neck that did not show any intra-or extracranial stenosis,  a renal artery ultrasound that did not show any stenosis and an echocardiogram that showed an EF of over 70% with diastolic dysfunction but no PFO.  His telemetry recordings did not show any evidence of atrial fibrillation.  He had not been on any antiplatelet agent prior to this event and  was started on both aspirin 325 mg and Plavix 75 mg.  He was also  started on Lipitor 80 mg.  Lipid panel was as follows-, , LDL 69, HDL 32.  His A1c was 10.6 and he was put on insulin in addition to his other medications.  He also has an upcoming appointment with endocrinology.  Patient was discharged to inpatient rehabilitation  for 4 weeks.  He is currently undergoing home health therapy but continues to have significant weakness in the right arm and leg.    The following portions of the patient's history were reviewed today and updated as of 12/27/2019  : allergies, current medications, past family history, past medical history, past social history, past surgical history and problem list  These document will be scanned to patient's chart.      Current Outpatient Medications:   •  amLODIPine (NORVASC) 10 MG tablet, Take 1 tablet by mouth Daily., Disp: 30 tablet, Rfl: 0  •  aspirin 325 MG tablet, Take 1 tablet by mouth Daily., Disp: , Rfl:   •  atorvastatin (Lipitor) 20 MG tablet, Take 1 tablet by mouth Daily., Disp: 30 tablet, Rfl: 11  •  baclofen (LIORESAL) 10 MG tablet, Take 2 tablets by mouth 3 (Three) Times a Day., Disp: 180 tablet, Rfl: 11  •  carvedilol (COREG) 12.5 MG tablet, TAKE ONE TABLET BY MOUTH EVERY 12 HOURS, Disp: 180 tablet, Rfl: 2  •  Continuous Blood Gluc Sensor (FreeStyle Berhane 2 Sensor) misc, Use 1 sensor Every 14 (Fourteen) Days., Disp: 6 each, Rfl: 1  •  doxazosin (CARDURA) 4 MG tablet, Take 1 tablet by mouth 2 (Two) Times a Day., Disp: 180 tablet, Rfl: 1  •  eltrombopag (PROMACTA) 50 MG tablet, Take 1 tablet by mouth Every Other Day., Disp: 15 tablet, Rfl: 11  •  empagliflozin (Jardiance) 25 MG tablet tablet, Take 1 tablet by mouth Daily., Disp: 90 tablet, Rfl: 1  •  gabapentin (NEURONTIN) 300 MG capsule, Take 1 capsule by mouth 3 (Three) Times a Day., Disp: 270 capsule, Rfl: 1  •  Insulin Glargine (Lantus SoloStar) 100 UNIT/ML injection pen, Inject 70 Units under the skin into the appropriate area as directed Every Night., Disp: 60 mL, Rfl:  "1  •  Insulin Pen Needle (BD Pen Needle Claudia U/F) 32G X 4 MM misc, Use for injections daily as directed., Disp: 100 each, Rfl: 1  •  Januvia 100 MG tablet, Take 1 tablet by mouth Daily., Disp: 90 tablet, Rfl: 1  •  Lancets misc, Use as directed to check blood glucose Daily., Disp: 100 each, Rfl: 1  •  metFORMIN (GLUCOPHAGE) 500 MG tablet, Take 1 tablet by mouth Daily., Disp: 90 tablet, Rfl: 1  •  ondansetron (ZOFRAN) 8 MG tablet, Take 1 tablet by mouth 3 (Three) Times a Day As Needed for Nausea or Vomiting., Disp: 30 tablet, Rfl: 5   Past Medical History:   Diagnosis Date   • Acute ITP (HCC) 2022   • Diabetes (HCC)    • Fatigue    • History of stroke    • Hypertension    • Obesity     body mass index 30+-obesity   • Stroke (cerebrum) (HCC)    • Type 2 diabetes mellitus, uncontrolled       History reviewed. No pertinent surgical history.   Family History   Problem Relation Age of Onset   • Diabetes Mother    • Mental illness Mother    • Heart disease Mother    • Hypertension Father    • Heart disease Father       Social History     Socioeconomic History   • Marital status:    Tobacco Use   • Smoking status: Former     Packs/day: 1.00     Years: 20.00     Pack years: 20.00     Types: Cigarettes     Quit date: 2018     Years since quittin.2     Passive exposure: Never   • Smokeless tobacco: Never   • Tobacco comments:     States Smokes 1 cigeratte per month   Vaping Use   • Vaping Use: Never used   Substance and Sexual Activity   • Alcohol use: No   • Drug use: No   • Sexual activity: Defer     Review of Systems   All other systems reviewed and are negative.      Objective:    /78   Pulse 57   Ht 182.9 cm (72.01\")   Wt 114 kg (252 lb)   SpO2 97%   BMI 34.17 kg/m²     Neurology Exam:    General apperance: Obese    Mental status: Alert, awake and oriented to time place and person.    Recent and Remote memory: Intact.    Attention span and Concentration: Normal.     Language and Speech: " Intact- No dysarthria.    Fluency, Naming , Repitition and Comprehension:  Intact    Cranial Nerves:   CN II: Visual fields are full. Intact. Fundi - Normal, No papillederma, Pupils - JAIDEN  CN III, IV and VI: Extraocular movements are intact. Normal saccades.   CN V: Facial sensation is intact.   CN VII: Muscles of facial expression reveal no asymmetry. Intact.   CN VIII: Hearing is intact. Whispered voice intact.   CN IX and X: Palate elevates symmetrically. Intact  CN XI: Shoulder shrug is intact.   CN XII: Tongue is midline without evidence of atrophy or fasciculation.     Ophthalmoscopic exam of optic disc-normal    Motor:  Right UE muscle strength 4/5 proximally, 3/5 distally. Spasticity present with increased tone in finger extensors    Left UE muscle strength 5/5. Normal tone.      Right LE muscle strength 4-/5, 2/5 PF/DF  Spastic    Left LE muscle strength 5/5. Normal tone.      Sensory: Normal light touch, vibration and pinprick sensation bilaterally.    DTRs: 2+ bilaterally in upper and lower extremities on left and 3+ on the right.  Pitting edema in right leg    Babinski: Negative bilaterally.    Co-ordination: Normal finger-to-nose, heel to shin B/L.    Rhomberg: Negative.    Gait: Steppage gait on the right.  Uses his cane    Cardiovascular: Regular rate and rhythm without murmur, gallop or rub.    Assessment and Plan:  1. Sequelae, post-stroke  Patient had a left pontine infarct most likely due to small vessel disease  I have asked him to continue taking aspirin 325 mg daily and Lipitor 20 mg daily for goal LDL of less than 70.  His LDL was 28.  Triglycerides remain elevated.  Maintain blood pressure less than 130/90.  His blood pressure is within normal limits now  Goal A1c of less than 7.  His last A1c has reduced to 7.2  I will increase his baclofen to 20 mg 3 times daily for his poststroke spasticity  For his dependent edema he should elevate his legs while sitting down.  I have also asked him to  speak to his PCP about starting him on low doses of Lasix  Once right leg edema reduces he can start wearing an AFO.  He should also wear a hand brace to prevent his fingers from curling    2.  Diabetic neuropathy  He is currently taking gabapentin 300 mg 3 times daily  A1c is adequately controlled at 7.2      Return in about 1 year (around 2/21/2024).     I spent 25 minutes out of which 17 minutes was spent face-to-face    Kalyani Willis MD

## 2023-02-24 ENCOUNTER — SPECIALTY PHARMACY (OUTPATIENT)
Dept: PHARMACY | Facility: HOSPITAL | Age: 58
End: 2023-02-24
Payer: MEDICARE

## 2023-02-24 NOTE — PROGRESS NOTES
Specialty Pharmacy Refill Coordination Note     Mele is a 57 y.o. male contacted today regarding refills of eltrombopag (PROMACTA) 50 MG tablet 50mg po qod specialty medication(s).    Specialty medication(s) and dose(s) confirmed: yes    Refill Questions    Flowsheet Row Most Recent Value   Changes to allergies? No   Changes to medications? No   New conditions since last clinic visit No   Unplanned office visit, urgent care, ED, or hospital admission in the last 4 weeks  No   How does patient/caregiver feel medication is working? Very good   Financial problems or insurance changes  No   Since the previous refill, were any specialty medication doses or scheduled injections missed or delayed?  No   Does this patient require a clinical escalation to a pharmacist? No          Delivery Questions    Flowsheet Row Most Recent Value   Delivery method FedEx   Delivery address correct? Yes   Delivery phone number [phone]   Preferred delivery time? Anytime   Number of medications in delivery 1   Medication being filled and delivered Promacta   Doses left of specialty medications About 3 days left   Is there any medication that is due not being filled? No   Supplies needed? No supplies needed   Cooler needed? No   Do any medications need mixed or dated? No   Additional comments No copay   Questions or concerns for the pharmacist? No   Explain any questions or concerns for the pharmacist n/a   Are any medications first time fills? No            Medication Adherence    Adherence tools used: patient uses a pill box to manage medications  Support network for adherence: family member          Follow-up: 28 day(s)     Rober Lutz, Pharmacy Technician  Specialty Pharmacy Technician

## 2023-03-08 ENCOUNTER — SPECIALTY PHARMACY (OUTPATIENT)
Dept: PHARMACY | Facility: HOSPITAL | Age: 58
End: 2023-03-08
Payer: MEDICARE

## 2023-03-14 ENCOUNTER — SPECIALTY PHARMACY (OUTPATIENT)
Dept: ONCOLOGY | Facility: HOSPITAL | Age: 58
End: 2023-03-14
Payer: MEDICARE

## 2023-03-14 NOTE — PROGRESS NOTES
Specialty Pharmacy Patient Management Program  Oncology 6-Month Clinical Assessment       Mele Rossi is a 58 y.o. male with ITP seen today to assess adherence and side effects.    Regimen: Promacta (eltrombopag) 50 mg PO every other day    Reason for Outreach: Routine medication check-in .       Problem list reviewed by Virginie Ferguson [facility] on 3/14/2023 at 11:19 AM  Medicines reviewed by Virginie Ferguson [facility] on 3/14/2023 at 11:19 AM  Allergies reviewed by Virginie Ferguson [facility] on 3/14/2023 at 11:19 AM    Goals     • Specialty Pharmacy General Goal      Promacta  Clinical goal/therapeutic target: disease control      Medication Assessment:  • Medication Assessment  o Follow Up Clinical Assessment  o Medication(s) assessed: Promacta  o Therapeutic appropriateness: Appropriate  o Medication tolerability: Tolerating with no to minimal ADRs (Denies common and rare, but serious AEs)  o Medication plan: Continue therapy with normal follow-up  o Quality of Life Improvement Scale: Significantly better  o Administration: It's recommended to take Promacta on an empty stomach or if taking with food avoid calcium for 2 hours before and 4 hours after taking Promacta.  The patient said he usually takes it around 6 am when he wakes up and he will take it with some food like cake (because his blood sugars are usually low in the morning).  I recommended looking if what he's eating has calcium and spacing that out from Promacta or to consider taking Promacta on an empty stomach.  He expressed his understanding.  o Patient can self administer medications: Yes  o Medication Follow-Up Plan: Refill coordination  • Lab Review: The labs listed below have been reviewed. No dose adjustments are needed for the oral specialty medication(s) based on the labs.    Lab Results   Component Value Date    GLUCOSE 230 (H) 02/03/2023    CALCIUM 9.5 02/03/2023     02/03/2023    K 4.3 02/03/2023    CO2 25.5 02/03/2023      02/03/2023    BUN 18 02/03/2023    CREATININE 1.23 02/03/2023    EGFRIFNONA 62 08/12/2018    BCR 14.6 02/03/2023    ANIONGAP 10.5 02/03/2023     Lab Results   Component Value Date    WBC 7.50 02/03/2023    RBC 5.36 02/03/2023    HGB 15.7 02/03/2023    HCT 46.5 02/03/2023    MCV 86.8 02/03/2023    MCH 29.3 02/03/2023    MCHC 33.8 02/03/2023    RDW 13.9 02/03/2023    RDWSD 43.4 02/03/2023    MPV 11.9 02/03/2023     (L) 02/03/2023    NEUTRORELPCT 69.9 02/03/2023    LYMPHORELPCT 17.5 (L) 02/03/2023    MONORELPCT 9.2 02/03/2023    EOSRELPCT 2.0 02/03/2023    BASORELPCT 0.7 02/03/2023    AUTOIGPER 0.7 (H) 02/03/2023    NEUTROABS 5.25 02/03/2023    LYMPHSABS 1.31 02/03/2023    MONOSABS 0.69 02/03/2023    EOSABS 0.15 02/03/2023    BASOSABS 0.05 02/03/2023    AUTOIGNUM 0.05 02/03/2023    NRBC 0.0 02/03/2023     • Drug-drug interactions  o Completed medication reconciliation today to assess for drug interactions. Patient denies starting or stopping any medications.    o Assessed medication list for interactions, Promacta can increase the concentration of atorvastatin.  I asked if he was having myalgia or dark/brown urine which could indicate statin toxicity but he denies these AE's and seems to be tolerating both medications without any issue.  o Advised patient to call the clinic if any new medications are started so we can assess for drug-drug interactions.  • Drug-food interactions discussed: Calcium (as mentioned above)  • Vaccines are coordinated by the patient's oncologist and primary care provider.    Allergies  Known allergies and reactions were discussed with the patient. The patient's chart has been reviewed for allergy information and updated as necessary.   Allergies   Allergen Reactions   • Lisinopril Dizziness     syncope        Hospitalizations and Urgent Care Visits Since Last Assessment:  • Unplanned hospitalizations or inpatient admissions: 0  • ED Visits: 0  • Urgent Office Visits: 0    Adherence  Assessment:  Adherence Questions  Medication(s) assessed: Promacta  On average, how many doses/injections does the patient miss per month?: 0 (Uses a pill box)  What are the identified reasons for non-adherence or missed doses? : no problems identfied  What is the estimated medication adherence level?: 80-89% (PDC 88%)  Based on the patient/caregiver response and refill history, does this patient require an MTP to track adherence improvements?: no    Quality of Life Assessment:  Quality of Life Assessment  Quality of Life Improvement Scale: Significantly better  -- Quality of Life: 10/10    Financial Assessment:  Medication availability/affordability: Patient has had no issues obtaining medication from pharmacy.      All questions addressed and patient had no additional concerns. Patient has pharmacy contact information.    Meliza Ferguson, PharmD, [facility]  Oncology Clinical Pharmacist   Phone: [phone]    3/14/2023  11:27 EDT

## 2023-03-21 ENCOUNTER — SPECIALTY PHARMACY (OUTPATIENT)
Dept: ONCOLOGY | Facility: HOSPITAL | Age: 58
End: 2023-03-21
Payer: MEDICARE

## 2023-03-21 NOTE — PROGRESS NOTES
Specialty Pharmacy Refill Coordination Note     Mele is a 58 y.o. male contacted today regarding refills of eltrombopag (PROMACTA) 50 MG tablet - Take one tablet po every other day specialty medication(s).      Scheduled to ship on Wednesday 03/22, set to be delivered to patient on Thursday 03/23 due to same day shipping cut off at 12pm.    Specialty medication(s) and dose(s) confirmed: yes    Refill Questions    Flowsheet Row Most Recent Value   Changes to allergies? No   Changes to medications? No   New conditions since last clinic visit No   Unplanned office visit, urgent care, ED, or hospital admission in the last 4 weeks  No   How does patient/caregiver feel medication is working? Very good   Financial problems or insurance changes  No   Since the previous refill, were any specialty medication doses or scheduled injections missed or delayed?  No   Does this patient require a clinical escalation to a pharmacist? No          Delivery Questions    Flowsheet Row Most Recent Value   Delivery method FedEx   Delivery address correct? Yes   Delivery phone number [phone]   Preferred delivery time? Anytime   Number of medications in delivery 1   Medication being filled and delivered Eltrombopag   Doses left of specialty medications 6 days left   Is there any medication that is due not being filled? No   Supplies needed? No supplies needed   Cooler needed? No   Do any medications need mixed or dated? No   Copay form of payment Credit card on file   Additional comments Copay:$256.97   Questions or concerns for the pharmacist? No   Explain any questions or concerns for the pharmacist N/A   Are any medications first time fills? No            Medication Adherence    Adherence tools used: patient uses a pill box to manage medications  Support network for adherence: family member          Follow-up: 28 day(s)     Rober Lutz, Pharmacy Technician  Specialty Pharmacy Technician

## 2023-04-07 ENCOUNTER — OFFICE VISIT (OUTPATIENT)
Dept: ENDOCRINOLOGY | Facility: CLINIC | Age: 58
End: 2023-04-07
Payer: MEDICARE

## 2023-04-07 VITALS
HEART RATE: 60 BPM | WEIGHT: 248 LBS | HEIGHT: 72 IN | BODY MASS INDEX: 33.59 KG/M2 | DIASTOLIC BLOOD PRESSURE: 70 MMHG | SYSTOLIC BLOOD PRESSURE: 114 MMHG | OXYGEN SATURATION: 95 %

## 2023-04-07 DIAGNOSIS — Z79.4 TYPE 2 DIABETES MELLITUS WITH HYPERGLYCEMIA, WITH LONG-TERM CURRENT USE OF INSULIN: Primary | ICD-10-CM

## 2023-04-07 DIAGNOSIS — E11.65 TYPE 2 DIABETES MELLITUS WITH HYPERGLYCEMIA, WITH LONG-TERM CURRENT USE OF INSULIN: Primary | ICD-10-CM

## 2023-04-07 LAB
ALBUMIN UR-MCNC: 2.8 MG/DL
CREAT UR-MCNC: 60 MG/DL
EXPIRATION DATE: ABNORMAL
EXPIRATION DATE: NORMAL
GLUCOSE BLDC GLUCOMTR-MCNC: 269 MG/DL (ref 70–130)
HBA1C MFR BLD: 9.5 %
Lab: ABNORMAL
Lab: NORMAL
MICROALBUMIN/CREAT UR: 46.7 MG/G

## 2023-04-07 PROCEDURE — 3078F DIAST BP <80 MM HG: CPT | Performed by: INTERNAL MEDICINE

## 2023-04-07 PROCEDURE — 82043 UR ALBUMIN QUANTITATIVE: CPT | Performed by: INTERNAL MEDICINE

## 2023-04-07 PROCEDURE — 82947 ASSAY GLUCOSE BLOOD QUANT: CPT | Performed by: INTERNAL MEDICINE

## 2023-04-07 PROCEDURE — 83036 HEMOGLOBIN GLYCOSYLATED A1C: CPT | Performed by: INTERNAL MEDICINE

## 2023-04-07 PROCEDURE — 1159F MED LIST DOCD IN RCRD: CPT | Performed by: INTERNAL MEDICINE

## 2023-04-07 PROCEDURE — 3046F HEMOGLOBIN A1C LEVEL >9.0%: CPT | Performed by: INTERNAL MEDICINE

## 2023-04-07 PROCEDURE — 3074F SYST BP LT 130 MM HG: CPT | Performed by: INTERNAL MEDICINE

## 2023-04-07 PROCEDURE — 99214 OFFICE O/P EST MOD 30 MIN: CPT | Performed by: INTERNAL MEDICINE

## 2023-04-07 PROCEDURE — 82570 ASSAY OF URINE CREATININE: CPT | Performed by: INTERNAL MEDICINE

## 2023-04-07 PROCEDURE — 1160F RVW MEDS BY RX/DR IN RCRD: CPT | Performed by: INTERNAL MEDICINE

## 2023-04-07 RX ORDER — INSULIN GLARGINE 100 [IU]/ML
70 INJECTION, SOLUTION SUBCUTANEOUS NIGHTLY
Qty: 60 ML | Refills: 1 | Status: SHIPPED | OUTPATIENT
Start: 2023-04-07

## 2023-04-07 RX ORDER — DOXAZOSIN MESYLATE 4 MG/1
4 TABLET ORAL 2 TIMES DAILY
Qty: 180 TABLET | Refills: 1 | Status: SHIPPED | OUTPATIENT
Start: 2023-04-07

## 2023-04-07 RX ORDER — GABAPENTIN 300 MG/1
300 CAPSULE ORAL 3 TIMES DAILY
Qty: 270 CAPSULE | Refills: 1 | Status: SHIPPED | OUTPATIENT
Start: 2023-04-07

## 2023-04-07 RX ORDER — DULAGLUTIDE 0.75 MG/.5ML
0.5 INJECTION, SOLUTION SUBCUTANEOUS WEEKLY
Qty: 6 ML | Refills: 3 | Status: SHIPPED | OUTPATIENT
Start: 2023-04-07

## 2023-04-07 NOTE — PROGRESS NOTES
"     Office Note      Date: 2023  Patient Name: Mele Rossi  MRN: [id number]  : 1965    Chief Complaint   Patient presents with   • Diabetes     Type II       History of Present Illness:   Mele Rossi is a 58 y.o. male who presents for Diabetes type 2.     rx:Jardiance/ lantus. Januvia/ metformin   bg checks  10 times per day with yanira.  The pattern shows reasonable fasting blood sugars and and progressively higher readings  As the day progresses.    *      Last A1c:  Hemoglobin A1C   Date Value Ref Range Status   2023 9.5 % Final   2022 7.8 (H) See comment % Final     Comment:     Comment:  Diagnosis of Diabetes: > or = 6.5%  Increased risk of diabetes (Prediabetes): 5.7-6.4%  Glycemic Control: Nonpregnant Adults: <7.0%                    Pregnant: <6.0%       Eye exam is recommended     Subjective              Review of Systems:   Review of Systems   Constitutional: Negative.    HENT: Negative.    Respiratory: Negative.        The following portions of the patient's history were reviewed and updated as appropriate: allergies, current medications, past family history, past medical history, past social history, past surgical history and problem list.    Objective     Visit Vitals  /70 (BP Location: Left arm, Patient Position: Sitting, Cuff Size: Adult)   Pulse 60   Ht 182.9 cm (72\")   Wt 112 kg (248 lb)   SpO2 95%   BMI 33.63 kg/m²           Physical Exam:  Physical Exam  Vitals reviewed.   Constitutional:       Appearance: Normal appearance.   Neurological:      Mental Status: He is alert.   Psychiatric:         Mood and Affect: Mood normal.         Behavior: Behavior normal.         Thought Content: Thought content normal.         Judgment: Judgment normal.          Assessment / Plan      Assessment & Plan:  Problem List Items Addressed This Visit        Other    Type 2 diabetes mellitus with hyperglycemia - Primary    Current Assessment & Plan      Deteriorated.  Will change januvia " to glp1 . Side effects  Were addressed. Will refill the  gabapentin         Relevant Medications    empagliflozin (Jardiance) 25 MG tablet tablet    Insulin Glargine (Lantus SoloStar) 100 UNIT/ML injection pen    metFORMIN (GLUCOPHAGE) 500 MG tablet    gabapentin (NEURONTIN) 300 MG capsule    Dulaglutide (Trulicity) 0.75 MG/0.5ML solution pen-injector    doxazosin (CARDURA) 4 MG tablet    Other Relevant Orders    POC Glucose, Blood (Completed)    POC Glycosylated Hemoglobin (Hb A1C) (Completed)    Microalbumin / Creatinine Urine Ratio - Urine, Clean Catch        Zeke Lopez MD   04/07/2023

## 2023-04-07 NOTE — ASSESSMENT & PLAN NOTE
Deteriorated.  Will change januvia to glp1 . Side effects  Were addressed. Will refill the  gabapentin

## 2023-04-14 ENCOUNTER — SPECIALTY PHARMACY (OUTPATIENT)
Dept: ONCOLOGY | Facility: HOSPITAL | Age: 58
End: 2023-04-14
Payer: MEDICARE

## 2023-04-14 NOTE — PROGRESS NOTES
Specialty Pharmacy Refill Coordination Note     Mele is a 58 y.o. male contacted today regarding refills of  eltrombopag (PROMACTA) 50 MG tablet - Take 1 tablet by mouth every other day specialty medication(s).    Scheduled to ship on Monday 04/17, set to be delivered to patient on Tuesday 04/18 due to the weekend.    Specialty medication(s) and dose(s) confirmed: yes    Refill Questions    Flowsheet Row Most Recent Value   Changes to allergies? No   Changes to medications? No   New conditions since last clinic visit No   Unplanned office visit, urgent care, ED, or hospital admission in the last 4 weeks  No   How does patient/caregiver feel medication is working? Good   Financial problems or insurance changes  No   Since the previous refill, were any specialty medication doses or scheduled injections missed or delayed?  No   Does this patient require a clinical escalation to a pharmacist? No          Delivery Questions    Flowsheet Row Most Recent Value   Delivery method FedEx   Delivery address correct? Yes   Delivery phone number [phone]   Preferred delivery time? Anytime   Number of medications in delivery 1   Medication being filled and delivered Eltrombopag   Doses left of specialty medications one week left   Is there any medication that is due not being filled? No   Supplies needed? No supplies needed   Cooler needed? No   Do any medications need mixed or dated? No   Additional comments No copay   Questions or concerns for the pharmacist? No   Explain any questions or concerns for the pharmacist N/A   Are any medications first time fills? No            Medication Adherence    Adherence tools used: patient uses a pill box to manage medications  Support network for adherence: family member          Follow-up: 28 day(s)     Rober Lutz, Pharmacy Technician  Specialty Pharmacy Technician

## 2023-04-26 ENCOUNTER — SPECIALTY PHARMACY (OUTPATIENT)
Dept: PHARMACY | Facility: HOSPITAL | Age: 58
End: 2023-04-26
Payer: MEDICARE

## 2023-04-26 NOTE — PROGRESS NOTES
Specialty Pharmacy Patient Management Program  Program Disenrollment      Mele Rossi is a 58 y.o. male seen by an Endocrinology provider for Type 2 Diabetes. Patient was previously enrolled in the Endocrinology Patient Management program offered by [facility].      Disenrolling patient today as he is using local chain pharmacy for diabetes medications at this time.     Dena Maynard, PharmD   4/26/2023  13:52 EDT

## 2023-05-03 ENCOUNTER — HOSPITAL ENCOUNTER (OUTPATIENT)
Facility: HOSPITAL | Age: 58
Discharge: HOME | End: 2023-05-03
Payer: MEDICARE

## 2023-05-03 LAB
INR PPP: 0.99 (ref 0.87–1.11)
PROTHROMBIN TIME: 13.1 SEC (ref 12–14.4)

## 2023-05-03 PROCEDURE — 85610 PROTHROMBIN TIME: CPT

## 2023-05-03 PROCEDURE — 36415 COLL VENOUS BLD VENIPUNCTURE: CPT

## 2023-05-15 ENCOUNTER — SPECIALTY PHARMACY (OUTPATIENT)
Dept: ONCOLOGY | Facility: HOSPITAL | Age: 58
End: 2023-05-15
Payer: MEDICARE

## 2023-05-15 NOTE — PROGRESS NOTES
Specialty Pharmacy Refill Coordination Note     Mele is a 58 y.o. male contacted today regarding refills of eltrombopag (PROMACTA) 50 MG tablet - Take 1 tablet by mouth every other day. specialty medication(s).    Reviewed and verified with patient: Y    Specialty medication(s) and dose(s) confirmed: yes    Refill Questions    Flowsheet Row Most Recent Value   Changes to allergies? No   Changes to medications? No   New conditions since last clinic visit No   Unplanned office visit, urgent care, ED, or hospital admission in the last 4 weeks  No   How does patient/caregiver feel medication is working? Good   Financial problems or insurance changes  No   Since the previous refill, were any specialty medication doses or scheduled injections missed or delayed?  No   Does this patient require a clinical escalation to a pharmacist? No          Delivery Questions    Flowsheet Row Most Recent Value   Delivery method FedEx   Delivery address correct? Yes   Delivery phone number [phone]   Preferred delivery time? Anytime   Number of medications in delivery 1   Medication being filled and delivered Eltrombopag   Doses left of specialty medications 3 days left   Is there any medication that is due not being filled? No   Supplies needed? No supplies needed   Cooler needed? No   Do any medications need mixed or dated? No   Copay form of payment Credit card on file   Additional comments Co-pay: $256.97   Questions or concerns for the pharmacist? No   Explain any questions or concerns for the pharmacist N/A   Are any medications first time fills? No            Medication Adherence    Adherence tools used: patient uses a pill box to manage medications  Support network for adherence: family member          Follow-up: 25 day(s)     Rober Lutz, Pharmacy Technician  Specialty Pharmacy Technician

## 2023-05-17 ENCOUNTER — TELEPHONE (OUTPATIENT)
Dept: ONCOLOGY | Facility: CLINIC | Age: 58
End: 2023-05-17
Payer: MEDICARE

## 2023-05-17 DIAGNOSIS — D69.3 ACUTE ITP: Primary | ICD-10-CM

## 2023-05-17 DIAGNOSIS — N42.9 DISORDER OF PROSTATE, UNSPECIFIED: ICD-10-CM

## 2023-05-17 NOTE — TELEPHONE ENCOUNTER
Caller: Angela Rossi    Relationship: Emergency Contact    Best call back number: [phone]    What was the call regarding: ANGELA CALLED REGARDING [facility]. SHE IS NEEDING THAT ORDER SENT TO [facility]. SHE IS ALSO NEEDING A CALLBACK TO DISCUSS.    Do you require a callback: YES

## 2023-05-17 NOTE — TELEPHONE ENCOUNTER
Returned call to patient care giver. At this time informing her that lab orders have been faxed. Patients caregiver stating that patient will go Friday to have labs drawn. Patient's caregiver to call with update of when labs have been drawn.

## 2023-05-22 ENCOUNTER — HOSPITAL ENCOUNTER (OUTPATIENT)
Facility: HOSPITAL | Age: 58
Discharge: HOME | End: 2023-05-22
Payer: MEDICARE

## 2023-05-22 LAB — PLATELET # BLD AUTO: 101 K/UL (ref 150–400)

## 2023-05-22 PROCEDURE — 85049 AUTOMATED PLATELET COUNT: CPT

## 2023-05-22 PROCEDURE — 36415 COLL VENOUS BLD VENIPUNCTURE: CPT

## 2023-06-09 ENCOUNTER — SPECIALTY PHARMACY (OUTPATIENT)
Dept: ONCOLOGY | Facility: HOSPITAL | Age: 58
End: 2023-06-09
Payer: MEDICARE

## 2023-06-09 NOTE — PROGRESS NOTES
Specialty Pharmacy Refill Coordination Note     Mele is a 58 y.o. male contacted today regarding refills of Eltrombopag (PROMACTA) - Take 1 tablet by mouth every other day specialty medication(s).    Reviewed and verified with patient: Y    Scheduled to ship on Monday 06/12, set to be delivered to patient on Tuesday 06/13 due to the weekend.      Specialty medication(s) and dose(s) confirmed: yes    Refill Questions      Flowsheet Row Most Recent Value   Changes to allergies? No   Changes to medications? No   New conditions since last clinic visit No   Unplanned office visit, urgent care, ED, or hospital admission in the last 4 weeks  No   How does patient/caregiver feel medication is working? Good   Financial problems or insurance changes  No   Since the previous refill, were any specialty medication doses or scheduled injections missed or delayed?  No   Does this patient require a clinical escalation to a pharmacist? No            Delivery Questions      Flowsheet Row Most Recent Value   Delivery method FedEx   Delivery address correct? Yes   Delivery phone number [phone]   Preferred delivery time? Anytime   Number of medications in delivery 1   Medication being filled and delivered Eltrombopag   Doses left of specialty medications 8 days left   Is there any medication that is due not being filled? No   Supplies needed? No supplies needed   Cooler needed? No   Do any medications need mixed or dated? No   Copay form of payment Credit card on file   Additional comments Co-pay: $256.97   Questions or concerns for the pharmacist? No   Explain any questions or concerns for the pharmacist N/A   Are any medications first time fills? No              Medication Adherence    Adherence tools used: patient uses a pill box to manage medications  Support network for adherence: family member          Follow-up: 25 day(s)     Rober Lutz, Pharmacy Technician  Specialty Pharmacy Technician

## 2023-06-19 NOTE — PROGRESS NOTES
Case Management Discharge Note    Final Note: Per Elida at [facility], they have received insurance approval for transfer to a skilled level of care. [facility] ambulance is scheduled for 3pm transport. Mr Rossi and wife in agreement with plan. Call report to [phone]    Destination     No service has been selected for the patient.      Durable Medical Equipment     No service has been selected for the patient.      Dialysis/Infusion     No service has been selected for the patient.      Home Medical Care     No service has been selected for the patient.      Social Care     No service has been selected for the patient.             Final Discharge Disposition Code: 61 - hospital-based swing bed   [] : Fellow [Fellow] : Fellow

## 2023-08-01 ENCOUNTER — SPECIALTY PHARMACY (OUTPATIENT)
Dept: ONCOLOGY | Facility: HOSPITAL | Age: 58
End: 2023-08-01
Payer: MEDICARE

## 2023-08-23 NOTE — TELEPHONE ENCOUNTER
Child was positioned for varnish application. Teeth were dried. Varnish was applied to all teeth surfaces.    Type of varnish: D[employer] S[place] Nupro White 5% Sodium Fluoride Varnish.    Child tolerated procedure well.   Provider: DR. CONCEPCION  Caller: ANGELA GÓMEZ  Relationship to Patient: PT'S WIFE        Reason for Call: PT'S WIFE ASKING IF DR. CONCEPCION FAXED THE GUARDIAN ATTENDING PHYSICIAN EVALUATION FORM THAT THEY BRUNG WITH THEM AT HIS APPT 6-29-20 AND DR. CONCEPCION STARTED FILLING IT OUT. THEY STATED TO THE PT THAT IT WAS FAXED BACK TO THEM ON THE 6-30-20 NOT FILLED OUT. THIS IS FORM HAS A TIME FRAME ON IT AND THAT IT WAS SUPPOSE TO BE BACK BUT THEY WILL PUT A EXTENSION ON IT. PLEASE CALL HER BACK [phone]. PT'S WIFE ALSO SENT THE FORM IN A EMAIL.

## 2023-08-25 ENCOUNTER — HOSPITAL ENCOUNTER (OUTPATIENT)
Facility: HOSPITAL | Age: 58
Discharge: HOME | End: 2023-08-25
Payer: MEDICARE

## 2023-08-25 LAB
25(OH)D3 SERPL-MCNC: 16.9 NG/ML (ref 32–100)
ALBUMIN SERPL-MCNC: 4.5 G/DL (ref 3.4–4.8)
ALBUMIN/GLOB SERPL: 1.8 {RATIO} (ref 0.8–2)
ALP SERPL-CCNC: 115 U/L (ref 25–100)
ALT SERPL-CCNC: 12 U/L (ref 4–36)
ANION GAP SERPL CALCULATED.3IONS-SCNC: 14 MMOL/L (ref 3–16)
AST SERPL-CCNC: 17 U/L (ref 8–33)
BASOPHILS # BLD: 0.1 K/UL (ref 0–0.1)
BASOPHILS NFR BLD: 0.8 %
BILIRUB SERPL-MCNC: 0.7 MG/DL (ref 0.3–1.2)
BUN SERPL-MCNC: 20 MG/DL (ref 6–20)
CALCIUM SERPL-MCNC: 9.4 MG/DL (ref 8.5–10.5)
CHLORIDE SERPL-SCNC: 105 MMOL/L (ref 98–107)
CHOLEST SERPL-MCNC: 109 MG/DL (ref 0–200)
CO2 SERPL-SCNC: 23 MMOL/L (ref 20–30)
CREAT SERPL-MCNC: 1.2 MG/DL (ref 0.4–1.2)
EOSINOPHIL # BLD: 0.2 K/UL (ref 0–0.4)
EOSINOPHIL NFR BLD: 2.3 %
ERYTHROCYTE [DISTWIDTH] IN BLOOD BY AUTOMATED COUNT: 14.2 % (ref 11–16)
FOLATE SERPL-MCNC: 14.98 NG/ML
GFR SERPLBLD CREATININE-BSD FMLA CKD-EPI: >60 ML/MIN/{1.73_M2}
GLOBULIN SER CALC-MCNC: 2.5 G/DL
GLUCOSE SERPL-MCNC: 185 MG/DL (ref 74–106)
HBA1C MFR BLD: 10 %
HCT VFR BLD AUTO: 45.1 % (ref 40–54)
HDLC SERPL-MCNC: 30 MG/DL (ref 40–60)
HGB BLD-MCNC: 15.1 G/DL (ref 13–18)
IMM GRANULOCYTES # BLD: 0 K/UL
IMM GRANULOCYTES NFR BLD: 0.4 % (ref 0–5)
LDLC SERPL CALC-MCNC: 29 MG/DL
LYMPHOCYTES # BLD: 1.6 K/UL (ref 1.5–4)
LYMPHOCYTES NFR BLD: 21.8 %
MCH RBC QN AUTO: 29.3 PG (ref 27–32)
MCHC RBC AUTO-ENTMCNC: 33.5 G/DL (ref 31–35)
MCV RBC AUTO: 87.4 FL (ref 80–100)
MONOCYTES # BLD: 0.6 K/UL (ref 0.2–0.8)
MONOCYTES NFR BLD: 8.3 %
NEUTROPHILS # BLD: 4.9 K/UL (ref 2–7.5)
NEUTS SEG NFR BLD: 66.4 %
PLATELET # BLD AUTO: 117 K/UL (ref 150–400)
PMV BLD AUTO: 11.8 FL (ref 6–10)
POTASSIUM SERPL-SCNC: 4.5 MMOL/L (ref 3.4–5.1)
PROT SERPL-MCNC: 7 G/DL (ref 6.4–8.3)
RBC # BLD AUTO: 5.16 M/UL (ref 4.5–6)
SODIUM SERPL-SCNC: 142 MMOL/L (ref 136–145)
TRIGL SERPL-MCNC: 248 MG/DL (ref 0–249)
TSH SERPL DL<=0.005 MIU/L-ACNC: 2.09 UIU/ML (ref 0.27–4.2)
VIT B12 SERPL-MCNC: 851 PG/ML (ref 211–911)
VLDLC SERPL CALC-MCNC: 50 MG/DL
WBC # BLD AUTO: 7.4 K/UL (ref 4–11)

## 2023-08-25 PROCEDURE — 85025 COMPLETE CBC W/AUTO DIFF WBC: CPT

## 2023-08-25 PROCEDURE — 80061 LIPID PANEL: CPT

## 2023-08-25 PROCEDURE — 82607 VITAMIN B-12: CPT

## 2023-08-25 PROCEDURE — 82306 VITAMIN D 25 HYDROXY: CPT

## 2023-08-25 PROCEDURE — 83036 HEMOGLOBIN GLYCOSYLATED A1C: CPT

## 2023-08-25 PROCEDURE — 36415 COLL VENOUS BLD VENIPUNCTURE: CPT

## 2023-08-25 PROCEDURE — 80053 COMPREHEN METABOLIC PANEL: CPT

## 2023-08-25 PROCEDURE — 82746 ASSAY OF FOLIC ACID SERUM: CPT

## 2023-08-25 PROCEDURE — 84443 ASSAY THYROID STIM HORMONE: CPT

## 2023-08-29 ENCOUNTER — SPECIALTY PHARMACY (OUTPATIENT)
Dept: ONCOLOGY | Facility: HOSPITAL | Age: 58
End: 2023-08-29
Payer: MEDICARE

## 2023-08-29 NOTE — PROGRESS NOTES
Specialty Pharmacy Refill Coordination Note     Mele is a 58 y.o. male contacted today regarding refills of  eltrombopag 50mg PO every other day specialty medication(s).      Specialty medication(s) and dose(s) confirmed: yes    Refill Questions      Flowsheet Row Most Recent Value   Changes to allergies? No   Changes to medications? No   New conditions since last clinic visit No   Unplanned office visit, urgent care, ED, or hospital admission in the last 4 weeks  No   How does patient/caregiver feel medication is working? Very good   Financial problems or insurance changes  No   Since the previous refill, were any specialty medication doses or scheduled injections missed or delayed?  No   Does this patient require a clinical escalation to a pharmacist? No            Delivery Questions      Flowsheet Row Most Recent Value   Delivery method FedEx   Delivery address correct? Yes   Delivery phone number [phone]   Preferred delivery time? Anytime   Number of medications in delivery 1   Medication being filled and delivered promacta   Doses left of specialty medications 3 days left   Is there any medication that is due not being filled? No   Supplies needed? No supplies needed   Cooler needed? No   Do any medications need mixed or dated? No   Additional comments no copay   Questions or concerns for the pharmacist? No   Explain any questions or concerns for the pharmacist n/a   Are any medications first time fills? No              Medication Adherence    Adherence tools used: patient uses a pill box to manage medications  Support network for adherence: family member          Follow-up:   28 day(s)     Bridgett Weems, Pharmacy Technician  Specialty Pharmacy Technician

## 2023-09-01 ENCOUNTER — SPECIALTY PHARMACY (OUTPATIENT)
Dept: ONCOLOGY | Facility: HOSPITAL | Age: 58
End: 2023-09-01
Payer: MEDICARE

## 2023-09-01 ENCOUNTER — OFFICE VISIT (OUTPATIENT)
Dept: ONCOLOGY | Facility: CLINIC | Age: 58
End: 2023-09-01
Payer: MEDICARE

## 2023-09-01 VITALS
DIASTOLIC BLOOD PRESSURE: 82 MMHG | WEIGHT: 244 LBS | RESPIRATION RATE: 16 BRPM | TEMPERATURE: 97.8 F | HEIGHT: 72 IN | OXYGEN SATURATION: 99 % | SYSTOLIC BLOOD PRESSURE: 133 MMHG | BODY MASS INDEX: 33.05 KG/M2 | HEART RATE: 65 BPM

## 2023-09-01 DIAGNOSIS — D69.3 ACUTE ITP: Primary | ICD-10-CM

## 2023-09-01 PROCEDURE — 1160F RVW MEDS BY RX/DR IN RCRD: CPT | Performed by: NURSE PRACTITIONER

## 2023-09-01 PROCEDURE — 3079F DIAST BP 80-89 MM HG: CPT | Performed by: NURSE PRACTITIONER

## 2023-09-01 PROCEDURE — 3075F SYST BP GE 130 - 139MM HG: CPT | Performed by: NURSE PRACTITIONER

## 2023-09-01 PROCEDURE — 1126F AMNT PAIN NOTED NONE PRSNT: CPT | Performed by: NURSE PRACTITIONER

## 2023-09-01 PROCEDURE — 1159F MED LIST DOCD IN RCRD: CPT | Performed by: NURSE PRACTITIONER

## 2023-09-01 PROCEDURE — 99213 OFFICE O/P EST LOW 20 MIN: CPT | Performed by: NURSE PRACTITIONER

## 2023-09-01 NOTE — PROGRESS NOTES
REASON FOR VISIT: ITP    HISTORY OF PRESENT ILLNESS:   58 y.o.  male presents today for follow-up of his ITP.  Doing reasonably well overall.  He continues on Promacta.  He is tolerating well.  No bruising or bleeding.  He does have a history of stroke and continues to have right-sided weakness.  He is getting a little bit stronger he feels like.  He was able to walk in with a cane.  He is using a brace that helps his stability.  His platelets have remained stable around 100.  No major changes.        Past medical history, social history and family history was reviewed 09/01/23 and unchanged from prior visit.    Review of Systems:    Review of Systems   Constitutional: Negative.    HENT:  Negative.     Eyes: Negative.    Respiratory: Negative.     Cardiovascular: Negative.    Gastrointestinal: Negative.    Endocrine: Negative.    Genitourinary: Negative.     Skin: Negative.    Neurological:  Positive for extremity weakness.   Hematological: Negative.    Psychiatric/Behavioral: Negative.            Medications:        Current Outpatient Medications:     amLODIPine (NORVASC) 10 MG tablet, Take 1 tablet by mouth Daily., Disp: 30 tablet, Rfl: 0    aspirin 325 MG tablet, Take 1 tablet by mouth Daily., Disp: , Rfl:     atorvastatin (Lipitor) 20 MG tablet, Take 1 tablet by mouth Daily., Disp: 30 tablet, Rfl: 11    baclofen (LIORESAL) 10 MG tablet, Take 2 tablets by mouth 3 (Three) Times a Day., Disp: 180 tablet, Rfl: 11    carvedilol (COREG) 12.5 MG tablet, TAKE ONE TABLET BY MOUTH EVERY 12 HOURS, Disp: 180 tablet, Rfl: 2    Continuous Blood Gluc Sensor (FreeStyle Berhane 2 Sensor) misc, Use 1 sensor Every 14 (Fourteen) Days., Disp: 6 each, Rfl: 3    doxazosin (CARDURA) 4 MG tablet, Take 1 tablet by mouth 2 (Two) Times a Day., Disp: 180 tablet, Rfl: 1    Dulaglutide (Trulicity) 0.75 MG/0.5ML solution pen-injector, Inject 0.75 mg under the skin into the appropriate area as directed 1 (One) Time Per Week. (Patient not  "taking: Reported on 9/1/2023), Disp: 6 mL, Rfl: 3    eltrombopag (PROMACTA) 50 MG tablet, Take 1 tablet by mouth Every Other Day., Disp: 15 tablet, Rfl: 11    empagliflozin (Jardiance) 25 MG tablet tablet, Take 1 tablet by mouth Daily., Disp: 90 tablet, Rfl: 1    gabapentin (NEURONTIN) 300 MG capsule, Take 1 capsule by mouth 3 (Three) Times a Day., Disp: 270 capsule, Rfl: 1    Insulin Glargine (Lantus SoloStar) 100 UNIT/ML injection pen, Inject 70 Units under the skin into the appropriate area as directed Every Night., Disp: 60 mL, Rfl: 1    Insulin Pen Needle (BD Pen Needle Claudia U/F) 32G X 4 MM misc, Use for injections daily as directed., Disp: 100 each, Rfl: 1    metFORMIN (GLUCOPHAGE) 500 MG tablet, Take 1 tablet by mouth Daily., Disp: 90 tablet, Rfl: 1    ondansetron (ZOFRAN) 8 MG tablet, Take 1 tablet by mouth 3 (Three) Times a Day As Needed for Nausea or Vomiting., Disp: 30 tablet, Rfl: 5    Pain Medications               aspirin 325 MG tablet Take 1 tablet by mouth Daily.    baclofen (LIORESAL) 10 MG tablet Take 2 tablets by mouth 3 (Three) Times a Day.    gabapentin (NEURONTIN) 300 MG capsule Take 1 capsule by mouth 3 (Three) Times a Day.               ALLERGIES:    Allergies   Allergen Reactions    Lisinopril Dizziness     syncope         Physical Exam    VITAL SIGNS:  /82   Pulse 65   Temp 97.8 øF (36.6 øC) (Temporal)   Resp 16   Ht 182.9 cm (72\")   Wt 111 kg (244 lb)   SpO2 99%   BMI 33.09 kg/mý                 Wt Readings from Last 3 Encounters:   09/01/23 111 kg (244 lb)   04/07/23 112 kg (248 lb)   02/21/23 114 kg (252 lb)       Body mass index is 33.09 kg/mý. Body surface area is 2.32 meters squared.    Pain Score    09/01/23 1053   PainSc: 0-No pain           Performance Status: 1    General: well appearing male in no acute distress  Neuro: alert and oriented  HEENT: sclera anicteric, oropharynx clear  Lymphatics: no cervical, supraclavicular, or axillary adenopathy  Cardiovascular: " regular rate and rhythm, no murmurs  Lungs: clear to auscultation bilaterally  Abdomen: soft, nontender, nondistended.  No palpable organomegaly  Extremeties: no lower extremity edema.  Brace on right leg.  Paralysis noted to right upper extremity  Skin: no rashes, lesions, bruising, or petechiae  Psych: mood and affect appropriate          RECENT LABS:    Lab Results   Component Value Date    HGB 15.1 08/25/2023    HCT 45.1 08/25/2023    MCV 87.4 08/25/2023     (L) 08/25/2023    WBC 7.4 08/25/2023    NEUTROABS 4.9 08/25/2023    LYMPHSABS 1.6 08/25/2023    MONOSABS 0.6 08/25/2023    EOSABS 0.2 08/25/2023    BASOSABS 0.1 08/25/2023       Lab Results   Component Value Date    GLUCOSE 230 (H) 02/03/2023    BUN 18 02/03/2023    CREATININE 1.23 02/03/2023     02/03/2023    K 4.3 02/03/2023     02/03/2023    CO2 25.5 02/03/2023    CALCIUM 9.5 02/03/2023    PROTEINTOT 7.4 02/03/2023    ALBUMIN 4.1 02/03/2023    BILITOT 1.0 02/03/2023    ALKPHOS 111 02/03/2023    AST 17 02/03/2023    ALT 14 02/03/2023       Lab Results   Component Value Date     (L) 08/25/2023     (L) 05/22/2023     (L) 02/03/2023     (L) 01/03/2023     (L) 11/28/2022       Lab Results   Component Value Date    PSA 6.290 (H) 08/11/2022    PSA 5.6 (H) 04/07/2022    PSA 6.62 (H) 03/01/2022    PSA 4.46 (H) 12/21/2018         Assessment/Plan    1.  Acute ITP.  Platelets remain stable on Promacta at 50 mg every other day.  Continue Promacta on the current dosing.  He does not seem to have any significant issues at this time.  We discussed platelets from 8/25/2023 showed platelets are stable at 117,000.  We will plan to continue our current plan of every 3-month CBC with every 6 months visit.    2.  Elevated PSA.  Followed by urology.    3.  History of stroke.  Continue aspirin since his platelets remain over 50,000.      Madisyn Boyce, CRYS  [facility]    Return in (Approximately):  6 months    No orders of the defined types were placed in this encounter.      9/1/2023

## 2023-09-05 ENCOUNTER — SPECIALTY PHARMACY (OUTPATIENT)
Dept: ONCOLOGY | Facility: HOSPITAL | Age: 58
End: 2023-09-05
Payer: MEDICARE

## 2023-09-05 NOTE — PROGRESS NOTES
Specialty Pharmacy Patient Management Program  Oncology 6-Month Clinical Assessment       Mele Rossi is a 58 y.o. male with chronic ITP seen today to assess adherence and side effects.    Reason for Outreach: Routine medication check-in .    Regimen: Promacta (eltromobpag) 50 mg tablet - Take 1 tablet by mouth every other day    Specialty Pharmacy Goal - On Track   Goals Addressed Today        Specialty Pharmacy General Goal      Promacta  Clinical goal/therapeutic target: disease control, per the recent oncology clinic notes and labs.              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    Acute ITP D69.3       Medication Assessment for Specialty Medication(s):  Medication Assessment  Follow Up Clinical Assessment  Medication(s) assessed: Eltrombopag  Therapeutic appropriateness: Appropriate  Medication tolerability: Tolerating with no to minimal ADRs (LFT normal, platelets stable, denies CINV, diarrhea, and any other AEs)  Medication plan: Continue therapy with normal follow-up  Quality of Life Improvement Scale: Significantly better  Administration: Patient is taking every other day at the same time in the morning  Patient can self administer medications: Yes  Medication Follow-Up Plan: Refill coordination  Lab Review: The labs listed below have been reviewed. No dose adjustments are needed for the oral specialty medication(s) based on the labs.    Lab Results   Component Value Date    GLUCOSE 230 (H) 02/03/2023    CALCIUM 9.5 02/03/2023     02/03/2023    K 4.3 02/03/2023    CO2 25.5 02/03/2023     02/03/2023    BUN 18 02/03/2023    CREATININE 1.23 02/03/2023    EGFRIFNONA 62 08/12/2018    BCR 14.6 02/03/2023    ANIONGAP 10.5 02/03/2023     Lab Results    Component Value Date    WBC 7.4 08/25/2023    RBC 5.16 08/25/2023    HGB 15.1 08/25/2023    HCT 45.1 08/25/2023    MCV 87.4 08/25/2023    MCH 29.3 08/25/2023    MCHC 33.5 08/25/2023    RDW 14.2 08/25/2023    RDWSD 43.4 02/03/2023    MPV 11.8 (H) 08/25/2023     (L) 08/25/2023    NEUTRORELPCT 66.4 08/25/2023    LYMPHORELPCT 21.8 08/25/2023    MONORELPCT 8.3 08/25/2023    EOSRELPCT 2.3 08/25/2023    BASORELPCT 0.8 08/25/2023    AUTOIGPER 0.4 08/25/2023    NEUTROABS 4.9 08/25/2023    LYMPHSABS 1.6 08/25/2023    MONOSABS 0.6 08/25/2023    EOSABS 0.2 08/25/2023    BASOSABS 0.1 08/25/2023    AUTOIGNUM 0.0 08/25/2023    NRBC 0.0 02/03/2023     Drug-drug interactions  Completed medication reconciliation today to assess for drug interactions. Patient denies starting or stopping any medications.    Assessed medication list for interactions,  eltrombopag can increase the concentration of atorvastatin.  I assessed for statin toxicity and the patient denies significant myalgia (has some at baseline) or dark urine.  Advised patient to call the clinic if any new medications are started so we can assess for drug-drug interactions.  Drug-food interactions discussed:  avoiding calcium within 2 hours before or 4 hours after taking Promacta  Vaccines are coordinated by the patient's oncologist and primary care provider.    Medications   Medicines reviewed by Virginie Ferguson [facility] on 9/5/2023 at 10:01 AM  Prior to Admission medications    Medication Sig Start Date End Date Taking? Authorizing Provider   amLODIPine (NORVASC) 10 MG tablet Take 1 tablet by mouth Daily. 1/12/22  Yes Kalyani Willis MD   aspirin 325 MG tablet Take 1 tablet by mouth Daily. 8/16/18  Yes Karla Mann APRN   atorvastatin (Lipitor) 20 MG tablet Take 1 tablet by mouth Daily. 2/21/23 2/21/24 Yes Kalyani Willis MD   baclofen (LIORESAL) 10 MG tablet Take 2 tablets by mouth 3 (Three) Times a Day. 2/21/23  Yes Kalyani Willis MD   carvedilol (COREG)  12.5 MG tablet TAKE ONE TABLET BY MOUTH EVERY 12 HOURS 2/3/23  Yes Kalyani Willis MD   Continuous Blood Gluc Sensor (FreeStyle Berhane 2 Sensor) misc Use 1 sensor Every 14 (Fourteen) Days. 6/16/23  Yes Junie Tadeo APRN   doxazosin (CARDURA) 4 MG tablet Take 1 tablet by mouth 2 (Two) Times a Day. 4/7/23  Yes Zeke Lopez MD   Dulaglutide (Trulicity) 0.75 MG/0.5ML solution pen-injector Inject 0.75 mg under the skin into the appropriate area as directed 1 (One) Time Per Week.  Patient taking differently: Inject 0.75 mg under the skin into the appropriate area as directed 1 (One) Time Per Week. Using once every 2 weeks d/t side effects.  F/U with endocrinology soon. 4/7/23  Yes eZke Lopez MD   eltrombopag (PROMACTA) 50 MG tablet Take 1 tablet by mouth Every Other Day. 12/28/22  Yes Amadou Weller MD   empagliflozin (Jardiance) 25 MG tablet tablet Take 1 tablet by mouth Daily. 4/7/23  Yes Zeke Lopez MD   gabapentin (NEURONTIN) 300 MG capsule Take 1 capsule by mouth 3 (Three) Times a Day. 4/7/23  Yes Zeke Lopez MD   Insulin Glargine (Lantus SoloStar) 100 UNIT/ML injection pen Inject 70 Units under the skin into the appropriate area as directed Every Night. 4/7/23  Yes Zeke Lopez MD   Insulin Pen Needle (BD Pen Needle Claudia U/F) 32G X 4 MM misc Use for injections daily as directed. 11/14/22  Yes Junie Tadeo APRN   metFORMIN (GLUCOPHAGE) 500 MG tablet Take 1 tablet by mouth Daily. 4/7/23  Yes Zeke Lopez MD   ondansetron (ZOFRAN) 8 MG tablet Take 1 tablet by mouth 3 (Three) Times a Day As Needed for Nausea or Vomiting. 4/8/22  Yes Amadou Weller MD   Lancets misc Use as directed to check blood glucose Daily. 11/14/22 9/1/23  Junie Tadeo APRN       Allergies  Known allergies and reactions were discussed with the patient. The patient's chart has been reviewed for allergy information and updated as necessary.    Allergies   Allergen Reactions    Lisinopril Dizziness     syncope         Allergies reviewed by Virginie Ferguson [facility] on 9/5/2023 at  9:58 AM    Hospitalizations and Urgent Care Visits Since Last Assessment:  Unplanned hospitalizations or inpatient admissions: 0  ED Visits: 0  Urgent Office Visits: 0    Adherence Assessment:  Adherence Questions  Medication(s) assessed: Eltrombopag  On average, how many doses/injections does the patient miss per month?: 0  What are the identified reasons for non-adherence or missed doses? : no problems identfied  What is the estimated medication adherence level?: % (PDC 99%)  Based on the patient/caregiver response and refill history, does this patient require an MTP to track adherence improvements?: no    Quality of Life Assessment:  Quality of Life Assessment  Quality of Life Improvement Scale: Significantly better  -- Quality of Life: 10/10    Financial Assessment:  Medication availability/affordability: Patient has had no issues obtaining medication from pharmacy.    Attestation:  I attest the patient was actively involved in and has agreed to the above plan of care. If the prescribed therapy is at any point deemed not appropriate based on the current or future assessments, a consultation will be initiated with the patient's specialty care provider to determine the best course of action. The revised plan of therapy will be documented along with any required assessments and/or additional patient education provided.       All questions addressed and patient had no additional concerns. Patient has pharmacy contact information.    Meliza Ferguson, PharmD, [facility]  Oncology Clinical Pharmacist   Phone: [phone]    9/5/2023  10:10 EDT

## 2023-09-28 ENCOUNTER — SPECIALTY PHARMACY (OUTPATIENT)
Dept: ONCOLOGY | Facility: HOSPITAL | Age: 58
End: 2023-09-28
Payer: MEDICARE

## 2023-09-28 NOTE — PROGRESS NOTES
Specialty Pharmacy Refill Coordination Note     Mele is a 58 y.o. male contacted today regarding refills of  promacta 50mg PO every other day specialty medication(s).    Specialty medication(s) and dose(s) confirmed: yes    Refill Questions      Flowsheet Row Most Recent Value   Changes to allergies? No   Changes to medications? No   New conditions since last clinic visit No   Unplanned office visit, urgent care, ED, or hospital admission in the last 4 weeks  No   How does patient/caregiver feel medication is working? Good   Financial problems or insurance changes  No   Since the previous refill, were any specialty medication doses or scheduled injections missed or delayed?  No   Does this patient require a clinical escalation to a pharmacist? No            Delivery Questions      Flowsheet Row Most Recent Value   Delivery method [employer]   Delivery address correct? Yes   Delivery phone number [phone]   Preferred delivery time? Anytime   Number of medications in delivery 1   Medication being filled and delivered promacta   Doses left of specialty medications 4 days left   Is there any medication that is due not being filled? No   Supplies needed? No supplies needed   Cooler needed? No   Do any medications need mixed or dated? No   Additional comments no copay   Questions or concerns for the pharmacist? No   Explain any questions or concerns for the pharmacist n/a   Are any medications first time fills? No              Medication Adherence    Adherence tools used: patient uses a pill box to manage medications  Support network for adherence: family member          Follow-up: 28 day(s)     Bridgett Weems, Pharmacy Technician  Specialty Pharmacy Technician

## 2023-10-09 ENCOUNTER — TELEPHONE (OUTPATIENT)
Dept: ENDOCRINOLOGY | Facility: CLINIC | Age: 58
End: 2023-10-09
Payer: MEDICARE

## 2023-10-09 RX ORDER — PEN NEEDLE, DIABETIC 32GX 5/32"
NEEDLE, DISPOSABLE MISCELLANEOUS
Qty: 100 EACH | Refills: 3 | Status: SHIPPED | OUTPATIENT
Start: 2023-10-09

## 2023-10-09 NOTE — TELEPHONE ENCOUNTER
PT CALLED REQUESTING PEN NEEDLES TO BE SENT [facility] TO [facility] [place], [place].

## 2023-10-23 RX ORDER — INSULIN GLARGINE 100 [IU]/ML
70 INJECTION, SOLUTION SUBCUTANEOUS NIGHTLY
Qty: 60 ML | Refills: 1 | Status: SHIPPED | OUTPATIENT
Start: 2023-10-23

## 2023-10-23 NOTE — TELEPHONE ENCOUNTER
Rx Refill Note  Requested Prescriptions     Pending Prescriptions Disp Refills    Insulin Glargine (Lantus SoloStar) 100 UNIT/ML injection pen 60 mL 1     Sig: Inject 70 Units under the skin into the appropriate area as directed Every Night.          Last office visit with prescribing clinician: 4/7/2023     Next office visit with prescribing clinician: 2/14/2024         Gina Navarro MA  10/23/23, 13:24 EDT

## 2023-10-25 ENCOUNTER — SPECIALTY PHARMACY (OUTPATIENT)
Dept: ONCOLOGY | Facility: HOSPITAL | Age: 58
End: 2023-10-25
Payer: MEDICARE

## 2023-10-25 NOTE — PROGRESS NOTES
Specialty Pharmacy Refill Coordination Note     Mele is a 58 y.o. male contacted today regarding refills of  Promacta 50 mg PO every OTHER day specialty medication(s).      Specialty medication(s) and dose(s) confirmed: yes    Refill Questions      Flowsheet Row Most Recent Value   Changes to allergies? No   Changes to medications? No   New conditions since last clinic visit No   Unplanned office visit, urgent care, ED, or hospital admission in the last 4 weeks  No   How does patient/caregiver feel medication is working? Very good   Financial problems or insurance changes  No   Since the previous refill, were any specialty medication doses or scheduled injections missed or delayed?  No   Does this patient require a clinical escalation to a pharmacist? No            Delivery Questions      Flowsheet Row Most Recent Value   Delivery method [employer]   Delivery address correct? Yes   Delivery phone number [phone]   Preferred delivery time? Anytime   Number of medications in delivery 1   Medication(s) being filled and delivered Eltrombopag Olamine   Doses left of specialty medications 5 days   Is there any medication that is due not being filled? No   Supplies needed? No supplies needed   Cooler needed? No   Do any medications need mixed or dated? No   Copay form of payment Credit card on file   Additional comments verified $0 copay with patient   Questions or concerns for the pharmacist? No   Explain any questions or concerns for the pharmacist n/a   Are any medications first time fills? No              Medication Adherence          Adherence tools used: patient uses a pill box to manage medications      Support network for adherence: family member                Follow-up: 30 day(s)     Tana Villasenor, Pharmacy Technician  Specialty Pharmacy Technician

## 2023-10-31 RX ORDER — CARVEDILOL 12.5 MG/1
TABLET ORAL
Qty: 180 TABLET | Refills: 0 | Status: SHIPPED | OUTPATIENT
Start: 2023-10-31

## 2023-11-17 ENCOUNTER — LAB (OUTPATIENT)
Dept: LAB | Facility: HOSPITAL | Age: 58
End: 2023-11-17
Payer: MEDICARE

## 2023-11-17 DIAGNOSIS — D69.3 ACUTE ITP: ICD-10-CM

## 2023-11-17 DIAGNOSIS — N42.9 DISORDER OF PROSTATE, UNSPECIFIED: ICD-10-CM

## 2023-11-17 LAB
ALBUMIN SERPL-MCNC: 4.5 G/DL (ref 3.5–5.2)
ALBUMIN/GLOB SERPL: 1.4 G/DL
ALP SERPL-CCNC: 115 U/L (ref 39–117)
ALT SERPL W P-5'-P-CCNC: 12 U/L (ref 1–41)
ANION GAP SERPL CALCULATED.3IONS-SCNC: 11.6 MMOL/L (ref 5–15)
AST SERPL-CCNC: 19 U/L (ref 1–40)
BASOPHILS # BLD AUTO: 0.07 10*3/MM3 (ref 0–0.2)
BASOPHILS NFR BLD AUTO: 0.9 % (ref 0–1.5)
BILIRUB SERPL-MCNC: 0.7 MG/DL (ref 0–1.2)
BUN SERPL-MCNC: 19 MG/DL (ref 8–23)
BUN/CREAT SERPL: 15 (ref 7–25)
CALCIUM SPEC-SCNC: 9.8 MG/DL (ref 8.6–10.5)
CHLORIDE SERPL-SCNC: 102 MMOL/L (ref 98–107)
CO2 SERPL-SCNC: 25.4 MMOL/L (ref 22–29)
CREAT SERPL-MCNC: 1.27 MG/DL (ref 0.76–1.27)
DEPRECATED RDW RBC AUTO: 41.9 FL (ref 37–54)
EGFRCR SERPLBLD CKD-EPI 2021: 64.7 ML/MIN/1.73
EOSINOPHIL # BLD AUTO: 0.13 10*3/MM3 (ref 0–0.4)
EOSINOPHIL NFR BLD AUTO: 1.7 % (ref 0.3–6.2)
ERYTHROCYTE [DISTWIDTH] IN BLOOD BY AUTOMATED COUNT: 13.6 % (ref 12.3–15.4)
GLOBULIN UR ELPH-MCNC: 3.2 GM/DL
GLUCOSE SERPL-MCNC: 156 MG/DL (ref 65–99)
HCT VFR BLD AUTO: 48.5 % (ref 37.5–51)
HGB BLD-MCNC: 16.9 G/DL (ref 13–17.7)
IMM GRANULOCYTES # BLD AUTO: 0.06 10*3/MM3 (ref 0–0.05)
IMM GRANULOCYTES NFR BLD AUTO: 0.8 % (ref 0–0.5)
LYMPHOCYTES # BLD AUTO: 1.35 10*3/MM3 (ref 0.7–3.1)
LYMPHOCYTES NFR BLD AUTO: 17.7 % (ref 19.6–45.3)
MCH RBC QN AUTO: 30.1 PG (ref 26.6–33)
MCHC RBC AUTO-ENTMCNC: 34.8 G/DL (ref 31.5–35.7)
MCV RBC AUTO: 86.5 FL (ref 79–97)
MONOCYTES # BLD AUTO: 0.54 10*3/MM3 (ref 0.1–0.9)
MONOCYTES NFR BLD AUTO: 7.1 % (ref 5–12)
NEUTROPHILS NFR BLD AUTO: 5.47 10*3/MM3 (ref 1.7–7)
NEUTROPHILS NFR BLD AUTO: 71.8 % (ref 42.7–76)
NRBC BLD AUTO-RTO: 0 /100 WBC (ref 0–0.2)
PLATELET # BLD AUTO: 139 10*3/MM3 (ref 140–450)
PMV BLD AUTO: 12.2 FL (ref 6–12)
POTASSIUM SERPL-SCNC: 4.2 MMOL/L (ref 3.5–5.2)
PROT SERPL-MCNC: 7.7 G/DL (ref 6–8.5)
PSA SERPL-MCNC: 8.56 NG/ML (ref 0–4)
RBC # BLD AUTO: 5.61 10*6/MM3 (ref 4.14–5.8)
SODIUM SERPL-SCNC: 139 MMOL/L (ref 136–145)
WBC NRBC COR # BLD AUTO: 7.62 10*3/MM3 (ref 3.4–10.8)

## 2023-11-17 PROCEDURE — 85025 COMPLETE CBC W/AUTO DIFF WBC: CPT

## 2023-11-17 PROCEDURE — 84153 ASSAY OF PSA TOTAL: CPT

## 2023-11-17 PROCEDURE — 80053 COMPREHEN METABOLIC PANEL: CPT

## 2023-11-17 PROCEDURE — 36415 COLL VENOUS BLD VENIPUNCTURE: CPT

## 2023-11-20 ENCOUNTER — SPECIALTY PHARMACY (OUTPATIENT)
Dept: ONCOLOGY | Facility: HOSPITAL | Age: 58
End: 2023-11-20
Payer: MEDICARE

## 2023-11-20 DIAGNOSIS — D69.3 ACUTE ITP: ICD-10-CM

## 2023-11-20 NOTE — PROGRESS NOTES
Specialty Pharmacy Refill Coordination Note     Mele is a 58 y.o. male contacted today regarding refills of Promacta 50 mg PO every OTHER day specialty medication(s).    Using debit card through  for remaining co-pay.    Specialty medication(s) and dose(s) confirmed: yes    Refill Questions      Flowsheet Row Most Recent Value   Changes to allergies? No   Changes to medications? No   New conditions since last clinic visit No   Unplanned office visit, urgent care, ED, or hospital admission in the last 4 weeks  No   How does patient/caregiver feel medication is working? Very good   Financial problems or insurance changes  No   Since the previous refill, were any specialty medication doses or scheduled injections missed or delayed?  No   Does this patient require a clinical escalation to a pharmacist? No            Delivery Questions      Flowsheet Row Most Recent Value   Delivery method [employer]   Delivery address correct? Yes   Delivery phone number [phone]   Preferred delivery time? Anytime   Number of medications in delivery 1   Medication(s) being filled and delivered Eltrombopag Olamine   Doses left of specialty medications about 10 days per pt   Is there any medication that is due not being filled? No   Supplies needed? No supplies needed   Cooler needed? No   Do any medications need mixed or dated? No   Copay form of payment Credit card on file   Additional comments Pt has promacta  debit card on file for remaining co-pay $256.97   Questions or concerns for the pharmacist? No   Explain any questions or concerns for the pharmacist n/a   Are any medications first time fills? No              Medication Adherence          Adherence tools used: patient uses a pill box to manage medications      Support network for adherence: family member                Follow-up: 30 day(s)     Tana Villasenor, Pharmacy Technician  Specialty Pharmacy Technician

## 2023-11-20 NOTE — PROGRESS NOTES
Re: Refills of Oral Specialty Medication - Promacta (eltrombopag)    Drug-Drug Interactions: The current medication list was reviewed and there are no relevant drug-drug interactions with the specialty medication.  Medication Allergies: The patient has no relevant allergies as it relates to their oral specialty medication  Review of Labs/Dose Adjustments: NO DOSE CHANGE - I reviewed the most recent note and labs and the patient will continue without any dose changes.  I sent refills as described below.    Drug: Promacta (eltrombopag)  Strength: 50 mg  Directions: Take 1 tablet by mouth every other day  Quantity: 15  Refills: 11  Pharmacy prescription sent to: [facility]    Meliza Ferguson PharmD, [facility]  Oncology Clinical Pharmacist   Phone: [phone]      11/20/2023  10:53 EST

## 2023-12-15 ENCOUNTER — SPECIALTY PHARMACY (OUTPATIENT)
Dept: ONCOLOGY | Facility: HOSPITAL | Age: 58
End: 2023-12-15
Payer: MEDICARE

## 2023-12-15 NOTE — PROGRESS NOTES
Specialty Pharmacy Refill Coordination Note     Mele is a 58 y.o. male contacted today regarding refills of Promacta 50 mg PO every OTHER day specialty medication(s).    Patient aware we will ship on Monday, 12/18, for delivery to home address on 12/19. He has  debit card on file.     Specialty medication(s) and dose(s) confirmed: yes    Refill Questions      Flowsheet Row Most Recent Value   Changes to allergies? No   Changes to medications? No   New conditions or infections since last clinic visit No   Unplanned office visit, urgent care, ED, or hospital admission in the last 4 weeks  No   How does patient/caregiver feel medication is working? Good   Financial problems or insurance changes  No   Since the previous refill, were any specialty medication doses or scheduled injections missed or delayed?  No   Does this patient require a clinical escalation to a pharmacist? No            Delivery Questions      Flowsheet Row Most Recent Value   Delivery method [employer]Ex   Delivery address verified with patient/caregiver? Yes   Delivery address Home   Number of medications in delivery 1   Medication(s) being filled and delivered Eltrombopag Olamine   Doses left of specialty medications about 7-10 days   Copay verified? Yes   Copay amount $0 copay with copay card and  debit card   Copay form of payment Credit/debit on file              Medication Adherence          Adherence tools used: patient uses a pill box to manage medications      Support network for adherence: family member                Follow-up: 30 day(s)     Tana Villasenor, Pharmacy Technician  Specialty Pharmacy Technician

## 2024-01-10 ENCOUNTER — SPECIALTY PHARMACY (OUTPATIENT)
Dept: ONCOLOGY | Facility: HOSPITAL | Age: 59
End: 2024-01-10
Payer: MEDICARE

## 2024-01-10 ENCOUNTER — TELEPHONE (OUTPATIENT)
Dept: NEUROLOGY | Facility: CLINIC | Age: 59
End: 2024-01-10
Payer: MEDICARE

## 2024-01-10 NOTE — PROGRESS NOTES
Specialty Pharmacy Refill Coordination Note     Mele is a 58 y.o. male contacted today regarding refills of Promacta 50 mg PO every OTHER day specialty medication(s).    Set up delivery for tomorrow. Obtained  debit card information for copay.    Specialty medication(s) and dose(s) confirmed: yes    Refill Questions      Flowsheet Row Most Recent Value   Changes to allergies? No   Changes to medications? No   New conditions or infections since last clinic visit No   Unplanned office visit, urgent care, ED, or hospital admission in the last 4 weeks  No   How does patient/caregiver feel medication is working? Very good   Financial problems or insurance changes  No   Since the previous refill, were any specialty medication doses or scheduled injections missed or delayed?  No   Does this patient require a clinical escalation to a pharmacist? No            Delivery Questions      Flowsheet Row Most Recent Value   Delivery method [employer]Ex   Delivery address verified with patient/caregiver? Yes   Delivery address Home   Number of medications in delivery 1   Medication(s) being filled and delivered Eltrombopag Olamine   Doses left of specialty medications around 4-5 doses left (takes QOD)   Copay verified? Yes   Copay amount $1809.60 (use  debit card)   Copay form of payment Credit/debit on file              Medication Adherence          Adherence tools used: patient uses a pill box to manage medications      Support network for adherence: family member                Follow-up: 30 day(s)     Tana Villasenor, Pharmacy Technician  Specialty Pharmacy Technician         5

## 2024-01-22 ENCOUNTER — TELEPHONE (OUTPATIENT)
Dept: NEUROLOGY | Facility: CLINIC | Age: 59
End: 2024-01-22
Payer: MEDICARE

## 2024-01-22 NOTE — TELEPHONE ENCOUNTER
Caller: Lexy Rossi    Relationship: Emergency Contact; SPOUSE    Best call back number: [phone]    What was the call regarding: PT'S WIFE CALLING TO CHECK THE STATUS OF THE DISABILITY PPW FROM [facility]. PT'S WIFE STATES PPW WAS DROPPED OFF ON 1/10/24. PPW IS DUE BY 2/4/24; NEEDING FAXED BACK TO THE FAX # LISTED ON THE PPW.    PT'S WIFE STATES SHE HAS SPOKEN W/ [facility] AND WAS ADVISED THAT THEY HAVE NOT YET RECEIVED THE PPW.    Do you require a callback: YES, PLEASE CALL BACK WITH PPW STATUS UPDATE.    PLEASE REVIEW AND ADVISE.

## 2024-01-23 NOTE — TELEPHONE ENCOUNTER
Spoke with patient and advised that I don't see any paperwork that we have received and wife will refax.

## 2024-01-24 NOTE — TELEPHONE ENCOUNTER
PT WIFE IS CALLING AGAIN. NEEDS P.W THAT SHE DROPPED OFF BACK FIRST OF JAN. SHE GAVE OUR OFFICE THE ORIGINAL P.W FOR OFFICE TO FILL OUT . [employer], PAID 25 DOLLARS .NOW OFFICE TOLD HER THEY DON'T KNOW WHERE IT IS AT AND DISABILITY OFFICE NEVER RECEIVED CALLED OFFICE TRANS  ANGELA     PLEASE ADVISE

## 2024-01-24 NOTE — TELEPHONE ENCOUNTER
Spoke with patient's wife and got everything sorted out.  Paperwork faxed to insurance company and sent wife a completed copy as well.

## 2024-01-24 NOTE — TELEPHONE ENCOUNTER
PATIENT SPOUSE CALLING AGAIN THIS MORNING    SHE IS VERY UPSET THAT THIS FOLDER SHE LEFT AT THE  WITH DISABILITY PPW, PAID THE $ 25.00 FEE AND NOW IS BEING TOLD THE OFFICE DOES NOT HAVE THE PPW.

## 2024-01-29 RX ORDER — CARVEDILOL 12.5 MG/1
TABLET ORAL
Qty: 180 TABLET | Refills: 0 | Status: SHIPPED | OUTPATIENT
Start: 2024-01-29

## 2024-01-29 NOTE — TELEPHONE ENCOUNTER
Rx Refill Note  Requested Prescriptions     Pending Prescriptions Disp Refills    carvedilol (COREG) 12.5 MG tablet [Pharmacy Med Name: CARVEDILOL 12.5 MG TABLET] 180 tablet 0     Sig: TAKE ONE TABLET BY MOUTH EVERY 12 HOURS      Last filled:10/31/23  Last office visit with prescribing clinician: 2/21/2023      Next office visit with prescribing clinician: 2/22/2024     Lila Sotomayor MA  01/29/24, 16:23 EST    Sent refill into pharmacy with refills

## 2024-02-05 ENCOUNTER — SPECIALTY PHARMACY (OUTPATIENT)
Dept: ONCOLOGY | Facility: HOSPITAL | Age: 59
End: 2024-02-05
Payer: MEDICARE

## 2024-02-05 NOTE — PROGRESS NOTES
Specialty Pharmacy Refill Coordination Note     Mele is a 58 y.o. male contacted today regarding refills of Promacta 50 mg PO every OTHER day specialty medication(s).    Delivery set up for home address for tomorrow- patient has a  debit card on file for copay burden.     Specialty medication(s) and dose(s) confirmed: yes    Refill Questions      Flowsheet Row Most Recent Value   Changes to allergies? No   Changes to medications? No   New conditions or infections since last clinic visit No   Unplanned office visit, urgent care, ED, or hospital admission in the last 4 weeks  No   How does patient/caregiver feel medication is working? Good   Financial problems or insurance changes  No   Since the previous refill, were any specialty medication doses or scheduled injections missed or delayed?  No   Does this patient require a clinical escalation to a pharmacist? No            Delivery Questions      Flowsheet Row Most Recent Value   Delivery method [employer]   Delivery address verified with patient/caregiver? Yes   Delivery address Home   Number of medications in delivery 1   Medication(s) being filled and delivered Eltrombopag Olamine   Doses left of specialty medications around 10 days or so   Copay verified? Yes   Copay amount $1312.46 - pt has  debit card on file for copay   Copay form of payment Credit/debit on file              Medication Adherence          Adherence tools used: patient uses a pill box to manage medications      Support network for adherence: family member                Follow-up: 30 day(s)     Tana Villasenor, Pharmacy Technician  Specialty Pharmacy Technician

## 2024-02-14 ENCOUNTER — OFFICE VISIT (OUTPATIENT)
Dept: ENDOCRINOLOGY | Facility: CLINIC | Age: 59
End: 2024-02-14
Payer: MEDICARE

## 2024-02-14 VITALS
SYSTOLIC BLOOD PRESSURE: 122 MMHG | WEIGHT: 242 LBS | HEART RATE: 70 BPM | DIASTOLIC BLOOD PRESSURE: 70 MMHG | BODY MASS INDEX: 32.78 KG/M2 | HEIGHT: 72 IN | OXYGEN SATURATION: 98 %

## 2024-02-14 DIAGNOSIS — E11.65 TYPE 2 DIABETES MELLITUS WITH HYPERGLYCEMIA, WITH LONG-TERM CURRENT USE OF INSULIN: Primary | ICD-10-CM

## 2024-02-14 DIAGNOSIS — Z79.4 TYPE 2 DIABETES MELLITUS WITH HYPERGLYCEMIA, WITH LONG-TERM CURRENT USE OF INSULIN: Primary | ICD-10-CM

## 2024-02-14 LAB
EXPIRATION DATE: ABNORMAL
EXPIRATION DATE: ABNORMAL
GLUCOSE BLDC GLUCOMTR-MCNC: 141 MG/DL (ref 70–130)
HBA1C MFR BLD: 10.4 % (ref 4.5–5.7)
Lab: ABNORMAL
Lab: ABNORMAL

## 2024-02-14 RX ORDER — REPAGLINIDE 2 MG/1
2 TABLET ORAL
Qty: 90 TABLET | Refills: 11 | Status: SHIPPED | OUTPATIENT
Start: 2024-02-14 | End: 2025-02-13

## 2024-02-14 RX ORDER — GABAPENTIN 300 MG/1
300 CAPSULE ORAL 3 TIMES DAILY
Qty: 270 CAPSULE | Refills: 1 | Status: SHIPPED | OUTPATIENT
Start: 2024-02-14

## 2024-02-14 RX ORDER — PEN NEEDLE, DIABETIC 32GX 5/32"
NEEDLE, DISPOSABLE MISCELLANEOUS
Qty: 100 EACH | Refills: 3 | Status: SHIPPED | OUTPATIENT
Start: 2024-02-14

## 2024-02-14 RX ORDER — DOXAZOSIN MESYLATE 4 MG/1
4 TABLET ORAL 2 TIMES DAILY
Qty: 180 TABLET | Refills: 1 | Status: SHIPPED | OUTPATIENT
Start: 2024-02-14

## 2024-02-14 RX ORDER — INSULIN GLARGINE 100 [IU]/ML
70 INJECTION, SOLUTION SUBCUTANEOUS NIGHTLY
Qty: 60 ML | Refills: 1 | Status: SHIPPED | OUTPATIENT
Start: 2024-02-14

## 2024-02-16 NOTE — TELEPHONE ENCOUNTER
Rx Refill Note  Requested Prescriptions     Pending Prescriptions Disp Refills    baclofen (LIORESAL) 10 MG tablet [Pharmacy Med Name: BACLOFEN 10MG TABLETS] 180 tablet 11     Sig: TAKE 2 TABLETS BY MOUTH THREE TIMES DAILY      Last filled:02/21/23  Last office visit with prescribing clinician: 2/21/2023      Next office visit with prescribing clinician: 2/22/2024     Lila Sotomayor MA  02/16/24, 16:34 EST

## 2024-02-19 RX ORDER — BACLOFEN 10 MG/1
20 TABLET ORAL 3 TIMES DAILY
Qty: 180 TABLET | Refills: 11 | Status: SHIPPED | OUTPATIENT
Start: 2024-02-19 | End: 2024-02-22 | Stop reason: SDUPTHER

## 2024-02-22 ENCOUNTER — OFFICE VISIT (OUTPATIENT)
Dept: NEUROLOGY | Facility: CLINIC | Age: 59
End: 2024-02-22
Payer: MEDICARE

## 2024-02-22 VITALS
BODY MASS INDEX: 32.78 KG/M2 | DIASTOLIC BLOOD PRESSURE: 72 MMHG | OXYGEN SATURATION: 97 % | WEIGHT: 242 LBS | HEIGHT: 72 IN | HEART RATE: 55 BPM | SYSTOLIC BLOOD PRESSURE: 118 MMHG

## 2024-02-22 DIAGNOSIS — I69.30 SEQUELAE, POST-STROKE: Primary | ICD-10-CM

## 2024-02-22 PROCEDURE — 1160F RVW MEDS BY RX/DR IN RCRD: CPT | Performed by: PSYCHIATRY & NEUROLOGY

## 2024-02-22 PROCEDURE — 3074F SYST BP LT 130 MM HG: CPT | Performed by: PSYCHIATRY & NEUROLOGY

## 2024-02-22 PROCEDURE — 3078F DIAST BP <80 MM HG: CPT | Performed by: PSYCHIATRY & NEUROLOGY

## 2024-02-22 PROCEDURE — 1159F MED LIST DOCD IN RCRD: CPT | Performed by: PSYCHIATRY & NEUROLOGY

## 2024-02-22 PROCEDURE — 99213 OFFICE O/P EST LOW 20 MIN: CPT | Performed by: PSYCHIATRY & NEUROLOGY

## 2024-02-22 RX ORDER — BACLOFEN 10 MG/1
20 TABLET ORAL 3 TIMES DAILY
Qty: 180 TABLET | Refills: 11 | Status: SHIPPED | OUTPATIENT
Start: 2024-02-22

## 2024-02-22 RX ORDER — ATORVASTATIN CALCIUM 20 MG/1
20 TABLET, FILM COATED ORAL DAILY
Qty: 30 TABLET | Refills: 11 | Status: SHIPPED | OUTPATIENT
Start: 2024-02-22 | End: 2025-02-21

## 2024-02-22 NOTE — PROGRESS NOTES
Subjective:    CC: Mele Rossi is seen today  for Sequelae, post-stroke       HPI:  Current visit-in the last year since he saw me patient denies any new strokelike symptoms.  His walking has improved since he was able to get a leg brace.  The stiffness in his muscles is also better since he increased his dose of baclofen to 20 mg 3 times daily which he is tolerating well without increased somnolence.  He continues to take aspirin 325 mg daily and Lipitor 20 mg daily.  His last lipid panel was as follows-, , LDL 29, HDL 30.  Platelet count was 139 and he is still on Promacta, A1c was 10.4.  Wife states that he was previously taking Trulicity which was helping with his diabetes but caused diarrhea therefore he was recently switched to Prandin (repaglinide).    Last visit-since his last visit 1 year ago patient has not had any new strokelike symptoms.  He continues to have right-sided spasticity with swelling of the right leg.  He had to get a brace for the right foot.  Has been taking baclofen 20 mg twice a day but thinks he needs a higher dose.  Also increased his gabapentin 300 mg to 3 times daily for the pain in his feet which is well controlled.  His last A1c was 7.2.  He has had thrombocytopenia for which she was started on Promacta.  His last platelet count was 116.    Last visit-at patient denies having any new strokelike symptoms.  He continues to have weakness and spasticity on the right side.  He states that he did not go for PT as there was no one to take him there.  He continues to take aspirin 325 mg daily and Lipitor 20 mg daily as well as baclofen 20 mg twice a day.  His last A1c was down to 7.  He has been taking gabapentin 300 mg nightly instead of 3 times a day.  He does have numbness and occasional pain in his feet as well as a feeling of being cold.  Denies any pain in the legs on walking or any color changes.  He has not been wearing his AFO for his foot drop as he was scared of  getting a skin tear given the swelling in his feet.    Last visit-patient states that he continues to have right-sided weakness/stiffness despite taking baclofen 20 mg twice a day.  He has stopped getting Botox because it was too expensive and it was not helping him.  Has also started physical therapy.  He continues to take aspirin 325 mg daily and Lipitor 40 mg daily.  His last lipid panel was as follows-TC 96, , LDL 28, HDL 33.  A1c was 7.3.    Last visit-  since the last visit patient got his second shot of Botox in March which has helped a little with spasticity.  He stopped getting physical therapy due to COVID but is planning to resume that soon.  He is also taking baclofen 20 mg twice a day.  He continues to take aspirin 325 mg daily and Lipitor 40 mg daily.  His blood pressure does fluctuate a lot and a lot of times it is extremely low in the mornings such as today (was in the 80s/60s).  His blood sugar also continues to run high and his last A1c was 8.3.  His endocrinologist did not make any medication changes but did tell him to make dietary modifications.  Patient states that he has gained 50 pounds since his stroke.     Last visit-since her last visit patient had his first set of Botox injections for his right-sided spasticity 1 month ago.  It helped a little however he is unsure whether he will continue getting it as he is having trouble getting it approved by insurance.  Also continues to get occupational therapy and his strength has improved slightly.  With regards to his diabetes his last A1c was 7.9 but his endocrinologist has been adjusting his medications.  He lowered his dose of gabapentin to 100 mg at night, he says that a dose of 300 mg was helping with his neuropathy and the muscle cramps but made him extremely off balance when he woke up to go to the bathroom.  Continues to take aspirin 325 mg daily along with Lipitor 40 mg daily.  Had a lot of blood work at Dr. Lopez office  recently.    Last visit-patient has now finished physical therapy but continues to have the right-sided weakness as well as spasticity for which he is taking baclofen.  He has also been getting pain and numbness in his feet that is exacerbated by prolonged standing or walking.  Dr. Benitez has ordered an EMG/NCS for him as well as OT for his hand.  He continues to take aspirin 325 mg daily in addition to Lipitor 40 mg daily.  His blood pressure is well controlled however his blood glucose remains poorly controlled and his last A1c was 8.1.    Last visit-since his last visit he continues to have right arm and leg weakness.  He has stopped getting home PT and does the exercises himself.  He stopped Plavix as advised but continues to take aspirin 325 mg daily and Lipitor 40 mg daily.  His last lipid panel was as follows-, , LDL 20, HDL 43.  His A1c has also improved and was 7.3.  His blood pressure is well controlled at home now.     Last visit-since his last visit he is doing about the same and has had no new strokelike symptoms.  He continues to have weakness in the right arm and leg.  He did see rehabilitation at [facility] for Botox injections for his spasticity however they are first trying to see if it will improve with baclofen.  He does have an improvement in his cramping with the baclofen however the stiffness in his arm has remained the same.  He also saw his endocrinologist who made changes to his medications.  He has not had a repeat A1c level yet, last level was 10.6.  His blood pressure now is much better controlled at home.  Has not smoked since his stroke.     Initial visit-  53-year-old male accompanied by his wife with a history of poorly controlled hypertension, diabetes mellitus type 2 presents with a hospital follow-up for stroke.  As per patient he had an episode of right arm and leg weakness in early August along with slurring of speech.  He initially went to an outside facility but  was sent back home after a CT head showed no abnormalities.  After that the symptoms worsened and he came back to our hospital where he had an MRI brain that showed an acute left pontine infarct.  His blood pressure was extremely high at the time ().  He had extensive testing including a CTA head and neck that did not show any intra-or extracranial stenosis,  a renal artery ultrasound that did not show any stenosis and an echocardiogram that showed an EF of over 70% with diastolic dysfunction but no PFO.  His telemetry recordings did not show any evidence of atrial fibrillation.  He had not been on any antiplatelet agent prior to this event and  was started on both aspirin 325 mg and Plavix 75 mg.  He was also started on Lipitor 80 mg.  Lipid panel was as follows-, , LDL 69, HDL 32.  His A1c was 10.6 and he was put on insulin in addition to his other medications.  He also has an upcoming appointment with endocrinology.  Patient was discharged to inpatient rehabilitation  for 4 weeks.  He is currently undergoing home health therapy but continues to have significant weakness in the right arm and leg.    The following portions of the patient's history were reviewed today and updated as of 12/27/2019  : allergies, current medications, past family history, past medical history, past social history, past surgical history and problem list  These document will be scanned to patient's chart.      Current Outpatient Medications:     amLODIPine (NORVASC) 10 MG tablet, Take 1 tablet by mouth Daily., Disp: 30 tablet, Rfl: 0    aspirin 325 MG tablet, Take 1 tablet by mouth Daily., Disp: , Rfl:     atorvastatin (Lipitor) 20 MG tablet, Take 1 tablet by mouth Daily., Disp: 30 tablet, Rfl: 11    baclofen (LIORESAL) 10 MG tablet, Take 2 tablets by mouth 3 (Three) Times a Day., Disp: 180 tablet, Rfl: 11    carvedilol (COREG) 12.5 MG tablet, TAKE ONE TABLET BY MOUTH EVERY 12 HOURS, Disp: 180 tablet, Rfl: 0     Continuous Blood Gluc Sensor (FreeStyle Berhane 2 Sensor) misc, Use 1 sensor Every 14 (Fourteen) Days., Disp: 6 each, Rfl: 3    doxazosin (CARDURA) 4 MG tablet, Take 1 tablet by mouth 2 (Two) Times a Day., Disp: 180 tablet, Rfl: 1    eltrombopag (PROMACTA) 50 MG tablet, Take 1 tablet by mouth Every Other Day., Disp: 15 tablet, Rfl: 11    empagliflozin (Jardiance) 25 MG tablet tablet, Take 1 tablet by mouth Daily., Disp: 90 tablet, Rfl: 1    gabapentin (NEURONTIN) 300 MG capsule, Take 1 capsule by mouth 3 (Three) Times a Day., Disp: 270 capsule, Rfl: 1    Insulin Glargine (Lantus SoloStar) 100 UNIT/ML injection pen, Inject 70 Units under the skin into the appropriate area as directed Every Night., Disp: 60 mL, Rfl: 1    Insulin Pen Needle (BD Pen Needle Claudia U/F) 32G X 4 MM misc, Use for injections daily as directed., Disp: 100 each, Rfl: 3    metFORMIN (GLUCOPHAGE) 500 MG tablet, Take 1 tablet by mouth Daily., Disp: 90 tablet, Rfl: 1    ondansetron (ZOFRAN) 8 MG tablet, Take 1 tablet by mouth 3 (Three) Times a Day As Needed for Nausea or Vomiting., Disp: 30 tablet, Rfl: 5    repaglinide (PRANDIN) 2 MG tablet, Take 1 tablet by mouth 3 (Three) Times a Day Before Meals., Disp: 90 tablet, Rfl: 11   Past Medical History:   Diagnosis Date    Acute ITP 4/1/2022    Diabetes     Fatigue     History of stroke     Hypertension     Obesity     body mass index 30+-obesity    Stroke (cerebrum)     Type 2 diabetes mellitus, uncontrolled       History reviewed. No pertinent surgical history.   Family History   Problem Relation Age of Onset    Diabetes Mother     Mental illness Mother     Heart disease Mother     Hypertension Father     Heart disease Father       Social History     Socioeconomic History    Marital status:    Tobacco Use    Smoking status: Former     Packs/day: 1.00     Years: 20.00     Additional pack years: 0.00     Total pack years: 20.00     Types: Cigarettes     Quit date: 11/20/2018     Years since  "quittin.2     Passive exposure: Never    Smokeless tobacco: Never    Tobacco comments:     States Smokes 1 cigeratte per month   Vaping Use    Vaping Use: Never used   Substance and Sexual Activity    Alcohol use: No    Drug use: No    Sexual activity: Defer     Review of Systems   All other systems reviewed and are negative.      Objective:    /72   Pulse 55   Ht 182.9 cm (72\")   Wt 110 kg (242 lb)   SpO2 97%   BMI 32.82 kg/m²     Neurology Exam:    General apperance: Obese    Mental status: Alert, awake and oriented to time place and person.    Recent and Remote memory: Intact.    Attention span and Concentration: Normal.     Language and Speech: Intact- No dysarthria.    Fluency, Naming , Repitition and Comprehension:  Intact    Cranial Nerves:   CN II: Visual fields are full. Intact. Fundi - Normal, No papillederma, Pupils - JAIDEN  CN III, IV and VI: Extraocular movements are intact. Normal saccades.   CN V: Facial sensation is intact.   CN VII: Muscles of facial expression reveal no asymmetry. Intact.   CN VIII: Hearing is intact. Whispered voice intact.   CN IX and X: Palate elevates symmetrically. Intact  CN XI: Shoulder shrug is intact.   CN XII: Tongue is midline without evidence of atrophy or fasciculation.     Ophthalmoscopic exam of optic disc-normal    Motor:  Right UE muscle strength 4/5 proximally, 3/5 distally. Spasticity present with increased tone in finger extensors    Left UE muscle strength 5/5. Normal tone.      Right LE muscle strength 4-/5, 2/5 PF/DF  Spastic    Left LE muscle strength 5/5. Normal tone.      Sensory: Normal light touch, vibration and pinprick sensation bilaterally.    DTRs: 2+ bilaterally in upper and lower extremities on left and 3+ on the right.  Pitting edema in right leg    Babinski: Negative bilaterally.    Co-ordination: Normal finger-to-nose, heel to shin B/L.    Rhomberg: Negative.    Gait: Gait has improved since his last visit since he is using a leg " brace.  Uses his cane    Cardiovascular: Regular rate and rhythm without murmur, gallop or rub.    Assessment and Plan:  1. Sequelae, post-stroke  Patient had a left pontine infarct most likely due to small vessel disease  I have asked him to continue taking aspirin 325 mg daily and Lipitor 20 mg daily for goal LDL of less than 70.  His LDL was 29.  Triglycerides remain elevated.  Maintain blood pressure less than 130/90.  His blood pressure is within normal limits now  Goal A1c of less than 7.  His last A1c was 10.4 and he has been started on a new medication.  I have also told him to make dietary modifications  He should continue baclofen 20 mg 3 times daily for poststroke spasticity  He has started to wear a leg brace.    2.  Diabetic neuropathy  He is currently taking gabapentin 300 mg 3 times daily  Currently has a foot ulcer that is improving      Return in about 1 year (around 2/22/2025).         Kalyani Willis MD

## 2024-02-23 ENCOUNTER — SPECIALTY PHARMACY (OUTPATIENT)
Dept: ONCOLOGY | Facility: HOSPITAL | Age: 59
End: 2024-02-23
Payer: MEDICARE

## 2024-02-23 NOTE — PROGRESS NOTES
Specialty Pharmacy Patient Management Program  Oncology 6-Month Clinical Assessment       Mele Rossi is a 58 y.o. male with chronic ITP seen today to assess adherence and side effects.    Reason for Outreach: Routine medication check-in .    Regimen: Promacta (eltrombopag) 50 mg tablet - Take 1 tablet by mouth every other day    Specialty Pharmacy Goal - ON TRACK   Goals Addressed Today        Specialty Pharmacy General Goal      Promacta  Clinical goal/therapeutic target: disease control, per the recent oncology clinic notes and labs.              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 Denies N/V.  Medication plan: Continue therapy with normal follow-up  Quality of Life Improvement Scale: 10-Significantly better  Administration: Patient is taking every day at the same time  and understands to avoid calcium-containing foods or antacids within 2 hours of taking Promacta.  He takes every morning on an empty stomach and waits 2-3 hours before eating .   Patient can self administer medications: Yes  Medication Follow-Up Plan: Refill coordination  Lab Review: The labs listed below have been reviewed. No dose adjustments are needed for the oral specialty medication(s) based on the labs.    Lab Results   Component Value Date    GLUCOSE 156 (H) 11/17/2023    CALCIUM 9.8 11/17/2023     11/17/2023    K 4.2 11/17/2023     CO2 25.4 11/17/2023     11/17/2023    BUN 19 11/17/2023    CREATININE 1.27 11/17/2023    EGFRIFNONA 62 08/12/2018    BCR 15.0 11/17/2023    ANIONGAP 11.6 11/17/2023     Lab Results   Component Value Date    WBC 7.62 11/17/2023    RBC 5.61 11/17/2023    HGB 16.9 11/17/2023    HCT 48.5 11/17/2023    MCV 86.5 11/17/2023    MCH 30.1 11/17/2023    MCHC 34.8 11/17/2023    RDW 13.6 11/17/2023    RDWSD 41.9 11/17/2023    MPV 12.2 (H) 11/17/2023     (L) 11/17/2023    NEUTRORELPCT 71.8 11/17/2023    LYMPHORELPCT 17.7 (L) 11/17/2023    MONORELPCT 7.1 11/17/2023    EOSRELPCT 1.7 11/17/2023    BASORELPCT 0.9 11/17/2023    AUTOIGPER 0.8 (H) 11/17/2023    NEUTROABS 5.47 11/17/2023    LYMPHSABS 1.35 11/17/2023    MONOSABS 0.54 11/17/2023    EOSABS 0.13 11/17/2023    BASOSABS 0.07 11/17/2023    AUTOIGNUM 0.06 (H) 11/17/2023    NRBC 0.0 11/17/2023     Drug-drug interactions  Completed medication reconciliation today to assess for drug interactions. Patient has had the following changes to medication list: since the last clinical assessment, the patient's endocrinologist started him on repaglinide due to post prandial hyperglycemia; patient's chart has been updated to reflect changes.   Assessed medication list for interactions,   Eltrombopag can increase the concentration of atorvastatin.  I assessed for statin toxicity and the patient denies significant myalgia (has some at baseline) or dark urine.  Eltrombopag can increase the concentration of repaglinide. His endocrinologist is monitoring him for side effects of that drug  Advised patient to call the clinic if any new medications are started so we can assess for drug-drug interactions.  Drug-food interactions discussed: avoiding calcium within 2 hours before or 4 hours after taking Promacta  Vaccines are coordinated by the patient's oncologist and primary care provider.    Medications   Medicines reviewed by Virginie Ferguson RPH on 2/23/2024 at 10:47 AM  Prior to  Admission medications    Medication Sig Start Date End Date Taking? Authorizing Provider   amLODIPine (NORVASC) 10 MG tablet Take 1 tablet by mouth Daily. 1/12/22   Kalyani Willis MD   aspirin 325 MG tablet Take 1 tablet by mouth Daily. 8/16/18   Karla Mann APRN   atorvastatin (Lipitor) 20 MG tablet Take 1 tablet by mouth Daily. 2/22/24 2/21/25  Kalyani Willis MD   baclofen (LIORESAL) 10 MG tablet Take 2 tablets by mouth 3 (Three) Times a Day. 2/22/24   Kalyani Willis MD   carvedilol (COREG) 12.5 MG tablet TAKE ONE TABLET BY MOUTH EVERY 12 HOURS 1/29/24   Kalyani Willis MD   Continuous Blood Gluc Sensor (FreeStyle Berhane 2 Sensor) misc Use 1 sensor Every 14 (Fourteen) Days. 2/15/24   Zeke Lopez MD   doxazosin (CARDURA) 4 MG tablet Take 1 tablet by mouth 2 (Two) Times a Day. 2/14/24   Zeke Lopez MD   eltrombopag (PROMACTA) 50 MG tablet Take 1 tablet by mouth Every Other Day. 11/20/23   Amadou Weller MD   empagliflozin (Jardiance) 25 MG tablet tablet Take 1 tablet by mouth Daily. 2/14/24   Zeke Lopez MD   gabapentin (NEURONTIN) 300 MG capsule Take 1 capsule by mouth 3 (Three) Times a Day. 2/14/24   Zeke Lopez MD   Insulin Glargine (Lantus SoloStar) 100 UNIT/ML injection pen Inject 70 Units under the skin into the appropriate area as directed Every Night. 2/14/24   Zeke Lopez MD   Insulin Pen Needle (BD Pen Needle Claudia U/F) 32G X 4 MM misc Use for injections daily as directed. 2/14/24   Zeke Lopez MD   metFORMIN (GLUCOPHAGE) 500 MG tablet Take 1 tablet by mouth Daily. 2/14/24   Zeke Lopez MD   ondansetron (ZOFRAN) 8 MG tablet Take 1 tablet by mouth 3 (Three) Times a Day As Needed for Nausea or Vomiting. 4/8/22   Amadou Weller MD   repaglinide (PRANDIN) 2 MG tablet Take 1 tablet by mouth 3 (Three) Times a Day Before Meals. 2/14/24 2/13/25  Zeke Lopez MD   atorvastatin (Lipitor) 20 MG  tablet Take 1 tablet by mouth Daily.  Patient taking differently: Take 1 tablet by mouth Every Night. 2/21/23 2/22/24  Kalyani Willis MD   baclofen (LIORESAL) 10 MG tablet Take 2 tablets by mouth 3 (Three) Times a Day. 2/21/23 2/19/24  Kalyani Willis MD   baclofen (LIORESAL) 10 MG tablet TAKE 2 TABLETS BY MOUTH THREE TIMES DAILY 2/19/24 2/22/24  Kalyani Willis MD   carvedilol (COREG) 12.5 MG tablet TAKE ONE TABLET BY MOUTH EVERY 12 HOURS 10/31/23 1/29/24  Kalyani Willis MD   Continuous Blood Gluc Sensor (FreeStyle Berhane 2 Sensor) misc Use 1 sensor Every 14 (Fourteen) Days. 6/16/23 2/15/24  Junie Tadeo APRN   doxazosin (CARDURA) 4 MG tablet Take 1 tablet by mouth 2 (Two) Times a Day. 4/7/23 2/14/24  Zeke Lopez MD   Dulaglutide (Trulicity) 0.75 MG/0.5ML solution pen-injector Inject 0.75 mg under the skin into the appropriate area as directed 1 (One) Time Per Week.  Patient taking differently: Inject 0.75 mg under the skin into the appropriate area as directed 1 (One) Time Per Week. Using once every 2 weeks d/t side effects.  F/U with endocrinology soon. 4/7/23 2/14/24  Zeke Lopez MD   empagliflozin (Jardiance) 25 MG tablet tablet Take 1 tablet by mouth Daily. 4/7/23 2/14/24  Zeke Lopez MD   gabapentin (NEURONTIN) 300 MG capsule Take 1 capsule by mouth 3 (Three) Times a Day. 4/7/23 2/14/24  Zeke Lopez MD   Insulin Glargine (Lantus SoloStar) 100 UNIT/ML injection pen Inject 70 Units under the skin into the appropriate area as directed Every Night. 10/23/23 2/14/24  Zeke Lopez MD   Insulin Pen Needle (BD Pen Needle Claudia U/F) 32G X 4 MM misc Use for injections daily as directed. 10/9/23 2/14/24  Zeke Lopez MD   metFORMIN (GLUCOPHAGE) 500 MG tablet Take 1 tablet by mouth Daily. 4/7/23 2/14/24  Zeke Lopez MD       Allergies  Known allergies and reactions were discussed with the patient. The patient's chart has been  reviewed for allergy information and updated as necessary.   Allergies   Allergen Reactions    Lisinopril Dizziness     syncope         Allergies reviewed by Virginie Ferguson [facility] on 2/23/2024 at 10:47 AM    Hospitalizations and Urgent Care Visits Since Last Assessment:  Unplanned hospitalizations or inpatient admissions: 0  ED Visits: 0  Urgent Office Visits: 0    Adherence Assessment:  Adherence Questions  Linked Medication(s) Assessed: Eltrombopag Olamine  On average, how many doses/injections does the patient miss per month?: 0  What are the identified reasons for non-adherence or missed doses? : no problems identified  What is the estimated medication adherence level?: % (%)  Based on the patient/caregiver response and refill history, does this patient require an MTP to track adherence improvements?: no    Quality of Life Assessment:  Quality of Life Assessment  Quality of Life Improvement Scale: 10-Significantly better  -- Quality of Life: 10/10    Financial Assessment:  Medication availability/affordability: Patient has had no issues obtaining medication from pharmacy.    Attestation:  I attest the patient was actively involved in and has agreed to the above plan of care. If the prescribed therapy is at any point deemed not appropriate based on the current or future assessments, a consultation will be initiated with the patient's specialty care provider to determine the best course of action. The revised plan of therapy will be documented along with any required assessments and/or additional patient education provided.       All questions addressed and patient had no additional concerns. Patient has pharmacy contact information.    Meliza Ferguson, PharmD, [facility]  Oncology Clinical Pharmacist   Phone: [phone]    2/23/2024  10:55 EST

## 2024-02-27 ENCOUNTER — LAB (OUTPATIENT)
Dept: LAB | Facility: HOSPITAL | Age: 59
End: 2024-02-27
Payer: MEDICARE

## 2024-02-27 DIAGNOSIS — D69.3 ACUTE ITP: ICD-10-CM

## 2024-02-27 LAB — PLATELET # BLD AUTO: 137 10*3/MM3 (ref 140–450)

## 2024-02-27 PROCEDURE — 85049 AUTOMATED PLATELET COUNT: CPT

## 2024-02-27 PROCEDURE — 36415 COLL VENOUS BLD VENIPUNCTURE: CPT

## 2024-02-29 ENCOUNTER — SPECIALTY PHARMACY (OUTPATIENT)
Dept: ONCOLOGY | Facility: HOSPITAL | Age: 59
End: 2024-02-29
Payer: MEDICARE

## 2024-02-29 NOTE — PROGRESS NOTES
Specialty Pharmacy Refill Coordination Emilee     Mele is a 59 y.o. male contacted today regarding refills of Promacta 50 mg PO every OTHER day specialty medication(s).    Shipping today for delivery to home address tomorrow; patient is aware.    Specialty medication(s) and dose(s) confirmed: yes    Refill Questions      Flowsheet Row Most Recent Value   Changes to allergies? No   Changes to medications? No   New conditions or infections since last clinic visit No   Unplanned office visit, urgent care, ED, or hospital admission in the last 4 weeks  No   How does patient/caregiver feel medication is working? Very good   Financial problems or insurance changes  No   Since the previous refill, were any specialty medication doses or scheduled injections missed or delayed?  No   Does this patient require a clinical escalation to a pharmacist? No            Delivery Questions      Flowsheet Row Most Recent Value   Delivery method [employer]   Delivery address verified with patient/caregiver? Yes   Delivery address Home   Number of medications in delivery 1   Medication(s) being filled and delivered Eltrombopag Olamine   Doses left of specialty medications around 5-6 days per pt   Copay verified? Yes   Copay amount $0 copay verified   Copay form of payment No copayment ($0)              Medication Adherence          Adherence tools used: patient uses a pill box to manage medications      Support network for adherence: family member                Follow-up: 30 day(s)     Tana Villasenor, Pharmacy Technician  Specialty Pharmacy Technician

## 2024-03-07 ENCOUNTER — OFFICE VISIT (OUTPATIENT)
Dept: ONCOLOGY | Facility: CLINIC | Age: 59
End: 2024-03-07
Payer: MEDICARE

## 2024-03-07 ENCOUNTER — SPECIALTY PHARMACY (OUTPATIENT)
Dept: ONCOLOGY | Facility: HOSPITAL | Age: 59
End: 2024-03-07
Payer: MEDICARE

## 2024-03-07 VITALS
WEIGHT: 241 LBS | DIASTOLIC BLOOD PRESSURE: 66 MMHG | HEART RATE: 55 BPM | BODY MASS INDEX: 32.64 KG/M2 | TEMPERATURE: 97.3 F | SYSTOLIC BLOOD PRESSURE: 118 MMHG | RESPIRATION RATE: 16 BRPM | HEIGHT: 72 IN | OXYGEN SATURATION: 95 %

## 2024-03-07 DIAGNOSIS — D69.3 CHRONIC ITP (IDIOPATHIC THROMBOCYTOPENIA): Primary | ICD-10-CM

## 2024-03-07 PROCEDURE — 1126F AMNT PAIN NOTED NONE PRSNT: CPT | Performed by: NURSE PRACTITIONER

## 2024-03-07 PROCEDURE — 3078F DIAST BP <80 MM HG: CPT | Performed by: NURSE PRACTITIONER

## 2024-03-07 PROCEDURE — 3074F SYST BP LT 130 MM HG: CPT | Performed by: NURSE PRACTITIONER

## 2024-03-07 PROCEDURE — 99213 OFFICE O/P EST LOW 20 MIN: CPT | Performed by: NURSE PRACTITIONER

## 2024-03-07 PROCEDURE — 1160F RVW MEDS BY RX/DR IN RCRD: CPT | Performed by: NURSE PRACTITIONER

## 2024-03-07 PROCEDURE — 1159F MED LIST DOCD IN RCRD: CPT | Performed by: NURSE PRACTITIONER

## 2024-03-07 NOTE — PROGRESS NOTES
Problem list:  1.  Chronic ITP  2.  Diabetes  3.  Stroke  A.  Right sided weakness with right leg brace      REASON FOR VISIT: ITP    HISTORY OF PRESENT ILLNESS:   59 y.o.  male presents today for follow-up of his ITP.  He continues to do reasonably well.  He continues on Promacta.  He is tolerating well.  Denies any bruising or bleeding.  He has a history of stroke and continues to have right-sided weakness.  He has a brace on his right leg.  He continues to use a cane for short distances and wheelchair for long distances.  No major changes since last visit.      Past medical history, social history and family history was reviewed 03/07/24 and unchanged from prior visit.    Review of Systems:    Review of Systems   Constitutional:  Positive for fatigue.   HENT:  Negative.     Eyes: Negative.    Respiratory: Negative.     Cardiovascular: Negative.    Gastrointestinal: Negative.    Endocrine: Negative.    Genitourinary: Negative.     Skin: Negative.    Neurological:  Positive for extremity weakness.   Hematological: Negative.    Psychiatric/Behavioral: Negative.              Medications:        Current Outpatient Medications:     amLODIPine (NORVASC) 10 MG tablet, Take 1 tablet by mouth Daily., Disp: 30 tablet, Rfl: 0    aspirin 325 MG tablet, Take 1 tablet by mouth Daily., Disp: , Rfl:     atorvastatin (Lipitor) 20 MG tablet, Take 1 tablet by mouth Daily., Disp: 30 tablet, Rfl: 11    baclofen (LIORESAL) 10 MG tablet, Take 2 tablets by mouth 3 (Three) Times a Day., Disp: 180 tablet, Rfl: 11    carvedilol (COREG) 12.5 MG tablet, TAKE ONE TABLET BY MOUTH EVERY 12 HOURS, Disp: 180 tablet, Rfl: 0    Continuous Blood Gluc Sensor (FreeStyle Berhane 2 Sensor) misc, Use 1 sensor Every 14 (Fourteen) Days., Disp: 6 each, Rfl: 3    doxazosin (CARDURA) 4 MG tablet, Take 1 tablet by mouth 2 (Two) Times a Day., Disp: 180 tablet, Rfl: 1    eltrombopag (PROMACTA) 50 MG tablet, Take 1 tablet by mouth Every Other Day., Disp: 15 tablet,  "Rfl: 11    empagliflozin (Jardiance) 25 MG tablet tablet, Take 1 tablet by mouth Daily., Disp: 90 tablet, Rfl: 1    gabapentin (NEURONTIN) 300 MG capsule, Take 1 capsule by mouth 3 (Three) Times a Day., Disp: 270 capsule, Rfl: 1    Insulin Glargine (Lantus SoloStar) 100 UNIT/ML injection pen, Inject 70 Units under the skin into the appropriate area as directed Every Night., Disp: 60 mL, Rfl: 1    Insulin Pen Needle (BD Pen Needle Claudia U/F) 32G X 4 MM misc, Use for injections daily as directed., Disp: 100 each, Rfl: 3    metFORMIN (GLUCOPHAGE) 500 MG tablet, Take 1 tablet by mouth Daily., Disp: 90 tablet, Rfl: 1    ondansetron (ZOFRAN) 8 MG tablet, Take 1 tablet by mouth 3 (Three) Times a Day As Needed for Nausea or Vomiting., Disp: 30 tablet, Rfl: 5    repaglinide (PRANDIN) 2 MG tablet, Take 1 tablet by mouth 3 (Three) Times a Day Before Meals., Disp: 90 tablet, Rfl: 11    Pain Medications               aspirin 325 MG tablet Take 1 tablet by mouth Daily.    baclofen (LIORESAL) 10 MG tablet Take 2 tablets by mouth 3 (Three) Times a Day.    gabapentin (NEURONTIN) 300 MG capsule Take 1 capsule by mouth 3 (Three) Times a Day.               ALLERGIES:    Allergies   Allergen Reactions    Lisinopril Dizziness     syncope         Physical Exam    VITAL SIGNS:  /66   Pulse 55   Temp 97.3 °F (36.3 °C)   Resp 16   Ht 182.9 cm (72\")   Wt 109 kg (241 lb)   SpO2 95%   BMI 32.69 kg/m²                 Wt Readings from Last 3 Encounters:   03/07/24 109 kg (241 lb)   02/22/24 110 kg (242 lb)   02/14/24 110 kg (242 lb)       Body mass index is 32.69 kg/m². Body surface area is 2.3 meters squared.    Pain Score    03/07/24 1326   PainSc: 0-No pain           Performance Status: 1    General: well appearing male in no acute distress  Neuro: alert and oriented  HEENT: sclera anicteric, oropharynx clear  Lymphatics: no cervical, supraclavicular, or axillary adenopathy  Cardiovascular: regular rate and rhythm, no " murmurs  Lungs: clear to auscultation bilaterally  Abdomen: soft, nontender, nondistended.    Extremities: no lower extremity edema.  Brace on right leg.  Paralysis noted to right upper extremity  Skin: no rashes, lesions, bruising, or petechiae  Psych: mood and affect appropriate          RECENT LABS:    Lab Results   Component Value Date    HGB 16.9 11/17/2023    HCT 48.5 11/17/2023    MCV 86.5 11/17/2023     (L) 02/27/2024    WBC 7.62 11/17/2023    NEUTROABS 5.47 11/17/2023    LYMPHSABS 1.35 11/17/2023    MONOSABS 0.54 11/17/2023    EOSABS 0.13 11/17/2023    BASOSABS 0.07 11/17/2023       Lab Results   Component Value Date    GLUCOSE 156 (H) 11/17/2023    BUN 19 11/17/2023    CREATININE 1.27 11/17/2023     11/17/2023    K 4.2 11/17/2023     11/17/2023    CO2 25.4 11/17/2023    CALCIUM 9.8 11/17/2023    PROTEINTOT 7.7 11/17/2023    ALBUMIN 4.5 11/17/2023    BILITOT 0.7 11/17/2023    ALKPHOS 115 11/17/2023    AST 19 11/17/2023    ALT 12 11/17/2023       Lab Results   Component Value Date     (L) 02/27/2024     (L) 11/17/2023     (L) 08/25/2023     (L) 05/22/2023     (L) 02/03/2023       Lab Results   Component Value Date    PSA 8.560 (H) 11/17/2023    PSA 6.290 (H) 08/11/2022    PSA 5.6 (H) 04/07/2022    PSA 6.62 (H) 03/01/2022    PSA 4.46 (H) 12/21/2018         Assessment/Plan    1.  Chronic ITP.  Platelets remain stable on Promacta at 50 mg every other day.  We will continue with current Promacta dosing.  We discussed platelets from 2/27/2024 showed improved at 137,000.  We will plan to continue our current plan of every 3-month CBC with 6-month office visits.    2.  Elevated PSA.  Followed by urology.    3.  History of stroke.  Continue aspirin since his platelets remain over 50,000.    4.  Diabetes.  Continue to follow-up with endocrinology.      CRYS Muniz  [facility] Hematology and Oncology    Return in (Approximately): 6 months    Orders  Placed This Encounter   Procedures    CBC & Differential       3/7/2024

## 2024-03-25 ENCOUNTER — SPECIALTY PHARMACY (OUTPATIENT)
Dept: ONCOLOGY | Facility: HOSPITAL | Age: 59
End: 2024-03-25
Payer: MEDICARE

## 2024-03-25 NOTE — PROGRESS NOTES
Specialty Pharmacy Refill Coordination Note     Mele is a 59 y.o. male contacted today regarding refills of Promacta 50 mg PO every OTHER day specialty medication(s).    Shipping out today for delivery to home address on file tomorrow, 3/26/24.    Specialty medication(s) and dose(s) confirmed: yes    Refill Questions      Flowsheet Row Most Recent Value   Changes to allergies? No   Changes to medications? No   New conditions or infections since last clinic visit No   How does patient/caregiver feel medication is working? Good   Financial problems or insurance changes  No   Since the previous refill, were any specialty medication doses or scheduled injections missed or delayed?  No   Does this patient require a clinical escalation to a pharmacist? No            Delivery Questions      Flowsheet Row Most Recent Value   Delivery method [employer]   Delivery address verified with patient/caregiver? Yes   Delivery address Home   Number of medications in delivery 1   Medication(s) being filled and delivered Eltrombopag Olamine   Doses left of specialty medications around 7-8 days per pt   Copay verified? Yes   Copay amount $0 copay verified   Copay form of payment No copayment ($0)              Medication Adherence    Adherence tools used: patient uses a pill box to manage medications  Support network for adherence: family member          Follow-up: 30 day(s)     Tana Villasenor, Pharmacy Technician  Specialty Pharmacy Technician

## 2024-04-19 ENCOUNTER — SPECIALTY PHARMACY (OUTPATIENT)
Dept: ONCOLOGY | Facility: HOSPITAL | Age: 59
End: 2024-04-19
Payer: MEDICARE

## 2024-04-19 NOTE — PROGRESS NOTES
Specialty Pharmacy Refill Coordination Note     Mele is a 59 y.o. male contacted today regarding refills of  promacta 50mg PO every other day of specialty medication(s).    Scheduled delivery for 4/23/24.    Specialty medication(s) and dose(s) confirmed: yes    Refill Questions      Flowsheet Row Most Recent Value   Changes to allergies? No   Changes to medications? No   New conditions or infections since last clinic visit No   Unplanned office visit, urgent care, ED, or hospital admission in the last 4 weeks  No   How does patient/caregiver feel medication is working? Good   Financial problems or insurance changes  No   Since the previous refill, were any specialty medication doses or scheduled injections missed or delayed?  No   Does this patient require a clinical escalation to a pharmacist? No            Delivery Questions      Flowsheet Row Most Recent Value   Delivery method [employer]   Delivery address verified with patient/caregiver? Yes   Delivery address Home   Number of medications in delivery 1   Medication(s) being filled and delivered Eltrombopag Olamine   Doses left of specialty medications 5 days left   Copay verified? Yes   Copay amount $0   Copay form of payment No copayment ($0)              Medication Adherence    Adherence tools used: patient uses a pill box to manage medications  Support network for adherence: family member          Follow-up: 30 day(s)     Bridgett Weems, Pharmacy Technician  Specialty Pharmacy Technician

## 2024-04-29 RX ORDER — CARVEDILOL 12.5 MG/1
TABLET ORAL
Qty: 180 TABLET | Refills: 3 | Status: SHIPPED | OUTPATIENT
Start: 2024-04-29

## 2024-04-29 NOTE — TELEPHONE ENCOUNTER
Rx Refill Note  Requested Prescriptions     Pending Prescriptions Disp Refills    carvedilol (COREG) 12.5 MG tablet [Pharmacy Med Name: CARVEDILOL 12.5 MG TABLET] 180 tablet 0     Sig: TAKE ONE TABLET BY MOUTH EVERY 12 HOURS      Last filled:01/29/2024  Last office visit with prescribing clinician: 2/22/2024      Next office visit with prescribing clinician: 2/24/2025     Lila Sotomayor MA  04/29/24, 14:20 EDT  Sent the Rx to patients pharmacy with 3 refills

## 2024-05-16 ENCOUNTER — SPECIALTY PHARMACY (OUTPATIENT)
Dept: ONCOLOGY | Facility: HOSPITAL | Age: 59
End: 2024-05-16
Payer: MEDICARE

## 2024-05-16 NOTE — PROGRESS NOTES
Specialty Pharmacy Refill Coordination Note     Mele is a 59 y.o. male contacted today regarding refills of  eltrombopag 50mg PO QOD  specialty medication(s).    Reviewed and verified with patient: yes  Specialty medication(s) and dose(s) confirmed: yes    Refill Questions      Flowsheet Row Most Recent Value   Changes to allergies? No   Changes to medications? No   New conditions or infections since last clinic visit No   Unplanned office visit, urgent care, ED, or hospital admission in the last 4 weeks  No   How does patient/caregiver feel medication is working? Good   Financial problems or insurance changes  No   Since the previous refill, were any specialty medication doses or scheduled injections missed or delayed?  No   Does this patient require a clinical escalation to a pharmacist? No            Delivery Questions      Flowsheet Row Most Recent Value   Delivery method [employer]   Delivery address verified with patient/caregiver? Yes   Delivery address Home   Number of medications in delivery 1   Medication(s) being filled and delivered Eltrombopag Olamine   Doses left of specialty medications about 5 doses   Copay verified? Yes   Copay amount 0   Copay form of payment No copayment ($0)              Medication Adherence    Adherence tools used: patient uses a pill box to manage medications  Support network for adherence: family member          Follow-up: 3 week(s)     [place], Pharmacy Technician  Specialty Pharmacy Technician

## 2024-06-04 ENCOUNTER — LAB (OUTPATIENT)
Dept: LAB | Facility: HOSPITAL | Age: 59
End: 2024-06-04
Payer: MEDICARE

## 2024-06-04 DIAGNOSIS — D69.3 ACUTE ITP: ICD-10-CM

## 2024-06-04 LAB — PLATELET # BLD AUTO: 116 10*3/MM3 (ref 140–450)

## 2024-06-04 PROCEDURE — 36415 COLL VENOUS BLD VENIPUNCTURE: CPT

## 2024-06-04 PROCEDURE — 85049 AUTOMATED PLATELET COUNT: CPT

## 2024-06-13 ENCOUNTER — SPECIALTY PHARMACY (OUTPATIENT)
Facility: HOSPITAL | Age: 59
End: 2024-06-13
Payer: MEDICARE

## 2024-06-13 NOTE — PROGRESS NOTES
Specialty Pharmacy Refill Coordination Note     Mele is a 59 y.o. male contacted today regarding refills of  Promacta 50mg PO every other day of specialty medication(s).    Reviewed and verified with patient: YES    Specialty medication(s) and dose(s) confirmed: yes    Refill Questions      Flowsheet Row Most Recent Value   Changes to allergies? No   Changes to medications? No   New conditions or infections since last clinic visit No   Unplanned office visit, urgent care, ED, or hospital admission in the last 4 weeks  No   How does patient/caregiver feel medication is working? Good   Financial problems or insurance changes  No   Since the previous refill, were any specialty medication doses or scheduled injections missed or delayed?  No   Does this patient require a clinical escalation to a pharmacist? No            Delivery Questions      Flowsheet Row Most Recent Value   Delivery method [employer]   Delivery address verified with patient/caregiver? Yes   Delivery address Home   Number of medications in delivery 1   Medication(s) being filled and delivered Eltrombopag Olamine   Doses left of specialty medications 4 days left   Copay verified? Yes   Copay amount $0   Copay form of payment No copayment ($0)              Medication Adherence    Adherence tools used: patient uses a pill box to manage medications  Support network for adherence: family member          Follow-up: 30 day(s)     Bridgett Weems, Pharmacy Technician  Specialty Pharmacy Technician

## 2024-07-08 ENCOUNTER — SPECIALTY PHARMACY (OUTPATIENT)
Facility: HOSPITAL | Age: 59
End: 2024-07-08
Payer: MEDICARE

## 2024-07-08 NOTE — PROGRESS NOTES
Specialty Pharmacy Refill Coordination Note     Mele is a 59 y.o. male contacted today regarding refills of  Promacta 50mg PO every other day of specialty medication(s).    Reviewed and verified with patient: YES     Specialty medication(s) and dose(s) confirmed: yes    Refill Questions      Flowsheet Row Most Recent Value   Changes to allergies? No   Changes to medications? No   New conditions or infections since last clinic visit No   Unplanned office visit, urgent care, ED, or hospital admission in the last 4 weeks  No   How does patient/caregiver feel medication is working? Good   Financial problems or insurance changes  No   Since the previous refill, were any specialty medication doses or scheduled injections missed or delayed?  No   Does this patient require a clinical escalation to a pharmacist? No            Delivery Questions      Flowsheet Row Most Recent Value   Delivery method [employer]Ex   Delivery address verified with patient/caregiver? Yes   Delivery address Home   Number of medications in delivery 1   Medication(s) being filled and delivered Eltrombopag Olamine   Doses left of specialty medications 4 doses left   Copay verified? Yes   Copay amount $0   Copay form of payment No copayment ($0)   Ship Date 7/9   Delivery Date 7/10   Signature Required No              Medication Adherence    Adherence tools used: patient uses a pill box to manage medications  Support network for adherence: family member          Follow-up: 30 day(s)     Bridgett Weems, Pharmacy Technician  Specialty Pharmacy Technician

## 2024-07-31 ENCOUNTER — SPECIALTY PHARMACY (OUTPATIENT)
Facility: HOSPITAL | Age: 59
End: 2024-07-31
Payer: MEDICARE

## 2024-07-31 NOTE — PROGRESS NOTES
Specialty Pharmacy Refill Coordination Note     Mele is a 59 y.o. male contacted today regarding refills of  promacta 50mg PO every other day of specialty medication(s).    Reviewed and verified with patient: yes     Specialty medication(s) and dose(s) confirmed: yes    Refill Questions      Flowsheet Row Most Recent Value   Changes to allergies? No   Changes to medications? No   New conditions or infections since last clinic visit No   Unplanned office visit, urgent care, ED, or hospital admission in the last 4 weeks  No   How does patient/caregiver feel medication is working? Good   Financial problems or insurance changes  No   Since the previous refill, were any specialty medication doses or scheduled injections missed or delayed?  No   Does this patient require a clinical escalation to a pharmacist? No            Delivery Questions      Flowsheet Row Most Recent Value   Delivery method [employer]Ex   Delivery address verified with patient/caregiver? Yes   Delivery address Home   Number of medications in delivery 1   Medication(s) being filled and delivered Eltrombopag Olamine   Doses left of specialty medications 3 days   Copay verified? Yes   Copay amount $0   Copay form of payment No copayment ($0)   Ship Date 7/31   Delivery Date 8/1   Signature Required No              Medication Adherence    Adherence tools used: patient uses a pill box to manage medications  Support network for adherence: family member          Follow-up: 30 day(s)     Bridgett Weems, Pharmacy Technician  Specialty Pharmacy Technician

## 2024-08-13 ENCOUNTER — SPECIALTY PHARMACY (OUTPATIENT)
Dept: ONCOLOGY | Facility: HOSPITAL | Age: 59
End: 2024-08-13
Payer: MEDICARE

## 2024-08-13 NOTE — PROGRESS NOTES
Specialty Pharmacy Patient Management Program  Oncology 6-Month Clinical Assessment       Mele Rossi is a 59 y.o. male with chronic ITP seen today to assess adherence and side effects.    Reason for Outreach: Routine medication check-in .    Regimen: Promacta (eltrombopag) 50 mg - 1 tablet by mouth every other day on an empty stomach.    Specialty Pharmacy Goal - ON TRACK   Goals Addressed Today        Specialty Pharmacy General Goal      Promacta  Clinical goal/therapeutic target: disease control, per the recent oncology clinic notes and labs.              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  Medication plan: Continue therapy with normal follow-up  Quality of Life Improvement Scale: 10-Significantly better  Administration: on an empty stomach  and as prescribed .   Patient can self administer medications: Yes  Medication Follow-Up Plan: Refill coordination  Lab Review: The labs listed below have been reviewed. No dose adjustments are needed for the oral specialty medication(s) based on the labs.    Lab Results   Component Value Date    GLUCOSE 156 (H) 11/17/2023    CALCIUM 9.8 11/17/2023     11/17/2023    K 4.2 11/17/2023    CO2 25.4 11/17/2023     11/17/2023    BUN 19 11/17/2023    CREATININE 1.27 11/17/2023    EGFRIFNONA 62  08/12/2018    BCR 15.0 11/17/2023    ANIONGAP 11.6 11/17/2023     Lab Results   Component Value Date    WBC 7.62 11/17/2023    RBC 5.61 11/17/2023    HGB 16.9 11/17/2023    HCT 48.5 11/17/2023    MCV 86.5 11/17/2023    MCH 30.1 11/17/2023    MCHC 34.8 11/17/2023    RDW 13.6 11/17/2023    RDWSD 41.9 11/17/2023    MPV 12.2 (H) 11/17/2023     (L) 06/04/2024    NEUTRORELPCT 71.8 11/17/2023    LYMPHORELPCT 17.7 (L) 11/17/2023    MONORELPCT 7.1 11/17/2023    EOSRELPCT 1.7 11/17/2023    BASORELPCT 0.9 11/17/2023    AUTOIGPER 0.8 (H) 11/17/2023    NEUTROABS 5.47 11/17/2023    LYMPHSABS 1.35 11/17/2023    MONOSABS 0.54 11/17/2023    EOSABS 0.13 11/17/2023    BASOSABS 0.07 11/17/2023    AUTOIGNUM 0.06 (H) 11/17/2023    NRBC 0.0 11/17/2023     Drug-drug interactions  Completed medication reconciliation today to assess for drug interactions. Patient denies starting or stopping any medications.    Assessed medication list for interactions,   Eltrombopag can increase the concentration of atorvastatin.  Denies significant myalgia (has some at baseline) or dark urine.  Eltrombopag can increase the concentration of repaglinide. His endocrinologist is monitoring him for side effects of that drug.  Advised patient to call the clinic if any new medications are started so we can assess for drug-drug interactions.  Drug-food interactions discussed: avoiding calcium within 2 hours before or 4 hours after taking Promacta  Vaccines are coordinated by the patient's oncologist and primary care provider.    Medications   Medicines reviewed by Virginie Ferguson [facility] on 8/13/2024 at  3:18 PM  Prior to Admission medications    Medication Sig Start Date End Date Taking? Authorizing Provider   amLODIPine (NORVASC) 10 MG tablet Take 1 tablet by mouth Daily. 1/12/22   Kalyani Willis MD   aspirin 325 MG tablet Take 1 tablet by mouth Daily. 8/16/18   Karla Mann APRN   atorvastatin (Lipitor) 20 MG tablet Take 1 tablet by mouth Daily.  2/22/24 2/21/25  Kalyani Willis MD   baclofen (LIORESAL) 10 MG tablet Take 2 tablets by mouth 3 (Three) Times a Day. 2/22/24   Kalyani iWllis MD   carvedilol (COREG) 12.5 MG tablet TAKE ONE TABLET BY MOUTH EVERY 12 HOURS 4/29/24   Kalyani Willis MD   Continuous Blood Gluc Sensor (FreeStyle Berhane 2 Sensor) misc Use 1 sensor Every 14 (Fourteen) Days. 2/15/24   Zeke Lopez MD   doxazosin (CARDURA) 4 MG tablet Take 1 tablet by mouth 2 (Two) Times a Day. 2/14/24   Zeke Lopez MD   eltrombopag (PROMACTA) 50 MG tablet Take 1 tablet by mouth Every Other Day. 11/20/23   Amadou Weller MD   empagliflozin (Jardiance) 25 MG tablet tablet Take 1 tablet by mouth Daily. 2/14/24   Zeke Lopez MD   gabapentin (NEURONTIN) 300 MG capsule Take 1 capsule by mouth 3 (Three) Times a Day. 2/14/24   Zeke Lopez MD   Insulin Glargine (Lantus SoloStar) 100 UNIT/ML injection pen Inject 70 Units under the skin into the appropriate area as directed Every Night. 2/14/24   Zeke Lopez MD   Insulin Pen Needle (BD Pen Needle Claudia U/F) 32G X 4 MM misc Use for injections daily as directed. 2/14/24   Zeke Lopez MD   metFORMIN (GLUCOPHAGE) 500 MG tablet Take 1 tablet by mouth Daily. 2/14/24   Zeke Lopez MD   ondansetron (ZOFRAN) 8 MG tablet Take 1 tablet by mouth 3 (Three) Times a Day As Needed for Nausea or Vomiting. 4/8/22   Amadou Weller MD   repaglinide (PRANDIN) 2 MG tablet Take 1 tablet by mouth 3 (Three) Times a Day Before Meals. 2/14/24 2/13/25  Zeke Lopez MD       Allergies  Known allergies and reactions were discussed with the patient. The patient's chart has been reviewed for allergy information and updated as necessary.   Allergies   Allergen Reactions    Lisinopril Dizziness     syncope         Allergies reviewed by Virginie Ferguson RP on 8/13/2024 at  3:16 PM    Hospitalizations and Urgent Care Visits Since Last  Assessment:  Unplanned hospitalizations or inpatient admissions: 0  ED Visits: 0  Urgent Office Visits: 0    Adherence Assessment:  Adherence Questions  Linked Medication(s) Assessed: Eltrombopag Olamine  On average, how many doses/injections does the patient miss per month?: 0  What are the identified reasons for non-adherence or missed doses? : no problems identified  What is the estimated medication adherence level?: % (%)  Based on the patient/caregiver response and refill history, does this patient require an MTP to track adherence improvements?: no    Quality of Life Assessment:  Quality of Life Assessment  Quality of Life Improvement Scale: 10-Significantly better  -- Quality of Life: 10/10    Financial Assessment:  Medication availability/affordability: Patient has had no issues obtaining medication from pharmacy.    Attestation:  I attest the patient was actively involved in and has agreed to the above plan of care. If the prescribed therapy is at any point deemed not appropriate based on the current or future assessments, a consultation will be initiated with the patient's specialty care provider to determine the best course of action. The revised plan of therapy will be documented along with any required assessments and/or additional patient education provided.       All questions addressed and patient had no additional concerns. Patient has pharmacy contact information.    Meliza Ferguson, Peter, [facility]  Oncology Clinical Pharmacist   Phone: [phone]    8/13/2024  15:23 EDT

## 2024-08-26 ENCOUNTER — SPECIALTY PHARMACY (OUTPATIENT)
Facility: HOSPITAL | Age: 59
End: 2024-08-26
Payer: MEDICARE

## 2024-08-26 NOTE — PROGRESS NOTES
Specialty Pharmacy Refill Coordination Note     Mele is a 59 y.o. male contacted today regarding refills of  Promacta 50mg PO every other day of specialty medication(s).    Reviewed and verified with patient: YES      Specialty medication(s) and dose(s) confirmed: yes    Refill Questions      Flowsheet Row Most Recent Value   Changes to allergies? No   Changes to medications? No   New conditions or infections since last clinic visit No   Unplanned office visit, urgent care, ED, or hospital admission in the last 4 weeks  No   How does patient/caregiver feel medication is working? Very good   Financial problems or insurance changes  No   Since the previous refill, were any specialty medication doses or scheduled injections missed or delayed?  No   Does this patient require a clinical escalation to a pharmacist? No            Delivery Questions      Flowsheet Row Most Recent Value   Delivery method [employer]Ex   Delivery address verified with patient/caregiver? Yes   Delivery address Home   Number of medications in delivery 1   Medication(s) being filled and delivered Eltrombopag Olamine   Doses left of specialty medications 3   Copay verified? Yes   Copay amount $0   Copay form of payment No copayment ($0)   Ship Date 8/27   Delivery Date 8/28   Signature Required No              Medication Adherence    Adherence tools used: patient uses a pill box to manage medications  Support network for adherence: family member          Follow-up: 30 day(s)     Bridgett Weems, Pharmacy Technician  Specialty Pharmacy Technician

## 2024-09-18 DIAGNOSIS — E11.65 TYPE 2 DIABETES MELLITUS WITH HYPERGLYCEMIA, WITH LONG-TERM CURRENT USE OF INSULIN: ICD-10-CM

## 2024-09-18 DIAGNOSIS — Z79.4 TYPE 2 DIABETES MELLITUS WITH HYPERGLYCEMIA, WITH LONG-TERM CURRENT USE OF INSULIN: ICD-10-CM

## 2024-09-18 RX ORDER — GABAPENTIN 300 MG/1
300 CAPSULE ORAL 3 TIMES DAILY
Qty: 270 CAPSULE | OUTPATIENT
Start: 2024-09-18

## 2024-09-18 RX ORDER — GABAPENTIN 300 MG/1
300 CAPSULE ORAL 3 TIMES DAILY
Qty: 270 CAPSULE | Refills: 1 | Status: SHIPPED | OUTPATIENT
Start: 2024-09-18

## 2024-09-18 RX ORDER — DOXAZOSIN 4 MG/1
4 TABLET ORAL 2 TIMES DAILY
Qty: 180 TABLET | Refills: 1 | OUTPATIENT
Start: 2024-09-18

## 2024-09-18 RX ORDER — INSULIN GLARGINE 100 [IU]/ML
70 INJECTION, SOLUTION SUBCUTANEOUS NIGHTLY
Qty: 60 ML | Refills: 1 | Status: SHIPPED | OUTPATIENT
Start: 2024-09-18

## 2024-09-18 RX ORDER — DOXAZOSIN 4 MG/1
4 TABLET ORAL 2 TIMES DAILY
Qty: 180 TABLET | Refills: 1 | Status: SHIPPED | OUTPATIENT
Start: 2024-09-18

## 2024-09-19 ENCOUNTER — SPECIALTY PHARMACY (OUTPATIENT)
Dept: ONCOLOGY | Facility: HOSPITAL | Age: 59
End: 2024-09-19
Payer: MEDICARE

## 2024-09-19 DIAGNOSIS — D69.3 ACUTE ITP: ICD-10-CM

## 2024-09-20 ENCOUNTER — SPECIALTY PHARMACY (OUTPATIENT)
Facility: HOSPITAL | Age: 59
End: 2024-09-20
Payer: MEDICARE

## 2024-10-01 ENCOUNTER — LAB (OUTPATIENT)
Dept: LAB | Facility: HOSPITAL | Age: 59
End: 2024-10-01
Payer: MEDICARE

## 2024-10-01 DIAGNOSIS — D69.3 ACUTE ITP: ICD-10-CM

## 2024-10-01 LAB — PLATELET # BLD AUTO: 112 10*3/MM3 (ref 140–450)

## 2024-10-01 PROCEDURE — 36415 COLL VENOUS BLD VENIPUNCTURE: CPT

## 2024-10-01 PROCEDURE — 85049 AUTOMATED PLATELET COUNT: CPT

## 2024-10-03 ENCOUNTER — TELEPHONE (OUTPATIENT)
Dept: ONCOLOGY | Facility: CLINIC | Age: 59
End: 2024-10-03

## 2024-10-03 NOTE — TELEPHONE ENCOUNTER
Caller: Lexy Rossi    Relationship to patient: Emergency Contact    NO BH VERBAL RELEASE ON FILE     Best call back number: [phone]    OK TO LEAVE A  IF UNABLE TO REACH     Chief complaint: REQUEST TO RESCHEDULE     Type of visit: FOLLOW UP 1    Requested date: 10/16 NEEDING IN AFTERNOON AROUND 1:30 OR 2:00PM    AND IS OK SEEING APRN AT [place] OFFICE     If rescheduling, when is the original appointment: 10/04

## 2024-10-16 ENCOUNTER — OFFICE VISIT (OUTPATIENT)
Dept: ONCOLOGY | Facility: CLINIC | Age: 59
End: 2024-10-16
Payer: MEDICARE

## 2024-10-16 VITALS
HEIGHT: 72 IN | RESPIRATION RATE: 16 BRPM | HEART RATE: 74 BPM | OXYGEN SATURATION: 97 % | SYSTOLIC BLOOD PRESSURE: 122 MMHG | WEIGHT: 241 LBS | BODY MASS INDEX: 32.64 KG/M2 | TEMPERATURE: 97.8 F | DIASTOLIC BLOOD PRESSURE: 81 MMHG

## 2024-10-16 DIAGNOSIS — D69.3 CHRONIC ITP (IDIOPATHIC THROMBOCYTOPENIA): Primary | ICD-10-CM

## 2024-10-16 PROCEDURE — 3079F DIAST BP 80-89 MM HG: CPT | Performed by: NURSE PRACTITIONER

## 2024-10-16 PROCEDURE — 99213 OFFICE O/P EST LOW 20 MIN: CPT | Performed by: NURSE PRACTITIONER

## 2024-10-16 PROCEDURE — 1159F MED LIST DOCD IN RCRD: CPT | Performed by: NURSE PRACTITIONER

## 2024-10-16 PROCEDURE — 3074F SYST BP LT 130 MM HG: CPT | Performed by: NURSE PRACTITIONER

## 2024-10-16 PROCEDURE — 1126F AMNT PAIN NOTED NONE PRSNT: CPT | Performed by: NURSE PRACTITIONER

## 2024-10-16 PROCEDURE — 1160F RVW MEDS BY RX/DR IN RCRD: CPT | Performed by: NURSE PRACTITIONER

## 2024-10-16 NOTE — PROGRESS NOTES
Problem list:  1.  Chronic ITP  2.  Diabetes  3.  Stroke  A.  Right sided weakness with right leg brace      REASON FOR VISIT: ITP    HISTORY OF PRESENT ILLNESS:   59 y.o.  male presents today for follow-up of his ITP.  He continues to do reasonably well.  He continues on Promacta and tolerating well.  Denies any bruising or bleeding.  He has a history of stroke and continues to have right-sided weakness.  He is wearing a brace on his right leg.  Continues to use a cane for short distances and a wheelchair superlong.  This is his normal.no significant changes since last visit.          Past medical history, social history and family history was reviewed 10/16/24 and unchanged from prior visit.    Review of Systems:    Review of Systems   Constitutional:  Positive for fatigue.   HENT:  Negative.     Eyes: Negative.    Respiratory: Negative.     Cardiovascular: Negative.    Gastrointestinal: Negative.    Endocrine: Negative.    Genitourinary: Negative.     Skin: Negative.    Neurological:  Positive for extremity weakness.   Hematological: Negative.    Psychiatric/Behavioral: Negative.              Medications:        Current Outpatient Medications:     amLODIPine (NORVASC) 10 MG tablet, Take 1 tablet by mouth Daily., Disp: 30 tablet, Rfl: 0    aspirin 325 MG tablet, Take 1 tablet by mouth Daily., Disp: , Rfl:     atorvastatin (Lipitor) 20 MG tablet, Take 1 tablet by mouth Daily., Disp: 30 tablet, Rfl: 11    baclofen (LIORESAL) 10 MG tablet, Take 2 tablets by mouth 3 (Three) Times a Day., Disp: 180 tablet, Rfl: 11    carvedilol (COREG) 12.5 MG tablet, TAKE ONE TABLET BY MOUTH EVERY 12 HOURS, Disp: 180 tablet, Rfl: 3    Continuous Blood Gluc Sensor (FreeStyle Berhane 2 Sensor) misc, Use 1 sensor Every 14 (Fourteen) Days., Disp: 6 each, Rfl: 3    doxazosin (CARDURA) 4 MG tablet, Take 1 tablet by mouth 2 (Two) Times a Day., Disp: 180 tablet, Rfl: 1    eltrombopag (PROMACTA) 50 MG tablet, Take 1 tablet by mouth Every Other  "Day., Disp: 15 tablet, Rfl: 11    empagliflozin (Jardiance) 25 MG tablet tablet, Take 1 tablet by mouth Daily., Disp: 90 tablet, Rfl: 1    gabapentin (NEURONTIN) 300 MG capsule, Take 1 capsule by mouth 3 (Three) Times a Day., Disp: 270 capsule, Rfl: 1    Insulin Glargine (Lantus SoloStar) 100 UNIT/ML injection pen, Inject 70 Units under the skin into the appropriate area as directed Every Night., Disp: 60 mL, Rfl: 1    Insulin Pen Needle (BD Pen Needle Claudia U/F) 32G X 4 MM misc, Use for injections daily as directed., Disp: 100 each, Rfl: 3    metFORMIN (GLUCOPHAGE) 500 MG tablet, Take 1 tablet by mouth Daily., Disp: 90 tablet, Rfl: 1    ondansetron (ZOFRAN) 8 MG tablet, Take 1 tablet by mouth 3 (Three) Times a Day As Needed for Nausea or Vomiting., Disp: 30 tablet, Rfl: 5    repaglinide (PRANDIN) 2 MG tablet, Take 1 tablet by mouth 3 (Three) Times a Day Before Meals., Disp: 90 tablet, Rfl: 11    Pain Medications               aspirin 325 MG tablet Take 1 tablet by mouth Daily.    baclofen (LIORESAL) 10 MG tablet Take 2 tablets by mouth 3 (Three) Times a Day.    gabapentin (NEURONTIN) 300 MG capsule Take 1 capsule by mouth 3 (Three) Times a Day.               ALLERGIES:    Allergies   Allergen Reactions    Lisinopril Dizziness     syncope         Physical Exam    VITAL SIGNS:  /81 Comment: LUE  Pulse 74   Temp 97.8 °F (36.6 °C) (Infrared)   Resp 16   Ht 182.9 cm (72\")   Wt 109 kg (241 lb) Comment: per pt  SpO2 97% Comment: RA  BMI 32.69 kg/m²     ECOG score: 3           Wt Readings from Last 3 Encounters:   10/16/24 109 kg (241 lb)   03/07/24 109 kg (241 lb)   02/22/24 110 kg (242 lb)       Body mass index is 32.69 kg/m². Body surface area is 2.3 meters squared.    Pain Score    10/16/24 1414   PainSc: 0-No pain             Performance Status: 1    General: well appearing male in no acute distress  Neuro: alert and oriented  HEENT: sclera anicteric, oropharynx clear  Lymphatics: no cervical, " supraclavicular, or axillary adenopathy  Cardiovascular: regular rate and rhythm, no murmurs  Lungs: clear to auscultation bilaterally  Abdomen: soft, nontender, nondistended.    Extremities: no lower extremity edema.  Brace on right leg.  Paralysis noted to right upper extremity  Skin: no rashes, lesions, bruising, or petechiae  Psych: mood and affect appropriate    Reviewed and repeated exam as above CRYS Muniz        RECENT LABS:    Lab Results   Component Value Date    HGB 16.9 11/17/2023    HCT 48.5 11/17/2023    MCV 86.5 11/17/2023     (L) 10/01/2024    WBC 7.62 11/17/2023    NEUTROABS 5.47 11/17/2023    LYMPHSABS 1.35 11/17/2023    MONOSABS 0.54 11/17/2023    EOSABS 0.13 11/17/2023    BASOSABS 0.07 11/17/2023       Lab Results   Component Value Date    GLUCOSE 156 (H) 11/17/2023    BUN 19 11/17/2023    CREATININE 1.27 11/17/2023     11/17/2023    K 4.2 11/17/2023     11/17/2023    CO2 25.4 11/17/2023    CALCIUM 9.8 11/17/2023    PROTEINTOT 7.7 11/17/2023    ALBUMIN 4.5 11/17/2023    BILITOT 0.7 11/17/2023    ALKPHOS 115 11/17/2023    AST 19 11/17/2023    ALT 12 11/17/2023       Lab Results   Component Value Date     (L) 10/01/2024     (L) 06/04/2024     (L) 02/27/2024     (L) 11/17/2023     (L) 08/25/2023       Lab Results   Component Value Date    PSA 8.560 (H) 11/17/2023    PSA 6.290 (H) 08/11/2022    PSA 5.6 (H) 04/07/2022    PSA 6.62 (H) 03/01/2022    PSA 4.46 (H) 12/21/2018         Assessment/Plan    1.  Chronic ITP.  Platelets remain stable at 112,000 on Promacta at 50 mg every other day.  For now we will continue with our current Promacta dosing.  I placed standing orders for platelet count every 3 months with 6-month office visits.     2.  Elevated PSA.  Followed by urology.    3.  History of stroke.  Continue aspirin since his platelets remain over 50,000.    4.  Diabetes.  Continue to follow-up with endocrinology.      Madisyn Boyce,  CRYS  [facility]    Return in (Approximately): 6 months    Orders Placed This Encounter   Procedures    Platelet Count       10/16/2024

## 2024-10-17 ENCOUNTER — SPECIALTY PHARMACY (OUTPATIENT)
Facility: HOSPITAL | Age: 59
End: 2024-10-17
Payer: MEDICARE

## 2024-10-18 ENCOUNTER — SPECIALTY PHARMACY (OUTPATIENT)
Facility: HOSPITAL | Age: 59
End: 2024-10-18
Payer: MEDICARE

## 2024-10-18 NOTE — PROGRESS NOTES
Specialty Pharmacy Refill Coordination Note     Mele is a 59 y.o. male contacted today regarding refills of  Promacta 50mg PO every other day of specialty medication(s).    Reviewed and verified with patient: YES    Specialty medication(s) and dose(s) confirmed: yes    Refill Questions      Flowsheet Row Most Recent Value   Changes to allergies? No   Changes to medications? No   New conditions or infections since last clinic visit No   Unplanned office visit, urgent care, ED, or hospital admission in the last 4 weeks  No   How does patient/caregiver feel medication is working? Good   Financial problems or insurance changes  No   Since the previous refill, were any specialty medication doses or scheduled injections missed or delayed?  No   Does this patient require a clinical escalation to a pharmacist? No            Delivery Questions      Flowsheet Row Most Recent Value   Delivery method [employer]   Delivery address verified with patient/caregiver? Yes   Delivery address Home   Number of medications in delivery 1   Medication(s) being filled and delivered Eltrombopag Olamine (PROMACTA)   Doses left of specialty medications 3 tablets left for 6 days   Copay verified? Yes   Copay amount $0   Copay form of payment No copayment ($0)   Ship Date 10/21   Delivery Date 10/22   Signature Required No              Medication Adherence    Adherence tools used: patient uses a pill box to manage medications  Support network for adherence: family member          Follow-up: 30 day(s)     Bridgett Weems, Pharmacy Technician  Specialty Pharmacy Technician

## 2024-11-14 ENCOUNTER — SPECIALTY PHARMACY (OUTPATIENT)
Facility: HOSPITAL | Age: 59
End: 2024-11-14
Payer: MEDICARE

## 2024-12-09 ENCOUNTER — SPECIALTY PHARMACY (OUTPATIENT)
Facility: HOSPITAL | Age: 59
End: 2024-12-09
Payer: MEDICARE

## 2024-12-09 NOTE — PROGRESS NOTES
Specialty Pharmacy Refill Coordination Note     Mele is a 59 y.o. male contacted today regarding refills of  Promacta 50mg PO every other day of specialty medication(s).    Reviewed and verified with patient: YES    Specialty medication(s) and dose(s) confirmed: yes    Refill Questions      Flowsheet Row Most Recent Value   Changes to allergies? No   Changes to medications? No   New conditions or infections since last clinic visit No   Unplanned office visit, urgent care, ED, or hospital admission in the last 4 weeks  No   How does patient/caregiver feel medication is working? Good   Financial problems or insurance changes  No   Since the previous refill, were any specialty medication doses or scheduled injections missed or delayed?  No   Does this patient require a clinical escalation to a pharmacist? No            Delivery Questions      Flowsheet Row Most Recent Value   Delivery method [employer]   Delivery address verified with patient/caregiver? Yes   Delivery address Home   Number of medications in delivery 1   Medication(s) being filled and delivered Eltrombopag Olamine (PROMACTA)   Doses left of specialty medications 6 days   Copay verified? Yes   Copay amount $0   Copay form of payment No copayment ($0)   Ship Date 12/9   Delivery Date 12/10   Signature Required No              Medication Adherence    Adherence tools used: patient uses a pill box to manage medications  Support network for adherence: family member          Follow-up: 30 day(s)     Bridgett Weems, Pharmacy Technician  Specialty Pharmacy Technician

## 2024-12-23 ENCOUNTER — HOSPITAL ENCOUNTER (OUTPATIENT)
Facility: HOSPITAL | Age: 59
Discharge: HOME | End: 2024-12-23
Payer: MEDICARE

## 2024-12-23 LAB
25(OH)D3 SERPL-MCNC: 8.1 NG/ML (ref 32–100)
ALBUMIN SERPL-MCNC: 4.7 G/DL (ref 3.4–4.8)
ALBUMIN/GLOB SERPL: 2 {RATIO} (ref 0.8–2)
ALP SERPL-CCNC: 111 U/L (ref 25–100)
ALT SERPL-CCNC: 21 U/L (ref 4–36)
ANION GAP SERPL CALCULATED.3IONS-SCNC: 13 MMOL/L (ref 3–16)
AST SERPL-CCNC: 30 U/L (ref 8–33)
BASOPHILS # BLD: 0.1 K/UL (ref 0–0.1)
BASOPHILS NFR BLD: 1 %
BILIRUB SERPL-MCNC: 0.7 MG/DL (ref 0.3–1.2)
BUN SERPL-MCNC: 19 MG/DL (ref 6–20)
CALCIUM SERPL-MCNC: 9.2 MG/DL (ref 8.5–10.5)
CHLORIDE SERPL-SCNC: 104 MMOL/L (ref 98–107)
CHOLEST SERPL-MCNC: 106 MG/DL (ref 0–200)
CO2 SERPL-SCNC: 25 MMOL/L (ref 20–30)
CREAT SERPL-MCNC: 1.2 MG/DL (ref 0.4–1.2)
EOSINOPHIL # BLD: 0.2 K/UL (ref 0–0.4)
EOSINOPHIL NFR BLD: 2.3 %
ERYTHROCYTE [DISTWIDTH] IN BLOOD BY AUTOMATED COUNT: 13.3 % (ref 11–16)
FOLATE SERPL-MCNC: >20 NG/ML
GFR SERPLBLD CREATININE-BSD FMLA CKD-EPI: 68 ML/MIN/{1.73_M2}
GLOBULIN SER CALC-MCNC: 2.4 G/DL
GLUCOSE SERPL-MCNC: 104 MG/DL (ref 74–106)
HBA1C MFR BLD: 10.6 %
HCT VFR BLD AUTO: 48.6 % (ref 40–54)
HDLC SERPL-MCNC: 36 MG/DL (ref 40–60)
HGB BLD-MCNC: 16.6 G/DL (ref 13–18)
IMM GRANULOCYTES # BLD: 0 K/UL
IMM GRANULOCYTES NFR BLD: 0.6 % (ref 0–5)
LDLC SERPL CALC-MCNC: 44 MG/DL
LYMPHOCYTES # BLD: 1.5 K/UL (ref 1.5–4)
LYMPHOCYTES NFR BLD: 22.3 %
MAGNESIUM SERPL-MCNC: 2.2 MG/DL (ref 1.7–2.4)
MCH RBC QN AUTO: 30.2 PG (ref 27–32)
MCHC RBC AUTO-ENTMCNC: 34.2 G/DL (ref 31–35)
MCV RBC AUTO: 88.4 FL (ref 80–100)
MONOCYTES # BLD: 0.5 K/UL (ref 0.2–0.8)
MONOCYTES NFR BLD: 6.8 %
NEUTROPHILS # BLD: 4.6 K/UL (ref 2–7.5)
NEUTS SEG NFR BLD: 67 %
PLATELET # BLD AUTO: 122 K/UL (ref 150–400)
PMV BLD AUTO: 12 FL (ref 6–10)
POTASSIUM SERPL-SCNC: 3.9 MMOL/L (ref 3.4–5.1)
PROT SERPL-MCNC: 7.1 G/DL (ref 6.4–8.3)
PSA SERPL DL<=0.01 NG/ML-MCNC: 10.4 NG/ML (ref 0–4)
RBC # BLD AUTO: 5.5 M/UL (ref 4.5–6)
SODIUM SERPL-SCNC: 142 MMOL/L (ref 136–145)
TRIGL SERPL-MCNC: 129 MG/DL (ref 0–249)
TSH SERPL DL<=0.005 MIU/L-ACNC: 1.85 UIU/ML (ref 0.27–4.2)
VIT B12 SERPL-MCNC: 735 PG/ML (ref 211–911)
VLDLC SERPL CALC-MCNC: 26 MG/DL
WBC # BLD AUTO: 6.9 K/UL (ref 4–11)

## 2024-12-23 PROCEDURE — 80061 LIPID PANEL: CPT

## 2024-12-23 PROCEDURE — 83036 HEMOGLOBIN GLYCOSYLATED A1C: CPT

## 2024-12-23 PROCEDURE — 84153 ASSAY OF PSA TOTAL: CPT

## 2024-12-23 PROCEDURE — 82607 VITAMIN B-12: CPT

## 2024-12-23 PROCEDURE — 82306 VITAMIN D 25 HYDROXY: CPT

## 2024-12-23 PROCEDURE — G0103 PSA SCREENING: HCPCS

## 2024-12-23 PROCEDURE — 80053 COMPREHEN METABOLIC PANEL: CPT

## 2024-12-23 PROCEDURE — 36415 COLL VENOUS BLD VENIPUNCTURE: CPT

## 2024-12-23 PROCEDURE — 82746 ASSAY OF FOLIC ACID SERUM: CPT

## 2024-12-23 PROCEDURE — 83735 ASSAY OF MAGNESIUM: CPT

## 2024-12-23 PROCEDURE — 85025 COMPLETE CBC W/AUTO DIFF WBC: CPT

## 2024-12-23 PROCEDURE — 84443 ASSAY THYROID STIM HORMONE: CPT

## 2025-01-09 ENCOUNTER — SPECIALTY PHARMACY (OUTPATIENT)
Facility: HOSPITAL | Age: 60
End: 2025-01-09
Payer: MEDICARE

## 2025-01-09 NOTE — PROGRESS NOTES
Specialty Pharmacy Refill Coordination Note     Mele is a 59 y.o. male contacted today regarding refills of  Promacta 50mg every other day of specialty medication(s).    Reviewed and verified with patient: YES: Patient has a month supply on hand because of dose stacking. Also, patient is not able to afford the $2000 copay for next fill or signing up for the M3P plan. I have waitlisted them on all [facility] sites.     Specialty medication(s) and dose(s) confirmed: yes    Refill Questions      Flowsheet Row Most Recent Value   Changes to allergies? No   Changes to medications? No   New conditions or infections since last clinic visit No   Unplanned office visit, urgent care, ED, or hospital admission in the last 4 weeks  No   How does patient/caregiver feel medication is working? Good   Financial problems or insurance changes  Yes   If yes, describe changes in insurance or financial issues. Not able to afford the $2000 copay or signing up for the M3P plan. Waitlisted patient and hoping something opens before next month.   Since the previous refill, were any specialty medication doses or scheduled injections missed or delayed?  No   Does this patient require a clinical escalation to a pharmacist? Yes  [Patient has a month supply on hand because of dose stacking]                  Medication Adherence    Adherence tools used: patient uses a pill box to manage medications  Support network for adherence: family member          Follow-up: 30 day(s)     Bridgett Weems, Pharmacy Technician  Specialty Pharmacy Technician

## 2025-01-10 ENCOUNTER — TELEPHONE (OUTPATIENT)
Dept: ONCOLOGY | Facility: CLINIC | Age: 60
End: 2025-01-10
Payer: MEDICARE

## 2025-01-10 ENCOUNTER — SPECIALTY PHARMACY (OUTPATIENT)
Facility: HOSPITAL | Age: 60
End: 2025-01-10
Payer: MEDICARE

## 2025-01-10 NOTE — TELEPHONE ENCOUNTER
Message received from pharmacy that pt can not afford Promacta. Dr Guillaume aware and ask RN to call pt with future plan of care.   RN spoke to patient and his wife. Notified them plan will be for pt to take what he has left of Promacta, then will stop and see how he does off medication. If platelets drop, Dr Guillaume will start iv therapy.  Informed pt iv therapy is billed differently. Wife reports pt has 54 tablets left and he takes one tablet every other day. At this time Markell f/u will remain schd for April. Dr Guillaume will check CBC at that time.   Patient and wife verbalized understanding.

## 2025-01-14 ENCOUNTER — TELEPHONE (OUTPATIENT)
Dept: ENDOCRINOLOGY | Facility: CLINIC | Age: 60
End: 2025-01-14
Payer: MEDICARE

## 2025-01-14 ENCOUNTER — OFFICE VISIT (OUTPATIENT)
Age: 60
End: 2025-01-14
Payer: MEDICARE

## 2025-01-14 VITALS
BODY MASS INDEX: 32.78 KG/M2 | WEIGHT: 242 LBS | HEART RATE: 68 BPM | OXYGEN SATURATION: 95 % | SYSTOLIC BLOOD PRESSURE: 134 MMHG | DIASTOLIC BLOOD PRESSURE: 72 MMHG | HEIGHT: 72 IN

## 2025-01-14 DIAGNOSIS — R80.9 MICROALBUMINURIA: ICD-10-CM

## 2025-01-14 DIAGNOSIS — Z79.4 TYPE 2 DIABETES MELLITUS WITH HYPERGLYCEMIA, WITH LONG-TERM CURRENT USE OF INSULIN: Primary | ICD-10-CM

## 2025-01-14 DIAGNOSIS — E11.65 TYPE 2 DIABETES MELLITUS WITH HYPERGLYCEMIA, WITH LONG-TERM CURRENT USE OF INSULIN: Primary | ICD-10-CM

## 2025-01-14 DIAGNOSIS — E11.621 DIABETIC ULCER OF TOE OF LEFT FOOT ASSOCIATED WITH TYPE 2 DIABETES MELLITUS, WITH OTHER ULCER SEVERITY: ICD-10-CM

## 2025-01-14 DIAGNOSIS — L97.528 DIABETIC ULCER OF TOE OF LEFT FOOT ASSOCIATED WITH TYPE 2 DIABETES MELLITUS, WITH OTHER ULCER SEVERITY: ICD-10-CM

## 2025-01-14 PROBLEM — L97.529 DIABETIC ULCER OF TOE OF LEFT FOOT: Status: ACTIVE | Noted: 2025-01-14

## 2025-01-14 LAB
EXPIRATION DATE: ABNORMAL
HBA1C MFR BLD: 10.1 % (ref 4.5–5.7)
Lab: ABNORMAL

## 2025-01-14 PROCEDURE — 3075F SYST BP GE 130 - 139MM HG: CPT | Performed by: PHYSICIAN ASSISTANT

## 2025-01-14 PROCEDURE — 3078F DIAST BP <80 MM HG: CPT | Performed by: PHYSICIAN ASSISTANT

## 2025-01-14 PROCEDURE — 99214 OFFICE O/P EST MOD 30 MIN: CPT | Performed by: PHYSICIAN ASSISTANT

## 2025-01-14 PROCEDURE — 1159F MED LIST DOCD IN RCRD: CPT | Performed by: PHYSICIAN ASSISTANT

## 2025-01-14 PROCEDURE — 1160F RVW MEDS BY RX/DR IN RCRD: CPT | Performed by: PHYSICIAN ASSISTANT

## 2025-01-14 PROCEDURE — 83036 HEMOGLOBIN GLYCOSYLATED A1C: CPT | Performed by: PHYSICIAN ASSISTANT

## 2025-01-14 PROCEDURE — 3046F HEMOGLOBIN A1C LEVEL >9.0%: CPT | Performed by: PHYSICIAN ASSISTANT

## 2025-01-14 RX ORDER — TIRZEPATIDE 2.5 MG/.5ML
2.5 INJECTION, SOLUTION SUBCUTANEOUS WEEKLY
Qty: 3 ML | Refills: 3 | Status: SHIPPED | OUTPATIENT
Start: 2025-01-14 | End: 2025-01-20

## 2025-01-14 RX ORDER — FUROSEMIDE 40 MG/1
40 TABLET ORAL 2 TIMES DAILY
COMMUNITY

## 2025-01-14 RX ORDER — ERGOCALCIFEROL 1.25 MG/1
50000 CAPSULE, LIQUID FILLED ORAL WEEKLY
COMMUNITY

## 2025-01-14 RX ORDER — POTASSIUM CHLORIDE 1.5 G/1.58G
20 POWDER, FOR SOLUTION ORAL 2 TIMES DAILY
COMMUNITY

## 2025-01-14 NOTE — TELEPHONE ENCOUNTER
Caller: Mele Rossi    Relationship to patient: Self    Best call back number: [phone]    Patient is needing: PTS WIFE CALLED AND THE [employer] IS GOING TO COST $300 A MONTH WITH HIS INSURANCE THEY ARE WANTING TO KNOW IF WE HAVE ANY SAMPLES HE CAN TRY BEFORE HE PAYS THAT MUCH OR IF THERE IS ANYTHING ELSE HE COULD TRY INSTEAD

## 2025-01-14 NOTE — PROGRESS NOTES
Chief Complaint   Patient presents with    Diabetes      HPI  Mele Rossi is a 59 y.o. male presents today for follow-up evaluation of type 2 diabetes. They were last seen for this 2/14/2024 by another provider in clinic, A1c 10.4%.   Last A1c done 12/23/2024, 10.6%.    Reports taking Lantus 65 units only about 1 or 2 times per week as this results in low blood sugar overnight. He last took this 1/12/224. He is taking  Metformin, repaglinide, Jardiance as prescribed.    He reports previously taking Trulicity with significant improved blood sugar control but stopped due to loose stools at the time.  He reports continued intermittent loose stools he attributes to Promacta.  He would like to retry  similar medication  to see if improves blood sugar control.    Without additional concerns today  - Admits frequent hypoglycemia with insulin use  - Admits chronic numbness improved with gabapentin taking twice daily; has current ulceration to left great toe managed by Dr. Emanuel, [facility]. Had previously on right great toe and improved. Has follow-up appt this Friday. Has wrapped currently, declines foot exam today.   - Denies SOB, chest pain  - Denies constipation, diarrhea, abdominal pain.  - Denies changes in thirst or urination.    - Denies vision changes.     Denies hx of pancreatitis.  Denies family hx of thyroid cancers.    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  - HTN: carvedilol   - HLD: atorvastatin 20 mg   - Aspirin: 81 mg    CGM Download  -Dates reviewed: 1/1/2025 to 1/14/2025  -Data: 31% Target range, 36% high, 33% very high, 0% low, 0% very low  -CGM interpretation: Hyperglycemia most of day with postprandial increase; occasional return to target range overnight and  between meals. Significant down trending 2024 after insulin use.       DM Health Maintenance:  Ophthalmology: >7   Monofilament / Foot exam: declined today  Urine microalbumin:cr 46 2024  LDL: 44 2024   GFR: 68 2024   Vitamin D: 8.1 2024   TSH: 1.85 2024    Social Hx:  Home: relies on sister for transportation  Diet: Meals: 3-5x/day Breakfast: breakfast sandwich Lunch: bologna Dinner: meat + carb  Snacks/Dessert: granola bar  Drinks: diet pepsi, water   Exercise: minimal     Past Medical History:   Diagnosis Date    Acute ITP 2022    Diabetes     Fatigue     History of stroke     Hypertension     Obesity     body mass index 30+-obesity    Stroke (cerebrum)     Type 2 diabetes mellitus, uncontrolled      History reviewed. No pertinent surgical history.   Family History   Problem Relation Age of Onset    Diabetes Mother     Mental illness Mother     Heart disease Mother     Hypertension Father     Heart disease Father       Social History     Socioeconomic History    Marital status:    Tobacco Use    Smoking status: Former     Current packs/day: 0.00     Average packs/day: 1 pack/day for 20.0 years (20.0 ttl pk-yrs)     Types: Cigarettes     Start date: 1998     Quit date: 2018     Years since quittin.1     Passive exposure: Never    Smokeless tobacco: Never    Tobacco comments:     States Smokes 1 cigeratte per month   Vaping Use    Vaping status: Never Used   Substance and Sexual Activity    Alcohol use: No    Drug use: No    Sexual activity: Defer      Allergies   Allergen Reactions    Lisinopril Dizziness     syncope      Current Outpatient Medications on File Prior to Visit   Medication Sig Dispense Refill    amLODIPine (NORVASC) 10 MG tablet Take 1 tablet by mouth Daily. 30 tablet 0    aspirin 325 MG tablet Take 1 tablet by mouth Daily.      atorvastatin (Lipitor) 20 MG tablet Take 1 tablet by mouth Daily. 30 tablet 11    baclofen (LIORESAL) 10 MG  tablet Take 2 tablets by mouth 3 (Three) Times a Day. 180 tablet 11    carvedilol (COREG) 12.5 MG tablet TAKE ONE TABLET BY MOUTH EVERY 12 HOURS 180 tablet 3    Continuous Blood Gluc Sensor (FreeStyle Berhane 2 Sensor) misc Use 1 sensor Every 14 (Fourteen) Days. 6 each 3    doxazosin (CARDURA) 4 MG tablet Take 1 tablet by mouth 2 (Two) Times a Day. 180 tablet 1    eltrombopag (Promacta) 50 MG tablet Take 1 tablet by mouth Daily.      empagliflozin (Jardiance) 25 MG tablet tablet Take 1 tablet by mouth Daily. 90 tablet 1    furosemide (LASIX) 40 MG tablet Take 1 tablet by mouth 2 (Two) Times a Day.      gabapentin (NEURONTIN) 300 MG capsule Take 1 capsule by mouth 3 (Three) Times a Day. 270 capsule 1    Insulin Glargine (Lantus SoloStar) 100 UNIT/ML injection pen Inject 70 Units under the skin into the appropriate area as directed Every Night. 60 mL 1    Insulin Pen Needle (BD Pen Needle Claudia U/F) 32G X 4 MM misc Use for injections daily as directed. 100 each 3    metFORMIN (GLUCOPHAGE) 500 MG tablet Take 1 tablet by mouth Daily. 90 tablet 1    ondansetron (ZOFRAN) 8 MG tablet Take 1 tablet by mouth 3 (Three) Times a Day As Needed for Nausea or Vomiting. 30 tablet 5    potassium chloride (KLOR-CON) 20 MEQ packet Take 20 mEq by mouth 2 (Two) Times a Day.      repaglinide (PRANDIN) 2 MG tablet Take 1 tablet by mouth 3 (Three) Times a Day Before Meals. 90 tablet 11    vitamin D (ERGOCALCIFEROL) 1.25 MG (00497 UT) capsule capsule Take 1 capsule by mouth 1 (One) Time Per Week.       No current facility-administered medications on file prior to visit.        Review of Systems   Constitutional:  Negative for activity change, appetite change, unexpected weight gain and unexpected weight loss.   Eyes:  Negative for visual disturbance.   Respiratory:  Negative for shortness of breath.    Cardiovascular:  Negative for chest pain.   Gastrointestinal:  Positive for diarrhea. Negative for abdominal pain and constipation.  "  Endocrine: Negative for polydipsia and polyuria.   Skin:  Positive for wound.   Neurological:  Positive for numbness. Negative for dizziness.        The following portions of the patient's history were reviewed and updated as appropriate: allergies, current medications, past family history, past medical history, past social history, past surgical history and problem list.       /72 (BP Location: Left arm, Patient Position: Sitting, Cuff Size: Adult)   Pulse 68   Ht 182.9 cm (72\")   Wt 110 kg (242 lb)   SpO2 95%   BMI 32.82 kg/m²      Physical Exam  Constitutional:       Appearance: Normal appearance. He is obese.      Comments: Uses cane for ambulation, right foot brace.   Image of left toe ulceration approximately 3 cm to bone/muscle. Pink. Without significant discharge or surrounding erythema.    Cardiovascular:      Rate and Rhythm: Normal rate.   Pulmonary:      Effort: Pulmonary effort is normal.   Musculoskeletal:         General: Normal range of motion.   Skin:     General: Skin is warm and dry.   Neurological:      General: No focal deficit present.      Mental Status: He is alert and oriented to person, place, and time.   Psychiatric:         Mood and Affect: Mood normal.         Behavior: Behavior normal.         LABS AND IMAGING  CMP:  Lab Results   Component Value Date    Glucose 141 (A) 02/14/2024    Glucose 302 (H) 09/13/2018    Glucose, UA 3+ (A) 08/11/2022    Glucose, UA >=1000 mg/dL (3+) (A) 03/02/2022    Glucose, Urine 500 (A) 08/21/2018    BUN 19 11/17/2023    BUN/Creatinine Ratio 15.0 11/17/2023    Creatinine 1.27 11/17/2023    Creatinine, Urine 60.0 04/07/2023    eGFR 64.7 11/17/2023    eGFR Non African Amer 62 08/12/2018    Ketones, UA Negative 08/11/2022    Ketones, UA 15 mg/dL (1+) (A) 03/02/2022    KID L RIGHT 10.8 08/12/2018    CO2 25.4 11/17/2023    Calcium 9.8 11/17/2023    Albumin 4.5 11/17/2023    AST (SGOT) 19 11/17/2023    ALT (SGPT) 12 11/17/2023     Lipid Panel:  Lab " "Results   Component Value Date    CHOL 96 06/29/2020    TRIG 248 08/25/2023    HDL 30 (L) 08/25/2023    VLDL 50 08/25/2023    LDL 29 08/25/2023     HbA1c:  No components found for: \"HGBA1C\"  Glucose:  Lab Results   Component Value Date    POCGLU 141 (A) 02/14/2024     Microalbumin:  Lab Results   Component Value Date    MALBCRERATIO 46.7 04/07/2023     TSH:  Lab Results   Component Value Date    TSH 1.85 12/23/2024       Assessment and Plan    Diagnoses and all orders for this visit:    1. Type 2 diabetes mellitus with hyperglycemia, with long-term current use of insulin (Primary)  Assessment & Plan:  Diabetes is not controlled.   A1C 10.1% today.  CGM interpretation: Hyperglycemia most of day with postprandial increase; occasional return to target range overnight and between meals. Significant down trending 1/12/2024 after insulin use.     Hypoglycemia with current dose lantus and taking infrequently; discussed need for consistent use/reduced dose.  Discussed restarting GLP1a; patient would like to try Mounjaro.  Instructed to contact office after 4 weeks for additional titration or concerns for continued hyperglycemia >300 or hypoglycemia <70.     Rx: Start Mounjaro 2.5 mg weekly. Decrease lantus 20 units nightly - instructions provided on titration as needed. Continue repaglinide, metformin, jardiance. Continue gabapentin 300 mg BID for neuropathy.   Cautioned with GLP1a: concerns for appetite loss, weight loss >2 lbs per weeks, nausea, heartburn, reflux, vomiting, abdominal pain, constipation, loose stools, vision changes, mood changes, or additional concerns.    Cautioned risk for hypoglycemia with insulin use; advised glucagon - declines prescription; advised glucose supplement/tablet as needed.     Discussed complications associated with diabetes .   Discussed diet:  Increase lean protein and vegetable intake  Avoid concentrated sugar drinks, such as sodas, and processed carbs including crackers, cookies, " delvin  Blood Glucose Goal: Blood glucose goal <150 fasting, <180 2 hr postprandial. Encouraged monitoring daily.   Discussed yearly microalbumin, annual eye examinations with Ophthalmology, and foot care.  Discussed medications, side effects and compliance.  Discussed hypoglycemia management and prevention.   Reviewed ‘ABCs’ of diabetes management (respective goals in parentheses):  A1C (<7), blood pressure (<130/80), and cholesterol (LDL <100, if CVD <70).      Orders:  -     POC Glycosylated Hemoglobin (Hb A1C)  -     Tirzepatide (Mounjaro) 2.5 MG/0.5ML solution auto-injector; Inject 2.5 mg under the skin into the appropriate area as directed 1 (One) Time Per Week.  Dispense: 3 mL; Refill: 3  -     Microalbumin / Creatinine Urine Ratio - Urine, Clean Catch    2. Diabetic ulcer of toe of left foot associated with type 2 diabetes mellitus, with other ulcer severity  Assessment & Plan:  Currently managed by [facility]; continue follow-up for monitoring.   Encouraged continued effort toward BG control        3. Microalbuminuria  Assessment & Plan:  POC albumin: 80, Creatinine: 100; ACR: 800   Encouraged improved BG control.  Continue SGLT2i. Consider additional ACE/ARB if continues elevated.            Return in about 3 months (around 4/14/2025) for follow-up diabetes. The patient was instructed to contact the clinic with any interval questions or concerns.    Electronically signed by: Virginie Crawford PA-C   Endocrinology     Please note that portions of this note were completed with a voice recognition program.

## 2025-01-14 NOTE — PATIENT INSTRUCTIONS
Diabetes Treatment Recommendations  Patient     Mele Rossi     Date:        25     ADA General Goals: A1c: < 7%                                                                                   Your A1C is   Lab Results   Component Value Date    HGBA1C 10.1 (A) 2025    HGBA1C 10.6 (H) 2024    HGBA1C 10.4 (A) 2024     Fasting/before meal glucose: <150 mg/dL                                    2 Hour after meal glucoses: < 180 mg/dL                                        Bedtime glucose:120-180                                                              Glucose testing frequency: daily before insulin and repaglinide use    Medication Changes:  - Start Mounjaro 2.5 mg: inject once weekly -- call for increase after 4 weeks if tolerating well.   - Continue repaglinide 2 m tablet 30 minutes before meals  - Continue jardiance 25 m tablet once daily   - Continue metformin 500 m tablet once daily with meals     Insulin dosing:  Basal insulin Lantus  20 Units nightly  Increase by 2 units every 3 days if fasting blood sugar is more than 140.   Keep at current dose if fasting blood sugar is between .  Decrease by 2 units if fasting blood sugar is less than 80 or concerns for hypoglycemia overnight or between meals.    Nutritional Recommendations:  4-5 carb portions per meal for male (60-75 gm of carbs)  Physical Activity Goals:  Walk at least 15 min every day, ideally exercise 45-60 min most days (could be done in short bouts of 10-15 minutes.  Keep records of your glucose levels and insulin adjustments. We may ask you to keep records on the content of your meals with insulin doses and before/after meal glucose levels to evaluate your ratios.  Call for advice if you have unexplained or unexpected hypoglycemia  (glucose < 60) or persistent high glucose > 300.  Office: [phone]      Virginie Crawford PA-C

## 2025-01-14 NOTE — TELEPHONE ENCOUNTER
Wife was advised to call the ins and find out what is on the formulary. Then call us back and let us know.    She verbalized understanding.

## 2025-01-14 NOTE — ASSESSMENT & PLAN NOTE
Diabetes is not controlled.   A1C 10.1% today.  CGM interpretation: Hyperglycemia most of day with postprandial increase; occasional return to target range overnight and between meals. Significant down trending 1/12/2024 after insulin use.     Hypoglycemia with current dose lantus and taking infrequently; discussed need for consistent use/reduced dose.  Discussed restarting GLP1a; patient would like to try Mounjaro.  Instructed to contact office after 4 weeks for additional titration or concerns for continued hyperglycemia >300 or hypoglycemia <70.     Rx: Start Mounjaro 2.5 mg weekly. Decrease lantus 20 units nightly - instructions provided on titration as needed. Continue repaglinide, metformin, jardiance. Continue gabapentin 300 mg BID for neuropathy.   Cautioned with GLP1a: concerns for appetite loss, weight loss >2 lbs per weeks, nausea, heartburn, reflux, vomiting, abdominal pain, constipation, loose stools, vision changes, mood changes, or additional concerns.    Cautioned risk for hypoglycemia with insulin use; advised glucagon - declines prescription; advised glucose supplement/tablet as needed.     Discussed complications associated with diabetes .   Discussed diet:  Increase lean protein and vegetable intake  Avoid concentrated sugar drinks, such as sodas, and processed carbs including crackers, cookies, cakes  Blood Glucose Goal: Blood glucose goal <150 fasting, <180 2 hr postprandial. Encouraged monitoring daily.   Discussed yearly microalbumin, annual eye examinations with Ophthalmology, and foot care.  Discussed medications, side effects and compliance.  Discussed hypoglycemia management and prevention.   Reviewed ‘ABCs’ of diabetes management (respective goals in parentheses):  A1C (<7), blood pressure (<130/80), and cholesterol (LDL <100, if CVD <70).

## 2025-01-14 NOTE — ASSESSMENT & PLAN NOTE
Currently managed by [facility]; continue follow-up for monitoring.   Encouraged continued effort toward BG control

## 2025-01-14 NOTE — ASSESSMENT & PLAN NOTE
POC albumin: 80, Creatinine: 100; ACR: 800   Encouraged improved BG control.  Continue SGLT2i. Consider additional ACE/ARB if continues elevated.

## 2025-01-15 ENCOUNTER — TELEPHONE (OUTPATIENT)
Dept: ENDOCRINOLOGY | Facility: CLINIC | Age: 60
End: 2025-01-15
Payer: MEDICARE

## 2025-01-15 DIAGNOSIS — Z79.4 TYPE 2 DIABETES MELLITUS WITH HYPERGLYCEMIA, WITH LONG-TERM CURRENT USE OF INSULIN: ICD-10-CM

## 2025-01-15 DIAGNOSIS — E11.65 TYPE 2 DIABETES MELLITUS WITH HYPERGLYCEMIA, WITH LONG-TERM CURRENT USE OF INSULIN: ICD-10-CM

## 2025-01-15 NOTE — TELEPHONE ENCOUNTER
Pt's wife Lexy called stating Mounjaro is too expensive. She stated she contacted the pharm and Ozempic and Trulicity cost the same per month. She requested a call back to consult.

## 2025-01-16 DIAGNOSIS — Z79.4 TYPE 2 DIABETES MELLITUS WITH HYPERGLYCEMIA, WITH LONG-TERM CURRENT USE OF INSULIN: ICD-10-CM

## 2025-01-16 DIAGNOSIS — E11.65 TYPE 2 DIABETES MELLITUS WITH HYPERGLYCEMIA, WITH LONG-TERM CURRENT USE OF INSULIN: ICD-10-CM

## 2025-01-16 RX ORDER — ATORVASTATIN CALCIUM 20 MG/1
20 TABLET, FILM COATED ORAL DAILY
Qty: 30 TABLET | Refills: 11 | OUTPATIENT
Start: 2025-01-16 | End: 2026-01-16

## 2025-01-16 RX ORDER — DOXAZOSIN 4 MG/1
4 TABLET ORAL 2 TIMES DAILY
Qty: 180 TABLET | Refills: 0 | Status: SHIPPED | OUTPATIENT
Start: 2025-01-16

## 2025-01-16 NOTE — TELEPHONE ENCOUNTER
Rx Refill Note  Requested Prescriptions     Pending Prescriptions Disp Refills    metFORMIN (GLUCOPHAGE) 500 MG tablet [Pharmacy Med Name: METFORMIN 500MG TABLETS] 90 tablet 1     Sig: TAKE 1 TABLET BY MOUTH DAILY    doxazosin (CARDURA) 4 MG tablet [Pharmacy Med Name: DOXAZOSIN 4MG TABLETS] 180 tablet 1     Sig: TAKE 1 TABLET BY MOUTH TWICE DAILY      Last office visit with prescribing clinician: 2/14/2024      Next office visit with prescribing clinician: 04/29/2025    Bebe Foley MA  01/16/25, 10:59 EST

## 2025-01-16 NOTE — TELEPHONE ENCOUNTER
Rx Refill Note  Requested Prescriptions     Pending Prescriptions Disp Refills    atorvastatin (LIPITOR) 20 MG tablet [Pharmacy Med Name: ATORVASTATIN 20MG TABLETS] 30 tablet 11     Sig: TAKE 1 TABLET BY MOUTH DAILY      Last filled:2/22/24 11 refills  Last office visit with prescribing clinician: 2/22/2024      Next office visit with prescribing clinician: 2/24/2025     KELSEA SEPULVEDA  01/16/25, 09:49 EST      Too soon for refill

## 2025-01-17 ENCOUNTER — LAB (OUTPATIENT)
Dept: LAB | Facility: HOSPITAL | Age: 60
End: 2025-01-17
Payer: MEDICARE

## 2025-01-17 DIAGNOSIS — D69.3 ACUTE ITP: ICD-10-CM

## 2025-01-17 LAB — PLATELET # BLD AUTO: 121 10*3/MM3 (ref 140–450)

## 2025-01-17 PROCEDURE — 36415 COLL VENOUS BLD VENIPUNCTURE: CPT

## 2025-01-17 PROCEDURE — 85049 AUTOMATED PLATELET COUNT: CPT

## 2025-01-20 RX ORDER — INSULIN GLARGINE 100 [IU]/ML
20 INJECTION, SOLUTION SUBCUTANEOUS NIGHTLY
Qty: 27 ML | Refills: 1 | Status: SHIPPED | OUTPATIENT
Start: 2025-01-20

## 2025-01-20 RX ORDER — PEN NEEDLE, DIABETIC 32GX 5/32"
NEEDLE, DISPOSABLE MISCELLANEOUS
Qty: 100 EACH | Refills: 3 | Status: SHIPPED | OUTPATIENT
Start: 2025-01-20

## 2025-01-20 RX ORDER — REPAGLINIDE 2 MG/1
2 TABLET ORAL
Qty: 90 TABLET | Refills: 11 | Status: SHIPPED | OUTPATIENT
Start: 2025-01-20 | End: 2026-01-20

## 2025-01-20 NOTE — TELEPHONE ENCOUNTER
Spoke with patient and wife in regard to Mounjaro; unfortunately unable to afford at this time.  Discussed possible Soliqua and advised inquiring about cost.    Patient reports reduced insulin and has been using Lantus 30 units.  Blood sugars in the morning continue low 50 to 70s.  He continues to have higher blood sugars in the afternoons 150-1 60s.    Advised to continue to decrease Lantus to 20 units.  Discussed overnight lows caused by higher doses of Lantus and advised to contact office before titrating.    Also reports needing refills of other medications,  patient reported has adequate supply at visit but missing refills. Can refill medications related to diabetes. Gabapentin with PDMP review 90 day supply filled 12/19/2024, not to refill until 3/2024.     Discussed concerns with doxazosin. This should be managed/further evaluated by PCP/urology to determine cause of frequent urination. Has hx of elevated PSA, cannot rule out prostate cancer.

## 2025-02-07 RX ORDER — ATORVASTATIN CALCIUM 20 MG/1
20 TABLET, FILM COATED ORAL DAILY
Qty: 90 TABLET | Refills: 0 | Status: SHIPPED | OUTPATIENT
Start: 2025-02-07

## 2025-02-07 NOTE — TELEPHONE ENCOUNTER
Rx Refill Note  Requested Prescriptions     Pending Prescriptions Disp Refills    atorvastatin (LIPITOR) 20 MG tablet [Pharmacy Med Name: ATORVASTATIN 20MG TABLETS] 90 tablet      Sig: TAKE 1 TABLET BY MOUTH EVERY DAY      Last filled:2/22/24 11 ref  Last office visit with prescribing clinician: 2/22/2024      Next office visit with prescribing clinician: 2/24/2025     Lalita Morrison MA  02/07/25, 16:07 EST    Requesting 90ds    Sent 90ds 0 refill until upcoming appt

## 2025-02-24 ENCOUNTER — TELEPHONE (OUTPATIENT)
Dept: NEUROLOGY | Facility: CLINIC | Age: 60
End: 2025-02-24

## 2025-02-24 NOTE — TELEPHONE ENCOUNTER
Caller: Mele Rossi    Relationship:  Self    Best call back number: [phone]    PATIENT CALLED REQUESTING TO CANCEL SAME DAY APPT.    Did the patient call AFTER the start time of their scheduled appointment?  []YES  [x]NO    Was the patient's appointment rescheduled? [x]YES  []NO    Any additional information: STILL HAS SNOW IN THEIR DRIVEWAY AND DOESN'T WANT TO GET OUT     THANK YOU

## 2025-03-04 RX ORDER — BACLOFEN 10 MG/1
20 TABLET ORAL 3 TIMES DAILY
Qty: 180 TABLET | Refills: 0 | Status: SHIPPED | OUTPATIENT
Start: 2025-03-04

## 2025-03-04 NOTE — TELEPHONE ENCOUNTER
Rx Refill Note  Requested Prescriptions     Pending Prescriptions Disp Refills    baclofen (LIORESAL) 10 MG tablet [Pharmacy Med Name: BACLOFEN 10MG TABLETS] 180 tablet 11     Sig: TAKE 2 TABLETS BY MOUTH THREE TIMES DAILY      Last filled:2/22/24 11 ref  Last office visit with prescribing clinician: 2/22/2024      Next office visit with prescribing clinician: 4/1/2025     Lalita Morrison MA  03/04/25, 08:37 EST    Sending 30ds 0 ref until upcoming appt

## 2025-03-20 DIAGNOSIS — E11.65 TYPE 2 DIABETES MELLITUS WITH HYPERGLYCEMIA, WITH LONG-TERM CURRENT USE OF INSULIN: ICD-10-CM

## 2025-03-20 DIAGNOSIS — Z79.4 TYPE 2 DIABETES MELLITUS WITH HYPERGLYCEMIA, WITH LONG-TERM CURRENT USE OF INSULIN: ICD-10-CM

## 2025-03-21 RX ORDER — GABAPENTIN 300 MG/1
300 CAPSULE ORAL 3 TIMES DAILY
Qty: 270 CAPSULE | Refills: 0 | Status: SHIPPED | OUTPATIENT
Start: 2025-03-21

## 2025-03-21 NOTE — TELEPHONE ENCOUNTER
Rx Refill Note  Requested Prescriptions     Pending Prescriptions Disp Refills    gabapentin (NEURONTIN) 300 MG capsule [Pharmacy Med Name: GABAPENTIN 300MG CAPSULES] 270 capsule 1     Sig: TAKE 1 CAPSULE BY MOUTH THREE TIMES DAILY      Last office visit with prescribing clinician: 2/14/2024      Next office visit with prescribing clinician: 04/29/2025    Bebe Foley MA  03/21/25, 09:08 EDT

## 2025-04-01 ENCOUNTER — OFFICE VISIT (OUTPATIENT)
Dept: NEUROLOGY | Facility: CLINIC | Age: 60
End: 2025-04-01
Payer: MEDICARE

## 2025-04-01 VITALS
DIASTOLIC BLOOD PRESSURE: 90 MMHG | SYSTOLIC BLOOD PRESSURE: 146 MMHG | BODY MASS INDEX: 31.67 KG/M2 | HEART RATE: 68 BPM | HEIGHT: 72 IN | OXYGEN SATURATION: 93 % | WEIGHT: 233.8 LBS

## 2025-04-01 DIAGNOSIS — I69.30 SEQUELAE, POST-STROKE: Primary | ICD-10-CM

## 2025-04-01 RX ORDER — TIRZEPATIDE 2.5 MG/.5ML
INJECTION, SOLUTION SUBCUTANEOUS
COMMUNITY
Start: 2025-03-03

## 2025-04-01 RX ORDER — ATORVASTATIN CALCIUM 20 MG/1
20 TABLET, FILM COATED ORAL DAILY
Qty: 90 TABLET | Refills: 3 | Status: SHIPPED | OUTPATIENT
Start: 2025-04-01

## 2025-04-01 RX ORDER — BACLOFEN 10 MG/1
20 TABLET ORAL 3 TIMES DAILY
Qty: 540 TABLET | Refills: 3 | Status: SHIPPED | OUTPATIENT
Start: 2025-04-01

## 2025-04-01 NOTE — PROGRESS NOTES
Subjective:    CC: Mele Rossi is seen today  for Follow-up       HPI:  Current visit-patient denies having any recurrent strokelike symptoms or falls in the last year.  He continues to take both aspirin 325 mg daily and Lipitor 20 mg daily.  Had blood work in November the results of which she does not have.  His last A1c in January was 10.1 but after that he was started on Mounjaro which has helped with his diabetes and weight loss (has lost 20 pounds so far).  His platelet count was still low at 122 and he continues to be on Promacta every other day.  His right-sided stiffness is stable on baclofen.  He does complain of some neuropathy symptoms in his feet.  Continues to take gabapentin 300 mg 3 times daily.  His sister who is with him states that he is doing well overall.    Last visit -in the last year since he saw me patient denies any new strokelike symptoms.  His walking has improved since he was able to get a leg brace.  The stiffness in his muscles is also better since he increased his dose of baclofen to 20 mg 3 times daily which he is tolerating well without increased somnolence.  He continues to take aspirin 325 mg daily and Lipitor 20 mg daily.  His last lipid panel was as follows-, , LDL 29, HDL 30.  Platelet count was 139 and he is still on Promacta, A1c was 10.4.  Wife states that he was previously taking Trulicity which was helping with his diabetes but caused diarrhea therefore he was recently switched to Prandin (repaglinide).    Last visit-since his last visit 1 year ago patient has not had any new strokelike symptoms.  He continues to have right-sided spasticity with swelling of the right leg.  He had to get a brace for the right foot.  Has been taking baclofen 20 mg twice a day but thinks he needs a higher dose.  Also increased his gabapentin 300 mg to 3 times daily for the pain in his feet which is well controlled.  His last A1c was 7.2.  He has had thrombocytopenia for which she  was started on Promacta.  His last platelet count was 116.    Last visit-at patient denies having any new strokelike symptoms.  He continues to have weakness and spasticity on the right side.  He states that he did not go for PT as there was no one to take him there.  He continues to take aspirin 325 mg daily and Lipitor 20 mg daily as well as baclofen 20 mg twice a day.  His last A1c was down to 7.  He has been taking gabapentin 300 mg nightly instead of 3 times a day.  He does have numbness and occasional pain in his feet as well as a feeling of being cold.  Denies any pain in the legs on walking or any color changes.  He has not been wearing his AFO for his foot drop as he was scared of getting a skin tear given the swelling in his feet.    Last visit-patient states that he continues to have right-sided weakness/stiffness despite taking baclofen 20 mg twice a day.  He has stopped getting Botox because it was too expensive and it was not helping him.  Has also started physical therapy.  He continues to take aspirin 325 mg daily and Lipitor 40 mg daily.  His last lipid panel was as follows-TC 96, , LDL 28, HDL 33.  A1c was 7.3.    Last visit-  since the last visit patient got his second shot of Botox in March which has helped a little with spasticity.  He stopped getting physical therapy due to COVID but is planning to resume that soon.  He is also taking baclofen 20 mg twice a day.  He continues to take aspirin 325 mg daily and Lipitor 40 mg daily.  His blood pressure does fluctuate a lot and a lot of times it is extremely low in the mornings such as today (was in the 80s/60s).  His blood sugar also continues to run high and his last A1c was 8.3.  His endocrinologist did not make any medication changes but did tell him to make dietary modifications.  Patient states that he has gained 50 pounds since his stroke.     Last visit-since her last visit patient had his first set of Botox injections for his  right-sided spasticity 1 month ago.  It helped a little however he is unsure whether he will continue getting it as he is having trouble getting it approved by insurance.  Also continues to get occupational therapy and his strength has improved slightly.  With regards to his diabetes his last A1c was 7.9 but his endocrinologist has been adjusting his medications.  He lowered his dose of gabapentin to 100 mg at night, he says that a dose of 300 mg was helping with his neuropathy and the muscle cramps but made him extremely off balance when he woke up to go to the bathroom.  Continues to take aspirin 325 mg daily along with Lipitor 40 mg daily.  Had a lot of blood work at Dr. Lopez office recently.    Last visit-patient has now finished physical therapy but continues to have the right-sided weakness as well as spasticity for which he is taking baclofen.  He has also been getting pain and numbness in his feet that is exacerbated by prolonged standing or walking.  Dr. Benitez has ordered an EMG/NCS for him as well as OT for his hand.  He continues to take aspirin 325 mg daily in addition to Lipitor 40 mg daily.  His blood pressure is well controlled however his blood glucose remains poorly controlled and his last A1c was 8.1.    Last visit-since his last visit he continues to have right arm and leg weakness.  He has stopped getting home PT and does the exercises himself.  He stopped Plavix as advised but continues to take aspirin 325 mg daily and Lipitor 40 mg daily.  His last lipid panel was as follows-, , LDL 20, HDL 43.  His A1c has also improved and was 7.3.  His blood pressure is well controlled at home now.     Last visit-since his last visit he is doing about the same and has had no new strokelike symptoms.  He continues to have weakness in the right arm and leg.  He did see rehabilitation at [facility] for Botox injections for his spasticity however they are first trying to see if it will improve  with baclofen.  He does have an improvement in his cramping with the baclofen however the stiffness in his arm has remained the same.  He also saw his endocrinologist who made changes to his medications.  He has not had a repeat A1c level yet, last level was 10.6.  His blood pressure now is much better controlled at home.  Has not smoked since his stroke.     Initial visit-  53-year-old male accompanied by his wife with a history of poorly controlled hypertension, diabetes mellitus type 2 presents with a hospital follow-up for stroke.  As per patient he had an episode of right arm and leg weakness in early August along with slurring of speech.  He initially went to an outside facility but was sent back home after a CT head showed no abnormalities.  After that the symptoms worsened and he came back to our hospital where he had an MRI brain that showed an acute left pontine infarct.  His blood pressure was extremely high at the time ().  He had extensive testing including a CTA head and neck that did not show any intra-or extracranial stenosis,  a renal artery ultrasound that did not show any stenosis and an echocardiogram that showed an EF of over 70% with diastolic dysfunction but no PFO.  His telemetry recordings did not show any evidence of atrial fibrillation.  He had not been on any antiplatelet agent prior to this event and  was started on both aspirin 325 mg and Plavix 75 mg.  He was also started on Lipitor 80 mg.  Lipid panel was as follows-, , LDL 69, HDL 32.  His A1c was 10.6 and he was put on insulin in addition to his other medications.  He also has an upcoming appointment with endocrinology.  Patient was discharged to inpatient rehabilitation  for 4 weeks.  He is currently undergoing home health therapy but continues to have significant weakness in the right arm and leg.    The following portions of the patient's history were reviewed today and updated as of 12/27/2019  : allergies,  current medications, past family history, past medical history, past social history, past surgical history and problem list  These document will be scanned to patient's chart.      Current Outpatient Medications:     amLODIPine (NORVASC) 10 MG tablet, Take 1 tablet by mouth Daily., Disp: 30 tablet, Rfl: 0    aspirin 325 MG tablet, Take 1 tablet by mouth Daily., Disp: , Rfl:     atorvastatin (LIPITOR) 20 MG tablet, Take 1 tablet by mouth Daily., Disp: 90 tablet, Rfl: 3    baclofen (LIORESAL) 10 MG tablet, Take 2 tablets by mouth 3 (Three) Times a Day., Disp: 540 tablet, Rfl: 3    carvedilol (COREG) 12.5 MG tablet, TAKE ONE TABLET BY MOUTH EVERY 12 HOURS, Disp: 180 tablet, Rfl: 3    Continuous Glucose Sensor (FreeStyle Berhane 2 Sensor) misc, Use 1 sensor Every 14 (Fourteen) Days., Disp: 6 each, Rfl: 3    doxazosin (CARDURA) 4 MG tablet, TAKE 1 TABLET BY MOUTH TWICE DAILY, Disp: 180 tablet, Rfl: 0    eltrombopag (Promacta) 50 MG tablet, Take 1 tablet by mouth Daily., Disp: , Rfl:     empagliflozin (Jardiance) 25 MG tablet tablet, Take 1 tablet by mouth Daily., Disp: 90 tablet, Rfl: 1    furosemide (LASIX) 40 MG tablet, Take 1 tablet by mouth 2 (Two) Times a Day., Disp: , Rfl:     gabapentin (NEURONTIN) 300 MG capsule, TAKE 1 CAPSULE BY MOUTH THREE TIMES DAILY, Disp: 270 capsule, Rfl: 0    Insulin Glargine (Lantus SoloStar) 100 UNIT/ML injection pen, Inject 20 Units under the skin into the appropriate area as directed Every Night., Disp: 27 mL, Rfl: 1    Insulin Pen Needle (BD Pen Needle Claudia U/F) 32G X 4 MM misc, Use for injections daily as directed., Disp: 100 each, Rfl: 3    metFORMIN (GLUCOPHAGE) 500 MG tablet, TAKE 1 TABLET BY MOUTH DAILY, Disp: 90 tablet, Rfl: 1    Mounjaro 2.5 MG/0.5ML solution auto-injector, INJECT 2.5MG UNDER THE SKIN IN THE APPROPRIATE AREA ONCE A WEEK, Disp: , Rfl:     ondansetron (ZOFRAN) 8 MG tablet, Take 1 tablet by mouth 3 (Three) Times a Day As Needed for Nausea or Vomiting., Disp: 30  "tablet, Rfl: 5    potassium chloride (KLOR-CON) 20 MEQ packet, Take 20 mEq by mouth 2 (Two) Times a Day., Disp: , Rfl:     repaglinide (PRANDIN) 2 MG tablet, Take 1 tablet by mouth 3 (Three) Times a Day Before Meals., Disp: 90 tablet, Rfl: 11    vitamin D (ERGOCALCIFEROL) 1.25 MG (78192 UT) capsule capsule, Take 1 capsule by mouth 1 (One) Time Per Week., Disp: , Rfl:    Past Medical History:   Diagnosis Date    Acute ITP 2022    Diabetes     Fatigue     History of stroke     Hypertension     Obesity     body mass index 30+-obesity    Stroke (cerebrum)     Type 2 diabetes mellitus, uncontrolled       History reviewed. No pertinent surgical history.   Family History   Problem Relation Age of Onset    Diabetes Mother     Mental illness Mother     Heart disease Mother     Hypertension Father     Heart disease Father       Social History     Socioeconomic History    Marital status:    Tobacco Use    Smoking status: Former     Current packs/day: 0.00     Average packs/day: 1 pack/day for 20.0 years (20.0 ttl pk-yrs)     Types: Cigarettes     Start date: 1998     Quit date: 2018     Years since quittin.3     Passive exposure: Never    Smokeless tobacco: Never    Tobacco comments:     States Smokes 1 cigeratte per month   Vaping Use    Vaping status: Never Used   Substance and Sexual Activity    Alcohol use: No    Drug use: No    Sexual activity: Defer     Review of Systems   Weakness, gait difficulty  All other systems reviewed and are negative.      Objective:    /90   Pulse 68   Ht 182.9 cm (72\")   Wt 106 kg (233 lb 12.8 oz)   SpO2 93%   BMI 31.71 kg/m²     Neurology Exam:    General apperance: Obese    Mental status: Alert, awake and oriented to time place and person.    Recent and Remote memory: Intact.    Attention span and Concentration: Normal.     Language and Speech: Intact- No dysarthria.    Fluency, Naming , Repitition and Comprehension:  Intact    Cranial Nerves:   CN II: " Visual fields are full. Intact. Fundi - Normal, No papillederma, Pupils - JAIDEN  CN III, IV and VI: Extraocular movements are intact. Normal saccades.   CN V: Facial sensation is intact.   CN VII: Muscles of facial expression reveal no asymmetry. Intact.   CN VIII: Hearing is intact. Whispered voice intact.   CN IX and X: Palate elevates symmetrically. Intact  CN XI: Shoulder shrug is intact.   CN XII: Tongue is midline without evidence of atrophy or fasciculation.     Ophthalmoscopic exam of optic disc-normal    Motor:  Right UE muscle strength 4/5 proximally, 3/5 distally. Spasticity present with increased tone in finger extensors    Left UE muscle strength 5/5. Normal tone.      Right LE muscle strength 4-/5, 2/5 PF/DF  Spastic    Left LE muscle strength 5/5. Normal tone.      Sensory: Normal light touch, vibration and pinprick sensation bilaterally.    DTRs: 2+ bilaterally in upper and lower extremities on left and 3+ on the right.  Pitting edema in right leg    Babinski: Negative bilaterally.    Co-ordination: Normal finger-to-nose, heel to shin B/L.    Rhomberg: Negative.    Gait: Gait has improved since his last visit since he is using a leg brace.  Uses his cane    Cardiovascular: Regular rate and rhythm without murmur, gallop or rub.    Assessment and Plan:  1. Sequelae, post-stroke  Patient had a left pontine infarct most likely due to small vessel disease  I have asked him to continue taking aspirin 325 mg daily and Lipitor 20 mg daily for goal LDL of less than 70.  His LDL was 29.  Triglycerides remain elevated.  Maintain blood pressure less than 130/90.  His blood pressure is within normal limits now  Goal A1c of less than 7.  His last A1c was 10.1 and he has been started on a new medication.    He should continue baclofen 20 mg 3 times daily for poststroke spasticity.  Previously tried Botox that did not help much  He has started to wear a leg brace.  I offered him a PT referral however he does not  want 1.  I have counseled him to exercise for at least 15 to 20 minutes daily at home    2.  Diabetic neuropathy  He is currently taking gabapentin 300 mg 3 times daily which he is getting through his endocrinologist  Currently has a foot ulcer that is improving  I have counseled him on dietary modifications.  He was recently started on Mounjaro for his diabetes which seems to be helping      Return in about 1 year (around 4/1/2026).         Kalyani Willis MD

## 2025-04-03 ENCOUNTER — SPECIALTY PHARMACY (OUTPATIENT)
Facility: HOSPITAL | Age: 60
End: 2025-04-03
Payer: MEDICARE

## 2025-04-03 NOTE — PROGRESS NOTES
Specialty Pharmacy Patient Management Program  Oncology Initial Assessment       Mele Rossi is a 60 y.o. male with ITP seen by an Oncology provider and enrolled in the Oncology Patient Management program offered by [facility].  An initial outreach was conducted, including assessment of therapy appropriateness and specialty medication education for eltrombopag. The patient was introduced to services offered by [facility], including: regular assessments, refill coordination, curbside pick-up or mail order delivery options, prior authorization maintenance, and financial assistance programs as applicable. The patient was also provided with contact information for the pharmacy team.     Regimen: Eltrombopag 50mg PO daily    Start date of oral specialty medication: As soon as oral specialty medication is available.    Relevant Past Medical History, Comorbidities, and Vaccines  Relevant medical history and concomitant health conditions were discussed with the patient. The patient's chart has been reviewed for relevant past medical history and comorbid health conditions and updated as necessary.  Vaccines are coordinated by the patient's oncologist and primary care provider.  Past Medical History:   Diagnosis Date    Acute ITP 2022    Diabetes     Fatigue     History of stroke     Hypertension     Obesity     body mass index 30+-obesity    Stroke (cerebrum)     Type 2 diabetes mellitus, uncontrolled      Social History     Socioeconomic History    Marital status:    Tobacco Use    Smoking status: Former     Current packs/day: 0.00     Average packs/day: 1 pack/day for 20.0 years (20.0 ttl pk-yrs)     Types: Cigarettes     Start date: 1998     Quit date: 2018     Years since quittin.3     Passive exposure: Never    Smokeless tobacco: Never    Tobacco comments:     States Smokes 1 cigeratte per month   Vaping Use    Vaping status: Never Used    Substance and Sexual Activity    Alcohol use: No    Drug use: No    Sexual activity: Defer       Allergies  Known allergies and reactions were discussed with the patient. The patient's chart has been reviewed for allergy information and updated as necessary.   Allergies   Allergen Reactions    Lisinopril Dizziness     syncope    Latex Rash     Rash        Current Medication List  This medication list has been reviewed with the patient and evaluated for any interactions or necessary modifications/recommendations, and updated to include all prescription medications, OTC medications, and supplements the patient is currently taking.  This list reflects what is contained in the patient's profile, which has also been marked as reviewed to communicate to other providers it is the most up to date version of the patient's current medication therapy.   Prior to Admission medications    Medication Sig Start Date End Date Taking? Authorizing Provider   eltrombopag (Promacta) 50 MG tablet Take 1 tablet by mouth Daily. 4/3/25  Yes Amadou Weller MD   amLODIPine (NORVASC) 10 MG tablet Take 1 tablet by mouth Daily. 1/12/22   Kalyani Willis MD   aspirin 325 MG tablet Take 1 tablet by mouth Daily. 8/16/18   Karla Mann APRN   atorvastatin (LIPITOR) 20 MG tablet Take 1 tablet by mouth Daily. 4/1/25   Kalyani Willis MD   baclofen (LIORESAL) 10 MG tablet Take 2 tablets by mouth 3 (Three) Times a Day. 4/1/25   Kalyani Willis MD   carvedilol (COREG) 12.5 MG tablet TAKE ONE TABLET BY MOUTH EVERY 12 HOURS 4/29/24   Kalyani Willis MD   Continuous Glucose Sensor (FreeStyle Berhane 2 Sensor) misc Use 1 sensor Every 14 (Fourteen) Days. 1/20/25   Virginie Crawford PA-C   doxazosin (CARDURA) 4 MG tablet TAKE 1 TABLET BY MOUTH TWICE DAILY 1/16/25   Virginie Crawford PA-C   empagliflozin (Jardiance) 25 MG tablet tablet Take 1 tablet by mouth Daily. 1/20/25   Virginie Crawford PA-C   furosemide (LASIX) 40 MG tablet Take 1 tablet by mouth  2 (Two) Times a Day.    Duran Everett MD   gabapentin (NEURONTIN) 300 MG capsule TAKE 1 CAPSULE BY MOUTH THREE TIMES DAILY 3/21/25   Virginie Crawford PA-C   Insulin Glargine (Lantus SoloStar) 100 UNIT/ML injection pen Inject 20 Units under the skin into the appropriate area as directed Every Night. 1/20/25   Virginie Crawford PA-C   Insulin Pen Needle (BD Pen Needle Claudia U/F) 32G X 4 MM misc Use for injections daily as directed. 1/20/25   Virginie Crawford PA-C   metFORMIN (GLUCOPHAGE) 500 MG tablet TAKE 1 TABLET BY MOUTH DAILY 1/16/25   Virginie Crawford PA-C   Mounjaro 2.5 MG/0.5ML solution auto-injector INJECT 2.5MG UNDER THE SKIN IN THE APPROPRIATE AREA ONCE A WEEK 3/3/25   Duran Everett MD   ondansetron (ZOFRAN) 8 MG tablet Take 1 tablet by mouth 3 (Three) Times a Day As Needed for Nausea or Vomiting. 4/8/22   Amadou Weller MD   potassium chloride (KLOR-CON) 20 MEQ packet Take 20 mEq by mouth 2 (Two) Times a Day.    Duran Everett MD   repaglinide (PRANDIN) 2 MG tablet Take 1 tablet by mouth 3 (Three) Times a Day Before Meals. 1/20/25 1/20/26  Virginie Crawford PA-C   vitamin D (ERGOCALCIFEROL) 1.25 MG (14963 UT) capsule capsule Take 1 capsule by mouth 1 (One) Time Per Week.    Duran Everett MD   atorvastatin (LIPITOR) 20 MG tablet TAKE 1 TABLET BY MOUTH EVERY DAY 2/7/25 4/1/25  Kalyani Willis MD   baclofen (LIORESAL) 10 MG tablet TAKE 2 TABLETS BY MOUTH THREE TIMES DAILY 3/4/25 4/1/25  Kalyani Willis MD   eltrombopag (Promacta) 50 MG tablet Take 1 tablet by mouth Daily.  4/3/25  Duran Everett MD   gabapentin (NEURONTIN) 300 MG capsule Take 1 capsule by mouth 3 (Three) Times a Day. 9/18/24 3/21/25  Zeke Lopez MD       Drug Interactions  Reviewed concomitant medications, allergies, labs, comorbidities/medical history, quality of life, and immunization history.   Drug-drug interactions noted and discussed during education:   no significant drug interactions  noted.   Reminded the patient to let us know before making any changes or starting any new prescription or OTC medications so we can first assess drug interactions.  Drug-food interactions noted and discussed during education.     Relevant Laboratory Values  Lab Results   Component Value Date    GLUCOSE 156 (H) 11/17/2023    CALCIUM 9.8 11/17/2023     11/17/2023    K 4.2 11/17/2023    CO2 25.4 11/17/2023     11/17/2023    BUN 19 11/17/2023    CREATININE 1.27 11/17/2023    EGFRIFNONA 62 08/12/2018    BCR 15.0 11/17/2023    ANIONGAP 11.6 11/17/2023     Lab Results   Component Value Date    WBC 6.9 12/23/2024    RBC 5.50 12/23/2024    HGB 16.6 12/23/2024    HCT 48.6 12/23/2024    MCV 88.4 12/23/2024    MCH 30.2 12/23/2024    MCHC 34.2 12/23/2024    RDW 13.3 12/23/2024    RDWSD 41.9 11/17/2023    MPV 12.0 (H) 12/23/2024     (L) 01/17/2025    NEUTRORELPCT 67.0 12/23/2024    LYMPHORELPCT 22.3 12/23/2024    MONORELPCT 6.8 12/23/2024    EOSRELPCT 2.3 12/23/2024    BASORELPCT 1.0 12/23/2024    AUTOIGPER 0.6 12/23/2024    NEUTROABS 4.6 12/23/2024    LYMPHSABS 1.5 12/23/2024    MONOSABS 0.5 12/23/2024    EOSABS 0.2 12/23/2024    BASOSABS 0.1 12/23/2024    AUTOIGNUM 0.0 12/23/2024    NRBC 0.0 11/17/2023       The above labs have been reviewed. No dose adjustments are needed for the oral specialty medication(s) based on the labs.    Initial Education Provided for Specialty Medication  The patient has been provided with the following education. All questions and concerns have been addressed prior to the patient receiving the medication, and the patient has verbalized understanding of the education and any materials provided.  Additional patient education shall be provided and documented upon request by the patient, provider or payer.      Provided patient with:   Chemo calendar to help improve adherence., Education sheets about the medication, 24-hour clinic phone number and my contact information and  instructions to call should additional questions arise.     Medication Education Sheets Provided: (select all that apply)  Oral Specialty Medication: Promacta (eltrombopag)  IV or SQ:  none  Steroid: None    Other Education Sheets Provided: (select all that apply)  Adherence, CINV, Diarrhea, Symptom Tracker Sheet, and Proper Disposal Information    TOPICS COMMENTS   Storage and Handling of Oral Specialty Medication Store in the original container, in a dry location out of direct sunlight, and out of reach of children or pets. Store at room temperature.  Discussed safe handling and what to do with any unused medication.   Administration of Oral Specialty Medication Take on an empty stomach, 2 hour(s) before and 1 hour(s) after eating. Take with a full glass of water. Do not crush or chew tablets.   Adherence to Oral Specialty Regimen and Handling Missed Doses Patient is likely to have good treatment adherence; reinforced the importance of adherence. Reviewed how to address missed doses and encouraged the patient to let us know of any missed doses.   Anemia: role of RBC, cause, s/s, ways to manage, role of transfusion Reviewed the role of RBC and the use of transfusions if hemoglobin decreases too much.  Patient to notify us if he experiences shortness of breath, dizziness, or palpitations.  Also let patient know that he could feel more tired than usual and to try to stay active, but rest if he needs to.    Thrombocytopenia: role of platelet, cause, s/s, ways to prevent bleeding, things to avoid, when to seek help Reviewed the role of platelets in blood clotting and when to call clinic (bloody nose that bleeds for 5 minutes despite pressure, a cut that won't stop bleeding despite pressure, gums that bleed excessively with brushing or flossing). Recommended using an electric razor, soft bristle toothbrush, and blowing your nose gently.    Neutropenia: role of WBC, cause, infection precautions, s/s of infection, when to  call MD Reviewed the role of WBC, good infection prevention practices, and when to call the clinic (temperature 100.4F, sore throat, burning urination, etc).   Nutrition and Appetite Changes:  importance of maintaining healthy diet & weight, ways to manage to improve intake, dietary consult, exercise regimen, electrolyte and/or blood glucose abnormalities    Diarrhea: causes, s/s of dehydration, ways to manage, dietary changes, when to call MD Discussed the risk of diarrhea. Instructed patient on use OTC loperamide with diarrhea onset, but emphasized the importance of calling the clinic if 4-6 episodes in 24 hours not relieved by OTC loperamide.   Constipation: causes, ways to manage, dietary changes, when to call MD Provided supplementary handout with instructions for use of docusate and other OTC therapies to manage constipation.  Patient was instructed to call us if medications aren't working.   Nausea/Vomiting: cause, use of antiemetics, dietary changes, when to call MD Emetic risk: Minimal  Instructed the patient to take a dose of the PRN medication at the first onset of nausea and if it's not working to call us for additional medications.  Also provided non-drug measures to mitigate nausea.   Mouth Sores: causes, oral care, ways to manage    Alopecia: cause, ways to manage, resources    Nervous System Changes: causes, s/s, neuropathies, cognitive changes, ways to manage    Pain: causes, ways to manage    Skin/Nail Changes: cause, s/s, ways to manage    Organ Toxicities: cause, s/s, need for diagnostic tests, labs, when to notify MD Discussed potential effects on organ systems, monitoring, diagnostic tests, labs, and when to notify the MD. Discussed the signs/symptoms of the following: hepatotoxicity.   Survivorship: distress, distress assessment, secondary malignancies, early/late effects, follow-up, social issues, social support Discussed the rare, but possible risk of secondary malignancies months to years  after treatment, most commonly  MDS   Miscellaneous Financial Issues: Patient will get [facility]  Lab Draws: On or before day 1 of each cycle, no sooner than 3 days early.  Blood Clots: Explained the rare, but possible risk of DVT or PE.  Reviewed the signs and symptoms of VTE and stressed the urgency to call 911 immediately.   Infertility and Sexuality:  causes, fertility preservation options, sexuality changes, ways to manage, importance of birth control Oral Oncology Therapy: Reviewed safe sex practices and the importance of minimizing exposure to body fluids while on oral oncology therapy. The patient is not sexually active with a woman of childbearing potential.   Home Care: how to manage bodily fluids Counseled on management of soiled linens and proper flush technique.  Discussed how to manage all the side effects at home and advised when to contact the MD office     Adherence and Self-Administration  Barriers to Patient Adherence and/or Self-Administration: no barriers identified  Methods for Supporting Patient Adherence and/or Self-Administration: Dosing calendar  Expected duration of therapy: Until disease progression or intolerable toxicity    Goals of Therapy  Patient Goals of Therapy:   Consistently take medications as prescribed  Patient will adhere to medication regimen  Patient will report any medication side effects to healthcare provider  Clinical Goals:    Goals Addressed Today    None       Support patient understanding of medication regimen  Ensure patient knows the pharmacy contact information  Schedule regular follow-up to monitor the treatment serious adverse events  Schedule regular follow-up to confirm medication adherence  Schedule regular follow-up to monitor disease progression or stability    Quality of Life Assessment   The patient's current (pre-therapy) quality of life was discussed with the patient. The QOL segment of this outreach has been reviewed and updated.   Quality of Life  Score: 10/10    Reassessment Plan & Follow-Up  Pharmacist to perform regular reassessments no more than (6) months from the previous assessment.  Welcome information and patient satisfaction survey to be sent by retail team with patient's initial fill.  Care Coordinator to set up future refill outreaches, coordinate prescription delivery, and escalate clinical questions to pharmacist.     Additional Plans, Therapy Recommendations or Therapy Problems to Be Addressed: no further concerns     Attestation  I attest the patient was actively involved in and has agreed to the above plan of care. If the prescribed therapy is at any point deemed not appropriate based on the current or future assessments, a consultation will be initiated with the patient's specialty care provider to determine the best course of action. The revised plan of therapy will be documented along with any required assessments and/or additional patient education provided.     Smita Villafana PharmD, [facility]  Clinical Oncology Pharmacy Specialist  Phone: [phone]      Date and Time: 4/3/2025  11:57 EDT

## 2025-04-04 ENCOUNTER — PATIENT ROUNDING (BHMG ONLY) (OUTPATIENT)
Dept: NEUROLOGY | Facility: CLINIC | Age: 60
End: 2025-04-04
Payer: MEDICARE

## 2025-04-11 ENCOUNTER — LAB (OUTPATIENT)
Dept: LAB | Facility: HOSPITAL | Age: 60
End: 2025-04-11
Payer: MEDICARE

## 2025-04-11 DIAGNOSIS — D69.3 ACUTE ITP: ICD-10-CM

## 2025-04-11 LAB — PLATELET # BLD AUTO: 119 10*3/MM3 (ref 140–450)

## 2025-04-11 PROCEDURE — 85049 AUTOMATED PLATELET COUNT: CPT

## 2025-04-11 PROCEDURE — 36415 COLL VENOUS BLD VENIPUNCTURE: CPT

## 2025-04-18 ENCOUNTER — SPECIALTY PHARMACY (OUTPATIENT)
Facility: HOSPITAL | Age: 60
End: 2025-04-18
Payer: MEDICARE

## 2025-04-18 ENCOUNTER — OFFICE VISIT (OUTPATIENT)
Dept: ONCOLOGY | Facility: CLINIC | Age: 60
End: 2025-04-18
Payer: MEDICARE

## 2025-04-18 VITALS
RESPIRATION RATE: 16 BRPM | OXYGEN SATURATION: 95 % | HEART RATE: 60 BPM | DIASTOLIC BLOOD PRESSURE: 71 MMHG | WEIGHT: 234 LBS | TEMPERATURE: 97.8 F | HEIGHT: 72 IN | SYSTOLIC BLOOD PRESSURE: 119 MMHG | BODY MASS INDEX: 31.69 KG/M2

## 2025-04-18 DIAGNOSIS — D69.3 CHRONIC ITP (IDIOPATHIC THROMBOCYTOPENIA): Primary | ICD-10-CM

## 2025-04-18 NOTE — PROGRESS NOTES
Problem list:  1.  Chronic ITP  2.  Diabetes  3.  Stroke  A.  Right sided weakness with right leg brace      REASON FOR VISIT: ITP    HISTORY OF PRESENT ILLNESS:   60 y.o.  male presents today for follow-up of his ITP.  He continues to do reasonably well.  He continues on Promacta every other day and tolerating well.  Denies any bruising or bleeding.  He has a history of stroke and continues to have right-sided weakness.  He is wearing a brace on his right leg.  Continues to use a cane for short distances and a wheelchair superlong.  This is his normal. no significant changes since last visit.          Past medical history, social history and family history was reviewed 04/18/25 and unchanged from prior visit.    Review of Systems:    Review of Systems   Constitutional:  Positive for fatigue.   HENT:  Negative.     Eyes: Negative.    Respiratory: Negative.     Cardiovascular: Negative.    Gastrointestinal: Negative.    Endocrine: Negative.    Genitourinary: Negative.     Skin: Negative.    Neurological:  Positive for extremity weakness.   Hematological: Negative.    Psychiatric/Behavioral: Negative.              Medications:        Current Outpatient Medications:     amLODIPine (NORVASC) 10 MG tablet, Take 1 tablet by mouth Daily., Disp: 30 tablet, Rfl: 0    aspirin 325 MG tablet, Take 1 tablet by mouth Daily., Disp: , Rfl:     atorvastatin (LIPITOR) 20 MG tablet, Take 1 tablet by mouth Daily., Disp: 90 tablet, Rfl: 3    baclofen (LIORESAL) 10 MG tablet, Take 2 tablets by mouth 3 (Three) Times a Day., Disp: 540 tablet, Rfl: 3    carvedilol (COREG) 12.5 MG tablet, TAKE ONE TABLET BY MOUTH EVERY 12 HOURS, Disp: 180 tablet, Rfl: 3    Continuous Glucose Sensor (FreeStyle Berhane 2 Sensor) misc, Use 1 sensor Every 14 (Fourteen) Days., Disp: 6 each, Rfl: 3    doxazosin (CARDURA) 4 MG tablet, TAKE 1 TABLET BY MOUTH TWICE DAILY, Disp: 180 tablet, Rfl: 0    eltrombopag (Promacta) 50 MG tablet, Take 1 tablet by mouth Daily.,  "Disp: 30 tablet, Rfl: 11    empagliflozin (Jardiance) 25 MG tablet tablet, Take 1 tablet by mouth Daily., Disp: 90 tablet, Rfl: 1    furosemide (LASIX) 40 MG tablet, Take 1 tablet by mouth 2 (Two) Times a Day., Disp: , Rfl:     gabapentin (NEURONTIN) 300 MG capsule, TAKE 1 CAPSULE BY MOUTH THREE TIMES DAILY, Disp: 270 capsule, Rfl: 0    Insulin Glargine (Lantus SoloStar) 100 UNIT/ML injection pen, Inject 20 Units under the skin into the appropriate area as directed Every Night., Disp: 27 mL, Rfl: 1    Insulin Pen Needle (BD Pen Needle Claudia U/F) 32G X 4 MM misc, Use for injections daily as directed., Disp: 100 each, Rfl: 3    metFORMIN (GLUCOPHAGE) 500 MG tablet, TAKE 1 TABLET BY MOUTH DAILY, Disp: 90 tablet, Rfl: 1    Mounjaro 2.5 MG/0.5ML solution auto-injector, INJECT 2.5MG UNDER THE SKIN IN THE APPROPRIATE AREA ONCE A WEEK, Disp: , Rfl:     ondansetron (ZOFRAN) 8 MG tablet, Take 1 tablet by mouth 3 (Three) Times a Day As Needed for Nausea or Vomiting., Disp: 30 tablet, Rfl: 5    potassium chloride (KLOR-CON) 20 MEQ packet, Take 20 mEq by mouth 2 (Two) Times a Day., Disp: , Rfl:     repaglinide (PRANDIN) 2 MG tablet, Take 1 tablet by mouth 3 (Three) Times a Day Before Meals., Disp: 90 tablet, Rfl: 11    vitamin D (ERGOCALCIFEROL) 1.25 MG (23364 UT) capsule capsule, Take 1 capsule by mouth 1 (One) Time Per Week., Disp: , Rfl:     Pain Medications              aspirin 325 MG tablet Take 1 tablet by mouth Daily.    baclofen (LIORESAL) 10 MG tablet Take 2 tablets by mouth 3 (Three) Times a Day.    gabapentin (NEURONTIN) 300 MG capsule TAKE 1 CAPSULE BY MOUTH THREE TIMES DAILY               ALLERGIES:    Allergies   Allergen Reactions    Lisinopril Dizziness     syncope    Latex Rash     Rash         Physical Exam    VITAL SIGNS:  /71 Comment: LUE  Pulse 60   Temp 97.8 °F (36.6 °C) (Infrared)   Resp 16   Ht 182.9 cm (72\")   Wt 106 kg (234 lb)   SpO2 95% Comment: RA  BMI 31.74 kg/m²     ECOG score: 2       "     Wt Readings from Last 3 Encounters:   04/18/25 106 kg (234 lb)   04/01/25 106 kg (233 lb 12.8 oz)   01/14/25 110 kg (242 lb)       Body mass index is 31.74 kg/m². Body surface area is 2.28 meters squared.    Pain Score    04/18/25 1309   PainSc: 0-No pain             Performance Status: 1    General: well appearing male in no acute distress  Neuro: alert and oriented  HEENT: sclera anicteric, oropharynx clear  Lymphatics: no cervical, supraclavicular, or axillary adenopathy  Cardiovascular: regular rate and rhythm, no murmurs  Lungs: clear to auscultation bilaterally  Abdomen: soft, nontender, nondistended.    Extremities: no lower extremity edema.  Brace on right leg.  Paralysis noted to right upper extremity  Skin: no rashes, lesions, bruising, or petechiae  Psych: mood and affect appropriate    Reviewed and repeated exam as above Amadou Weller MD        RECENT LABS:    Lab Results   Component Value Date    HGB 16.6 12/23/2024    HCT 48.6 12/23/2024    MCV 88.4 12/23/2024     (L) 04/11/2025    WBC 6.9 12/23/2024    NEUTROABS 4.6 12/23/2024    LYMPHSABS 1.5 12/23/2024    MONOSABS 0.5 12/23/2024    EOSABS 0.2 12/23/2024    BASOSABS 0.1 12/23/2024       Lab Results   Component Value Date    GLUCOSE 156 (H) 11/17/2023    BUN 19 11/17/2023    CREATININE 1.27 11/17/2023     11/17/2023    K 4.2 11/17/2023     11/17/2023    CO2 25.4 11/17/2023    CALCIUM 9.8 11/17/2023    PROTEINTOT 7.7 11/17/2023    ALBUMIN 4.5 11/17/2023    BILITOT 0.7 11/17/2023    ALKPHOS 115 11/17/2023    AST 19 11/17/2023    ALT 12 11/17/2023       Lab Results   Component Value Date     (L) 04/11/2025     (L) 01/17/2025     (L) 12/23/2024     (L) 10/01/2024     (L) 06/04/2024       Lab Results   Component Value Date    PSA 10.40 (H) 12/23/2024    PSA 8.560 (H) 11/17/2023    PSA 6.290 (H) 08/11/2022    PSA 5.6 (H) 04/07/2022    PSA 6.62 (H) 03/01/2022         Assessment/Plan    1.   Chronic ITP.  Platelets remain stable at 119,000 on Promacta at 50 mg every other day.  For now we will continue with our current Promacta dosing.  He will get labs every 3 months and see us in 6    2.  Elevated PSA.  Followed by urology.    3.  History of stroke.  Continue aspirin since his platelets remain over 50,000.    4.  Diabetes.  Continue to follow-up with endocrinology.      Amadou Weller MD  [facility]         No orders of the defined types were placed in this encounter.      4/18/2025

## 2025-04-29 ENCOUNTER — OFFICE VISIT (OUTPATIENT)
Age: 60
End: 2025-04-29
Payer: MEDICARE

## 2025-04-29 VITALS
HEIGHT: 72 IN | DIASTOLIC BLOOD PRESSURE: 70 MMHG | SYSTOLIC BLOOD PRESSURE: 124 MMHG | BODY MASS INDEX: 31.25 KG/M2 | OXYGEN SATURATION: 95 % | HEART RATE: 59 BPM | WEIGHT: 230.7 LBS

## 2025-04-29 DIAGNOSIS — E11.65 TYPE 2 DIABETES MELLITUS WITH HYPERGLYCEMIA, WITH LONG-TERM CURRENT USE OF INSULIN: Primary | ICD-10-CM

## 2025-04-29 DIAGNOSIS — L97.526 DIABETIC ULCER OF TOE OF LEFT FOOT ASSOCIATED WITH TYPE 2 DIABETES MELLITUS, WITH BONE INVOLVEMENT WITHOUT EVIDENCE OF NECROSIS: ICD-10-CM

## 2025-04-29 DIAGNOSIS — Z79.4 TYPE 2 DIABETES MELLITUS WITH HYPERGLYCEMIA, WITH LONG-TERM CURRENT USE OF INSULIN: Primary | ICD-10-CM

## 2025-04-29 DIAGNOSIS — E11.621 DIABETIC ULCER OF TOE OF LEFT FOOT ASSOCIATED WITH TYPE 2 DIABETES MELLITUS, WITH BONE INVOLVEMENT WITHOUT EVIDENCE OF NECROSIS: ICD-10-CM

## 2025-04-29 DIAGNOSIS — R80.9 MICROALBUMINURIA: ICD-10-CM

## 2025-04-29 LAB
EXPIRATION DATE: ABNORMAL
EXPIRATION DATE: ABNORMAL
EXPIRATION DATE: NORMAL
GLUCOSE BLDC GLUCOMTR-MCNC: 204 MG/DL (ref 70–130)
HBA1C MFR BLD: 8.5 % (ref 4.5–5.7)
Lab: ABNORMAL
Lab: ABNORMAL
Lab: NORMAL
POC ALBUMIN, URINE: 30 MG/L
POC CREATININE, URINE: 200 MG/DL
POC URINE ALB/CREA RATIO: <30

## 2025-04-29 RX ORDER — TIRZEPATIDE 2.5 MG/.5ML
2.5 INJECTION, SOLUTION SUBCUTANEOUS WEEKLY
Qty: 2 ML | Refills: 4 | Status: SHIPPED | OUTPATIENT
Start: 2025-04-29

## 2025-04-29 RX ORDER — REPAGLINIDE 2 MG/1
2 TABLET ORAL
Qty: 90 TABLET | Refills: 11 | Status: SHIPPED | OUTPATIENT
Start: 2025-04-29 | End: 2026-04-29

## 2025-04-29 RX ORDER — INSULIN GLARGINE 100 [IU]/ML
10 INJECTION, SOLUTION SUBCUTANEOUS NIGHTLY
Start: 2025-04-29

## 2025-04-29 NOTE — ASSESSMENT & PLAN NOTE
Diabetes is not controlled.   A1C 8.5% today.  Unable to get CGM data today.    Given concerns for weight loss defer increase Mounjaro for now; discussed likely combination medication and reducing insulin dose; discussed increase if plateau next OV.   Encouraged consistency with basal insulin for improved control; reducing dose to avoid hypoglycemia.    Rx: Decrease lantus 10 units nightly. Continue Mounjaro 2.5 mg weekly. Continue repaglinide, metformin, jardiance. Continue gabapentin 300 mg BID for neuropathy.   Cautioned with GLP1a: concerns for appetite loss, weight loss >2 lbs per weeks, nausea, heartburn, reflux, vomiting, abdominal pain, constipation, loose stools, vision changes, mood changes, or additional concerns.    Cautioned risk for hypoglycemia with insulin use; advised glucagon - declines prescription; advised glucose supplement/tablet as needed.     Discussed complications associated with diabetes .   Discussed diet:  Increase lean protein and vegetable intake  Avoid concentrated sugar drinks, such as sodas, and processed carbs including crackers, cookies, cakes

## 2025-04-29 NOTE — PATIENT INSTRUCTIONS
Diabetes Treatment Recommendations  04/29/25     Mele Rossi 1965     Your A1C is:   Lab Results   Component Value Date    HGBA1C 8.5 (A) 04/29/2025    HGBA1C 10.1 (A) 01/14/2025    HGBA1C 10.6 (H) 12/23/2024       ADA General Goals: A1c: < 7%                                                  Fasting/before meal glucose: <150 mg/dL                                    2 Hour after meal glucoses: < 180 mg/dL                                        Bedtime glucose:120-180                                                                Glucose testing frequency: daily; continue CGM    Medication Changes:   Continue Mounjaro 2.5 mg weekly  Metformin 500 mg once daily  Jardiance 25 mg once daily   Repaglinide 2 mg three times daily before meals; skip if blood sugar is less than 100 before meal.    Insulin dosing:  Basal insulin (Long acting) Lantus  10 units at bedtime  Increase by 1 units every 3 days if fasting blood sugar is more than 140.   Keep at current dose if fasting blood sugar is between .  Decrease by 1 units if fasting blood sugar is less than 80 or concerns for hypoglycemia overnight or between meals.           Keep records of your glucose levels and insulin adjustments.   We may ask you to keep records on the content of your meals with insulin doses and before/after meal glucose levels to evaluate your ratios.    Call for advice if you have unexplained or unexpected hypoglycemia  (glucose < 70) or persistent high glucose > 250.    Office: [phone]    Virginie Crawford PA-C

## 2025-04-29 NOTE — PROGRESS NOTES
Chief Complaint   Patient presents with    Diabetes        HPI  Mele Rossi is a 60 y.o. male presents today for follow-up evaluation of type 2 diabetes. They were last seen for this 1/14/2025.     Had issue with sensor and ran out prematurely and not currently using Freestyle    Taking Mounjaro 2.5 mg and tolerating well.   Lantus 18 units about 4-5 times per week; skips if blood sugar is less than 150 due to concerns for low blood sugars overnight/early morning.   Continues Metformin, repaglinide, Jardiance    - Admits weight loss with Mounjaro/decreasing insulin  - Admits hypoglycemia once every few weeks.    - Admits constipation few times per week; uses fiber supplement with relief;   - Admits chronic numbness improved with gabapentin taking twice daily; has current ulceration to left great toe managed by Dr. Emanuel, [facility]. Had previously on right great toe and improved. Has left wrapped currently, declines foot exam today.     - Denies SOB, chest pain  - Denies constipation, diarrhea, abdominal pain.  - Denies changes in thirst or urination.    - Denies vision changes.      Denies hx of pancreatitis.  Denies family hx of thyroid cancers.     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  - HTN: carvedilol   - HLD: atorvastatin 20 mg   - Aspirin: 81 mg     DM Health Maintenance:  Ophthalmology: >1 year   Monofilament / Foot exam: declined left side exam today; following with podiatry q3w; has visit next week.   Urine microalbumin:cr 800 1/14/2025  LDL: 44 12/23/2024   GFR: 68 12/23/2024   Vitamin D: 8.1 12/23/2024   TSH: 1.85 12/23/2024     Social Hx:  Home: relies on sister for transportation  Diet: Meals: 3-5x/day Breakfast: breakfast sandwich Lunch:  bologna Dinner: meat + carb  Snacks/Dessert: granola bar  Drinks: diet pepsi, water   Exercise: minimal     The following portions of the patient's history were reviewed and updated as appropriate: allergies, current medications, past family history, past medical history, past social history, past surgical history and problem list.    Past Medical History:   Diagnosis Date    Acute ITP 2022    Diabetes     Fatigue     History of stroke     Hypertension     Obesity     body mass index 30+-obesity    Stroke (cerebrum)     Type 2 diabetes mellitus, uncontrolled      History reviewed. No pertinent surgical history.   Family History   Problem Relation Age of Onset    Diabetes Mother     Mental illness Mother     Heart disease Mother     Hypertension Father     Heart disease Father       Social History     Socioeconomic History    Marital status:    Tobacco Use    Smoking status: Former     Current packs/day: 0.00     Average packs/day: 1 pack/day for 20.0 years (20.0 ttl pk-yrs)     Types: Cigarettes     Start date: 1998     Quit date: 2018     Years since quittin.4     Passive exposure: Never    Smokeless tobacco: Never    Tobacco comments:     States Smokes 1 cigeratte per month   Vaping Use    Vaping status: Never Used   Substance and Sexual Activity    Alcohol use: No    Drug use: No    Sexual activity: Defer      Allergies   Allergen Reactions    Lisinopril Dizziness     syncope    Latex Rash     Rash      Current Outpatient Medications on File Prior to Visit   Medication Sig Dispense Refill    amLODIPine (NORVASC) 10 MG tablet Take 1 tablet by mouth Daily. 30 tablet 0    aspirin 325 MG tablet Take 1 tablet by mouth Daily.      atorvastatin (LIPITOR) 20 MG tablet Take 1 tablet by mouth Daily. 90 tablet 3    baclofen (LIORESAL) 10 MG tablet Take 2 tablets by mouth 3 (Three) Times a Day. 540 tablet 3    carvedilol (COREG) 12.5 MG tablet TAKE ONE TABLET BY MOUTH EVERY 12 HOURS 180 tablet 3     "Continuous Glucose Sensor (FreeStyle Berhane 2 Sensor) misc Use 1 sensor Every 14 (Fourteen) Days. 6 each 3    doxazosin (CARDURA) 4 MG tablet TAKE 1 TABLET BY MOUTH TWICE DAILY 180 tablet 0    eltrombopag (Promacta) 50 MG tablet Take 1 tablet by mouth Every Other Day. 15 tablet 11    furosemide (LASIX) 40 MG tablet Take 1 tablet by mouth 2 (Two) Times a Day.      gabapentin (NEURONTIN) 300 MG capsule TAKE 1 CAPSULE BY MOUTH THREE TIMES DAILY 270 capsule 0    Insulin Pen Needle (BD Pen Needle Claudia U/F) 32G X 4 MM misc Use for injections daily as directed. 100 each 3    ondansetron (ZOFRAN) 8 MG tablet Take 1 tablet by mouth 3 (Three) Times a Day As Needed for Nausea or Vomiting. 30 tablet 5    potassium chloride (KLOR-CON) 20 MEQ packet Take 20 mEq by mouth 2 (Two) Times a Day.      vitamin D (ERGOCALCIFEROL) 1.25 MG (44532 UT) capsule capsule Take 1 capsule by mouth 1 (One) Time Per Week.       No current facility-administered medications on file prior to visit.        Review of Systems   Constitutional:  Positive for unexpected weight loss. Negative for activity change, appetite change and unexpected weight gain.   Eyes:  Negative for visual disturbance.   Respiratory:  Negative for shortness of breath.    Cardiovascular:  Negative for chest pain.   Gastrointestinal:  Positive for constipation. Negative for abdominal pain and diarrhea.   Endocrine: Negative for polydipsia and polyuria.   Skin:  Positive for wound.   Neurological:  Positive for numbness. Negative for dizziness.        /70 (BP Location: Left arm, Patient Position: Sitting, Cuff Size: Adult)   Pulse 59   Ht 182.9 cm (72\")   Wt 105 kg (230 lb 11.2 oz)   SpO2 95%   BMI 31.29 kg/m²      Physical Exam  Constitutional:       Appearance: Normal appearance. He is obese.   Cardiovascular:      Rate and Rhythm: Normal rate.      Pulses:           Dorsalis pedis pulses are 1+ on the right side.        Posterior tibial pulses are 1+ on the right side. "   Pulmonary:      Effort: Pulmonary effort is normal.   Musculoskeletal:         General: Normal range of motion.   Feet:      Right foot:      Protective Sensation: 10 sites tested.  0 sites sensed.      Skin integrity: Callus present.      Toenail Condition: Right toenails are abnormally thick.      Comments: Right foot violaceous with varicose veins. Pre ulcerative callus x 2 to great toe  Skin:     General: Skin is warm and dry.   Neurological:      General: No focal deficit present.      Mental Status: He is alert and oriented to person, place, and time.   Psychiatric:         Mood and Affect: Mood normal.         Behavior: Behavior normal.         LABS AND IMAGING  CMP:  Lab Results   Component Value Date    Glucose 204 (A) 04/29/2025    Glucose 302 (H) 09/13/2018    Glucose, UA 3+ (A) 08/11/2022    Glucose, UA >=1000 mg/dL (3+) (A) 03/02/2022    Glucose, Urine 500 (A) 08/21/2018    BUN 19 11/17/2023    BUN/Creatinine Ratio 15.0 11/17/2023    Creatinine 1.27 11/17/2023    Creatinine, Urine 60.0 04/07/2023    eGFR 64.7 11/17/2023    eGFR Non African Amer 62 08/12/2018    Ketones, UA Negative 08/11/2022    Ketones, UA 15 mg/dL (1+) (A) 03/02/2022    KID L RIGHT 10.8 08/12/2018    CO2 25.4 11/17/2023    Calcium 9.8 11/17/2023    Albumin 4.5 11/17/2023    AST (SGOT) 19 11/17/2023    ALT (SGPT) 12 11/17/2023     Lipid Panel:  Lab Results   Component Value Date    CHOL 96 06/29/2020    TRIG 248 08/25/2023    HDL 30 (L) 08/25/2023    VLDL 50 08/25/2023    LDL 29 08/25/2023     HbA1c:  Hemoglobin A1C   Date Value Ref Range Status   04/29/2025 8.5 (A) 4.5 - 5.7 % Final     Glucose:  Lab Results   Component Value Date    POCGLU 204 (A) 04/29/2025     Microalbumin:  Lab Results   Component Value Date    MALBCRERATIO 46.7 04/07/2023     TSH:  Lab Results   Component Value Date    TSH 1.85 12/23/2024       Assessment and Plan    Diagnoses and all orders for this visit:    1. Type 2 diabetes mellitus with hyperglycemia,  with long-term current use of insulin (Primary)  Assessment & Plan:  Diabetes is not controlled.   A1C 8.5% today.  Unable to get CGM data today.    Given concerns for weight loss defer increase Mounjaro for now; discussed likely combination medication and reducing insulin dose; discussed increase if plateau next OV.   Encouraged consistency with basal insulin for improved control; reducing dose to avoid hypoglycemia.    Rx: Decrease lantus 10 units nightly. Continue Mounjaro 2.5 mg weekly. Continue repaglinide, metformin, jardiance. Continue gabapentin 300 mg BID for neuropathy.   Cautioned with GLP1a: concerns for appetite loss, weight loss >2 lbs per weeks, nausea, heartburn, reflux, vomiting, abdominal pain, constipation, loose stools, vision changes, mood changes, or additional concerns.    Cautioned risk for hypoglycemia with insulin use; advised glucagon - declines prescription; advised glucose supplement/tablet as needed.     Discussed complications associated with diabetes .   Discussed diet:  Increase lean protein and vegetable intake  Avoid concentrated sugar drinks, such as sodas, and processed carbs including crackers, cookies, cakes    Orders:  -     POC Glycosylated Hemoglobin (Hb A1C)  -     POC Glucose, Blood  -     POC Albumin/Creatinine Ratio Urine    2. Diabetic ulcer of toe of left foot associated with type 2 diabetes mellitus, with bone involvement without evidence of necrosis  Assessment & Plan:  [facility]; continue follow-up for monitoring.   Encouraged continued effort toward BG control      3. Microalbuminuria  Assessment & Plan:  POC albumin: 30, Creatinine: 200; ACR: 150  Improving/   Encouraged continue efforts toward improved BG control.  Continue SGLT2i. Consider additional ACE/ARB if continues elevated.       Other orders  -     Insulin Glargine (Lantus SoloStar) 100 UNIT/ML injection pen; Inject 10 Units under the skin into the appropriate area as  directed Every Night.  -     Mounjaro 2.5 MG/0.5ML solution auto-injector; Inject 2.5 mg under the skin into the appropriate area as directed 1 (One) Time Per Week.  Dispense: 2 mL; Refill: 4  -     metFORMIN (GLUCOPHAGE) 500 MG tablet; Take 1 tablet by mouth Daily.  Dispense: 90 tablet; Refill: 1  -     empagliflozin (Jardiance) 25 MG tablet tablet; Take 1 tablet by mouth Daily.  Dispense: 90 tablet; Refill: 1  -     repaglinide (PRANDIN) 2 MG tablet; Take 1 tablet by mouth 3 (Three) Times a Day Before Meals.  Dispense: 90 tablet; Refill: 11         Return in about 3 months (around 7/29/2025) for follow-up diabetes. The patient was instructed to contact the clinic with any interval questions or concerns.    Electronically signed by: Virginie Crawford PA-C   Endocrinology     Please note that portions of this note were completed with a voice recognition program.

## 2025-04-30 NOTE — ASSESSMENT & PLAN NOTE
POC albumin: 30, Creatinine: 200; ACR: 150  Improving/   Encouraged continue efforts toward improved BG control.  Continue SGLT2i. Consider additional ACE/ARB if continues elevated.

## 2025-05-21 ENCOUNTER — SPECIALTY PHARMACY (OUTPATIENT)
Dept: ONCOLOGY | Facility: HOSPITAL | Age: 60
End: 2025-05-21
Payer: MEDICARE

## 2025-05-21 NOTE — PROGRESS NOTES
Specialty Pharmacy Patient Management Program  Refill Outreach     Mele was contacted today regarding refills of their medication(s).    Refill Questions      Flowsheet Row Most Recent Value   Changes to allergies? No   Changes to medications? No   New conditions or infections since last clinic visit No   Unplanned office visit, urgent care, ED, or hospital admission in the last 4 weeks  No   How does patient/caregiver feel medication is working? Good   Financial problems or insurance changes  No   Since the previous refill, were any specialty medication doses or scheduled injections missed or delayed?  No   Does this patient require a clinical escalation to a pharmacist? No            Delivery Questions      Flowsheet Row Most Recent Value   Delivery method UPS   Delivery address verified with patient/caregiver? Yes   Delivery address Home   Other address preferred n/a   Number of medications in delivery 1   Medication(s) being filled and delivered Eltrombopag Olamine (PROMACTA)   Doses left of specialty medications 6 tablets left   Copay verified? Yes   Copay amount $0   Copay form of payment No copayment ($0)   Delivery Date Selection 05/23/25   Signature Required No            Medication Adherence    Adherence tools used: patient uses a pill box to manage medications  Support network for adherence: family member          Follow-up: 30 day(s)     Jasmin Lopes, Pharmacy Technician  5/21/2025  13:34 EDT

## 2025-06-15 DIAGNOSIS — Z79.4 TYPE 2 DIABETES MELLITUS WITH HYPERGLYCEMIA, WITH LONG-TERM CURRENT USE OF INSULIN: ICD-10-CM

## 2025-06-15 DIAGNOSIS — E11.65 TYPE 2 DIABETES MELLITUS WITH HYPERGLYCEMIA, WITH LONG-TERM CURRENT USE OF INSULIN: ICD-10-CM

## 2025-06-16 RX ORDER — DOXAZOSIN 4 MG/1
4 TABLET ORAL 2 TIMES DAILY
Qty: 180 TABLET | Refills: 0 | Status: SHIPPED | OUTPATIENT
Start: 2025-06-16

## 2025-06-16 NOTE — TELEPHONE ENCOUNTER
Rx Refill Note  Requested Prescriptions     Pending Prescriptions Disp Refills    doxazosin (CARDURA) 4 MG tablet [Pharmacy Med Name: DOXAZOSIN 4MG TABLETS] 180 tablet 0     Sig: TAKE 1 TABLET BY MOUTH TWICE DAILY      Last office visit with prescribing clinician: 4/29/2025     Next office visit with prescribing clinician: 9/2/2025     Renetta Mann MA  06/16/25, 09:25 EDT

## 2025-06-18 ENCOUNTER — SPECIALTY PHARMACY (OUTPATIENT)
Dept: ONCOLOGY | Facility: HOSPITAL | Age: 60
End: 2025-06-18
Payer: MEDICARE

## 2025-06-18 NOTE — PROGRESS NOTES
Specialty Pharmacy Patient Management Program  Refill Outreach     Mele was contacted today regarding refills of their medication(s).    Refill Questions      Flowsheet Row Most Recent Value   Changes to allergies? No   Changes to medications? No   New conditions or infections since last clinic visit No   Unplanned office visit, urgent care, ED, or hospital admission in the last 4 weeks  No   How does patient/caregiver feel medication is working? Good   Financial problems or insurance changes  No   Since the previous refill, were any specialty medication doses or scheduled injections missed or delayed?  No   Does this patient require a clinical escalation to a pharmacist? No            Delivery Questions      Flowsheet Row Most Recent Value   Delivery method UPS   Delivery address verified with patient/caregiver? Yes   Delivery address Home   Other address preferred n/a   Number of medications in delivery 1   Medication(s) being filled and delivered Eltrombopag Olamine (PROMACTA)   Doses left of specialty medications 4 days left   Copay verified? Yes   Copay amount $0   Copay form of payment No copayment ($0)   Delivery Date Selection 06/20/25   Signature Required No            Medication Adherence    Adherence tools used: patient uses a pill box to manage medications  Support network for adherence: family member          Follow-up: 30 day(s)     Poornima Echevarria, Pharmacy Care Coordinator  6/18/2025  13:37 EDT

## 2025-07-17 ENCOUNTER — SPECIALTY PHARMACY (OUTPATIENT)
Dept: ONCOLOGY | Facility: HOSPITAL | Age: 60
End: 2025-07-17
Payer: MEDICARE

## 2025-07-17 NOTE — PROGRESS NOTES
Specialty Pharmacy Patient Management Program  Refill Outreach     Mele was contacted today regarding refills of their medication(s).    Refill Questions      Flowsheet Row Most Recent Value   Changes to allergies? No   Changes to medications? No   New conditions or infections since last clinic visit No   Unplanned office visit, urgent care, ED, or hospital admission in the last 4 weeks  No   How does patient/caregiver feel medication is working? Good   Financial problems or insurance changes  No   Since the previous refill, were any specialty medication doses or scheduled injections missed or delayed?  No   Does this patient require a clinical escalation to a pharmacist? No            Delivery Questions      Flowsheet Row Most Recent Value   Delivery method UPS   Delivery address verified with patient/caregiver? Yes   Delivery address Home   Other address preferred n/a   Number of medications in delivery 1   Medication(s) being filled and delivered Eltrombopag Olamine (PROMACTA)   Doses left of specialty medications 4 days left   Copay verified? Yes   Copay amount $0   Copay form of payment No copayment ($0)   Delivery Date Selection 07/18/25   Signature Required No            Medication Adherence    Adherence tools used: patient uses a pill box to manage medications  Support network for adherence: family member          Follow-up: 30 day(s)     Poornima Echevarria, Pharmacy Care Coordinator  7/17/2025  10:12 EDT

## 2025-07-21 ENCOUNTER — LAB (OUTPATIENT)
Dept: LAB | Facility: HOSPITAL | Age: 60
End: 2025-07-21
Payer: MEDICARE

## 2025-07-21 DIAGNOSIS — D69.3 CHRONIC ITP (IDIOPATHIC THROMBOCYTOPENIA): ICD-10-CM

## 2025-07-21 LAB
BASOPHILS # BLD AUTO: 0.1 10*3/MM3 (ref 0–0.2)
BASOPHILS NFR BLD AUTO: 1.2 % (ref 0–1.5)
DEPRECATED RDW RBC AUTO: 43.8 FL (ref 37–54)
EOSINOPHIL # BLD AUTO: 0.23 10*3/MM3 (ref 0–0.4)
EOSINOPHIL NFR BLD AUTO: 2.7 % (ref 0.3–6.2)
ERYTHROCYTE [DISTWIDTH] IN BLOOD BY AUTOMATED COUNT: 13.7 % (ref 12.3–15.4)
HCT VFR BLD AUTO: 45.4 % (ref 37.5–51)
HGB BLD-MCNC: 15.6 G/DL (ref 13–17.7)
IMM GRANULOCYTES # BLD AUTO: 0.06 10*3/MM3 (ref 0–0.05)
IMM GRANULOCYTES NFR BLD AUTO: 0.7 % (ref 0–0.5)
LYMPHOCYTES # BLD AUTO: 1.9 10*3/MM3 (ref 0.7–3.1)
LYMPHOCYTES NFR BLD AUTO: 22.1 % (ref 19.6–45.3)
MCH RBC QN AUTO: 30.1 PG (ref 26.6–33)
MCHC RBC AUTO-ENTMCNC: 34.4 G/DL (ref 31.5–35.7)
MCV RBC AUTO: 87.6 FL (ref 79–97)
MONOCYTES # BLD AUTO: 0.64 10*3/MM3 (ref 0.1–0.9)
MONOCYTES NFR BLD AUTO: 7.5 % (ref 5–12)
NEUTROPHILS NFR BLD AUTO: 5.66 10*3/MM3 (ref 1.7–7)
NEUTROPHILS NFR BLD AUTO: 65.8 % (ref 42.7–76)
NRBC BLD AUTO-RTO: 0 /100 WBC (ref 0–0.2)
PLATELET # BLD AUTO: 195 10*3/MM3 (ref 140–450)
PMV BLD AUTO: 11.1 FL (ref 6–12)
RBC # BLD AUTO: 5.18 10*6/MM3 (ref 4.14–5.8)
WBC NRBC COR # BLD AUTO: 8.59 10*3/MM3 (ref 3.4–10.8)

## 2025-07-21 PROCEDURE — 36415 COLL VENOUS BLD VENIPUNCTURE: CPT

## 2025-07-21 PROCEDURE — 85025 COMPLETE CBC W/AUTO DIFF WBC: CPT

## 2025-07-29 DIAGNOSIS — Z79.4 TYPE 2 DIABETES MELLITUS WITH HYPERGLYCEMIA, WITH LONG-TERM CURRENT USE OF INSULIN: ICD-10-CM

## 2025-07-29 DIAGNOSIS — E11.65 TYPE 2 DIABETES MELLITUS WITH HYPERGLYCEMIA, WITH LONG-TERM CURRENT USE OF INSULIN: ICD-10-CM

## 2025-07-29 NOTE — TELEPHONE ENCOUNTER
Rx Refill Note  Requested Prescriptions     Pending Prescriptions Disp Refills    gabapentin (NEURONTIN) 300 MG capsule [Pharmacy Med Name: GABAPENTIN 300MG CAPSULES] 90 capsule 0     Sig: TAKE 1 CAPSULE BY MOUTH THREE TIMES DAILY      Last office visit with prescribing clinician: 4/29/2025     Next office visit with prescribing clinician: 9/2/2025     Renetta Mann MA  07/29/25, 08:07 EDT

## 2025-07-30 RX ORDER — GABAPENTIN 300 MG/1
300 CAPSULE ORAL 3 TIMES DAILY
Qty: 90 CAPSULE | Refills: 0 | Status: SHIPPED | OUTPATIENT
Start: 2025-07-30

## 2025-08-15 ENCOUNTER — SPECIALTY PHARMACY (OUTPATIENT)
Dept: ONCOLOGY | Facility: HOSPITAL | Age: 60
End: 2025-08-15
Payer: MEDICARE

## 2025-08-26 ENCOUNTER — HOSPITAL ENCOUNTER (EMERGENCY)
Facility: HOSPITAL | Age: 60
Discharge: HOME | End: 2025-08-26
Attending: EMERGENCY MEDICINE
Payer: MEDICARE

## 2025-08-26 ENCOUNTER — APPOINTMENT (OUTPATIENT)
Dept: CT IMAGING | Facility: HOSPITAL | Age: 60
End: 2025-08-26
Payer: MEDICARE

## 2025-08-26 VITALS
OXYGEN SATURATION: 95 % | BODY MASS INDEX: 30.88 KG/M2 | WEIGHT: 228 LBS | DIASTOLIC BLOOD PRESSURE: 80 MMHG | HEIGHT: 72 IN | TEMPERATURE: 98.5 F | HEART RATE: 75 BPM | SYSTOLIC BLOOD PRESSURE: 144 MMHG | RESPIRATION RATE: 18 BRPM

## 2025-08-26 DIAGNOSIS — S01.01XA LACERATION OF SCALP, INITIAL ENCOUNTER: Primary | ICD-10-CM

## 2025-08-26 PROCEDURE — 70450 CT HEAD/BRAIN W/O DYE: CPT

## 2025-08-26 PROCEDURE — 90471 IMMUNIZATION ADMIN: CPT | Performed by: EMERGENCY MEDICINE

## 2025-08-26 PROCEDURE — 99284 EMERGENCY DEPT VISIT MOD MDM: CPT

## 2025-08-26 PROCEDURE — 90715 TDAP VACCINE 7 YRS/> IM: CPT | Performed by: EMERGENCY MEDICINE

## 2025-08-26 PROCEDURE — 6360000002 HC RX W HCPCS: Performed by: EMERGENCY MEDICINE

## 2025-08-26 PROCEDURE — 6370000000 HC RX 637 (ALT 250 FOR IP): Performed by: EMERGENCY MEDICINE

## 2025-08-26 PROCEDURE — 12005 RPR S/N/A/GEN/TRK12.6-20.0CM: CPT

## 2025-08-26 RX ORDER — ELTROMBOPAG 50 MG/1
50 TABLET, FILM COATED ORAL EVERY OTHER DAY
COMMUNITY
Start: 2025-04-18

## 2025-08-26 RX ORDER — ACETAMINOPHEN 325 MG/1
650 TABLET ORAL ONCE
Status: COMPLETED | OUTPATIENT
Start: 2025-08-26 | End: 2025-08-26

## 2025-08-26 RX ADMIN — TETANUS TOXOID, REDUCED DIPHTHERIA TOXOID AND ACELLULAR PERTUSSIS VACCINE, ADSORBED 0.5 ML: 5; 2.5; 8; 8; 2.5 SUSPENSION INTRAMUSCULAR at 16:48

## 2025-08-26 RX ADMIN — ACETAMINOPHEN 650 MG: 325 TABLET, FILM COATED ORAL at 18:04

## 2025-08-26 ASSESSMENT — ENCOUNTER SYMPTOMS
VOICE CHANGE: 0
WHEEZING: 0
SHORTNESS OF BREATH: 0
TROUBLE SWALLOWING: 0

## 2025-08-26 ASSESSMENT — PAIN - FUNCTIONAL ASSESSMENT
PAIN_FUNCTIONAL_ASSESSMENT: 0-10

## 2025-08-26 ASSESSMENT — LIFESTYLE VARIABLES
HOW OFTEN DO YOU HAVE A DRINK CONTAINING ALCOHOL: NEVER
HOW MANY STANDARD DRINKS CONTAINING ALCOHOL DO YOU HAVE ON A TYPICAL DAY: PATIENT DOES NOT DRINK

## 2025-08-26 ASSESSMENT — PAIN SCALES - GENERAL
PAINLEVEL_OUTOF10: 0
PAINLEVEL_OUTOF10: 5
PAINLEVEL_OUTOF10: 0

## 2025-08-26 ASSESSMENT — PAIN DESCRIPTION - LOCATION: LOCATION: HEAD
